# Patient Record
Sex: FEMALE | Race: BLACK OR AFRICAN AMERICAN | NOT HISPANIC OR LATINO | Employment: FULL TIME | URBAN - METROPOLITAN AREA
[De-identification: names, ages, dates, MRNs, and addresses within clinical notes are randomized per-mention and may not be internally consistent; named-entity substitution may affect disease eponyms.]

---

## 2017-01-26 ENCOUNTER — OFFICE VISIT (OUTPATIENT)
Dept: FAMILY MEDICINE | Facility: CLINIC | Age: 57
End: 2017-01-26
Payer: COMMERCIAL

## 2017-01-26 ENCOUNTER — TELEPHONE (OUTPATIENT)
Dept: FAMILY MEDICINE | Facility: CLINIC | Age: 57
End: 2017-01-26

## 2017-01-26 VITALS
HEART RATE: 75 BPM | HEIGHT: 65 IN | WEIGHT: 293 LBS | OXYGEN SATURATION: 97 % | BODY MASS INDEX: 48.82 KG/M2 | DIASTOLIC BLOOD PRESSURE: 84 MMHG | SYSTOLIC BLOOD PRESSURE: 132 MMHG | RESPIRATION RATE: 18 BRPM | TEMPERATURE: 98 F

## 2017-01-26 DIAGNOSIS — J30.89 NON-SEASONAL ALLERGIC RHINITIS DUE TO OTHER ALLERGIC TRIGGER: ICD-10-CM

## 2017-01-26 DIAGNOSIS — I10 ESSENTIAL HYPERTENSION: ICD-10-CM

## 2017-01-26 DIAGNOSIS — M25.551 RIGHT HIP PAIN: Primary | ICD-10-CM

## 2017-01-26 DIAGNOSIS — F45.41 STRESS HEADACHES: ICD-10-CM

## 2017-01-26 DIAGNOSIS — E55.9 VITAMIN D DEFICIENCY: ICD-10-CM

## 2017-01-26 PROBLEM — J30.9 ALLERGIC RHINITIS: Status: ACTIVE | Noted: 2017-01-26

## 2017-01-26 PROCEDURE — 99999 PR PBB SHADOW E&M-EST. PATIENT-LVL IV: CPT | Mod: PBBFAC,,, | Performed by: FAMILY MEDICINE

## 2017-01-26 PROCEDURE — 1159F MED LIST DOCD IN RCRD: CPT | Mod: S$GLB,,, | Performed by: FAMILY MEDICINE

## 2017-01-26 PROCEDURE — 3079F DIAST BP 80-89 MM HG: CPT | Mod: S$GLB,,, | Performed by: FAMILY MEDICINE

## 2017-01-26 PROCEDURE — 3075F SYST BP GE 130 - 139MM HG: CPT | Mod: S$GLB,,, | Performed by: FAMILY MEDICINE

## 2017-01-26 PROCEDURE — 99214 OFFICE O/P EST MOD 30 MIN: CPT | Mod: S$GLB,,, | Performed by: FAMILY MEDICINE

## 2017-01-26 RX ORDER — ERGOCALCIFEROL 1.25 MG/1
50000 CAPSULE ORAL
Qty: 6 CAPSULE | Refills: 0 | Status: SHIPPED | OUTPATIENT
Start: 2017-01-26 | End: 2017-03-09 | Stop reason: SDUPTHER

## 2017-01-26 RX ORDER — MELOXICAM 15 MG/1
15 TABLET ORAL DAILY
Qty: 30 TABLET | Refills: 2 | Status: SHIPPED | OUTPATIENT
Start: 2017-01-26 | End: 2017-11-29 | Stop reason: SDUPTHER

## 2017-01-26 NOTE — PROGRESS NOTES
Chief Complaint   Patient presents with    Headache     couple of weeks    Hip Pain     R hip on going       HPI  Bambi Carrera is a 56 y.o. female with multiple medical diagnoses as listed in the medical history and problem list that presents for evaluation of right pain that has been present for several months and worsening over the past three weeks. She did fall while bringing groceries in the house and she fell forward and landed on her left knee. The pain in her hip is worse with lying down. It can be intense and she does take ibuprofen for this.    She has been having headaches for the past three weeks that occur frontally. Initially they were occurring daily but are now every other day. No vision changes or light sensitivity, no nausea/vomiting. She has no hx of migraine headaches and has been having some stress with a new relationship she is involved in.    PAST MEDICAL HISTORY:  Past Medical History   Diagnosis Date    Borderline diabetic     Depression     Hypertension     Menopause        PAST SURGICAL HISTORY:  Past Surgical History   Procedure Laterality Date    Lasik Bilateral 07/15/2016       SOCIAL HISTORY:  Social History     Social History    Marital status: Single     Spouse name: N/A    Number of children: N/A    Years of education: N/A     Occupational History    Not on file.     Social History Main Topics    Smoking status: Never Smoker    Smokeless tobacco: Never Used    Alcohol use Not on file    Drug use: Not on file    Sexual activity: Not on file     Other Topics Concern    Not on file     Social History Narrative       FAMILY HISTORY:  Family History   Problem Relation Age of Onset    Cancer Mother        ALLERGIES AND MEDICATIONS: updated and reviewed.  Review of patient's allergies indicates:  No Known Allergies  Current Outpatient Prescriptions   Medication Sig Dispense Refill    cetirizine (ZYRTEC) 10 MG tablet TAKE ONE TABLET BY MOUTH ONCE DAILY 30 tablet 5     "ergocalciferol (VITAMIN D2) 50,000 unit Cap Take 1 capsule (50,000 Units total) by mouth every 7 days. 6 capsule 0    fluticasone (FLONASE) 50 mcg/actuation nasal spray 1 spray by Each Nare route 2 (two) times daily. 1 Bottle 5    hydrochlorothiazide (HYDRODIURIL) 25 MG tablet TAKE ONE TABLET BY MOUTH ONCE DAILY 90 tablet 0    meloxicam (MOBIC) 15 MG tablet Take 1 tablet (15 mg total) by mouth once daily. 30 tablet 2     Current Facility-Administered Medications   Medication Dose Route Frequency Provider Last Rate Last Dose    methylPREDNISolone acetate injection 80 mg  80 mg Intra-articular 1 time in Clinic/HOD MD BROOKS Ordonez  Review of Systems   Constitutional: Negative for chills, diaphoresis, fatigue, fever and unexpected weight change.   HENT: Negative for rhinorrhea, sinus pressure, sore throat and tinnitus.    Eyes: Negative for photophobia and visual disturbance.   Respiratory: Negative for cough, shortness of breath and wheezing.    Cardiovascular: Negative for chest pain and palpitations.   Gastrointestinal: Negative for abdominal pain, blood in stool, constipation, diarrhea, nausea and vomiting.   Genitourinary: Negative for dysuria, flank pain, frequency and vaginal discharge.   Musculoskeletal: Positive for arthralgias and myalgias. Negative for joint swelling.   Skin: Negative for rash.   Neurological: Negative for speech difficulty, weakness, light-headedness and headaches.   Psychiatric/Behavioral: Negative for behavioral problems and dysphoric mood.       Physical Exam  Vitals:    01/26/17 0924   BP: 132/84   Pulse: 75   Resp: 18   Temp: 97.6 °F (36.4 °C)    Body mass index is 50.35 kg/(m^2).  Weight: (!) 137.2 kg (302 lb 9.3 oz)   Height: 5' 5" (165.1 cm)     Physical Exam   Constitutional: She is oriented to person, place, and time. She appears well-developed and well-nourished. No distress.   HENT:   Head: Normocephalic and atraumatic.   Eyes: EOM are normal.   Neck: Neck " supple.   Cardiovascular: Normal rate and regular rhythm.  Exam reveals no gallop and no friction rub.    No murmur heard.  Pulmonary/Chest: Effort normal and breath sounds normal. No respiratory distress. She has no wheezes. She has no rales.   Abdominal: Soft. Bowel sounds are normal. She exhibits no distension and no mass. There is no tenderness. There is no rebound and no guarding.   Musculoskeletal:   Right hip pain with palpation over upper buttock muscle, pain with internal rotation of hip while flexed, no pain with external rotation   Lymphadenopathy:     She has no cervical adenopathy.   Neurological: She is alert and oriented to person, place, and time.   Skin: Skin is warm and dry. No rash noted.   Psychiatric: She has a normal mood and affect. Her behavior is normal.   Nursing note and vitals reviewed.      Health Maintenance       Date Due Completion Date    Colonoscopy 4/1/2010 ---    Influenza Vaccine 8/1/2016 3/9/2016 (Declined)    Override on 3/9/2016: Declined    Pap Smear 6/3/2017 6/3/2014 (Done)    Override on 6/3/2014: Done (normal)    Mammogram 6/10/2017 6/10/2015 (Done)    Override on 6/10/2015: Done (normal)    Override on 6/10/2014: Done (normal)    Lipid Panel 3/9/2021 3/9/2016    TETANUS VACCINE 6/8/2026 6/8/2016    Override on 4/1/2005: Done            ASSESSMENT     1. Right hip pain    2. Stress headaches    3. Essential hypertension    4. Vitamin D deficiency    5. Non-seasonal allergic rhinitis due to other allergic trigger        PLAN:     Right hip pain  -PT and ortho consult due to chronic nature of pain  -     Ambulatory referral to Orthopedics  -     Ambulatory consult to Physical Therapy    Stress headaches  -discussed stress management    Essential hypertension  -stable on current regimen    Vitamin D deficiency  -     ergocalciferol (VITAMIN D2) 50,000 unit Cap; Take 1 capsule (50,000 Units total) by mouth every 7 days.  Dispense: 6 capsule; Refill: 0    Non-seasonal allergic  rhinitis due to other allergic trigger  -may take claritin, allegra or use flonase OTC            Natalia Martin MD  01/30/2017 10:07 AM        Return in about 6 weeks (around 3/9/2017) for Follow up.

## 2017-01-26 NOTE — MR AVS SNAPSHOT
[facility]  [address]  [place] [address]  Phone: [phone]  Fax: [phone]                  Bambi Carrera   2017 9:20 AM   Office Visit    Description:  Female : 1960   Provider:  Natalia Martin MD   Department:  [facility]           Reason for Visit     Headache     Hip Pain           Diagnoses this Visit        Comments    Right hip pain    -  Primary     Stress headaches         Essential hypertension         Vitamin D deficiency         Non-seasonal allergic rhinitis due to other allergic trigger                To Do List           Goals (5 Years of Data)     None      Follow-Up and Disposition     Return in about 6 weeks (around 3/9/2017) for Follow up.       These Medications        Disp Refills Start End    ergocalciferol (VITAMIN D2) 50,000 unit Cap 6 capsule 0 2017     Take 1 capsule (50,000 Units total) by mouth every 7 days. - Oral    Pharmacy: [facility] [address] [place] [address]. Ph #: [phone]       meloxicam (MOBIC) 15 MG tablet 30 tablet 2 2017    Take 1 tablet (15 mg total) by mouth once daily. - Oral    Pharmacy: [facility] [address] [place] [address]. Ph #: [phone]         [facility]     [facility] Nurse Care Line -  Assistance  Registered nurses in the [facility] provide clinical advisement, health education, appointment booking, and other advisory services.  Call for this free service at [phone].             Medications           Message regarding Medications     Verify the changes and/or additions to your medication regime listed below are the same as discussed with your clinician today.  If any of these changes or additions are incorrect, please notify your healthcare provider.        START taking these NEW medications        Refills    meloxicam (MOBIC) 15 MG tablet 2    Sig: Take 1 tablet (15 mg total) by mouth  "once daily.    Class: Normal    Route: Oral      CHANGE how you are taking these medications     Start Taking Instead of    ergocalciferol (VITAMIN D2) 50,000 unit Cap VITAMIN D2 50,000 unit capsule    Dosage:  Take 1 capsule (50,000 Units total) by mouth every 7 days. Dosage:  TAKE ONE CAPSULE BY MOUTH ONCE A WEEK    Reason for Change:  Reorder            Verify that the below list of medications is an accurate representation of the medications you are currently taking.  If none reported, the list may be blank. If incorrect, please contact your healthcare provider. Carry this list with you in case of emergency.           Current Medications     cetirizine (ZYRTEC) 10 MG tablet TAKE ONE TABLET BY MOUTH ONCE DAILY    ergocalciferol (VITAMIN D2) 50,000 unit Cap Take 1 capsule (50,000 Units total) by mouth every 7 days.    fluticasone (FLONASE) 50 mcg/actuation nasal spray 1 spray by Each Nare route 2 (two) times daily.    hydrochlorothiazide (HYDRODIURIL) 25 MG tablet TAKE ONE TABLET BY MOUTH ONCE DAILY    meloxicam (MOBIC) 15 MG tablet Take 1 tablet (15 mg total) by mouth once daily.           Clinical Reference Information           Vital Signs - Last Recorded  Most recent update: 1/26/2017  9:25 AM by Isaiah Portillo MA    BP Pulse Temp Resp Ht Wt    132/84 (BP Location: Left arm, Patient Position: Sitting, BP Method: Manual) 75 97.6 °F (36.4 °C) (Oral) 18 5' 5" (1.651 m) (!) 137.2 kg (302 lb 9.3 oz)    SpO2 BMI             97% 50.35 kg/m2         Blood Pressure          Most Recent Value    BP  132/84      Allergies as of 1/26/2017     No Known Allergies      Immunizations Administered on Date of Encounter - 1/26/2017     None      Orders Placed During Today's Visit      Normal Orders This Visit    Ambulatory referral to Orthopedics       "

## 2017-01-27 ENCOUNTER — TELEPHONE (OUTPATIENT)
Dept: FAMILY MEDICINE | Facility: CLINIC | Age: 57
End: 2017-01-27

## 2017-01-27 NOTE — TELEPHONE ENCOUNTER
Patient has an appointment 2/9/17 10:30am with Dr. Cavazos at the main [facility], patient was notified.

## 2017-02-06 ENCOUNTER — CLINICAL SUPPORT (OUTPATIENT)
Dept: REHABILITATION | Facility: HOSPITAL | Age: 57
End: 2017-02-06
Attending: FAMILY MEDICINE
Payer: COMMERCIAL

## 2017-02-06 DIAGNOSIS — M25.551 RIGHT HIP PAIN: ICD-10-CM

## 2017-02-06 DIAGNOSIS — M25.60 JOINT STIFFNESS: ICD-10-CM

## 2017-02-06 DIAGNOSIS — R26.2 DIFFICULTY WALKING: ICD-10-CM

## 2017-02-06 DIAGNOSIS — M62.81 MUSCLE WEAKNESS: ICD-10-CM

## 2017-02-06 PROCEDURE — 97110 THERAPEUTIC EXERCISES: CPT | Mod: PN

## 2017-02-06 PROCEDURE — 97161 PT EVAL LOW COMPLEX 20 MIN: CPT | Mod: PN

## 2017-02-06 PROCEDURE — 97140 MANUAL THERAPY 1/> REGIONS: CPT | Mod: PN

## 2017-02-06 NOTE — PLAN OF CARE
OUTPATIENT PHYSICAL THERAPY   PATIENT EVALUATION  Onset Date: 2 weeks  Primary Diagnosis:   1. Right hip pain     2. Joint stiffness     3. Muscle weakness     4. Difficulty walking       Treatment Diagnosis: see above  Past Medical History   Diagnosis Date    Borderline diabetic     Depression     Hypertension     Menopause      Precautions: none  Prior Therapy: None  Medications: Bambi Carrera has a current medication list which includes the following prescription(s): cetirizine, ergocalciferol, fluticasone, hydrochlorothiazide, and meloxicam, and the following Facility-Administered Medications: methylprednisolone acetate.  Nutrition:  Obese    Prior Level of Function: Independent  Social History: computer work  Place of Residence (Steps/Adaptations): 18 stairs      Subjective     Bambi Carrera is a 56 year old female presenting with c/o right hip and low back pain without right LE radiculopathy.  She reports a traumatic onset status post fall onto her knees 2 weeks ago. She denies a prior history of falls.   She states she had lower back pain prior to the fall for several years. She denies any recent imaging. Her goal is to return to pain free prior level of function including walking more and riding her bike.     Pain:  Location: right hip, right lumbar    Activities Which Increase Pain: lying, prolonged walking, sweeping, standing at sink (5 min),bending, squatting  Activities Which Decrease Pain: hot water.   Pain Scale: 1/10 at best 1/10 now  10/10 at worst      Objective     Observation: pt stands with right side slightly higher than the contralateral left  Gait: Pt presents ambulating with a slight antalgic gait, decreased stance on right LE.   Palpation: moderate tenderness on palpation of right lumbar paraspinals, QL, piriformis, greater trochanter and IT band      Range of Motion/Strength:      Lumbar AROM: Degrees   Flexion 50   Extension 10   Right side bending 20   Left side bending 15    Lumbar quadrant reveals: right lateral flexion, extension    AROM: Bilateral LE: Grossly WFL  MMT:  Right LE: 4/5   Left LE: 4/5  Abdominal Strength: 3+/5    Mobility: L/S p/a grade 2-  Flexibility: hip flexor length:fair      Hamstring Length:fair    Bed Mobility:Independent  Transfers: Independent    Special Tests:   -LLD: right > left 1cm  -Slump: Negative  -SLR: negative  -Hip scour: negative  -Horace's: Negative  -SIJ gapping and compression: Negative    Treatment:   Pt received therapeutic exercises to develop strength, endurance, ROM, flexibility, posture and core stabilization for 20 minutes including:  -LTR x 20  -piriformis stretch 20 sec x 4  -pelvic tilts x 20: np  -TrA recruitment x 5 sec 2 x 10: np  -QL stretch 20 sec x 4  -ball squeeze x 20    Pt received manual therapy to improve mobility for 10 minutes:  -STM with roller stick to piriformis, QL, IT band  -MET right innominate posterior, shotgun      Pt was instructed in and given a home exercise program consisting of the above activities.       Assessment     Pt presents with signs and symptoms consistent with referring diagnosis. Evaluation has determined a decrease in functional status and subjective and objective deficits that can be addressed by physical therapy intervention. Pt demonstrates pain limiting functional activities. Decreased flexibility and strength limiting normal movement patterns. Decreased segmental motion. Decreased postural strength and awareness. Positive special testing. Decreased participation in functional and recreational activities. Subjective and objective measures are addressed by goals in the plan of care.  Patient/family are involved in the development of these goals. Patient/family are educated about current injury and treatment. Pt demonstrates no additional cultural, spiritual or educational need and currently has no barriers to learning.      Pt responded well to treatment today. Pt is a good candidate for  skilled physical therapy intervention and has a good prognosis and is motivated to return to functional and  recreational activities.    Rehab Potential: good     History  Co-morbidities and personal factors that may impact the plan of care Examination  Body Structures and Functions, activity limitations and participation restrictions that may impact the plan of care Clinical Presentation   Decision Making/ Complexity Score   Co-morbidities:   obesity              Personal Factors:   None Body Regions: right hip, right L/S    Body Systems: musculoskeletal          Activity limitations:  Prolonged walking, prolonged standing, bending, lying down      Participation Restrictions: sleeping, household chores, walking for exercise          Stable, uncomplicated Low         Short Term Goals (4 Weeks):     1.Pt to increase strength by a 1/2 grade of muscles test to allow for improvement in functional activities such as performing chores.  2.Pt to improve range of motion by 25% to allow for improved functional mobility to allow for improvement in IADLs.   3.Pt to report compliance with HEP and demonstrate proper exercise technique to PT to show competence with self management of condition.  4.Decrease pain by 25% during functional activities.    Long Term Goals (12 Weeks):     1. Increase ROM to allow improved joint biomechanics during functional activities.   2.Increase trunk and lower extremity strength to within normal limits during functional activities.   3. Independent with home exercise program.   4. Full return to functional activities with manageable complaints.  5. Patient to demonstrate improved posture and body mechanics.  6. Decrease pain by 75% during functional activities.    CMS Impairment/Limitation/Restriction for FOTO Hip Survey  Status Limitation G-Code CMS Severity Modifier  Intake 71% 29% Current Status CJ - At least 20 percent but less than 40 percent  Predicted 77% 23% Goal Status+ CJ - At least 20  percent but less than 40 percent  PT goal 1-20% impaired      Plan     Certification Period: 2/6/17 to 5/6/17    Recommended Treatment Plan: 2-3 times per week for 12 weeks with treatments to consist of:  Neuromuscular and postural re-education,  training, therapeutic exercise, therapeutic activities,balance training, manual therapy, soft tissue mobilization, ROM exercises, Cardiovascular, Postural stabilization, manual traction, spinal mobilization, moist heat, cryotherapy, electrical stimulation, ultrasound, home exercise education and planning.      Therapist: Rell Mcconnell, PT

## 2017-02-06 NOTE — PROGRESS NOTES
TIME RECORD    Date: 02/06/2017    Start Time:  10:00  Stop Time:  11:00      Total Timed Minutes:  60 minutes      OUTPATIENT PHYSICAL THERAPY   PATIENT EVALUATION  Onset Date: 2 weeks  Primary Diagnosis:   1. Right hip pain     2. Joint stiffness     3. Muscle weakness     4. Difficulty walking       Treatment Diagnosis: see above  Past Medical History   Diagnosis Date    Borderline diabetic     Depression     Hypertension     Menopause      Precautions: none  Prior Therapy: None  Medications: Bambi Carrera has a current medication list which includes the following prescription(s): cetirizine, ergocalciferol, fluticasone, hydrochlorothiazide, and meloxicam, and the following Facility-Administered Medications: methylprednisolone acetate.  Nutrition:  Obese    Prior Level of Function: Independent  Social History: computer work  Place of Residence (Steps/Adaptations): 18 stairs      Subjective     Bambi Carrera is a 56 year old female presenting with c/o right hip and low back pain without right LE radiculopathy.  She reports a traumatic onset status post fall onto her knees 2 weeks ago. She denies a prior history of falls.   She states she had lower back pain prior to the fall for several years. She denies any recent imaging. Her goal is to return to pain free prior level of function including walking more and riding her bike.     Pain:  Location: right hip, right lumbar    Activities Which Increase Pain: lying, prolonged walking, sweeping, standing at sink (5 min),bending, squatting  Activities Which Decrease Pain: hot water.   Pain Scale: 1/10 at best 1/10 now  10/10 at worst      Objective     Observation: pt stands with right side slightly higher than the contralateral left  Gait: Pt presents ambulating with a slight antalgic gait, decreased stance on right LE.   Palpation: moderate tenderness on palpation of right lumbar paraspinals, QL, piriformis, greater trochanter and IT band      Range of  Motion/Strength:      Lumbar AROM: Degrees   Flexion 50   Extension 10   Right side bending 20   Left side bending 15   Lumbar quadrant reveals: right lateral flexion, extension    AROM: Bilateral LE: Grossly WFL  MMT:  Right LE: 4/5   Left LE: 4/5  Abdominal Strength: 3+/5    Mobility: L/S p/a grade 2-  Flexibility: hip flexor length:fair      Hamstring Length:fair    Bed Mobility:Independent  Transfers: Independent    Special Tests:   -LLD: right > left 1cm  -Slump: Negative  -SLR: negative  -Hip scour: negative  -Horace's: Negative  -SIJ gapping and compression: Negative    Treatment:   Pt received therapeutic exercises to develop strength, endurance, ROM, flexibility, posture and core stabilization for 20 minutes including:  -LTR x 20  -piriformis stretch 20 sec x 4  -pelvic tilts x 20: np  -TrA recruitment x 5 sec 2 x 10: np  -QL stretch 20 sec x 4  -ball squeeze x 20    Pt received manual therapy to improve mobility for 10 minutes:  -STM with roller stick to piriformis, QL, IT band  -MET right innominate posterior, shotgun      Pt was instructed in and given a home exercise program consisting of the above activities.       Assessment     Pt presents with signs and symptoms consistent with referring diagnosis. Evaluation has determined a decrease in functional status and subjective and objective deficits that can be addressed by physical therapy intervention. Pt demonstrates pain limiting functional activities. Decreased flexibility and strength limiting normal movement patterns. Decreased segmental motion. Decreased postural strength and awareness. Positive special testing. Decreased participation in functional and recreational activities. Subjective and objective measures are addressed by goals in the plan of care.  Patient/family are involved in the development of these goals. Patient/family are educated about current injury and treatment. Pt demonstrates no additional cultural, spiritual or educational need  and currently has no barriers to learning.      Pt responded well to treatment today. Pt is a good candidate for skilled physical therapy intervention and has a good prognosis and is motivated to return to functional and  recreational activities.    Rehab Potential: good     History  Co-morbidities and personal factors that may impact the plan of care Examination  Body Structures and Functions, activity limitations and participation restrictions that may impact the plan of care Clinical Presentation   Decision Making/ Complexity Score   Co-morbidities:   obesity              Personal Factors:   None Body Regions: right hip, right L/S    Body Systems: musculoskeletal          Activity limitations:  Prolonged walking, prolonged standing, bending, lying down      Participation Restrictions: sleeping, household chores, walking for exercise          Stable, uncomplicated Low         Short Term Goals (4 Weeks):     1.Pt to increase strength by a 1/2 grade of muscles test to allow for improvement in functional activities such as performing chores.  2.Pt to improve range of motion by 25% to allow for improved functional mobility to allow for improvement in IADLs.   3.Pt to report compliance with HEP and demonstrate proper exercise technique to PT to show competence with self management of condition.  4.Decrease pain by 25% during functional activities.    Long Term Goals (12 Weeks):     1. Increase ROM to allow improved joint biomechanics during functional activities.   2.Increase trunk and lower extremity strength to within normal limits during functional activities.   3. Independent with home exercise program.   4. Full return to functional activities with manageable complaints.  5. Patient to demonstrate improved posture and body mechanics.  6. Decrease pain by 75% during functional activities.    CMS Impairment/Limitation/Restriction for FOTO Hip Survey  Status Limitation G-Code CMS Severity Modifier  Intake 71% 29%  Current Status CJ - At least 20 percent but less than 40 percent  Predicted 77% 23% Goal Status+ CJ - At least 20 percent but less than 40 percent  PT goal 1-20% impaired      Plan     Certification Period: 2/6/17 to 5/6/17    Recommended Treatment Plan: 2-3 times per week for 12 weeks with treatments to consist of:  Neuromuscular and postural re-education,  training, therapeutic exercise, therapeutic activities,balance training, manual therapy, soft tissue mobilization, ROM exercises, Cardiovascular, Postural stabilization, manual traction, spinal mobilization, moist heat, cryotherapy, electrical stimulation, ultrasound, home exercise education and planning.      Therapist: Rell Mcconnell, PT

## 2017-02-09 ENCOUNTER — HOSPITAL ENCOUNTER (OUTPATIENT)
Dept: RADIOLOGY | Facility: HOSPITAL | Age: 57
Discharge: HOME | End: 2017-02-09
Attending: ORTHOPAEDIC SURGERY
Payer: COMMERCIAL

## 2017-02-09 ENCOUNTER — OFFICE VISIT (OUTPATIENT)
Dept: ORTHOPEDICS | Facility: CLINIC | Age: 57
End: 2017-02-09
Payer: COMMERCIAL

## 2017-02-09 VITALS
HEART RATE: 77 BPM | BODY MASS INDEX: 48.82 KG/M2 | WEIGHT: 293 LBS | DIASTOLIC BLOOD PRESSURE: 87 MMHG | HEIGHT: 65 IN | SYSTOLIC BLOOD PRESSURE: 122 MMHG

## 2017-02-09 DIAGNOSIS — M70.71 BURSITIS OF HIP, RIGHT: ICD-10-CM

## 2017-02-09 DIAGNOSIS — M25.559 ARTHRALGIA OF HIP, UNSPECIFIED LATERALITY: Primary | ICD-10-CM

## 2017-02-09 DIAGNOSIS — M25.559 ARTHRALGIA OF HIP, UNSPECIFIED LATERALITY: ICD-10-CM

## 2017-02-09 PROCEDURE — 99243 OFF/OP CNSLTJ NEW/EST LOW 30: CPT | Mod: S$GLB,,, | Performed by: ORTHOPAEDIC SURGERY

## 2017-02-09 PROCEDURE — 72170 X-RAY EXAM OF PELVIS: CPT | Mod: 26,,, | Performed by: RADIOLOGY

## 2017-02-09 PROCEDURE — 72170 X-RAY EXAM OF PELVIS: CPT | Mod: TC

## 2017-02-09 PROCEDURE — 99999 PR PBB SHADOW E&M-EST. PATIENT-LVL III: CPT | Mod: PBBFAC,,, | Performed by: ORTHOPAEDIC SURGERY

## 2017-02-09 NOTE — LETTER
February 9, 2017        Natalia Martin MD  [address] [facility]  [place] [address]             [facility] Orthopedics  [address]  [place] [address]  Phone: [phone]   Patient: Bambi Carrera   MR Number: [id number]   YOB: 1960   Date of Visit: 2/9/2017       Dear Dr. Martin:    Thank you for referring Bambi Carrera to me for evaluation. Below are the relevant portions of my assessment and plan of care.     She has a imaging which was reviewed with the patient and shows .mild osteoarthritis of the hips.                                                                               IMPRESSION: Trochanteric bursitis  right hip.    The conservative options including NSAIDs, activity modification, physical therapy, and corticosteroid injection were discussed.  She has begun PT and I have encouraged continued IT stretching.  She will continue mobic.  Will consider injection if does not improve.         If you have questions, please do not hesitate to call me. I look forward to following Bambi along with you.    Sincerely,      Oli Cavazos MD           CC  No Recipients

## 2017-02-09 NOTE — LETTER
February 9, 2017      Natalia Martin MD  [address]  [place] [address]           [facility]  [address]  [place] [address]  Phone: [phone]          Patient: Bambi Carrera   MR Number: [id number]   YOB: 1960   Date of Visit: 2/9/2017       Dear Dr. Natalia Martin:    Thank you for referring Bambi Carrera to me for evaluation. Attached you will find relevant portions of my assessment and plan of care.    If you have questions, please do not hesitate to call me. I look forward to following Bambi Carrera along with you.    Sincerely,    Oli Cavazos MD    Enclosure  CC:  No Recipients    If you would like to receive this communication electronically, please contact [email or web address] or [phone] to request more information on [employer] Link access.    For providers and/or their staff who would like to refer a patient to [facility], please contact us through our one-stop-shop provider referral line, [facility] , at [phone].    If you feel you have received this communication in error or would no longer like to receive these types of communications, please e-mail [email or web address]

## 2017-02-09 NOTE — PROGRESS NOTES
CHIEF COMPLAINT: Right  Hip pain.                   Seen in consultation from Dr. Martin                              HISTORY OF PRESENT ILLNESS:  The patient is a 56 y.o. female  who presents  for evaluation of her right hip.     Pain:positive    Description: burning    Duration: 3 months    Night pain: Positive (worst at night and with extended sitting)    Location: .lateral    Tried NSAIDS's (OTC or prescription): .Yes - helping    She  presents for further treatment recommendations.    She denies radicular pain.  She denies distal paresthesias, lower extremity edema, or calf pain concerning for vascular claudication.  She has no history of discreet prior trauma.                                                                                                                       Past Medical History   Diagnosis Date    Borderline diabetic     Depression     Hypertension     Menopause        Past Surgical History   Procedure Laterality Date    Lasik Bilateral 07/15/2016       Review of patient's allergies indicates:  No Known Allergies    SOCIAL HISTORY:  Reviewed per EPIC history for tobacco or alcohol use and she   is an active  56 y.o.  female                                                                             FAMILY MEDICAL HISTORY:  family history includes Cancer in her mother.                                                                                                                              PHYSICAL EXAMINATION:                                                        GENERAL:  A well-developed, well-nourished 56 y.o. female who is alert and       oriented in no acute distress.      Gait: She  walks with a normal gait.                   EXTREMITIES:  Examination of lower extremities reveals there is no visible mass or deformity.    Left hip:  ROM (IR/ER) 20/30    .Negative trochanteric pain.    .Negative straight leg raise.    No warmth.    No erythema    Right hip:  ROM(IR/ER)  20/30    Positive trochanteric pain.    Negative straight leg raise.    No warmth    No erythema    The skin over both lower extremities is normal and unremarkable.  She has a  painless range of motion of the knees and ankles bilaterally.   Sensation is intact in both lower extremities.    There are no motor deficits in the lower extremities bilaterally.   Pedal pulses are palpable distally bilaterally.    She has no calf tenderness to palpation nor edema.                                                                               She has a imaging which was reviewed with the patient and shows .mild osteoarthritis of the hips.                                                                               IMPRESSION: Trochanteric bursitis  right hip.    The conservative options including NSAIDs, activity modification, physical therapy, and corticosteroid injection were discussed.  She has begun PT and I have encouraged continued IT stretching.  She will continue mobic.  Will consider injection if does not improve.

## 2017-02-13 ENCOUNTER — CLINICAL SUPPORT (OUTPATIENT)
Dept: REHABILITATION | Facility: HOSPITAL | Age: 57
End: 2017-02-13
Attending: FAMILY MEDICINE
Payer: COMMERCIAL

## 2017-02-13 DIAGNOSIS — R26.2 DIFFICULTY WALKING: ICD-10-CM

## 2017-02-13 DIAGNOSIS — M25.60 JOINT STIFFNESS: ICD-10-CM

## 2017-02-13 DIAGNOSIS — M25.551 RIGHT HIP PAIN: Primary | ICD-10-CM

## 2017-02-13 DIAGNOSIS — M62.81 MUSCLE WEAKNESS: ICD-10-CM

## 2017-02-13 PROCEDURE — 97110 THERAPEUTIC EXERCISES: CPT | Mod: PN

## 2017-02-13 PROCEDURE — 97140 MANUAL THERAPY 1/> REGIONS: CPT | Mod: PN

## 2017-02-13 NOTE — PROGRESS NOTES
Name: Bambi Carrera  [facility] Number: [id number]  Date of Treatment: 02/13/2017   Diagnosis:   Encounter Diagnoses   Name Primary?    Right hip pain Yes    Joint stiffness     Muscle weakness     Difficulty walking        Time in: 9:30  Time Out: 10:30    Total Treatment Time: 60 minutes (1 on 1 with PT for 45 minutes)      Subjective:    Bambi reports improvement of symptoms.  Patient reports their pain to be 1/10 on a 0-10 scale with 0 being no pain and 10 being the worst pain imaginable.    Objective    Treatment:   Pt received therapeutic exercises to develop strength, endurance, ROM, flexibility, posture and core stabilization for 45 minutes including:    -LTR x 20  -piriformis stretch 20 sec x 4  -pelvic tilts x 20:  -TrA recruitment x 5 sec 2 x 10  -TrA recruitment with hip flexion  -TrA recruitment: with hip extension  -QL stretch 20 sec x 4  -ball squeeze x 20  -left sidelying ITband stretch 20 sec x 4    Pt received manual therapy to improve mobility for 10 minutes:  -STM with roller stick to piriformis, QL, IT band  -MET right innominate posterior, shotgun    Pt was instructed in and given a home exercise program consisting of the above activities.       Assessment     No c/o increased discomfort with prescribed activities. Good response to progression of stabilization therex. Pt demontrates a good understanding of the education provided and a good return demonstration of activities. Pt  Requires skilled supervision to complete and progress home program.    Medical necessity is demonstrated by the following IMPAIRMENTS:  -pain   -decreased range of motion/flexibility   -decreased muscle strength   -impaired function   -decreased ADL ability  -decreased recreational ability     Patient is making good progress towards established goals.      Short Term Goals (4 Weeks):     1.Pt to increase strength by a 1/2 grade of muscles test to allow for improvement in functional activities such as performing  chores.  2.Pt to improve range of motion by 25% to allow for improved functional mobility to allow for improvement in IADLs.   3.Pt to report compliance with HEP and demonstrate proper exercise technique to PT to show competence with self management of condition.  4.Decrease pain by 25% during functional activities.    Long Term Goals (12 Weeks):     1. Increase ROM to allow improved joint biomechanics during functional activities.   2.Increase trunk and lower extremity strength to within normal limits during functional activities.   3. Independent with home exercise program.   4. Full return to functional activities with manageable complaints.  5. Patient to demonstrate improved posture and body mechanics.  6. Decrease pain by 75% during functional activities.    CMS Impairment/Limitation/Restriction for FOTO Hip Survey  Status Limitation G-Code CMS Severity Modifier  Intake 71% 29% Current Status CJ - At least 20 percent but less than 40 percent  Predicted 77% 23% Goal Status+ CJ - At least 20 percent but less than 40 percent  PT goal 1-20% impaired      Plan     Continue with established Plan of Care towards PT goals.     Certification Period: 2/6/17 to 5/6/17    Recommended Treatment Plan: 2-3 times per week for 12 weeks with treatments to consist of:  Neuromuscular and postural re-education,  training, therapeutic exercise, therapeutic activities,balance training, manual therapy, soft tissue mobilization, ROM exercises, Cardiovascular, Postural stabilization, manual traction, spinal mobilization, moist heat, cryotherapy, electrical stimulation, ultrasound, home exercise education and planning.      Therapist: Rell Mcconnell, PT

## 2017-02-21 ENCOUNTER — TELEPHONE (OUTPATIENT)
Dept: FAMILY MEDICINE | Facility: CLINIC | Age: 57
End: 2017-02-21

## 2017-02-21 DIAGNOSIS — I10 ESSENTIAL HYPERTENSION: ICD-10-CM

## 2017-02-21 DIAGNOSIS — Z12.31 ENCOUNTER FOR SCREENING MAMMOGRAM FOR MALIGNANT NEOPLASM OF BREAST: Primary | ICD-10-CM

## 2017-02-21 DIAGNOSIS — J30.9 ALLERGIC RHINITIS: ICD-10-CM

## 2017-02-21 RX ORDER — HYDROCHLOROTHIAZIDE 25 MG/1
TABLET ORAL
Qty: 90 TABLET | Refills: 0 | Status: SHIPPED | OUTPATIENT
Start: 2017-02-21 | End: 2017-05-30 | Stop reason: SDUPTHER

## 2017-02-21 RX ORDER — CETIRIZINE HYDROCHLORIDE 10 MG/1
TABLET ORAL
Qty: 30 TABLET | Refills: 0 | Status: SHIPPED | OUTPATIENT
Start: 2017-02-21 | End: 2017-03-24 | Stop reason: SDUPTHER

## 2017-02-21 NOTE — TELEPHONE ENCOUNTER
----- Message from Niurka Macias sent at 2/20/2017  8:09 AM CST -----  Contact: self  Pt calling to schedule a mammogram. Please call [phone]

## 2017-02-22 ENCOUNTER — HOSPITAL ENCOUNTER (OUTPATIENT)
Dept: RADIOLOGY | Facility: HOSPITAL | Age: 57
Discharge: HOME | End: 2017-02-22
Attending: FAMILY MEDICINE
Payer: COMMERCIAL

## 2017-02-22 DIAGNOSIS — Z12.31 ENCOUNTER FOR SCREENING MAMMOGRAM FOR MALIGNANT NEOPLASM OF BREAST: ICD-10-CM

## 2017-02-22 PROCEDURE — 77067 SCR MAMMO BI INCL CAD: CPT | Mod: TC

## 2017-02-22 PROCEDURE — 77063 BREAST TOMOSYNTHESIS BI: CPT | Mod: 26,,, | Performed by: RADIOLOGY

## 2017-02-22 PROCEDURE — 77067 SCR MAMMO BI INCL CAD: CPT | Mod: 26,,, | Performed by: RADIOLOGY

## 2017-03-09 ENCOUNTER — OFFICE VISIT (OUTPATIENT)
Dept: FAMILY MEDICINE | Facility: CLINIC | Age: 57
End: 2017-03-09
Payer: COMMERCIAL

## 2017-03-09 ENCOUNTER — LAB VISIT (OUTPATIENT)
Dept: LAB | Facility: HOSPITAL | Age: 57
End: 2017-03-09
Attending: FAMILY MEDICINE
Payer: COMMERCIAL

## 2017-03-09 VITALS
TEMPERATURE: 98 F | DIASTOLIC BLOOD PRESSURE: 84 MMHG | BODY MASS INDEX: 48.82 KG/M2 | HEIGHT: 65 IN | HEART RATE: 66 BPM | WEIGHT: 293 LBS | OXYGEN SATURATION: 96 % | SYSTOLIC BLOOD PRESSURE: 122 MMHG

## 2017-03-09 DIAGNOSIS — M25.551 RIGHT HIP PAIN: Primary | ICD-10-CM

## 2017-03-09 DIAGNOSIS — E55.9 VITAMIN D DEFICIENCY: ICD-10-CM

## 2017-03-09 DIAGNOSIS — M79.671 PAIN OF RIGHT HEEL: ICD-10-CM

## 2017-03-09 PROCEDURE — 36415 COLL VENOUS BLD VENIPUNCTURE: CPT | Mod: PO

## 2017-03-09 PROCEDURE — 99214 OFFICE O/P EST MOD 30 MIN: CPT | Mod: S$GLB,,, | Performed by: FAMILY MEDICINE

## 2017-03-09 PROCEDURE — 3080F DIAST BP >= 90 MM HG: CPT | Mod: S$GLB,,, | Performed by: FAMILY MEDICINE

## 2017-03-09 PROCEDURE — 99999 PR PBB SHADOW E&M-EST. PATIENT-LVL III: CPT | Mod: PBBFAC,,, | Performed by: FAMILY MEDICINE

## 2017-03-09 PROCEDURE — 82306 VITAMIN D 25 HYDROXY: CPT

## 2017-03-09 PROCEDURE — 1160F RVW MEDS BY RX/DR IN RCRD: CPT | Mod: S$GLB,,, | Performed by: FAMILY MEDICINE

## 2017-03-09 PROCEDURE — 3074F SYST BP LT 130 MM HG: CPT | Mod: S$GLB,,, | Performed by: FAMILY MEDICINE

## 2017-03-09 RX ORDER — ERGOCALCIFEROL 1.25 MG/1
50000 CAPSULE ORAL
Qty: 6 CAPSULE | Refills: 5 | Status: SHIPPED | OUTPATIENT
Start: 2017-03-09 | End: 2017-12-13 | Stop reason: SDUPTHER

## 2017-03-09 RX ORDER — DICLOFENAC SODIUM 10 MG/G
2 GEL TOPICAL 4 TIMES DAILY
Qty: 100 G | Refills: 1 | Status: SHIPPED | OUTPATIENT
Start: 2017-03-09 | End: 2017-07-10

## 2017-03-09 NOTE — PROGRESS NOTES
Chief Complaint   Patient presents with    Hip Pain     6 Week Follow Up    Headache       HPI  Bambi Carrera is a 56 y.o. female with multiple medical diagnoses as listed in the medical history and problem list that presents for follow-up for right hip pain. She did see orthopedics and was diagnosed with trochanteric bursitis. She opted for PT instead of injection and has been improving with exercises. She has now been having pain in her right heel worse in the Am when she gets out of bed. She has insoles but has not been wearing them.    She would also like to lose weight as her joint pains have been made worse by weight gain.     PAST MEDICAL HISTORY:  Past Medical History:   Diagnosis Date    Borderline diabetic     Depression     Hypertension     Menopause        PAST SURGICAL HISTORY:  Past Surgical History:   Procedure Laterality Date    LASIK Bilateral 07/15/2016       SOCIAL HISTORY:  Social History     Social History    Marital status: Single     Spouse name: N/A    Number of children: N/A    Years of education: N/A     Occupational History    Not on file.     Social History Main Topics    Smoking status: Never Smoker    Smokeless tobacco: Never Used    Alcohol use No    Drug use: No    Sexual activity: Not on file     Other Topics Concern    Not on file     Social History Narrative       FAMILY HISTORY:  Family History   Problem Relation Age of Onset    Cancer Mother        ALLERGIES AND MEDICATIONS: updated and reviewed.  Review of patient's allergies indicates:  No Known Allergies  Current Outpatient Prescriptions   Medication Sig Dispense Refill    cetirizine (ZYRTEC) 10 MG tablet TAKE ONE TABLET BY MOUTH ONCE DAILY 30 tablet 0    ergocalciferol (VITAMIN D2) 50,000 unit Cap Take 1 capsule (50,000 Units total) by mouth every 7 days. 6 capsule 5    fluticasone (FLONASE) 50 mcg/actuation nasal spray 1 spray by Each Nare route 2 (two) times daily. 1 Bottle 5    hydrochlorothiazide  "(HYDRODIURIL) 25 MG tablet TAKE ONE TABLET BY MOUTH ONCE DAILY 90 tablet 0    diclofenac sodium 1 % Gel Apply 2 g topically 4 (four) times daily. 100 g 1     Current Facility-Administered Medications   Medication Dose Route Frequency Provider Last Rate Last Dose    methylPREDNISolone acetate injection 80 mg  80 mg Intra-articular 1 time in Clinic/HOD MD BROOKS Ordonez  Review of Systems   Constitutional: Negative for chills, diaphoresis, fatigue, fever and unexpected weight change.   HENT: Negative for rhinorrhea, sinus pressure, sore throat and tinnitus.    Eyes: Negative for photophobia and visual disturbance.   Respiratory: Negative for cough, shortness of breath and wheezing.    Cardiovascular: Negative for chest pain and palpitations.   Gastrointestinal: Negative for abdominal pain, blood in stool, constipation, diarrhea, nausea and vomiting.   Genitourinary: Negative for dysuria, flank pain, frequency and vaginal discharge.   Musculoskeletal: Positive for arthralgias. Negative for joint swelling.   Skin: Negative for rash.   Neurological: Negative for speech difficulty, weakness, light-headedness and headaches.   Psychiatric/Behavioral: Negative for behavioral problems and dysphoric mood.       Physical Exam  Vitals:    03/09/17 0959   BP: (!) 132/90   BP Location: Left arm   Patient Position: Sitting   BP Method: Manual   Pulse: 66   Temp: 97.8 °F (36.6 °C)   TempSrc: Oral   SpO2: 96%   Weight: (!) 136.2 kg (300 lb 4.3 oz)   Height: 5' 5" (1.651 m)    Body mass index is 49.97 kg/(m^2).  Weight: (!) 136.2 kg (300 lb 4.3 oz)   Height: 5' 5" (165.1 cm)     Physical Exam   Constitutional: She is oriented to person, place, and time. She appears well-developed and well-nourished.   HENT:   Head: Normocephalic and atraumatic.   Eyes: EOM are normal.   Musculoskeletal:   Right heel pain with palpation, callus present also, palpable pedal pulses bilaterally, she is wearing slide shoes without a back "   Neurological: She is alert and oriented to person, place, and time.   Skin: Skin is warm and dry. No rash noted. No erythema.   Psychiatric: She has a normal mood and affect. Her behavior is normal.   Nursing note and vitals reviewed.      Health Maintenance       Date Due Completion Date    Colonoscopy 4/1/2010 ---    Pap Smear 6/3/2017 6/3/2014 (Done)    Override on 6/3/2014: Done (normal)    Mammogram 2/22/2019 2/22/2017    Override on 6/10/2015: Done (normal)    Override on 6/10/2014: Done (normal)    Lipid Panel 3/9/2021 3/9/2016    TETANUS VACCINE 6/8/2026 6/8/2016    Override on 4/1/2005: Done            ASSESSMENT     1. Right hip pain    2. Vitamin D deficiency    3. BMI 45.0-49.9, adult    4. Pain of right heel        PLAN:     Right hip pain  -recommend she continue her exercises as she is improving  Follow up with ortho if it worsens    Vitamin D deficiency  -vitamin d level  -     ergocalciferol (VITAMIN D2) 50,000 unit Cap; Take 1 capsule (50,000 Units total) by mouth every 7 days.  Dispense: 6 capsule; Refill: 5    BMI 45.0-49.9, adult  -     Ambulatory Referral to Bariatric Medicine      Pain of right heel  -recommend insoles, frozen water bottle massage  -diclofenac gel BID along with OTC lidoderm patches          Natalia Martin MD  03/09/2017 10:29 AM        Return in about 3 months (around 6/9/2017) for Follow up.

## 2017-03-09 NOTE — MR AVS SNAPSHOT
[facility]  [address] [facility]  [place] [address]  Phone: [phone]  Fax: [phone]                  Bambi Carrera   3/9/2017 10:00 AM   Office Visit    Description:  Female : 1960   Provider:  Natalia Martin MD   Department:  [facility]           Reason for Visit     Hip Pain     Headache           Diagnoses this Visit        Comments    Right hip pain    -  Primary     Vitamin D deficiency         BMI 45.0-49.9, adult                To Do List           Goals (5 Years of Data)     None      Follow-Up and Disposition     Return in about 3 months (around 2017) for Follow up.       These Medications        Disp Refills Start End    ergocalciferol (VITAMIN D2) 50,000 unit Cap 6 capsule 5 3/9/2017     Take 1 capsule (50,000 Units total) by mouth every 7 days. - Oral    Pharmacy: [facility] [address]  #: [phone]         [facility] On Call     [facility] On Call Nurse Care Line -  Assistance  Registered nurses in the [facility] On Call Center provide clinical advisement, health education, appointment booking, and other advisory services.  Call for this free service at [phone].             Medications           Message regarding Medications     Verify the changes and/or additions to your medication regime listed below are the same as discussed with your clinician today.  If any of these changes or additions are incorrect, please notify your healthcare provider.             Verify that the below list of medications is an accurate representation of the medications you are currently taking.  If none reported, the list may be blank. If incorrect, please contact your healthcare provider. Carry this list with you in case of emergency.           Current Medications     cetirizine (ZYRTEC) 10 MG tablet TAKE ONE TABLET BY MOUTH ONCE DAILY    ergocalciferol (VITAMIN D2) 50,000 unit Cap Take 1 capsule (50,000 Units  "total) by mouth every 7 days.    fluticasone (FLONASE) 50 mcg/actuation nasal spray 1 spray by Each Nare route 2 (two) times daily.    hydrochlorothiazide (HYDRODIURIL) 25 MG tablet TAKE ONE TABLET BY MOUTH ONCE DAILY           Clinical Reference Information           Your Vitals Were     BP Pulse Temp Height Weight SpO2    132/90 (BP Location: Left arm, Patient Position: Sitting, BP Method: Manual) 66 97.8 °F (36.6 °C) (Oral) 5' 5" (1.651 m) 136.2 kg (300 lb 4.3 oz) 96%    BMI                49.97 kg/m2          Blood Pressure          Most Recent Value    BP  (!)  132/90      Allergies as of 3/9/2017     No Known Allergies      Immunizations Administered on Date of Encounter - 3/9/2017     None      Orders Placed During Today's Visit      Normal Orders This Visit    Ambulatory Referral to Bariatric Medicine     Future Labs/Procedures Expected by Expires    Vitamin D  3/9/2017 3/9/2018      [facility] Sign-Up     Activating your [facility] account is as easy as 1-2-3!     1) Visit [email or web address], select Sign Up Now, enter this activation code and your date of birth, then select Next.  Activation code not generated  Current Patient Portal Status: Account disabled      2) Create a username and password to use when you visit [facility] in the future and select a security question in case you lose your password and select Next.    3) Enter your e-mail address and click Sign Up!    Additional Information  If you have questions, please e-mail [email or web address] or call [phone] to talk to our [facility] staff. Remember, [facility] is NOT to be used for urgent needs. For medical emergencies, dial 911.         Language Assistance Services     ATTENTION: Language assistance services are available, free of charge. Please call [phone].      ATENCIÓN: Si john jung, tiene a underwood disposición servicios gratuitos de asistencia lingüística. Llame al [phone].     CHÚ Ý: N?u b?n nói Ti?ng Vi?t, có các d?ch v? h? tr? " marifer clark? mi?n phí dành cho b?n. G?i s? [phone].         [facility] complies with applicable Federal civil rights laws and does not discriminate on the basis of race, color, national origin, age, disability, or sex.

## 2017-03-10 LAB — 25(OH)D3+25(OH)D2 SERPL-MCNC: 32 NG/ML

## 2017-03-13 ENCOUNTER — TELEPHONE (OUTPATIENT)
Dept: FAMILY MEDICINE | Facility: CLINIC | Age: 57
End: 2017-03-13

## 2017-03-16 NOTE — TELEPHONE ENCOUNTER
Patient has an appointment 4/7/17 2:15pm with Dr. [place] at the main [address], patient was notified.

## 2017-03-24 DIAGNOSIS — J30.9 ALLERGIC RHINITIS: ICD-10-CM

## 2017-03-24 RX ORDER — CETIRIZINE HYDROCHLORIDE 10 MG/1
TABLET ORAL
Qty: 30 TABLET | Refills: 0 | Status: SHIPPED | OUTPATIENT
Start: 2017-03-24 | End: 2017-04-05 | Stop reason: SDUPTHER

## 2017-04-05 DIAGNOSIS — J30.9 ALLERGIC RHINITIS: ICD-10-CM

## 2017-04-05 RX ORDER — CETIRIZINE HYDROCHLORIDE 10 MG/1
TABLET ORAL
Qty: 30 TABLET | Refills: 3 | Status: SHIPPED | OUTPATIENT
Start: 2017-04-05 | End: 2017-08-26 | Stop reason: SDUPTHER

## 2017-04-07 ENCOUNTER — INITIAL CONSULT (OUTPATIENT)
Dept: BARIATRICS | Facility: CLINIC | Age: 57
End: 2017-04-07
Payer: COMMERCIAL

## 2017-04-07 VITALS
WEIGHT: 293 LBS | SYSTOLIC BLOOD PRESSURE: 126 MMHG | HEIGHT: 65 IN | HEART RATE: 78 BPM | BODY MASS INDEX: 48.82 KG/M2 | DIASTOLIC BLOOD PRESSURE: 72 MMHG

## 2017-04-07 DIAGNOSIS — I10 ESSENTIAL HYPERTENSION: Primary | ICD-10-CM

## 2017-04-07 DIAGNOSIS — E66.01 OBESITY, MORBID, BMI 50 OR HIGHER: ICD-10-CM

## 2017-04-07 PROCEDURE — 99244 OFF/OP CNSLTJ NEW/EST MOD 40: CPT | Mod: S$GLB,,, | Performed by: INTERNAL MEDICINE

## 2017-04-07 PROCEDURE — 99999 PR PBB SHADOW E&M-EST. PATIENT-LVL III: CPT | Mod: PBBFAC,,, | Performed by: INTERNAL MEDICINE

## 2017-04-07 RX ORDER — DIETHYLPROPION HYDROCHLORIDE 75 MG/1
75 TABLET, EXTENDED RELEASE ORAL DAILY
Qty: 30 TABLET | Refills: 0 | Status: SHIPPED | OUTPATIENT
Start: 2017-04-07 | End: 2017-05-08 | Stop reason: SDUPTHER

## 2017-04-07 NOTE — PATIENT INSTRUCTIONS
Exercising 30 min 4 times a week.     Patient warned of common side effects of diethylpropion including anxiety, insomnia, palpitations and increased blood pressure. It was also explained that it is for short-term usage along with diet and exercise, and that stopping the medication without making lifestyle changes will result in regain of weight. Patient states understanding.    No soda, sweet tea, juices or lemonade    3 meals a day made up of the following:  Unlimited green vegetables, tomatoes, mushrooms, spaghetti squash, cauliflower, meat, poultry, seafood, eggs and hard cheeses.   Milk and plain yogurt  Dressings, seasonings, condiments, etc should have less than 2 g sugars.   beans or nuts can have 1 x a day.   1-2 servings of citrus fruits, berries, pineapple or melon a day (1/2 cup)  Avoid fried foods    No grains, rice, pasta, potatoes or bread    No soda, sweet tea, juices or lemonade    [email or web address] Action for recipes.     Weight loss medications are controlled substances.  They require routine follow up. Prescription or pills that are lost or destroyed will not be replaced.     Return in about 4 weeks (around 5/5/2017).

## 2017-04-07 NOTE — MR AVS SNAPSHOT
[facility]  [address]  [facility] [address]  Phone: [phone]  Fax: [phone]                  Bambi Carrera   2017 2:15 PM   Initial consult    Description:  Female : 1960   Provider:  Niurka Phoenix MD   Department:  [facility]           Reason for Visit     Consult           Diagnoses this Visit        Comments    Essential hypertension    -  Primary     Obesity, morbid, BMI 50 or higher                To Do List           Future Appointments        Provider Department Dept Phone    2017 9:00 AM Niurka Phoenix MD [facility] [phone]      Goals (5 Years of Data)     None      Follow-Up and Disposition     Return in about 4 weeks (around 2017).       These Medications        Disp Refills Start End    diethylpropion 75 mg TbSR 30 tablet 0 2017     Take 75 mg by mouth once daily. - Oral    Pharmacy: [facility] [address] [place] [address].  #: [phone]         [facility]     [facility] -  Assistance  Unless otherwise directed by your provider, please contact [facility], our nurse care line that is available for  assistance.     Registered nurses in the [facility] provide: appointment scheduling, clinical advisement, health education, and other advisory services.  Call: [phone] (toll free)               Medications           Message regarding Medications     Verify the changes and/or additions to your medication regime listed below are the same as discussed with your clinician today.  If any of these changes or additions are incorrect, please notify your healthcare provider.        START taking these NEW medications        Refills    diethylpropion 75 mg TbSR 0    Sig: Take 75 mg by mouth once daily.    Class: Print    Route: Oral           Verify that the below list of medications is an accurate representation  "of the medications you are currently taking.  If none reported, the list may be blank. If incorrect, please contact your healthcare provider. Carry this list with you in case of emergency.           Current Medications     cetirizine (ZYRTEC) 10 MG tablet TAKE ONE TABLET BY MOUTH ONCE DAILY    diclofenac sodium 1 % Gel Apply 2 g topically 4 (four) times daily.    diethylpropion 75 mg TbSR Take 75 mg by mouth once daily.    ergocalciferol (VITAMIN D2) 50,000 unit Cap Take 1 capsule (50,000 Units total) by mouth every 7 days.    fluticasone (FLONASE) 50 mcg/actuation nasal spray 1 spray by Each Nare route 2 (two) times daily.    hydrochlorothiazide (HYDRODIURIL) 25 MG tablet TAKE ONE TABLET BY MOUTH ONCE DAILY           Clinical Reference Information           Your Vitals Were     BP Pulse Height Weight BMI    126/72 78 5' 5" (1.651 m) 137.2 kg (302 lb 7.5 oz) 50.33 kg/m2      Blood Pressure          Most Recent Value    BP  126/72      Allergies as of 4/7/2017     No Known Allergies      Immunizations Administered on Date of Encounter - 4/7/2017     None      [facility] Sign-Up     Activating your [facility] account is as easy as 1-2-3!     1) Visit [email or web address], select Sign Up Now, enter this activation code and your date of birth, then select Next.  Activation code not generated  Current Patient Portal Status: Account disabled      2) Create a username and password to use when you visit [facility] in the future and select a security question in case you lose your password and select Next.    3) Enter your e-mail address and click Sign Up!    Additional Information  If you have questions, please e-mail [email or web address] or call [phone] to talk to our [facility] staff. Remember, [facility] is NOT to be used for urgent needs. For medical emergencies, dial 911.         Instructions    Exercising 30 min 4 times a week.     Patient warned of common side effects of diethylpropion including anxiety, insomnia, " palpitations and increased blood pressure. It was also explained that it is for short-term usage along with diet and exercise, and that stopping the medication without making lifestyle changes will result in regain of weight. Patient states understanding.    No soda, sweet tea, juices or lemonade    3 meals a day made up of the following:  Unlimited green vegetables, tomatoes, mushrooms, spaghetti squash, cauliflower, meat, poultry, seafood, eggs and hard cheeses.   Milk and plain yogurt  Dressings, seasonings, condiments, etc should have less than 2 g sugars.   beans or nuts can have 1 x a day.   1-2 servings of citrus fruits, berries, pineapple or melon a day (1/2 cup)  Avoid fried foods    No grains, rice, pasta, potatoes or bread    No soda, sweet tea, juices or lemonade    [email or web address] for recipes.     Weight loss medications are controlled substances.  They require routine follow up. Prescription or pills that are lost or destroyed will not be replaced.     Return in about 4 weeks (around 5/5/2017).         Language Assistance Services     ATTENTION: Language assistance services are available, free of charge. Please call [phone].      ATENCIÓN: Si aurela erich, tiene a underwood disposición servicios gratuitos de asistencia lingüística. Llame al [phone].     VALERIA Ý: N?u b?n nói Ti?ng Vi?t, có các d?ch v? h? tr? ngôn ng? mi?n phí dành cho b?n. G?i s? [phone].         [facility] complies with applicable Federal civil rights laws and does not discriminate on the basis of race, color, national origin, age, disability, or sex.

## 2017-04-07 NOTE — LETTER
April 11, 2017      Natalia Martin MD  [address]  [place] [address]           [facility]  [address]  [facility] [address]  Phone: [phone]  Fax: [phone]          Patient: Bambi Carrera   MR Number: [id number]   YOB: 1960   Date of Visit: 4/7/2017       Dear Dr. Natalia Martin:    Thank you for referring Bambi Carrera to me for evaluation. Attached you will find relevant portions of my assessment and plan of care.    If you have questions, please do not hesitate to call me. I look forward to following Bambi Carrera along with you.    Sincerely,    Niurka Phoenix MD    Enclosure  CC:  No Recipients    If you would like to receive this communication electronically, please contact [email or web address] or [phone] to request more information on [employer] Link access.    For providers and/or their staff who would like to refer a patient to [facility], please contact us through our one-stop-shop provider referral line, [facility], at [phone].    If you feel you have received this communication in error or would no longer like to receive these types of communications, please e-mail [email or web address]

## 2017-04-07 NOTE — PROGRESS NOTES
"Subjective:       Patient ID: Bambi Carrera is a 57 y.o. female.    Chief Complaint: Consult    HPI Comments: Current attempts at weight loss: New pt to me, referred by Natalia Martin MD  [address] [facility]  [place] [address]  With Patient Active Problem List:     Hypertension     Allergic rhinitis     Right hip pain     Joint stiffness     Muscle weakness     Difficulty walking     Morbid obesity    Just started Walking for the past 2 weeks. Has "slacked up" on eating.     Previous diet attempts: eats WW dinners.     Heaviest weight: 306#    Lightest weight: 250#    Goal weight: 250#    History of medication for loss: 30 years ago  Took medication. Does not recall what.         Typical eating patterns:   Breakfast: coffee with cream and equal. Oatmeal- with fruit from [employer]    Lunch: Leftovers. Salad from [employer], baked fish and vegetables.     Dinner: protein drink, egg and rice and martini. Green peas and spaghetti and meat sauce.     Snacks:  Cookie     Beverages: Water, soda, coffee as above.     Willingness to change: 10/10        BMR: 1959      Review of Systems   Constitutional: Negative for chills and fever.   Respiratory: Positive for shortness of breath. Negative for apnea.    Cardiovascular: Negative for chest pain and leg swelling.   Gastrointestinal: Negative for constipation and diarrhea.        Denies HB   Genitourinary: Negative for difficulty urinating and menstrual problem.   Musculoskeletal: Positive for arthralgias and back pain.   Neurological: Negative for dizziness and light-headedness.   Psychiatric/Behavioral: Negative for dysphoric mood. The patient is not nervous/anxious.        Objective:     /72  Pulse 78  Ht 5' 5" (1.651 m)  Wt (!) 137.2 kg (302 lb 7.5 oz)  BMI 50.33 kg/m2    Physical Exam   Constitutional: She is oriented to person, place, and time. She appears well-developed. No distress.   Morbidly obese     HENT:   Head: Normocephalic and atraumatic.   Mouth/Throat: No " oropharyngeal exudate.   Eyes: EOM are normal. Pupils are equal, round, and reactive to light. No scleral icterus.   Neck: Normal range of motion. Neck supple. No thyromegaly present.   Cardiovascular: Normal rate and normal heart sounds.  Exam reveals no gallop and no friction rub.    No murmur heard.  Pulmonary/Chest: Effort normal and breath sounds normal. No respiratory distress. She has no wheezes.   Abdominal: Soft. Bowel sounds are normal. She exhibits no distension. There is no tenderness.   Musculoskeletal: Normal range of motion. She exhibits no edema.   Lymphadenopathy:     She has no cervical adenopathy.   Neurological: She is alert and oriented to person, place, and time. No cranial nerve deficit.   Skin: Skin is warm and dry. No erythema.   Psychiatric: She has a normal mood and affect. Her behavior is normal. Judgment normal.   Vitals reviewed.      Assessment:       1. Essential hypertension    2. Obesity, morbid, BMI 50 or higher        Plan:         1. Essential hypertension  The current medical regimen is effective;  continue present plan and medications.      2. Obesity, morbid, BMI 50 or higher    - diethylpropion 75 mg TbSR; Take 75 mg by mouth once daily.  Dispense: 30 tablet; Refill: 0      Exercising 30 min 4 times a week.     Patient warned of common side effects of diethylpropion including anxiety, insomnia, palpitations and increased blood pressure. It was also explained that it is for short-term usage along with diet and exercise, and that stopping the medication without making lifestyle changes will result in regain of weight. Patient states understanding.    No soda, sweet tea, juices or lemonade    3 meals a day made up of the following:  Unlimited green vegetables, tomatoes, mushrooms, spaghetti squash, cauliflower, meat, poultry, seafood, eggs and hard cheeses.   Milk and plain yogurt  Dressings, seasonings, condiments, etc should have less than 2 g sugars.   beans or nuts can have  1 x a day.   1-2 servings of citrus fruits, berries, pineapple or melon a day (1/2 cup)  Avoid fried foods    No grains, rice, pasta, potatoes or bread    No soda, sweet tea, juices or lemonade    [email or web address] for recipes.     Weight loss medications are controlled substances.  They require routine follow up. Prescription or pills that are lost or destroyed will not be replaced.     Return in about 4 weeks (around 5/5/2017).

## 2017-05-08 ENCOUNTER — OFFICE VISIT (OUTPATIENT)
Dept: BARIATRICS | Facility: CLINIC | Age: 57
End: 2017-05-08
Payer: COMMERCIAL

## 2017-05-08 VITALS
HEIGHT: 65 IN | BODY MASS INDEX: 47.2 KG/M2 | WEIGHT: 283.31 LBS | HEART RATE: 76 BPM | SYSTOLIC BLOOD PRESSURE: 126 MMHG | DIASTOLIC BLOOD PRESSURE: 84 MMHG

## 2017-05-08 DIAGNOSIS — I10 ESSENTIAL HYPERTENSION: ICD-10-CM

## 2017-05-08 DIAGNOSIS — E66.01 MORBID OBESITY WITH BMI OF 45.0-49.9, ADULT: ICD-10-CM

## 2017-05-08 PROCEDURE — 3074F SYST BP LT 130 MM HG: CPT | Mod: S$GLB,,, | Performed by: INTERNAL MEDICINE

## 2017-05-08 PROCEDURE — 3079F DIAST BP 80-89 MM HG: CPT | Mod: S$GLB,,, | Performed by: INTERNAL MEDICINE

## 2017-05-08 PROCEDURE — 99213 OFFICE O/P EST LOW 20 MIN: CPT | Mod: S$GLB,,, | Performed by: INTERNAL MEDICINE

## 2017-05-08 PROCEDURE — 99999 PR PBB SHADOW E&M-EST. PATIENT-LVL III: CPT | Mod: PBBFAC,,, | Performed by: INTERNAL MEDICINE

## 2017-05-08 PROCEDURE — 1160F RVW MEDS BY RX/DR IN RCRD: CPT | Mod: S$GLB,,, | Performed by: INTERNAL MEDICINE

## 2017-05-08 RX ORDER — DIETHYLPROPION HYDROCHLORIDE 75 MG/1
75 TABLET, EXTENDED RELEASE ORAL DAILY
Qty: 30 TABLET | Refills: 1 | Status: SHIPPED | OUTPATIENT
Start: 2017-05-08 | End: 2017-06-19 | Stop reason: SDUPTHER

## 2017-05-08 NOTE — MR AVS SNAPSHOT
[facility]  [address]  [facility] [address]  Phone: [phone]  Fax: [phone]                  Bambi Carrera   2017 3:00 PM   Office Visit    Description:  Female : 1960   Provider:  Niurka Phoenix MD   Department:  [facility]           Reason for Visit     Follow-up           Diagnoses this Visit        Comments    Morbid obesity with BMI of 45.0-49.9, adult    -  Primary     Essential hypertension                To Do List           Future Appointments        Provider Department Dept Phone    2017 3:00 PM Niurka Phoenix MD [facility] [phone]    2017 9:00 AM Niurka Phoenix MD [facility] [phone]    2017 9:00 AM Niurka Phoenix MD [facility] [phone]      Goals (5 Years of Data)     None       These Medications        Disp Refills Start End    diethylpropion 75 mg TbSR 30 tablet 1 2017     Take 75 mg by mouth once daily. - Oral    Pharmacy: [facility] [address] [place] [address].  #: [phone]         [facility]     [facility] -  Assistance  Unless otherwise directed by your provider, please contact [facility], our nurse care line that is available for  assistance.     Registered nurses in the [facility] provide: appointment scheduling, clinical advisement, health education, and other advisory services.  Call: [phone] (toll free)               Medications           Message regarding Medications     Verify the changes and/or additions to your medication regime listed below are the same as discussed with your clinician today.  If any of these changes or additions are incorrect, please notify your healthcare provider.             Verify that the below list of medications is an accurate representation of the medications you are currently taking.  If none  "reported, the list may be blank. If incorrect, please contact your healthcare provider. Carry this list with you in case of emergency.           Current Medications     cetirizine (ZYRTEC) 10 MG tablet TAKE ONE TABLET BY MOUTH ONCE DAILY    diclofenac sodium 1 % Gel Apply 2 g topically 4 (four) times daily.    diethylpropion 75 mg TbSR Take 75 mg by mouth once daily.    ergocalciferol (VITAMIN D2) 50,000 unit Cap Take 1 capsule (50,000 Units total) by mouth every 7 days.    fluticasone (FLONASE) 50 mcg/actuation nasal spray 1 spray by Each Nare route 2 (two) times daily.    hydrochlorothiazide (HYDRODIURIL) 25 MG tablet TAKE ONE TABLET BY MOUTH ONCE DAILY           Clinical Reference Information           Your Vitals Were     BP Pulse Height Weight BMI    126/84 76 5' 5" (1.651 m) 128.5 kg (283 lb 4.7 oz) 47.14 kg/m2      Blood Pressure          Most Recent Value    BP  126/84      Allergies as of 5/8/2017     No Known Allergies      Immunizations Administered on Date of Encounter - 5/8/2017     None      [facility] Sign-Up     Activating your [facility] account is as easy as 1-2-3!     1) Visit [email or web address], select Sign Up Now, enter this activation code and your date of birth, then select Next.  Activation code not generated  Current Patient Portal Status: Account disabled      2) Create a username and password to use when you visit [facility] in the future and select a security question in case you lose your password and select Next.    3) Enter your e-mail address and click Sign Up!    Additional Information  If you have questions, please e-mail [email or web address] or call [phone] to talk to our [facility] staff. Remember, [facility] is NOT to be used for urgent needs. For medical emergencies, dial 911.         Instructions    Exercising 30 min 5 times a week.     Patient warned of common side effects of diethylpropion including anxiety, insomnia, palpitations and increased blood pressure. It was also " explained that it is for short-term usage along with diet and exercise, and that stopping the medication without making lifestyle changes will result in regain of weight. Patient states understanding.    No soda, sweet tea, juices or lemonade    3 meals a day made up of the following:  Unlimited green vegetables, tomatoes, mushrooms, spaghetti squash, cauliflower, meat, poultry, seafood, eggs and hard cheeses.   Milk and plain yogurt  Dressings, seasonings, condiments, etc should have less than 2 g sugars.   beans or nuts can have 1 x a day.   1-2 servings of citrus fruits, berries, pineapple or melon a day (1/2 cup)  Avoid fried foods    No grains, rice, pasta, potatoes or bread    No soda, sweet tea, juices or lemonade    [email or web address] for recipes.     Weight loss medications are controlled substances.  They require routine follow up. Prescription or pills that are lost or destroyed will not be replaced.     Return in about 6 weeks          Language Assistance Services     ATTENTION: Language assistance services are available, free of charge. Please call [phone].      ATENCIÓN: Si habla español, tiene a underwood disposición servicios gratuitos de asistencia lingüística. Llame al [phone].     VALERIA Ý: N?u b?n nói Ti?ng Vi?t, có các d?ch v? h? tr? ngôn ng? mi?n phí dành cho b?n. G?i s? [phone].         [facility] complies with applicable Federal civil rights laws and does not discriminate on the basis of race, color, national origin, age, disability, or sex.

## 2017-05-08 NOTE — PROGRESS NOTES
"Subjective:       Patient ID: Bambi Carrera is a 57 y.o. female.    Chief Complaint: Follow-up    HPI Comments: Pt here today for follow up. She has lost 19 lbs. BP is good today. Has been in diethylpropion with LCHF diet. She has been following diet plan and walking for exercise. Only SE has been dry mouth.       Review of Systems   Constitutional: Negative for chills and fever.   Respiratory: Positive for shortness of breath. Negative for apnea.    Cardiovascular: Negative for chest pain and leg swelling.   Gastrointestinal: Negative for constipation and diarrhea.        Denies HB   Genitourinary: Negative for difficulty urinating and menstrual problem.   Musculoskeletal: Positive for arthralgias and back pain.   Neurological: Negative for dizziness and light-headedness.   Psychiatric/Behavioral: Negative for dysphoric mood. The patient is not nervous/anxious.        Objective:     /84  Pulse 76  Ht 5' 5" (1.651 m)  Wt 128.5 kg (283 lb 4.7 oz)  BMI 47.14 kg/m2    Physical Exam   Constitutional: She is oriented to person, place, and time. She appears well-developed. No distress.   Morbidly obese     HENT:   Head: Normocephalic and atraumatic.   Eyes: EOM are normal. Pupils are equal, round, and reactive to light. No scleral icterus.   Neck: Normal range of motion. Neck supple.   Cardiovascular: Normal rate and normal heart sounds.  Exam reveals no gallop and no friction rub.    No murmur heard.  Pulmonary/Chest: Effort normal and breath sounds normal. No respiratory distress. She has no wheezes.   Musculoskeletal: Normal range of motion. She exhibits no edema.   Neurological: She is alert and oriented to person, place, and time. No cranial nerve deficit.   Skin: Skin is warm and dry. No erythema.   Psychiatric: She has a normal mood and affect. Her behavior is normal. Judgment normal.   Vitals reviewed.      Assessment:       1. Essential hypertension    2. Morbid obesity with BMI of 45.0-49.9, adult  "       Plan:         1. Essential hypertension  The current medical regimen is effective;  continue present plan and medications.      2. Obesity, morbid, BMI 45-49.9    - diethylpropion 75 mg TbSR; Take 75 mg by mouth once daily.  Dispense: 30 tablet; Refill: 0      Exercising 30 min 5 times a week.     Patient warned of common side effects of diethylpropion including anxiety, insomnia, palpitations and increased blood pressure. It was also explained that it is for short-term usage along with diet and exercise, and that stopping the medication without making lifestyle changes will result in regain of weight. Patient states understanding.    No soda, sweet tea, juices or lemonade    3 meals a day made up of the following:  Unlimited green vegetables, tomatoes, mushrooms, spaghetti squash, cauliflower, meat, poultry, seafood, eggs and hard cheeses.   Milk and plain yogurt  Dressings, seasonings, condiments, etc should have less than 2 g sugars.   beans or nuts can have 1 x a day.   1-2 servings of citrus fruits, berries, pineapple or melon a day (1/2 cup)  Avoid fried foods    No grains, rice, pasta, potatoes or bread    No soda, sweet tea, juices or lemonade    [email or web address] for recipes.     Weight loss medications are controlled substances.  They require routine follow up. Prescription or pills that are lost or destroyed will not be replaced.     Return in about 6 weeks

## 2017-05-08 NOTE — PATIENT INSTRUCTIONS
Exercising 30 min 5 times a week.     Patient warned of common side effects of diethylpropion including anxiety, insomnia, palpitations and increased blood pressure. It was also explained that it is for short-term usage along with diet and exercise, and that stopping the medication without making lifestyle changes will result in regain of weight. Patient states understanding.    No soda, sweet tea, juices or lemonade    3 meals a day made up of the following:  Unlimited green vegetables, tomatoes, mushrooms, spaghetti squash, cauliflower, meat, poultry, seafood, eggs and hard cheeses.   Milk and plain yogurt  Dressings, seasonings, condiments, etc should have less than 2 g sugars.   beans or nuts can have 1 x a day.   1-2 servings of citrus fruits, berries, pineapple or melon a day (1/2 cup)  Avoid fried foods    No grains, rice, pasta, potatoes or bread    No soda, sweet tea, juices or lemonade    [email or web address] for recipes.     Weight loss medications are controlled substances.  They require routine follow up. Prescription or pills that are lost or destroyed will not be replaced.     Return in about 6 weeks

## 2017-05-11 DIAGNOSIS — J30.9 ALLERGIC RHINITIS: ICD-10-CM

## 2017-05-12 RX ORDER — FLUTICASONE PROPIONATE 50 MCG
SPRAY, SUSPENSION (ML) NASAL
Qty: 16 G | Refills: 5 | Status: SHIPPED | OUTPATIENT
Start: 2017-05-12 | End: 2017-05-15 | Stop reason: SDUPTHER

## 2017-05-15 DIAGNOSIS — J30.9 ALLERGIC RHINITIS, UNSPECIFIED ALLERGIC RHINITIS TRIGGER, UNSPECIFIED RHINITIS SEASONALITY: ICD-10-CM

## 2017-05-15 RX ORDER — FLUTICASONE PROPIONATE 50 MCG
SPRAY, SUSPENSION (ML) NASAL
Qty: 16 G | Refills: 5 | Status: SHIPPED | OUTPATIENT
Start: 2017-05-15 | End: 2017-07-10 | Stop reason: SDUPTHER

## 2017-05-30 DIAGNOSIS — I10 ESSENTIAL HYPERTENSION: ICD-10-CM

## 2017-05-30 RX ORDER — HYDROCHLOROTHIAZIDE 25 MG/1
TABLET ORAL
Qty: 90 TABLET | Refills: 0 | Status: SHIPPED | OUTPATIENT
Start: 2017-05-30 | End: 2017-08-26 | Stop reason: SDUPTHER

## 2017-06-16 ENCOUNTER — TELEPHONE (OUTPATIENT)
Dept: BARIATRICS | Facility: CLINIC | Age: 57
End: 2017-06-16

## 2017-06-19 ENCOUNTER — OFFICE VISIT (OUTPATIENT)
Dept: BARIATRICS | Facility: CLINIC | Age: 57
End: 2017-06-19
Payer: COMMERCIAL

## 2017-06-19 VITALS
DIASTOLIC BLOOD PRESSURE: 74 MMHG | SYSTOLIC BLOOD PRESSURE: 122 MMHG | BODY MASS INDEX: 45.73 KG/M2 | HEIGHT: 65 IN | WEIGHT: 274.5 LBS | HEART RATE: 76 BPM

## 2017-06-19 DIAGNOSIS — E66.01 MORBID OBESITY WITH BMI OF 45.0-49.9, ADULT: ICD-10-CM

## 2017-06-19 DIAGNOSIS — I10 ESSENTIAL HYPERTENSION: ICD-10-CM

## 2017-06-19 PROCEDURE — 99213 OFFICE O/P EST LOW 20 MIN: CPT | Mod: S$GLB,,, | Performed by: INTERNAL MEDICINE

## 2017-06-19 PROCEDURE — 99999 PR PBB SHADOW E&M-EST. PATIENT-LVL III: CPT | Mod: PBBFAC,,, | Performed by: INTERNAL MEDICINE

## 2017-06-19 RX ORDER — PHENTERMINE HYDROCHLORIDE 37.5 MG/1
37.5 TABLET ORAL
Qty: 30 TABLET | Refills: 0 | Status: SHIPPED | OUTPATIENT
Start: 2017-07-19 | End: 2017-08-18

## 2017-06-19 RX ORDER — DIETHYLPROPION HYDROCHLORIDE 75 MG/1
75 TABLET, EXTENDED RELEASE ORAL DAILY
Qty: 30 TABLET | Refills: 1 | Status: SHIPPED | OUTPATIENT
Start: 2017-06-19 | End: 2017-09-11

## 2017-06-19 NOTE — PROGRESS NOTES
"Subjective:       Patient ID: Bambimyron Carrera is a 57 y.o. female.    Chief Complaint: Follow-up    Pt here today for follow up. She has lost 9 lbs, net neg 27 lbs in total. BP is good today. Has been in diethylpropion x 2 months with LCHF diet. She has been following diet plan and walking for exercise. Only SE has been dry mouth.         Review of Systems   Constitutional: Negative for chills and fever.   Respiratory: Positive for shortness of breath. Negative for apnea.    Cardiovascular: Negative for chest pain and leg swelling.   Gastrointestinal: Negative for constipation and diarrhea.        Denies HB   Genitourinary: Negative for difficulty urinating and menstrual problem.   Musculoskeletal: Positive for arthralgias and back pain.   Neurological: Negative for dizziness and light-headedness.   Psychiatric/Behavioral: Negative for dysphoric mood. The patient is not nervous/anxious.        Objective:     /74   Pulse 76   Ht 5' 5" (1.651 m)   Wt 124.5 kg (274 lb 7.6 oz)   BMI 45.67 kg/m²     Physical Exam   Constitutional: She is oriented to person, place, and time. She appears well-developed. No distress.   Morbidly obese     HENT:   Head: Normocephalic and atraumatic.   Eyes: EOM are normal. Pupils are equal, round, and reactive to light. No scleral icterus.   Neck: Normal range of motion. Neck supple.   Cardiovascular: Normal rate and normal heart sounds.  Exam reveals no gallop and no friction rub.    No murmur heard.  Pulmonary/Chest: Effort normal and breath sounds normal. No respiratory distress. She has no wheezes.   Musculoskeletal: Normal range of motion. She exhibits no edema.   Neurological: She is alert and oriented to person, place, and time. No cranial nerve deficit.   Skin: Skin is warm and dry. No erythema.   Psychiatric: She has a normal mood and affect. Her behavior is normal. Judgment normal.   Vitals reviewed.      Assessment:       1. Essential hypertension    2. Morbid obesity " with BMI of 45.0-49.9, adult        Plan:         1. Essential hypertension  The current medical regimen is effective;  continue present plan and medications.      2. Obesity, morbid, BMI 45-49.9    - diethylpropion 75 mg TbSR; Take 75 mg by mouth once daily.  Dispense: 30 tablet; Refill: 0      Exercising 30 min 5 times a week.     Patient warned of common side effects of diethylpropion including anxiety, insomnia, palpitations and increased blood pressure. It was also explained that it is for short-term usage along with diet and exercise, and that stopping the medication without making lifestyle changes will result in regain of weight. Patient states understanding.    No soda, sweet tea, juices or lemonade    3 meals a day made up of the following:  Unlimited green vegetables, tomatoes, mushrooms, spaghetti squash, cauliflower, meat, poultry, seafood, eggs and hard cheeses.   Milk and plain yogurt  Dressings, seasonings, condiments, etc should have less than 2 g sugars.   beans or nuts can have 1 x a day.   1-2 servings of citrus fruits, berries, pineapple or melon a day (1/2 cup)  Avoid fried foods    No grains, rice, pasta, potatoes or bread    No soda, sweet tea, juices or lemonade    [email or web address] for recipes.     Weight loss medications are controlled substances.  They require routine follow up. Prescription or pills that are lost or destroyed will not be replaced.     Return in about 6 weeks

## 2017-06-19 NOTE — PATIENT INSTRUCTIONS
Exercising 30 min 5 times a week. Look up indoor work outs on [employer] or [employer].     Patient warned of common side effects of diethylpropion including anxiety, insomnia, palpitations and increased blood pressure. It was also explained that it is for short-term usage along with diet and exercise, and that stopping the medication without making lifestyle changes will result in regain of weight. Patient states understanding.    After one month: Patient warned of common side effects of phentermine including anxiety, insomnia, palpitations and increased blood pressure. It was also explained that it is for short-term usage along with diet and exercise, and that stopping the medication without making lifestyle changes will result in regain of weight. Patient states understanding.    Start phentermine with 1/2 pill a day to see if that will control your appetite.  Go up to a full pill when needed.       No soda, sweet tea, juices or lemonade    3 meals a day made up of the following:  Unlimited green vegetables, tomatoes, mushrooms, spaghetti squash, cauliflower, meat, poultry, seafood, eggs and hard cheeses.   Milk and plain yogurt  Dressings, seasonings, condiments, etc should have less than 2 g sugars.   beans or nuts can have 1 x a day.   1-2 servings of citrus fruits, berries, pineapple or melon a day (1/2 cup)  Avoid fried foods    No grains, rice, pasta, potatoes or bread    No soda, sweet tea, juices or lemonade    [email or web address] for recipes.     Weight loss medications are controlled substances.  They require routine follow up. Prescription or pills that are lost or destroyed will not be replaced.     Return in about 6 weeks       Spring Recipes:    Marian Shake:     Ingredients  ½ cup low fat cottage cheese  ¼ cup vanilla protein powder  1/8 tsp mint extract  2-3 packets of stevia or artificial sweetener of choice  5-10 ice cubes (more or less depending on how thick you would like it)  4 oz of water  2-3  "drops of green food coloring OR a small handful of spinach to make it green    Put all ingredients in  and  until desired consistency.      "Unstuffed" Cabbage Rolls:    Ingredients:  1 1/2 to 2 pounds lean ground beef or turkey  1 large onion, chopped  1 clove garlic, minced  1 small cabbage, chopped  2 cans (14.5 ounces each) diced tomatoes  1 can (8 ounces) tomato sauce  ¼ - ½ cup water depending on desired consistency  1 teaspoon ground black pepper, salt to taste    Spray large skillet with nonstick cookspray. Heat pan on medium heat. Sauté the onions until tender, and then add the ground beef (or turkey) and cook until the meat is browned.    Add the garlic, cook an additional minute before adding the remaining ingredients. Cover, reduce the heat and simmer about 25 minutes (or until the cabbage is  fork tender)        Not so Anahi's Pie:    Ingredients  2 (or more) pounds of lean ground beef, or turkey  2 heads of cauliflower or 3 bags of frozen cauliflower, chopped  1 bag of frozen mixed veggies          (NO Corn, Peas or Potatoes!)  1-2 onions  1 egg  1 teaspoon each of basil, garlic powder, pepper, oregano and a little cayenne  4-5 sprays of I cant believe its not butter spray  4 ounces of fat free cream cheese (optional)  Low fat Cheese to top (optional)    Roast cauliflower in oven on 350* F for about 15-20 minutes, until browned and fork tender. (begin marte meat while cauliflower is cooking). Place cooked cauliflower in . Add 4 ounces of fat free cream cheese, 4-5 sprays of I cant believe its not butter SPRAY, and spices and puree until creamy.     While cauliflower is roasting, brown meat in large skillet and season to taste. Set aside. Sauté diced onion in skillet until somewhat soft. Set aside with meat. Pour mixed veggies in the skillet to heat on low heat.     Mix the meat, onions, mixed veggies, raw egg and any additional seasonings and put in bottom of 9x13 baking " dish. Spread mashed cauliflower mixture over it until smooth.    Bake at 350 for approximately 30 minutes. Add cheese and bake 5 additional minutes (optional). Serve warm (or reheat later).    Crock Pot Balsamic Pork Tenderloin:    Ingredients:  1 tsp dried thyme  1 tsp ground pepper  ½ tsp salt  3 tbsp dried minced onion  2 tsp dried basil  ¼ cup low sodium broth  ½ cup balsamic vinegar  2 cloves garlic, minced  2 lbs Pork Tenderloin    Mix first 5 ingredients together. Rub pork tenderloin with dry seasoning mixture.  In a large sauté pan, heat ¼ cup balsamic vinegar and garlic on medium heat. Add pork to sauté kaplan and sear, marte all sides. Add low sodium broth, 1 tbsp at a time to keep tenderloin from sticking or use nonstick cookspray.     Transfer meat to crock pot. Pour remaining balsamic vinegar into sauté pan and continue  to stir for a few minutes to deglaze the pan. Pour juices over the top of the tenderloin in crockpot. Cook on high for 3 hours or until meat thermometer says 170*. Let rest 5-10 minutes and then slice.       Side Dish: Steamed carrots w/ garlic and jesus    Ingredients:  2 garlic cloves, minced  1 lb baby carrots  5-6 sprays of I cant believe its not butter SPRAY  1 tsp minced peeled fresh jesus  1 tbsp chopped fresh cilantro  ½ tsp grated lime rind  1 tbsp fresh lime juice   ¼ tsp salt    Prepare garlic; let stand 10 minutes. Steam carrots, covered in pot, about 10 minutes or until tender.     In a nonstick skillet over medium heat, spray pan w/ I cant believe its not butter SPRAY. Add garlic and jesus and cook 1 minute, stirring constantly. Remove from  heat; stir in carrots, cilantro and remaining ingredients.         Side Dish: Green Bean Almondine    Ingredients:  1 pound fresh green beans, trimmed  1 tbsp leroy vinegar  1 ½ tsp water  1 tsp Chandu Mustard  ¼ tsp salt  ¼ tsp freshly ground black pepper  1 tbsp sliced almonds, toasted    Cook green beans in boiling water for  4 minutes or until crisp-tender. Drain and plunge beans into ice water. Drain well. Pat beans dry w/ paper towel.     Combine vinegar, water, mustard, salt and pepper in a medium bowl. Add beans to vinegar mixture; toss to coat well. Sprinkle with toasted almonds.      How to Cook the Perfect Hard Boiled Egg:    Steam in water: Fill a large pot with 1 inch of water. Place steamer insert inside, cover, and bring to a boil over high heat. Add eggs to steamer basket, cover, and continue cooking 12 minute. If serving cold, immediately place eggs in a bowl of ice water and allow to cool for at least 15 minutes before peeling under cool running water. Store in the refrigerator for up to 5 days.    OR    Purchase Dash Go Rapid Egg Boiler: available @ [employer], [employer], [employer], [employer] for ~$15-$20      Classic Egg Salad Recipe:    Ingredients:  8 hard cooked eggs, peeled and coarsely chopped  4-6 tbsp of low fat or fat free rodriguez  2 tbsp celery, chopped  2 tsp alejandra mustard  Dash of cayenne pepper (for spice)  Black pepper, salt to taste    In a medium bowl, combine rodriguez, celery, mustard and cayenne pepper with chopped eggs. Season w/ pepper and salt. Serve over Lettuce leaves.     Get Creative! Add chopped turkey martini and tomato and serve on lettuce leaves for an egg-cellent BLT egg salad or mix lean diced ham, low fat cheddar and tomatoes for  egg salad    Tuna Deviled Eggs:    Ingredients:  12 hard boiled eggs  1 can of tuna packed in water  1 rib celery, diced  ¼ cup low fat rodriguez  1 tsp mustard  Garlic powder, black pepper to taste  Dash of paprika (for finish)    Cut eggs in half lengthwise. Remove yolk and mash in bowl. In separate bowl, mix tuna, celery, low fat rodriguez, mustard and seasonings.  Stir in egg yolks until blended. Stuff eggs and sprinkle dash of paprika      Martini Jalapeno Deviled Eggs:    Ingredients:  12 hard boiled eggs, peeled  ½ cup low fat rodriguez  1 ½ tsp rice vinegar  ¾ tsp ground  mustard  ½ packet splenda  2 jalapenos, seeded and chopped, 6 pieces turkey martini, cooked crisp and crumbled  Dash of paprika (for finish)    Cut eggs in half lengthwise. Remove yolk and mash in bowl. Add rodriguez, vinegar, spices and Splenda until well combined. Mix in jalapenos and martini. Stuff eggs and sprinkle w/ dash of paprika      Sugar-Free High Protein Brownie Recipe:    Ingredients:  2 cups of pureed zucchini  4 oz fat free cream cheese  1 large egg  2 scoops chocolate protein powder  1 tbsp pure vanilla extract  1/3 cup unsweetened cocoa powder    ¼ tsp salt  2 tbsp chopped nuts  *8-9 packets of splenda or artificial sweetener of choice    Preheat oven to 350* F.     Line an 8x8 pan w/ parchment paper or spray with nonstick cookspray.     In a medium bowl, blend the fat free cream cheese with egg until creamy. Add chocolate protein powder and cocoa powder until creamy. Add vanilla extract. Stir in pureed zucchini and salt.     The batter will be thick, but not dry. If batter seems dry, add 1-2 tbsp water. Fold in Nuts. Pour batter in prepared pan.     Bake for 30 minutes. Allow to cool completely. Cut into squares and chill to semi-set.     *best if eaten within 2 days but may last 3-4 days in fridge.         Lemon Whip:    Ingredients:  Small box of sugar-free vanilla instant pudding mix  1 small packet of crystal light lemonade mix  2 cups skim milk or fat free fairlife milk  Sugar-free, fat free cool whip     Make sugar-free pudding using 2 cups skim milk according to package. Stir in 1 small packet of crystal light lemonade mix.  Fold in a dollop of sugar-free, fat free cool whip and stir to combine.     Home-made Sugar-free Pudding Pops:    Ingredients:  1 small pkg of sugar-free instant pudding mix (flavor of choice!)  2 cups fat free milk     Beat ingredients with whisk for 2 minutes. Pour into 6 paper or plastic cups or into a popsicle mold. Insert wooden pop stick in center of each cup.     Freeze  for 5 hours or until firm. Peel off paper or pull out of popsicle mold.     Variations: add sugar-free syrups to change up the flavor, such as sugar-free chocolate pudding + sugar free raspberry syrup = chocolate raspberry fudgesicle.

## 2017-06-30 ENCOUNTER — TELEPHONE (OUTPATIENT)
Dept: FAMILY MEDICINE | Facility: CLINIC | Age: 57
End: 2017-06-30

## 2017-06-30 DIAGNOSIS — M79.671 PAIN IN BOTH FEET: Primary | ICD-10-CM

## 2017-06-30 DIAGNOSIS — M79.672 PAIN IN BOTH FEET: Primary | ICD-10-CM

## 2017-06-30 NOTE — TELEPHONE ENCOUNTER
Would she like a referral to podiatrist, they can evaluate her and determine if an injection is appropriate

## 2017-06-30 NOTE — TELEPHONE ENCOUNTER
----- Message from Yina Martinez sent at 6/30/2017  9:00 AM CDT -----  Patient states her feet are in pain and she needs an injection or something. Please call at [phone] thank you!

## 2017-07-05 ENCOUNTER — TELEPHONE (OUTPATIENT)
Dept: FAMILY MEDICINE | Facility: CLINIC | Age: 57
End: 2017-07-05

## 2017-07-10 ENCOUNTER — LAB VISIT (OUTPATIENT)
Dept: LAB | Facility: HOSPITAL | Age: 57
End: 2017-07-10
Attending: FAMILY MEDICINE
Payer: COMMERCIAL

## 2017-07-10 ENCOUNTER — OFFICE VISIT (OUTPATIENT)
Dept: FAMILY MEDICINE | Facility: CLINIC | Age: 57
End: 2017-07-10
Payer: COMMERCIAL

## 2017-07-10 VITALS
HEART RATE: 76 BPM | HEIGHT: 65 IN | OXYGEN SATURATION: 99 % | WEIGHT: 273.81 LBS | SYSTOLIC BLOOD PRESSURE: 130 MMHG | RESPIRATION RATE: 18 BRPM | TEMPERATURE: 98 F | DIASTOLIC BLOOD PRESSURE: 84 MMHG | BODY MASS INDEX: 45.62 KG/M2

## 2017-07-10 DIAGNOSIS — I10 ESSENTIAL HYPERTENSION: ICD-10-CM

## 2017-07-10 DIAGNOSIS — R26.2 DIFFICULTY WALKING: ICD-10-CM

## 2017-07-10 DIAGNOSIS — Z00.00 ROUTINE GENERAL MEDICAL EXAMINATION AT A HEALTH CARE FACILITY: ICD-10-CM

## 2017-07-10 DIAGNOSIS — L30.4 INTERTRIGO: ICD-10-CM

## 2017-07-10 DIAGNOSIS — J30.9 CHRONIC ALLERGIC RHINITIS, UNSPECIFIED SEASONALITY, UNSPECIFIED TRIGGER: ICD-10-CM

## 2017-07-10 DIAGNOSIS — N89.8 VAGINAL DRYNESS: ICD-10-CM

## 2017-07-10 DIAGNOSIS — R73.03 BORDERLINE DIABETIC: ICD-10-CM

## 2017-07-10 DIAGNOSIS — Z00.00 ROUTINE GENERAL MEDICAL EXAMINATION AT A HEALTH CARE FACILITY: Primary | ICD-10-CM

## 2017-07-10 LAB
ALBUMIN SERPL BCP-MCNC: 3.6 G/DL
ALP SERPL-CCNC: 124 U/L
ALT SERPL W/O P-5'-P-CCNC: 20 U/L
ANION GAP SERPL CALC-SCNC: 10 MMOL/L
AST SERPL-CCNC: 17 U/L
BASOPHILS # BLD AUTO: 0.02 K/UL
BASOPHILS NFR BLD: 0.4 %
BILIRUB SERPL-MCNC: 0.3 MG/DL
BUN SERPL-MCNC: 14 MG/DL
CALCIUM SERPL-MCNC: 9.6 MG/DL
CHLORIDE SERPL-SCNC: 103 MMOL/L
CHOLEST/HDLC SERPL: 2.7 {RATIO}
CO2 SERPL-SCNC: 26 MMOL/L
CREAT SERPL-MCNC: 0.9 MG/DL
DIFFERENTIAL METHOD: ABNORMAL
EOSINOPHIL # BLD AUTO: 0.1 K/UL
EOSINOPHIL NFR BLD: 1.1 %
ERYTHROCYTE [DISTWIDTH] IN BLOOD BY AUTOMATED COUNT: 16.4 %
EST. GFR  (AFRICAN AMERICAN): >60 ML/MIN/1.73 M^2
EST. GFR  (NON AFRICAN AMERICAN): >60 ML/MIN/1.73 M^2
GLUCOSE SERPL-MCNC: 88 MG/DL
HCT VFR BLD AUTO: 39.4 %
HDL/CHOLESTEROL RATIO: 37.5 %
HDLC SERPL-MCNC: 168 MG/DL
HDLC SERPL-MCNC: 63 MG/DL
HGB BLD-MCNC: 12.8 G/DL
LDLC SERPL CALC-MCNC: 86 MG/DL
LYMPHOCYTES # BLD AUTO: 2.5 K/UL
LYMPHOCYTES NFR BLD: 45.4 %
MCH RBC QN AUTO: 28.2 PG
MCHC RBC AUTO-ENTMCNC: 32.5 %
MCV RBC AUTO: 87 FL
MONOCYTES # BLD AUTO: 0.5 K/UL
MONOCYTES NFR BLD: 8.3 %
NEUTROPHILS # BLD AUTO: 2.5 K/UL
NEUTROPHILS NFR BLD: 44.6 %
NONHDLC SERPL-MCNC: 105 MG/DL
PLATELET # BLD AUTO: 293 K/UL
PMV BLD AUTO: 10.9 FL
POTASSIUM SERPL-SCNC: 3.8 MMOL/L
PROT SERPL-MCNC: 8.4 G/DL
RBC # BLD AUTO: 4.54 M/UL
SODIUM SERPL-SCNC: 139 MMOL/L
TRIGL SERPL-MCNC: 95 MG/DL
WBC # BLD AUTO: 5.53 K/UL

## 2017-07-10 PROCEDURE — 99396 PREV VISIT EST AGE 40-64: CPT | Mod: S$GLB,,, | Performed by: FAMILY MEDICINE

## 2017-07-10 PROCEDURE — 99999 PR PBB SHADOW E&M-EST. PATIENT-LVL IV: CPT | Mod: PBBFAC,,, | Performed by: FAMILY MEDICINE

## 2017-07-10 PROCEDURE — 85025 COMPLETE CBC W/AUTO DIFF WBC: CPT

## 2017-07-10 PROCEDURE — 87624 HPV HI-RISK TYP POOLED RSLT: CPT

## 2017-07-10 PROCEDURE — 88175 CYTOPATH C/V AUTO FLUID REDO: CPT

## 2017-07-10 PROCEDURE — 80053 COMPREHEN METABOLIC PANEL: CPT

## 2017-07-10 PROCEDURE — 83036 HEMOGLOBIN GLYCOSYLATED A1C: CPT

## 2017-07-10 PROCEDURE — 80061 LIPID PANEL: CPT

## 2017-07-10 PROCEDURE — 36415 COLL VENOUS BLD VENIPUNCTURE: CPT | Mod: PO

## 2017-07-10 RX ORDER — FLUTICASONE PROPIONATE 50 MCG
SPRAY, SUSPENSION (ML) NASAL
Qty: 16 G | Refills: 5 | Status: SHIPPED | OUTPATIENT
Start: 2017-07-10 | End: 2019-10-22 | Stop reason: SDUPTHER

## 2017-07-10 RX ORDER — ESTRADIOL 0.1 MG/G
1 CREAM VAGINAL DAILY
Qty: 30 G | Refills: 11 | Status: SHIPPED | OUTPATIENT
Start: 2017-07-10 | End: 2018-02-10

## 2017-07-10 RX ORDER — FLUCONAZOLE 150 MG/1
150 TABLET ORAL ONCE
Qty: 2 TABLET | Refills: 2 | Status: SHIPPED | OUTPATIENT
Start: 2017-07-10 | End: 2017-07-10

## 2017-07-10 NOTE — PROGRESS NOTES
Chief Complaint   Patient presents with    Gynecologic Exam    Annual Exam       HPI  Bambi Carrera is a 57 y.o. female with multiple medical diagnoses as listed in the medical history and problem list that presents for annual exam and pap smear.    She has been seeing Dr. Phoenix and has been losing weight and has lost 27lbs. She is taking adipex. She has been walking outdoors and her knees are bothering her. She does wear supportive shoes and uses insoles in her shoes.     She has also been having vaginal dryness that is intense, she is not sexually active and is not having bleeding or discharge.    She has also been having a rash under both breasts that is very pruritic.    PAST MEDICAL HISTORY:  Past Medical History:   Diagnosis Date    Borderline diabetic     Depression     Hypertension     Menopause        PAST SURGICAL HISTORY:  Past Surgical History:   Procedure Laterality Date    LASIK Bilateral 07/15/2016       SOCIAL HISTORY:  Social History     Social History    Marital status: Single     Spouse name: N/A    Number of children: N/A    Years of education: N/A     Occupational History    Not on file.     Social History Main Topics    Smoking status: Never Smoker    Smokeless tobacco: Never Used    Alcohol use No    Drug use: No    Sexual activity: Not on file     Other Topics Concern    Not on file     Social History Narrative    No narrative on file       FAMILY HISTORY:  Family History   Problem Relation Age of Onset    Cancer Mother        ALLERGIES AND MEDICATIONS: updated and reviewed.  Review of patient's allergies indicates:  No Known Allergies  Current Outpatient Prescriptions   Medication Sig Dispense Refill    cetirizine (ZYRTEC) 10 MG tablet TAKE ONE TABLET BY MOUTH ONCE DAILY 30 tablet 3    diethylpropion 75 mg TbSR Take 75 mg by mouth once daily. 30 tablet 1    ergocalciferol (VITAMIN D2) 50,000 unit Cap Take 1 capsule (50,000 Units total) by mouth every 7 days. 6  "capsule 5    fluticasone (FLONASE) 50 mcg/actuation nasal spray USE 1 SPRAY IN EACH NOSTRIL 2 TIMES DAILY 16 g 5    hydrochlorothiazide (HYDRODIURIL) 25 MG tablet TAKE ONE TABLET BY MOUTH ONCE DAILY 90 tablet 0    [START ON 7/19/2017] phentermine (ADIPEX-P) 37.5 mg tablet Take 1 tablet (37.5 mg total) by mouth before breakfast. 30 tablet 0    conjugated estrogens (PREMARIN) vaginal cream Place 0.5 g vaginally once daily. 30 g 5    fluconazole (DIFLUCAN) 150 MG Tab Take 1 tablet (150 mg total) by mouth once. May repeat in 72 hours. 2 tablet 2     No current facility-administered medications for this visit.        ROS  Review of Systems   Constitutional: Negative for chills, diaphoresis, fatigue, fever and unexpected weight change.   HENT: Negative for rhinorrhea, sinus pressure, sore throat and tinnitus.    Eyes: Negative for photophobia and visual disturbance.   Respiratory: Negative for cough, shortness of breath and wheezing.    Cardiovascular: Negative for chest pain and palpitations.   Gastrointestinal: Negative for abdominal pain, blood in stool, constipation, diarrhea, nausea and vomiting.   Genitourinary: Negative for dysuria, flank pain, frequency and vaginal discharge.   Musculoskeletal: Positive for arthralgias. Negative for joint swelling.   Skin: Positive for color change. Negative for rash.   Neurological: Negative for speech difficulty, weakness, light-headedness and headaches.   Psychiatric/Behavioral: Negative for behavioral problems and dysphoric mood.       Physical Exam  Vitals:    07/10/17 0907   BP: 130/84   Pulse: 76   Resp: 18   Temp: 97.7 °F (36.5 °C)   TempSrc: Oral   SpO2: 99%   Weight: 124.2 kg (273 lb 13 oz)   Height: 5' 5" (1.651 m)    Body mass index is 45.56 kg/m².  Weight: 124.2 kg (273 lb 13 oz)   Height: 5' 5" (165.1 cm)     Physical Exam   Constitutional: She is oriented to person, place, and time. She appears well-developed and well-nourished. No distress.   HENT:   Head: " Normocephalic and atraumatic.   Right Ear: Tympanic membrane normal.   Left Ear: Tympanic membrane normal.   Nose: Nose normal.   Mouth/Throat: No oropharyngeal exudate.   Eyes: EOM are normal.   Neck: Neck supple. No thyromegaly present.   Cardiovascular: Normal rate and regular rhythm.  Exam reveals no gallop and no friction rub.    No murmur heard.  Pulmonary/Chest: Effort normal and breath sounds normal. No respiratory distress. She has no wheezes. She has no rales.   Abdominal: Soft. Bowel sounds are normal. She exhibits no distension and no mass. There is no tenderness. There is no rebound and no guarding.   Genitourinary:   Genitourinary Comments:   GYN Exam    Medical assistant was present in room and assisted with procedure. Pap smear was performed, specimen collected with cytobrush. No external lesions noted. Normal vaginal mucosa.  Cervix normal appearing cervix without discharge or lesions,no vaginal discharge.    Bimanual exam performed:  no cervical motion tenderness noted, no uterine or ovarian masses palpated.      Lymphadenopathy:     She has no cervical adenopathy.   Neurological: She is alert and oriented to person, place, and time.   Skin: Skin is warm and dry. Rash noted. There is erythema.   Confluent hyperpigmented rash under both breasts   Psychiatric: She has a normal mood and affect. Her behavior is normal.   Nursing note and vitals reviewed.      Health Maintenance       Date Due Completion Date    Pap Smear with HPV Cotest 04/01/1981 ---    Colonoscopy 04/01/2010 ---    Influenza Vaccine 08/01/2017 3/9/2017 (Declined)    Override on 3/9/2017: Declined    Override on 3/9/2016: Declined    Mammogram 02/22/2019 2/22/2017    Override on 6/10/2015: Done (normal)    Override on 6/10/2014: Done (normal)    Lipid Panel 03/09/2021 3/9/2016    TETANUS VACCINE 06/08/2026 6/8/2016    Override on 4/1/2005: Done            ASSESSMENT     1. Routine general medical examination at a health care facility     2. Difficulty walking    3. Essential hypertension    4. Borderline diabetic    5. Vaginal dryness    6. Chronic allergic rhinitis, unspecified seasonality, unspecified trigger    7. Intertrigo        PLAN:     Routine general medical examination at a health care facility  -     Liquid-based pap smear, screening  -     CBC auto differential; Future; Expected date: 07/10/2017  -     Comprehensive metabolic panel; Future; Expected date: 07/10/2017  -     Liquid-based pap smear, screening  -     HPV High Risk Genotypes, PCR    Difficulty walking  -temporary handicap tag given, with weight loss her symptoms should improve    Essential hypertension  -This is a chronic medical condition that is stable under the current regimen. Continue to monitor and we will make medication adjustments as needed.       Borderline diabetic  -     Lipid panel; Future; Expected date: 07/10/2017  -     Hemoglobin A1c; Future; Expected date: 07/10/2017    Vaginal dryness  -she should apply once a week  -     conjugated estrogens (PREMARIN) vaginal cream; Place 0.5 g vaginally once daily.  Dispense: 30 g; Refill: 5    Chronic allergic rhinitis, unspecified seasonality, unspecified trigger  -     fluticasone (FLONASE) 50 mcg/actuation nasal spray; USE 1 SPRAY IN EACH NOSTRIL 2 TIMES DAILY  Dispense: 16 g; Refill: 5    Intertrigo  -she should try to keep the area dry, likely due to sweating from her exercise program and stimulants  -     fluconazole (DIFLUCAN) 150 MG Tab; Take 1 tablet (150 mg total) by mouth once. May repeat in 72 hours.  Dispense: 2 tablet; Refill: 2              Natalia Martin MD  07/10/2017 9:47 AM        Return in about 3 months (around 10/10/2017) for Follow up.

## 2017-07-11 ENCOUNTER — TELEPHONE (OUTPATIENT)
Dept: BARIATRICS | Facility: CLINIC | Age: 57
End: 2017-07-11

## 2017-07-11 LAB
ESTIMATED AVG GLUCOSE: 126 MG/DL
HBA1C MFR BLD HPLC: 6 %

## 2017-07-11 NOTE — TELEPHONE ENCOUNTER
----- Message from Michael Genao sent at 7/11/2017 10:56 AM CDT -----  Pt wants to speak with someone regarding her diagnosis code. Pt said she isn't covered under that one. Please call pt at [phone]

## 2017-07-13 ENCOUNTER — TELEPHONE (OUTPATIENT)
Dept: FAMILY MEDICINE | Facility: CLINIC | Age: 57
End: 2017-07-13

## 2017-07-13 NOTE — TELEPHONE ENCOUNTER
Her hemoglobin A1C has gone down from 6.1 to 6.0. Keep up the good work! At 5.6 she will no longer be borderline diabetic    All other labs look good    Natalia Martin MD

## 2017-07-14 LAB
HPV HR 12 DNA CVX QL NAA+PROBE: NEGATIVE
HPV16 DNA SPEC QL NAA+PROBE: NEGATIVE
HPV18 DNA SPEC QL NAA+PROBE: NEGATIVE

## 2017-07-18 ENCOUNTER — OFFICE VISIT (OUTPATIENT)
Dept: PODIATRY | Facility: CLINIC | Age: 57
End: 2017-07-18
Payer: COMMERCIAL

## 2017-07-18 VITALS
WEIGHT: 272 LBS | DIASTOLIC BLOOD PRESSURE: 80 MMHG | HEIGHT: 65 IN | BODY MASS INDEX: 45.32 KG/M2 | SYSTOLIC BLOOD PRESSURE: 104 MMHG

## 2017-07-18 DIAGNOSIS — M21.42 PES PLANUS OF BOTH FEET: ICD-10-CM

## 2017-07-18 DIAGNOSIS — M79.672 HEEL PAIN, BILATERAL: Primary | ICD-10-CM

## 2017-07-18 DIAGNOSIS — M20.42 HAMMER TOES OF BOTH FEET: ICD-10-CM

## 2017-07-18 DIAGNOSIS — M21.41 PES PLANUS OF BOTH FEET: ICD-10-CM

## 2017-07-18 DIAGNOSIS — M79.671 HEEL PAIN, BILATERAL: Primary | ICD-10-CM

## 2017-07-18 DIAGNOSIS — M20.41 HAMMER TOES OF BOTH FEET: ICD-10-CM

## 2017-07-18 DIAGNOSIS — M20.10 HALLUX ABDUCTO VALGUS, UNSPECIFIED LATERALITY: ICD-10-CM

## 2017-07-18 DIAGNOSIS — M72.2 PLANTAR FASCIITIS: ICD-10-CM

## 2017-07-18 PROCEDURE — 99999 PR PBB SHADOW E&M-EST. PATIENT-LVL III: CPT | Mod: PBBFAC,,, | Performed by: PODIATRIST

## 2017-07-18 PROCEDURE — 99203 OFFICE O/P NEW LOW 30 MIN: CPT | Mod: 25,S$GLB,, | Performed by: PODIATRIST

## 2017-07-18 PROCEDURE — 20550 NJX 1 TENDON SHEATH/LIGAMENT: CPT | Mod: RT,S$GLB,, | Performed by: PODIATRIST

## 2017-07-18 RX ORDER — MELOXICAM 15 MG/1
15 TABLET ORAL DAILY
Qty: 30 TABLET | Refills: 1 | Status: SHIPPED | OUTPATIENT
Start: 2017-07-18 | End: 2017-11-29

## 2017-07-18 RX ORDER — DEXAMETHASONE SODIUM PHOSPHATE 4 MG/ML
2 INJECTION, SOLUTION INTRA-ARTICULAR; INTRALESIONAL; INTRAMUSCULAR; INTRAVENOUS; SOFT TISSUE ONCE
Status: COMPLETED | OUTPATIENT
Start: 2017-07-18 | End: 2017-07-18

## 2017-07-18 RX ORDER — BUPIVACAINE HYDROCHLORIDE 5 MG/ML
1 INJECTION, SOLUTION EPIDURAL; INTRACAUDAL ONCE
Status: COMPLETED | OUTPATIENT
Start: 2017-07-18 | End: 2017-07-18

## 2017-07-18 RX ORDER — LIDOCAINE HYDROCHLORIDE 20 MG/ML
1 INJECTION, SOLUTION EPIDURAL; INFILTRATION; INTRACAUDAL; PERINEURAL ONCE
Status: COMPLETED | OUTPATIENT
Start: 2017-07-18 | End: 2017-07-18

## 2017-07-18 RX ORDER — TRIAMCINOLONE ACETONIDE 40 MG/ML
20 INJECTION, SUSPENSION INTRA-ARTICULAR; INTRAMUSCULAR ONCE
Status: COMPLETED | OUTPATIENT
Start: 2017-07-18 | End: 2017-07-18

## 2017-07-18 RX ADMIN — BUPIVACAINE HYDROCHLORIDE 5 MG: 5 INJECTION, SOLUTION EPIDURAL; INTRACAUDAL at 10:07

## 2017-07-18 RX ADMIN — LIDOCAINE HYDROCHLORIDE 20 MG: 20 INJECTION, SOLUTION EPIDURAL; INFILTRATION; INTRACAUDAL; PERINEURAL at 10:07

## 2017-07-18 RX ADMIN — DEXAMETHASONE SODIUM PHOSPHATE 2 MG: 4 INJECTION, SOLUTION INTRA-ARTICULAR; INTRALESIONAL; INTRAMUSCULAR; INTRAVENOUS; SOFT TISSUE at 10:07

## 2017-07-18 RX ADMIN — TRIAMCINOLONE ACETONIDE 20 MG: 40 INJECTION, SUSPENSION INTRA-ARTICULAR; INTRAMUSCULAR at 10:07

## 2017-07-18 NOTE — PROGRESS NOTES
Subjective:      Patient ID: Bambi Carrera is a 57 y.o. female.    Chief Complaint: Foot Pain (both feet pcp Dr. Martin 7/10/17)    Bambi Carrera is a 57 y.o. female who presents to the clinic complaining of bilateral plantar medial heel pain R>>L, especially with the first step in the morning. The pain is described as Aching and Throbbing. The onset of the pain was gradual and has worsened over the past several months. Bambi Carrera rates the pain as 10/10. she denies a history of trauma. she relates pain began several years ago. Prior treatments include icing, stretching, PT.    Shoe gear: kitten heel  Hours on Feet: 6  Exercise: moderately active    Hemoglobin A1C   Date Value Ref Range Status   07/10/2017 6.0 (H) 4.0 - 5.6 % Final     Comment:     According to ADA guidelines, hemoglobin A1c <7.0% represents  optimal control in non-pregnant diabetic patients. Different  metrics may apply to specific patient populations.   Standards of Medical Care in Diabetes-2016.  For the purpose of screening for the presence of diabetes:  <5.7%     Consistent with the absence of diabetes  5.7-6.4%  Consistent with increasing risk for diabetes   (prediabetes)  >or=6.5%  Consistent with diabetes  Currently, no consensus exists for use of hemoglobin A1c  for diagnosis of diabetes for children.  This Hemoglobin A1c assay has significant interference with fetal   hemoglobin   (HbF). The results are invalid for patients with abnormal amounts of   HbF,   including those with known Hereditary Persistence   of Fetal Hemoglobin. Heterozygous hemoglobin variants (HbAS, HbAC,   HbAD, HbAE, HbA2) do not significantly interfere with this assay;   however, presence of multiple variants in a sample may impact the %   interference.     03/09/2016 6.1 4.5 - 6.2 % Final         Patient Active Problem List   Diagnosis    Hypertension    Allergic rhinitis    Right hip pain    Joint stiffness    Muscle weakness    Difficulty  walking    Borderline diabetic       Current Outpatient Prescriptions on File Prior to Visit   Medication Sig Dispense Refill    cetirizine (ZYRTEC) 10 MG tablet TAKE ONE TABLET BY MOUTH ONCE DAILY 30 tablet 3    diethylpropion 75 mg TbSR Take 75 mg by mouth once daily. 30 tablet 1    ergocalciferol (VITAMIN D2) 50,000 unit Cap Take 1 capsule (50,000 Units total) by mouth every 7 days. 6 capsule 5    estradiol (ESTRACE) 0.01 % (0.1 mg/gram) vaginal cream Place 1 g vaginally once daily. 30 g 11    fluticasone (FLONASE) 50 mcg/actuation nasal spray USE 1 SPRAY IN EACH NOSTRIL 2 TIMES DAILY 16 g 5    hydrochlorothiazide (HYDRODIURIL) 25 MG tablet TAKE ONE TABLET BY MOUTH ONCE DAILY 90 tablet 0    [START ON 7/19/2017] phentermine (ADIPEX-P) 37.5 mg tablet Take 1 tablet (37.5 mg total) by mouth before breakfast. 30 tablet 0     No current facility-administered medications on file prior to visit.        Review of patient's allergies indicates:  No Known Allergies    Past Surgical History:   Procedure Laterality Date    LASIK Bilateral 07/15/2016       Family History   Problem Relation Age of Onset    Cancer Mother        Social History     Social History    Marital status: Single     Spouse name: N/A    Number of children: N/A    Years of education: N/A     Occupational History    Not on file.     Social History Main Topics    Smoking status: Never Smoker    Smokeless tobacco: Never Used    Alcohol use No    Drug use: No    Sexual activity: Not on file     Other Topics Concern    Not on file     Social History Narrative    No narrative on file     Review of Systems   Constitution: Negative for chills, fever and weakness.   Cardiovascular: Negative for claudication and leg swelling.   Respiratory: Negative for cough and shortness of breath.    Skin: Negative for dry skin, itching, nail changes and rash.   Musculoskeletal: Positive for myalgias. Negative for falls, joint pain, joint swelling and muscle  "weakness.   Gastrointestinal: Negative for diarrhea, nausea and vomiting.   Neurological: Negative for numbness, paresthesias and tremors.   Psychiatric/Behavioral: Negative for altered mental status and hallucinations.       Objective:       Vitals:    07/18/17 0929   BP: 104/80   Weight: 123.4 kg (272 lb)   Height: 5' 5" (1.651 m)   PainSc: 10-Worst pain ever   PainLoc: Foot       Physical Exam   Constitutional:  Non-toxic appearance. She does not have a sickly appearance. No distress.   Cardiovascular:   Pulses:       Dorsalis pedis pulses are 2+ on the right side, and 2+ on the left side.        Posterior tibial pulses are 2+ on the right side, and 2+ on the left side.   Pulmonary/Chest: No respiratory distress.   Musculoskeletal:        Right ankle: She exhibits decreased range of motion. She exhibits no swelling. No tenderness. No lateral malleolus, no medial malleolus, no AITFL, no CF ligament and no posterior TFL tenderness found. Achilles tendon exhibits no pain, no defect and normal Klein's test results.        Left ankle: She exhibits decreased range of motion. She exhibits no swelling. No tenderness. No lateral malleolus, no medial malleolus, no AITFL, no CF ligament and no posterior TFL tenderness found. Achilles tendon exhibits no pain, no defect and normal Klein's test results.        Right foot: There is no bony tenderness.        Left foot: There is no bony tenderness.   Biomechanical exam: Pain on palpation bilateral medial calcaneal tubercle at origin of plantar fascia. There is equinus deformity bilateral with decreased dorsiflexion at the ankle joint bilateral. No tenderness with compression of heel. Negative tinels sign. Gait analysis reveals excessive pronation through midstance and propulsion with early heel off. Shoes reveals lateral heel counter wear bilateral     Patient has hammertoes of digits 2-5 bilateral partially reducible without symptom today.    Visible and palpable bunion " without pain at dorsomedial 1st metatarsal head right and left.  Hallux abducted right and left partially reducible, tracks laterally without being track bound.  No ecchymosis, erythema, edema, or cardinal signs infection or signs of trauma same foot.     Neurological: She has normal reflexes. She displays no atrophy and no tremor. No sensory deficit. She exhibits normal muscle tone.   Skin: Skin is warm, dry and intact. No bruising, no burn, no laceration, no lesion and no rash noted. She is not diaphoretic. No cyanosis. No pallor. Nails show no clubbing.   Psychiatric: Her mood appears not anxious. Her affect is not inappropriate. Her speech is not slurred. She is not combative. She is communicative. She is attentive.   Nursing note reviewed.            Assessment:       Encounter Diagnoses   Name Primary?    Heel pain, bilateral Yes    Plantar fasciitis     Pes planus of both feet     Hammer toes of both feet     Hallux abducto valgus, unspecified laterality          Plan:       Bambi was seen today for foot pain.    Diagnoses and all orders for this visit:    Heel pain, bilateral    Plantar fasciitis    Pes planus of both feet    Hammer toes of both feet    Hallux abducto valgus, unspecified laterality    Other orders  -     bupivacaine (PF) 0.5% (5 mg/ml) injection 5 mg; Inject 1 mL (5 mg total) into the skin once.  -     dexamethasone injection 2 mg; 0.5 mLs (2 mg total) by Other route once.  -     triamcinolone acetonide injection 20 mg; 0.5 mLs (20 mg total) by Other route once.  -     lidocaine (PF) 20 mg/ml (2%) injection 20 mg; 1 mL (20 mg total) by Other route once.  -     meloxicam (MOBIC) 15 MG tablet; Take 1 tablet (15 mg total) by mouth once daily.      I counseled the patient on her conditions, their implications and medical management.      Discussed different treatment options for heel pain. I gave written and verbal instructions on stretching exercises. Patient expressed understanding.  Discussed icing the affected area as needed and also wearing appropriate shoe gear and avoiding flats, slippers, sandals, and going barefoot. My recommendation for OTC supports is [employer]. Patient instructed on adequate icing techniques. Patient should ice the affected area at least once per day x 10 minutes for 10 days . I advised the patient that extra icing would also be beneficial to ensure adequate anti inflammatory effect. We also discussed cortisone injections and NSAID therapy.     Mobic prescribed. Patient was instructed on dosing information. Discontinue if adverse effects occur     Patient would like injection today.  With patient's permission,  a cortisone injection was performed at the right foot, medial approach, using 3ccs of mixture of (1cc 1% plain Lidocaine : 1cc 0.5% Marcaine plain:  0.5cc kenalog-40 : 0.5cc dexamethasone).   This was well tolerated.         RTC in 6-9 weeks if no improvement, at this time she will receive cortisone injections and referral to PT. Patient is amenable to plan.

## 2017-07-18 NOTE — PATIENT INSTRUCTIONS
Recommend lotions: eucerin, aquaphor, A&D ointment, gold bond for diabetics, sween    Shoe recommendations: (try [email or web address], [email or web address] , [email or web address], or [email or web address] for discounted prices) you can visit [employer] shoes in [place] as well    Asics (GT 1000 or gel foundations), new balance, saucony (stabil c3),  Cunha (GTS or Beast or transcend), vionic, propet (tennis shoe)    sofft brand, clarks, crocs, aerosoles, naturalizers, SAS, ecco, born, devan manuel, rockports (dress shoes)    Vionic, volitiles, burkenstocks, fitflops, propet (sandals)    Nike comfort thong sandals, crocs, propet (house shoes)    Nail Home remedy:  Vicks Vapor rub to nails    Occasional soaks for 15-20 mins in luke warm water with 1 cup of listerine and 1 cup of apple cider vinegar are ok You may add several drops of oil of oregano or tea tree oil as well      Understanding Plantar Fasciitis    Plantar fasciitis is a condition that causes foot and heel pain. The plantar fascia is a tough band of tissue that runs across the bottom of the foot from the heel to the toes. This tissue pulls on the heel bone. It supports the arch of the foot as it pushes off the ground. If the tissue becomes irritated or red and swollen (inflamed), it is called plantar fasciitis.  How to say it  PLAN-tuhr fa-see-IY-tis   What causes plantar fasciitis?  Plantar fasciitis most often occurs from overusing the plantar fascia. The tissue may become damaged from activities that put repeated stress on the heel and foot. Or it may wear down over time with age and ankle stiffness. You are more likely to have plantar fasciitis if you:  · Do activities that require a lot of running, jumping, or dancing  · Have a job that requires being on your feet for long periods  · Are overweight or obese  · Have certain foot problems, such as a tight Achilles tendon, flat feet, or high arches  · Often wear poorly fitting shoes  Symptoms of plantar fasciitis  The condition most often causes pain  in the heel and the bottom of the foot. The pain may occur when you take your first steps in the morning. It may get better as you walk throughout the day. But as you continue to put weight on the foot, the pain often returns. Pain may also occur after standing or sitting for long periods.  Treating plantar fasciitis  Treatments for plantar fasciitis include:  · Resting the foot. This involves limiting movements that make your foot hurt. You may also need to avoid certain sports and types of work for a time.  · Using cold packs. Put an ice pack on the heel and foot to help reduce pain and swelling.  · Taking pain medicines. Prescription and over-the-counter pain medicines can help relieve pain and swelling.  · Using heel cups or foot inserts (orthotics). These are placed in the shoes to help support the heel or arch and cushion the heel. You may also be told to buy proper-fitting shoes with good arch support and cushioned soles.  · Taping the foot. This supports the arch and limits the movement of the plantar fascia to help relieve symptoms.  · Wearing a night splint. This stretches the plantar fascia and leg muscles while you sleep. This may help relieve pain.  · Doing exercises and physical therapy. These stretch and strengthen the plantar fascia and the muscles in the leg that support the heel and foot.  · Getting shots of medicine into the foot. These may help relieve symptoms for a time.  · Having surgery. This may be needed if other treatments fail to relieve symptoms. During surgery, the surgeon may partially cut the plantar fascia to release tension.  Possible complications of plantar fasciitis  Without proper care and treatment, healing may take longer than normal. Also, symptoms may continue or get worse. Over time, the plantar fascia may be damaged. This can make it hard to walk or even stand without pain.  When to call your healthcare provider  Call your healthcare provider right away if you have any of  these:  · Fever of 100.4°F (38°C) or higher, or as directed  · Symptoms that dont get better with treatment, or get worse  · New symptoms, such as numbness, tingling, or weakness in the foot   © 7388-7727 [employer] [address] [place] [address]. All rights reserved. This information is not intended as a substitute for professional medical care. Always follow your healthcare professional's instructions.        Treating Plantar Fasciitis  First, your healthcare provider tries to determine the cause of your problem in order to suggest ways to relieve pain. If your pain is due to poor foot mechanics, custom-made shoe inserts (orthoses) may help.    Reduce symptoms  · To relieve mild symptoms, try aspirin, ibuprofen, or other medicines as directed. Rubbing ice on the affected area may also help.  · To reduce severe pain and swelling, your healthcare provider may prescribe pills or injections or a walking cast in some instances. Physical therapy, such as ultrasound or a daily stretching program, may also be recommended. Surgery is rarely required.  · To reduce symptoms caused by poor foot mechanics, your foot may be taped. This supports the arch and temporarily controls movement. Night splints may also help by stretching the fascia.  Control movement  If taping helps, your healthcare provider may prescribe orthoses. Built from plaster casts of your feet, these inserts control the way your foot moves. As a result, your symptoms should go away.  Reduce overuse  Every time your foot strikes the ground, the plantar fascia is stretched. You can reduce the strain on the plantar fascia and the possibility of overuse by following these suggestions:  · Lose any excess weight.  · Avoid running on hard or uneven ground.  · Use orthoses at all times in your shoes and house slippers.  If surgery is needed  Your healthcare provider may consider surgery if other types of treatment don't control your pain.  During surgery, the plantar fascia is partially cut to release tension. As you heal, fibrous tissue fills the space between the heel bone and the plantar fascia.   © 8863-8231 [employer]. [address] [place] [address], [place] [address]. All rights reserved. This information is not intended as a substitute for professional medical care. Always follow your healthcare professional's instructions.        Wearing Proper Shoes                    You walk on your feet every day, forcing them to support the weight of your body. Repeated stress on your feet can cause damage over time. The right shoes can help protect your feet. The wrong shoes can cause more foot problems. Read the information below to help you find a shoe that fits your foot needs.     A good shoe fit will cover your foot outline.       A shoe that doesnt cover the outline is a bad fit.      Whats Your Foot Shape?  To get a good fit, you need to know the shape of your foot. Do this simple test: While standing, place your foot on a piece of paper and trace around it. Is your foot straight or curved? Do you have a foot problem, such as a bunion, that causes your foot outline to show a bulge on the side of your big toe?  Finding Your Fit  Bring your foot outline to the [employer] store to help you find the right shoe. Place a shoe you like on top of the outline to see if it matches the shape. The shoe should cover the outline. (If you have a bunion, the shoe may not cover the bulge on the outline. Look for soft leather shoes to stretch over the bunion.) Once youve found a pair of proper shoes, put them on. Walk around. Be sure the shoes dont rub or pinch. If the shoes feel good, youve found your fit!  The Right Shoe for You  A good shoe has features that provide comfort and support. It must also be the right size and shape for your feet. Look for a shoe made of breathable fabric and lining, such as leather or canvas. Be sure that shoes have enough  tread to prevent slipping. Go to a good shoe store for help finding the right shoe.  Good Shoe Features  An ideal shoe has the following:  · Laces for support. If tying laces is a problem for you, try shoes with Velcro fasteners or jj.  · A front of the shoe (toe box) with ½ inch space in front of your longest toes.  · An arch shape that supports your foot.  · No more than 1½ inches of heel.  · A stiff, snug back of the shoe to keep your foot from sliding around.  · A smooth lining with no rough seams.  Shoe Shopping Tips  Below are some dos and donts for when you go to the shoe store.  Do:  · Select the shoes that feel right. Wear them around the house. Then bring them to your foot doctor to check for fit. If they dont fit well, return them.  · Shop late in the day, when your feet will be slightly bigger.  · Each time you buy shoes, have both your feet measured while you are standing. Foot size changes with time.  · Pick shoes to suit their purpose. High heels are okay for an occasional night on the town. But for everyday wear, choose a more sensible shoe.  · Try on shoes while wearing any inserts specially made for your feet (orthoses).  · Try on both the right and left shoes. If your feet are different sizes, pick a pair that fits the larger foot.  Dont:  · Dont buy shoes based on shoe size alone. Always try on shoes, as sizes differ from brand to brand and within brands.  · Dont expect shoes to break in. If they dont fit at the store, dont buy them.  · Dont buy a shoe that doesnt match your foot shape.  What About Socks?  Always wear socks with shoes. Socks help absorb sweat and reduce friction and blistering. When shopping for shoes, choose soft, padded socks with seams that dont irritate your feet.  If You Have Foot Problems  Some foot problems cause deformities. This can make it hard to find a good fit. Look for shoes made of soft leather to stretch over the deformity. If you have bunions,  buy shoes with a wider toe box. To fit hammertoes, look for shoes with a tall toe box. If you have arch problems, you may need inserts. In some cases, youll need to have custom footwear or orthoses made for your feet.  Suggested Footwear  Ask your healthcare provider what kind of footwear you need. He or she may recommend a certain brand or shoe store.  © 9957-1549 [employer]. [address] [place] [address]. All rights reserved. This information is not intended as a substitute for professional medical care. Always follow your healthcare professional's instructions.        Toe Extension (Flexibility)    These instructions are for your right foot. Switch sides for your left foot.  1. Sit in a chair. Rest your right ankle on your left knee.  2. Hold your toes with your right hand. Gently bend the toes backward. Feel a stretch in the undersides of the toes and ball of the foot. Hold for 30 to 60 seconds.  3. Then gently bend the toes in the other direction. Gently press on them until your foot is pointed. Hold for 30 to 60 seconds.  4. Repeat 5 times, or as instructed.  © 6720-1689 [employer]. [address] [place] [address]. All rights reserved. This information is not intended as a substitute for professional medical care. Always follow your healthcare professional's instructions.      Ankle Alphabet (Flexibility)    These instructions are for your right foot. Switch sides for your left foot.  5. Sit on the floor with your legs straight in front of you.  6. Rest your right calf on a rolled-up towel. Use your foot to write the letters of the alphabet in mid-air.  7. Repeat this exercise 3 times a day, or as instructed.  Date Last Reviewed: 3/10/2016  © 8059-0456 [employer]. [address] [place] [address]. All rights reserved. This information is not intended as a substitute for professional medical care. Always follow your healthcare  professional's instructions.        Calf Raise (Strength)    8. Stand up straight with both feet flat on the floor, slightly apart. Hold onto a sturdy chair, railing, counter, or table.  9. Raise both heels so youre standing on the balls of your feet. Dont lock your knees or arch your back. Hold for 5 seconds. Then slowly lower your heels back down to the floor.  10. Repeat 10 times, or as instructed.  11. Do this exercise 3 times a day, or as instructed.     Challenge yourself  As you become stronger, do this exercise on one foot at a time.   Date Last Reviewed: 3/10/2016  © 5423-3323 [employer]. [address] [place] [address]. All rights reserved. This information is not intended as a substitute for professional medical care. Always follow your healthcare professional's instructions.        Bent-Knee Calf Stretch  This exercise is designed to stretch and strengthen your feet and ankles. Before beginning the exercise, read through all the instructions. While exercising, breathe normally and dont bounce. If you feel any pain, stop the exercise. If pain persists, inform your healthcare provider:    · Stand an arms length away from a wall. Place the palms of your hands on the wall. Step forward about 12 inches with your ______ foot.  · Keeping toes pointed forward and both heels on the floor, bend both knees and lean forward. Hold for ______ seconds. Relax.  · Repeat ______ times. Do ______ sets a day.  Date Last Reviewed: 8/16/2015  © 2527-4554 [employer]. [address] [place] [address]. All rights reserved. This information is not intended as a substitute for professional medical care. Always follow your healthcare professional's instructions.        Arch retraining    These exercises are for your right foot. Switch sides for your left foot.  12. Sit in a chair or stand with both feet flat on the floor. Press down with the ball of your right foot, but only on  the left side of the foot, just under the big toe.  13. Then pull the bottom of your big toe back toward your heel. This should pull up the arch of your foot. Dont flex your toes while doing this. It is a subtle movement of the arch.  14. Hold for 5 seconds. Relax.  Date Last Reviewed: 3/10/2016  © 1200-9537 [employer]. [address] [place] [address]. All rights reserved. This information is not intended as a substitute for professional medical care. Always follow your healthcare professional's instructions.        Ankle Dorsiflexion/Plantarflexion (Flexibility)    15. Sit on the floor or in bed with your legs straight in front of you.  16. Point both feet. Then flex both feet.  17. Do this 10 to 30 times in a row.  18. Repeat this exercise 2 times a day, or as instructed.  Date Last Reviewed: 5/1/2016  © 0294-5687 [employer]. [address] [place] [address]. All rights reserved. This information is not intended as a substitute for professional medical care. Always follow your healthcare professional's instructions.

## 2017-07-18 NOTE — LETTER
July 18, 2017      Natalia Martin MD  [address] [facility]  [place] [address]           [facility]  [address]  [place] [address]  Phone: [phone]          Patient: Bambi Carrera   MR Number: [id number]   YOB: 1960   Date of Visit: 7/18/2017       Dear Dr. Natalia Martin:    Thank you for referring Bambi Carrera to me for evaluation. Attached you will find relevant portions of my assessment and plan of care.    If you have questions, please do not hesitate to call me. I look forward to following Bambi Carrera along with you.    Sincerely,    Gertrude Marie, ABRAM    Enclosure  CC:  No Recipients    If you would like to receive this communication electronically, please contact [email or web address] or [phone] to request more information on [employer] Link access.    For providers and/or their staff who would like to refer a patient to [facility], please contact us through our one-stop-shop provider referral line, [facility], at [phone].    If you feel you have received this communication in error or would no longer like to receive these types of communications, please e-mail [email or web address]

## 2017-07-20 NOTE — TELEPHONE ENCOUNTER
Patient states her April visit was covered but her may and June visit wasn't because we didn't list HTN first. She states the insurance company is requesting we change the codes and resubmit it.

## 2017-07-21 ENCOUNTER — TELEPHONE (OUTPATIENT)
Dept: BARIATRICS | Facility: CLINIC | Age: 57
End: 2017-07-21

## 2017-07-21 NOTE — TELEPHONE ENCOUNTER
----- Message from Nina Stewart sent at 7/21/2017  3:17 PM CDT -----  Contact: self  Nikki States that she needs a call returned by a nurse in reference to her medication having her mouth dry  . Please call Nikki @ [phone]  . Thanks :)

## 2017-07-21 NOTE — TELEPHONE ENCOUNTER
Advised pt that this symptom should go away she said dr told her to take 1/2 pill but the bottle says take a whole pill she has been taking a whole pill advised pt to take how Dr instructed her to take and see if Dryness gets better

## 2017-07-29 ENCOUNTER — HOSPITAL ENCOUNTER (EMERGENCY)
Facility: OTHER | Age: 57
Discharge: HOME | End: 2017-07-29
Attending: EMERGENCY MEDICINE
Payer: COMMERCIAL

## 2017-07-29 VITALS
TEMPERATURE: 98 F | HEIGHT: 65 IN | HEART RATE: 72 BPM | RESPIRATION RATE: 18 BRPM | DIASTOLIC BLOOD PRESSURE: 84 MMHG | BODY MASS INDEX: 45.32 KG/M2 | SYSTOLIC BLOOD PRESSURE: 150 MMHG | OXYGEN SATURATION: 100 % | WEIGHT: 272 LBS

## 2017-07-29 DIAGNOSIS — M54.9 ACUTE RIGHT-SIDED BACK PAIN, UNSPECIFIED BACK LOCATION: Primary | ICD-10-CM

## 2017-07-29 DIAGNOSIS — M62.838 MUSCLE SPASM: ICD-10-CM

## 2017-07-29 LAB
BILIRUBIN, POC UA: NEGATIVE
BLOOD, POC UA: ABNORMAL
CLARITY, POC UA: ABNORMAL
COLOR, POC UA: YELLOW
GLUCOSE, POC UA: NEGATIVE
KETONES, POC UA: NEGATIVE
LEUKOCYTE EST, POC UA: NEGATIVE
NITRITE, POC UA: NEGATIVE
PH UR STRIP: 5.5 [PH]
POCT GLUCOSE: 78 MG/DL (ref 70–110)
PROTEIN, POC UA: NEGATIVE
SPECIFIC GRAVITY, POC UA: 1.01
UROBILINOGEN, POC UA: 0.2 E.U./DL

## 2017-07-29 PROCEDURE — 99284 EMERGENCY DEPT VISIT MOD MDM: CPT | Mod: 25

## 2017-07-29 PROCEDURE — 81003 URINALYSIS AUTO W/O SCOPE: CPT

## 2017-07-29 PROCEDURE — 96372 THER/PROPH/DIAG INJ SC/IM: CPT

## 2017-07-29 PROCEDURE — 63600175 PHARM REV CODE 636 W HCPCS: Performed by: EMERGENCY MEDICINE

## 2017-07-29 RX ORDER — ORPHENADRINE CITRATE 30 MG/ML
60 INJECTION INTRAMUSCULAR; INTRAVENOUS
Status: COMPLETED | OUTPATIENT
Start: 2017-07-29 | End: 2017-07-29

## 2017-07-29 RX ORDER — DICLOFENAC SODIUM 10 MG/G
2 GEL TOPICAL 4 TIMES DAILY
Qty: 1 TUBE | Refills: 0 | Status: SHIPPED | OUTPATIENT
Start: 2017-07-29 | End: 2018-02-10

## 2017-07-29 RX ORDER — KETOROLAC TROMETHAMINE 30 MG/ML
30 INJECTION, SOLUTION INTRAMUSCULAR; INTRAVENOUS
Status: COMPLETED | OUTPATIENT
Start: 2017-07-29 | End: 2017-07-29

## 2017-07-29 RX ORDER — IBUPROFEN 600 MG/1
600 TABLET ORAL EVERY 6 HOURS PRN
Qty: 20 TABLET | Refills: 0 | Status: SHIPPED | OUTPATIENT
Start: 2017-07-29 | End: 2017-11-29

## 2017-07-29 RX ORDER — CYCLOBENZAPRINE HCL 10 MG
10 TABLET ORAL 3 TIMES DAILY PRN
Qty: 15 TABLET | Refills: 0 | Status: SHIPPED | OUTPATIENT
Start: 2017-07-29 | End: 2017-08-03

## 2017-07-29 RX ADMIN — ORPHENADRINE CITRATE 60 MG: 30 INJECTION INTRAMUSCULAR; INTRAVENOUS at 05:07

## 2017-07-29 RX ADMIN — KETOROLAC TROMETHAMINE 30 MG: 30 INJECTION, SOLUTION INTRAMUSCULAR; INTRAVENOUS at 05:07

## 2017-07-29 NOTE — ED PROVIDER NOTES
Encounter Date: 7/29/2017       History     Chief Complaint   Patient presents with    Back Pain     non traumatic right lower back pain, denies difficulty with urination, onset today      Bambi Carrera is a 57 y.o. female who presents to the Emergency Department with  low back pain worse on the right side.  Patient does not recall an injury.      The history is provided by the patient.   Back Pain    This is a new problem. The current episode started today. The problem occurs constantly. The problem has been unchanged. The pain is associated with no known injury. The pain is present in the sacro-iliac joint. The quality of the pain is described as stabbing. The pain does not radiate. The pain is at a severity of 9/10. The symptoms are aggravated by bending. Pertinent negatives include no chest pain, no fever, no numbness, no abdominal pain, no abdominal swelling, no bowel incontinence, no perianal numbness, no dysuria, no paresthesias, no paresis, no tingling and no weakness. She has tried nothing for the symptoms. The treatment provided no relief. Risk factors include obesity and a sedentary lifestyle.     Review of patient's allergies indicates:  No Known Allergies  Past Medical History:   Diagnosis Date    Borderline diabetic     Depression     Hypertension     Menopause      Past Surgical History:   Procedure Laterality Date    LASIK Bilateral 07/15/2016     Family History   Problem Relation Age of Onset    Cancer Mother      Social History   Substance Use Topics    Smoking status: Never Smoker    Smokeless tobacco: Never Used    Alcohol use No     Review of Systems   Constitutional: Negative for fever.   HENT: Negative for sore throat.    Respiratory: Negative for shortness of breath.    Cardiovascular: Negative for chest pain.   Gastrointestinal: Negative for abdominal pain, bowel incontinence and nausea.   Genitourinary: Negative for dysuria.   Musculoskeletal: Positive for back pain.   Skin:  Negative for rash.   Neurological: Negative for tingling, weakness, numbness and paresthesias.   Hematological: Does not bruise/bleed easily.   All other systems reviewed and are negative.      Physical Exam     Initial Vitals   BP Pulse Resp Temp SpO2   07/29/17 1706 07/29/17 1704 07/29/17 1704 07/29/17 1704 07/29/17 1704   (!) 150/84 72 18 98.2 °F (36.8 °C) 100 %      MAP       07/29/17 1706       106         Physical Exam    Nursing note and vitals reviewed.  Constitutional: She appears well-developed and well-nourished.   HENT:   Head: Normocephalic and atraumatic.   Right Ear: External ear normal.   Left Ear: External ear normal.   Nose: Nose normal.   Mouth/Throat: Oropharynx is clear and moist.   Eyes: Conjunctivae and EOM are normal. Pupils are equal, round, and reactive to light.   Neck: Normal range of motion. Neck supple.   Cardiovascular: Normal rate, regular rhythm, normal heart sounds and intact distal pulses. Exam reveals no gallop and no friction rub.    No murmur heard.  Pulmonary/Chest: Breath sounds normal. No respiratory distress. She has no wheezes. She has no rhonchi. She has no rales. She exhibits no tenderness.   Abdominal: Soft. Bowel sounds are normal. She exhibits no distension. There is no tenderness. There is no rebound and no guarding.   Musculoskeletal: Normal range of motion. She exhibits tenderness. She exhibits no edema.        Right hip: Normal.        Left hip: Normal.        Cervical back: Normal.        Thoracic back: Normal.        Lumbar back: She exhibits tenderness.        Back:    No midline tenderness   Neurological: She is alert and oriented to person, place, and time. She has normal strength and normal reflexes. She displays normal reflexes. No cranial nerve deficit or sensory deficit.   Skin: Skin is warm and dry. Capillary refill takes less than 2 seconds.   Psychiatric: She has a normal mood and affect.         ED Course   Procedures  Labs Reviewed   POCT URINALYSIS  W/O SCOPE - Abnormal; Notable for the following:        Result Value    Glucose, UA Negative (*)     Bilirubin, UA Negative (*)     Ketones, UA Negative (*)     Blood, UA Trace-lysed (*)     Protein, UA Negative (*)     Nitrite, UA Negative (*)     Leukocytes, UA Negative (*)     All other components within normal limits   POCT GLUCOSE   POCT GLUCOSE                               ED Course       Medical decision making   Chief complaint: Right-sided low back pain  Differential diagnosis: Drain, sprain, and muscle spasm   Treatment in the ED Physical Exam, Toradol and Norflex.    Patient reports feeling better after medication.    Discussed outpatient treatment plan, prescriptions, and need to follow up with PCP in one day.    Fill and take prescriptions  for ibuprofen, Flexeril, and Voltaren gel as directed.  Return to the ED if symptoms worsen or do not resolve.   Answered questions and discussed discharge plan.    Patient feels much better and is ready for discharge.  Follow up with PCP in 1 days.    Clinical Impression:   The primary encounter diagnosis was Acute right-sided back pain, unspecified back location. A diagnosis of Muscle spasm was also pertinent to this visit.                           Cathy Green DO  08/03/17 1241

## 2017-07-29 NOTE — ED NOTES
Pt identifiers checked and correct.    LOC: The patient is awake, alert and aware of environment with an appropriate affect, the patient is oriented x 3 and speaking appropriately.   APPEARANCE: Patient resting comfortably and in no acute distress, patient is clean and well groomed, patient's clothing is properly fastened.   SKIN: The skin is warm and dry, color consistent with ethnicity, patient has normal skin turgor and moist mucus membranes, skin intact, no breakdown or bruising noted.   MUSCULOSKELETAL: Patient moving all extremities spontaneously, no obvious swelling or deformities noted.   RESPIRATORY: Airway is open and patent, respirations are spontaneous, patient has a normal effort and rate, no accessory muscle use noted.   CARDIAC: Patient has a normal rate and regular rhythm, no periphreal edema noted, capillary refill < 3 seconds.   ABDOMEN: Morbid Obesity / Soft and non tender to palpation, no distention noted, active bowel sounds present in all four quadrants.   NEUROLOGIC: PERRL, 3 mm bilaterally, eyes open spontaneously, behavior appropriate to situation, follows commands, facial expression symmetrical, bilateral hand grasp equal and even, purposeful motor response noted, normal sensation in all extremities when touched with a finger.

## 2017-08-03 ENCOUNTER — TELEPHONE (OUTPATIENT)
Dept: FAMILY MEDICINE | Facility: CLINIC | Age: 57
End: 2017-08-03

## 2017-08-03 DIAGNOSIS — N95.0 POST-MENOPAUSAL BLEEDING: Primary | ICD-10-CM

## 2017-08-03 NOTE — TELEPHONE ENCOUNTER
Pt reports right sided cramping & heavy bleeding from her vagina since Sunday morning.  Pt reports not having a cycle in 7 years.  Pt had her tubes tied in 1999.    Please advise.

## 2017-08-03 NOTE — TELEPHONE ENCOUNTER
Please let her know we are going to refer to gynecology for evaluation; someone will call for an appt

## 2017-08-03 NOTE — TELEPHONE ENCOUNTER
----- Message from Konstantin Dorsey sent at 8/3/2017 10:23 AM CDT -----  Contact: Self/[phone]  Patient states that she haven't had a menstrual cycle since 2010 and and woke up this morning and she's experiencing heavy bleeding. Thank you.

## 2017-08-04 ENCOUNTER — TELEPHONE (OUTPATIENT)
Dept: OBSTETRICS AND GYNECOLOGY | Facility: CLINIC | Age: 57
End: 2017-08-04

## 2017-08-04 NOTE — TELEPHONE ENCOUNTER
----- Message from Niurka Macias sent at 8/4/2017  2:04 PM CDT -----  Contact: self  Pt is having abnormal having bleeding and is in pain. Pt states she has not had a menstrual in 7 years pt is 58 y/o. Please call pt to discuss being seen sooner at [phone].  ---------------------------------------------------------------  8/3/17 3469p (CHELSEA)  SPOKE WITH MS RAMON, SHE STATED THE WENT TO E.R. WITH HEAVY BLEEDING ,AND THEY GAVE MEDICATION AND TOLD HER TO SEE A GYN DR. SHE STATED SHE CAN NOT WAIT UNTIL 8/28 TO SEE DR SALVADOR, NEW APPT MADE WITH DR REDDY FOR 8/8/17 @ 5807E . INSTRUCTED PT TO GO TO E.R. THRU THE WEEKEND IF SHE NEEDS TO,

## 2017-08-08 ENCOUNTER — OFFICE VISIT (OUTPATIENT)
Dept: OBSTETRICS AND GYNECOLOGY | Facility: CLINIC | Age: 57
End: 2017-08-08
Payer: COMMERCIAL

## 2017-08-08 VITALS
SYSTOLIC BLOOD PRESSURE: 132 MMHG | WEIGHT: 270.94 LBS | DIASTOLIC BLOOD PRESSURE: 77 MMHG | HEIGHT: 65 IN | BODY MASS INDEX: 45.14 KG/M2

## 2017-08-08 DIAGNOSIS — N93.9 ABNORMAL UTERINE BLEEDING: Primary | ICD-10-CM

## 2017-08-08 DIAGNOSIS — N95.1 MENOPAUSAL STATE: ICD-10-CM

## 2017-08-08 DIAGNOSIS — D25.9 UTERINE LEIOMYOMA, UNSPECIFIED LOCATION: ICD-10-CM

## 2017-08-08 PROCEDURE — 3078F DIAST BP <80 MM HG: CPT | Mod: S$GLB,,, | Performed by: OBSTETRICS & GYNECOLOGY

## 2017-08-08 PROCEDURE — 99205 OFFICE O/P NEW HI 60 MIN: CPT | Mod: S$GLB,,, | Performed by: OBSTETRICS & GYNECOLOGY

## 2017-08-08 PROCEDURE — 3075F SYST BP GE 130 - 139MM HG: CPT | Mod: S$GLB,,, | Performed by: OBSTETRICS & GYNECOLOGY

## 2017-08-08 PROCEDURE — 99999 PR PBB SHADOW E&M-EST. PATIENT-LVL III: CPT | Mod: PBBFAC,,, | Performed by: OBSTETRICS & GYNECOLOGY

## 2017-08-08 PROCEDURE — 3008F BODY MASS INDEX DOCD: CPT | Mod: S$GLB,,, | Performed by: OBSTETRICS & GYNECOLOGY

## 2017-08-08 NOTE — LETTER
August 8, 2017      Catyh Green, DO  [address]  [place] [address]           [facility]  [address] [facility] [address]  [place] [address]  Phone: [phone]          Patient: Bambi Carrera   MR Number: [id number]   YOB: 1960   Date of Visit: 8/8/2017       Dear Dr. Cathy Green:    Thank you for referring Bambi Carrera to me for evaluation. Attached you will find relevant portions of my assessment and plan of care.    If you have questions, please do not hesitate to call me. I look forward to following Bambi Carrera along with you.    Sincerely,    Prince Le MD    Enclosure  CC:  No Recipients    If you would like to receive this communication electronically, please contact [email or web address] or [phone] to request more information on [employer] Link access.    For providers and/or their staff who would like to refer a patient to [facility], please contact us through our one-stop-shop provider referral line, [facility], at [phone].    If you feel you have received this communication in error or would no longer like to receive these types of communications, please e-mail [email or web address]

## 2017-08-08 NOTE — PROGRESS NOTES
Subjective:      Chief Complaint:  ABN   BLEEDING    Menstrual History:    OB History      Para Term  AB Living    3 3 3 0 0 3    SAB TAB Ectopic Multiple Live Births    0 0 0 0            Menarche age: 13     No LMP recorded. Patient is postmenopausal.               Objective:        History of Present Illness AND  Examination detailed DICTATE:    HISTORY OF PRESENT ILLNESS:  The patient is 57 years of age, new patient to me,   new patient to [facility].  The patient is a  3, para 3, who   presented to clinic because of abnormal uterine bleeding about a week and a   half.  Last time she bled when she was 50 years of age, the menopausal bleeding   with pain and cramping.  Pap smear in 2017 negative, mammogram in 2017 was   negative.  Medical, the patient has increased blood pressure and diabetes   mellitus.  Surgery, LASIK.    PHYSICAL EXAMINATION:  VITAL SIGNS:  Blood pressure 132/77 and weight 270.  BREASTS:  No lumps, masses, discharge, skin changes, retraction, nipple changes.    Axillae negative.  PELVIC:  External normal.  Vulva normal.  Bartholin, urethral and [place] glands   are negative.  Vagina minimal bleeding, cervix bleeding.  Uterus is enlarged,   probably about 12 weeks' size, nodular, multinodular.  Ovaries are palpable, of   normal size.  Good pelvic support.  No descensus.  RECTAL:  External is negative.    IMPRESSION:  Abnormal uterine bleeding, most likely uterine fibroid,   adenomyosis.    PLAN:  We will do a pelvic ultrasound.  Reviewing the patient's CBC and   comprehensive metabolic pattern, she is not anemic, slightly elevated hemoglobin   A1c.  We will see the patient back within two weeks and also we would   recommended the patient to take a multivitamin with iron and we will put her on   Megace to stop the bleeding and we will see her back within a week to 10 days   and we will decide on followup and treatment.      KARYNA  dd: 2017 15:36:13 (CDT)  td:  08/09/2017 03:04:20 (CDT)  Doc ID   #[id number]  Job ID #[id number]    CC:         Physical Exam   Constitutional: She is oriented to person, place, and time. She appears well-developed and well-nourished. No distress.   HENT:   Head: Normoc  Eyes: Pupils are equal, round, and reactive to light.   Neck: Neck supple. No tracheal deviation present.   Cardiovascular: Normal rate, regular rhythm and normal heart sounds. No murmur heard.  Pulmonary/Chest: Effort normal and breath sounds normal. No respiratory distress. She has no wheezes. She has no rales.    Abdominal: Bowel sounds are normal. She exhibits no distension and no mass. There is no tenderness. There is no rebound and no guarding.  Musculoskeletal: Normal range of motion.   Lymphadenopathy:        Right: No inguinal adenopathy present.        Left: No inguinal adenopathy present.   Neurological: She is alert and oriented.  Skin: Skin is warm. No rash noted.        Review of Systems  Review of Systems   Normal ROS:   Constitutional: Negative for fever, chills, activity change fatigue and unexpected weight change.   HENT: Negative for nosebleeds, congestion.  Eyes: Negative for visual disturbance.   Respiratory: Negative for shortness of breath and wheezing.    Cardiovascular: Negative for chest pain, palpitations.   Gastrointestinal: Negative for abdominal pain, diarrhea,   Musculoskeletal: Negative for back pain.   Allergic/Immunologic: Negative s.   Neurological: Negative .   Hematological: Negative    Psychiatric/Behavioral: DEPRESSION    Assessment:      Diagnosis: MENOPAUSAL   FIBROIDS   ABN   UTERINE   BLEEDING       Plan:      Return in 2  weeks

## 2017-08-09 ENCOUNTER — HOSPITAL ENCOUNTER (OUTPATIENT)
Dept: RADIOLOGY | Facility: HOSPITAL | Age: 57
Discharge: HOME | End: 2017-08-09
Attending: OBSTETRICS & GYNECOLOGY
Payer: COMMERCIAL

## 2017-08-09 DIAGNOSIS — D25.9 UTERINE LEIOMYOMA, UNSPECIFIED LOCATION: ICD-10-CM

## 2017-08-09 DIAGNOSIS — N93.9 ABNORMAL UTERINE BLEEDING: ICD-10-CM

## 2017-08-09 PROCEDURE — 76830 TRANSVAGINAL US NON-OB: CPT | Mod: 26,,, | Performed by: RADIOLOGY

## 2017-08-09 PROCEDURE — 76856 US EXAM PELVIC COMPLETE: CPT | Mod: 26,,, | Performed by: RADIOLOGY

## 2017-08-09 PROCEDURE — 76856 US EXAM PELVIC COMPLETE: CPT | Mod: TC

## 2017-08-11 ENCOUNTER — TELEPHONE (OUTPATIENT)
Dept: OBSTETRICS AND GYNECOLOGY | Facility: CLINIC | Age: 57
End: 2017-08-11

## 2017-08-11 NOTE — TELEPHONE ENCOUNTER
----- Message from Prince Le MD sent at 8/10/2017  8:06 AM CDT -----  Please make an appointment. I would like to see you in my office.

## 2017-08-11 NOTE — TELEPHONE ENCOUNTER
Notes Recorded by Stephanie Martinez MA on 8/11/2017 at 10:02 AM CDT  Patient aware of appointment and results. No questions

## 2017-08-16 ENCOUNTER — OFFICE VISIT (OUTPATIENT)
Dept: OBSTETRICS AND GYNECOLOGY | Facility: CLINIC | Age: 57
End: 2017-08-16
Payer: COMMERCIAL

## 2017-08-16 VITALS
DIASTOLIC BLOOD PRESSURE: 78 MMHG | HEIGHT: 65 IN | BODY MASS INDEX: 44.92 KG/M2 | SYSTOLIC BLOOD PRESSURE: 125 MMHG | WEIGHT: 269.63 LBS

## 2017-08-16 DIAGNOSIS — N95.0 PMB (POSTMENOPAUSAL BLEEDING): Primary | ICD-10-CM

## 2017-08-16 DIAGNOSIS — D25.9 UTERINE LEIOMYOMA, UNSPECIFIED LOCATION: ICD-10-CM

## 2017-08-16 DIAGNOSIS — N95.1 MENOPAUSAL STATE: ICD-10-CM

## 2017-08-16 PROCEDURE — 99999 PR PBB SHADOW E&M-EST. PATIENT-LVL III: CPT | Mod: PBBFAC,,, | Performed by: OBSTETRICS & GYNECOLOGY

## 2017-08-16 PROCEDURE — 99212 OFFICE O/P EST SF 10 MIN: CPT | Mod: S$GLB,,, | Performed by: OBSTETRICS & GYNECOLOGY

## 2017-08-16 PROCEDURE — 3074F SYST BP LT 130 MM HG: CPT | Mod: S$GLB,,, | Performed by: OBSTETRICS & GYNECOLOGY

## 2017-08-16 PROCEDURE — 3078F DIAST BP <80 MM HG: CPT | Mod: S$GLB,,, | Performed by: OBSTETRICS & GYNECOLOGY

## 2017-08-16 PROCEDURE — 3008F BODY MASS INDEX DOCD: CPT | Mod: S$GLB,,, | Performed by: OBSTETRICS & GYNECOLOGY

## 2017-08-16 NOTE — PROGRESS NOTES
Subjective:      Chief Complaint:    Chief Complaint   Patient presents with    Results       Menstrual History:    OB History      Para Term  AB Living    3 3 3 0 0 3    SAB TAB Ectopic Multiple Live Births    0 0 0 0            Menarche age: 13     No LMP recorded. Patient is postmenopausal.           Objective:        History of Present Illness AND  Examination detailed DICTATE:       The patient is 57 years of age.  Return to the clinic for followup from previous   exam.  She is a  3, para 3, diabetes, and increased blood pressure.  The   patient had postmenopausal bleeding episode.  The patient received the benefit   of pelvic ultrasonography, which indicates one small subserosal fibroid.    Endometrial tissue was 3 mm below what is expected.  At this stage, her CBC is   essentially normal.  She is not anemic.  Her hemoglobin A1c is.  Discussed the   problem with the patient.  The bleeding stopped.  At the last visit, I gave the   patient some Megace; however, the patient never started it.  This is a   postmenopausal bleeding, is some concern the endometrial tissue is thin.  I do   not think the fibroid causing the bleeding.  The plan will be repeat pelvic   ultrasound in three months, expectant management.  If the bleeding recurs or   happens again, the patient will come in immediately, then we may schedule her   for either endometrial biopsy and then we may consider possibility of removal of   the uterus with the fibroids.  However, at the present, she is asymptomatic.    We will follow with expectant management.  We will see her back in three months.      KARYNA  dd: 2017 10:44:11 (CDT)  td: 2017 22:42:08 (CDT)  Doc ID   #[id number]  Job ID #[id number]    CC:           Assessment:      Diagnosis:PMB    FIBROID       Plan:      Return in 3  months

## 2017-08-25 DIAGNOSIS — Z12.11 COLON CANCER SCREENING: ICD-10-CM

## 2017-08-26 DIAGNOSIS — J30.9 ALLERGIC RHINITIS: ICD-10-CM

## 2017-08-26 DIAGNOSIS — I10 ESSENTIAL HYPERTENSION: ICD-10-CM

## 2017-08-28 RX ORDER — HYDROCHLOROTHIAZIDE 25 MG/1
TABLET ORAL
Qty: 90 TABLET | Refills: 0 | Status: SHIPPED | OUTPATIENT
Start: 2017-08-28 | End: 2017-11-22 | Stop reason: SDUPTHER

## 2017-08-28 RX ORDER — CETIRIZINE HYDROCHLORIDE 10 MG/1
TABLET ORAL
Qty: 30 TABLET | Refills: 3 | Status: SHIPPED | OUTPATIENT
Start: 2017-08-28 | End: 2017-12-13 | Stop reason: SDUPTHER

## 2017-09-11 ENCOUNTER — OFFICE VISIT (OUTPATIENT)
Dept: BARIATRICS | Facility: CLINIC | Age: 57
End: 2017-09-11
Payer: COMMERCIAL

## 2017-09-11 VITALS
HEART RATE: 70 BPM | BODY MASS INDEX: 44.92 KG/M2 | SYSTOLIC BLOOD PRESSURE: 129 MMHG | HEIGHT: 65 IN | DIASTOLIC BLOOD PRESSURE: 85 MMHG | WEIGHT: 269.63 LBS

## 2017-09-11 DIAGNOSIS — E66.01 MORBID OBESITY WITH BMI OF 40.0-44.9, ADULT: ICD-10-CM

## 2017-09-11 DIAGNOSIS — I10 ESSENTIAL HYPERTENSION: ICD-10-CM

## 2017-09-11 PROCEDURE — 3008F BODY MASS INDEX DOCD: CPT | Mod: S$GLB,,, | Performed by: INTERNAL MEDICINE

## 2017-09-11 PROCEDURE — 99999 PR PBB SHADOW E&M-EST. PATIENT-LVL III: CPT | Mod: PBBFAC,,, | Performed by: INTERNAL MEDICINE

## 2017-09-11 PROCEDURE — 3074F SYST BP LT 130 MM HG: CPT | Mod: S$GLB,,, | Performed by: INTERNAL MEDICINE

## 2017-09-11 PROCEDURE — 99213 OFFICE O/P EST LOW 20 MIN: CPT | Mod: S$GLB,,, | Performed by: INTERNAL MEDICINE

## 2017-09-11 PROCEDURE — 3079F DIAST BP 80-89 MM HG: CPT | Mod: S$GLB,,, | Performed by: INTERNAL MEDICINE

## 2017-09-11 RX ORDER — TOPIRAMATE 50 MG/1
50 TABLET, FILM COATED ORAL 2 TIMES DAILY
Qty: 60 TABLET | Refills: 2 | Status: SHIPPED | OUTPATIENT
Start: 2017-09-11 | End: 2017-12-04

## 2017-09-11 NOTE — PATIENT INSTRUCTIONS
Patient was informed that topiramate is used for migraine prevention and seizures. Weight loss is a common side effect that is well documented. She understands this. She was informed of the potential side effects such as serious and possibly fatal rash in which case the medication should be discontinued immediately. Paresthesias, forgetfulness, fatigue, kidney stones, GI symptoms, and changes in lab values such as electrolytes, blood counts and kidney function.    Start topiramate  in the evening for 1 week, then morning and evening.     Patient was informed that topiramate is used for migraine prevention and seizures. Weight loss is a common side effect that is well documented. She understands this. She was informed of the potential side effects such as serious and possibly fatal rash in which case the medication should be discontinued immediately. Paresthesias, forgetfulness, fatigue, kidney stones, GI symptoms, and changes in lab values such as electrolytes, blood counts and kidney function.    Start topiramate  in the evening for 1 week, then morning and evening.     Add some type of resistance training 2-3 days a week. These can be body weight exercises, light weight or elastic bands. [employer] and [employer] are great sources for free work out plans and videos.       No soda, sweet tea, juices or lemonade    3 meals a day made up of the following:  Unlimited green vegetables, tomatoes, mushrooms, spaghetti squash, cauliflower, meat, poultry, seafood, eggs and hard cheeses.   Milk and plain yogurt  Dressings, seasonings, condiments, etc should have less than 2 g sugars.   beans or nuts can have 1 x a day.   1-2 servings of citrus fruits, berries, pineapple or melon a day (1/2 cup)  Avoid fried foods    No grains, rice, pasta, potatoes or bread    No soda, sweet tea, juices or lemonade    Www.dietdoctor.com for recipes.       Lower Carb Comfort Food Dupes      Skinny Bell Pepper All Boats  Yields: 18 boats   Servin boats  Calories: 145  Total Fat: 9g  Saturated Fat: 4g  Trans Fat: 0g  Cholesterol: 50mg  Sodium: 293mg  Carbohydrates: 4g  Fiber: 1g  Sugars: 2g  Protein: 13g  SmartPoints: 4     Ingredients   1 pound lean ground turkey   1 teaspoons chili powder   1 teaspoon cumin   1/2 teaspoon black pepper   1/4 teaspoon kosher or sea salt   3/4 cup salsa, no sugar added   1 cup grated cheddar cheese, reduced-fat   3 bell peppers  Directions  Remove seeds, core, and membrane from bell peppers then slice each one into 6 verticle pieces where they dip down. Set sliced bell peppers aside.  Cook ground turkey over medium-high heat, breaking up as it cooks. Cook until the turkey loses it's pink color and is cooked through. Drain off any fat.  Preheat oven to 375 degrees.  Combine cooked turkey with spices and salsa. Evenly distribute mixture into the bell pepper boats, top with cheese. Bake on a parchment lined baking sheet for 10 minutes or until cheese is melted and peppers are hot.  NOTE: If you prefer much softer bell peppers, add a few tablespoons water to the bottom of a large casserole dish, add filled nachos, cover tightly with foil and bake 15 minutes.  Remove from the oven and add additional toppings, If desired.  Optional ingredients: sliced Jalepeno peppers, diced avocado, fat-free Greek yogurt or sour cream, or sliced green onions.    [place] Chicken Spaghetti Squash  Yields: 4 servings  Calories: 457  Total Fat: 23g  Saturated Fat: 8g  Trans Fat: 0g  Cholesterol: 201mg  Sodium: 1146mg  Carbohydrates: 19g  Fiber: 4g  Sugar: 9g  Protein: 44g  SmartPoints: 13    Ingredients   1 large spaghetti squash   1 small onion, diced   2 medium carrots, diced   2 celery stalks, diced   1 pound cooked chicken, shredded   1/2 cup hot sauce   1/4 cup Homemade Ranch dressing   1/2 teaspoon garlic powder   salt and pepper to taste   1 cup low-fat shredded cheddar cheese   2 eggs   1/4  cup green onion, chopped  Directions  Preheat oven to 400 degrees F and spray a baking sheet with cooking spray.  Slice your spaghetti squash in half lengthwise and scoop out the seeds, then spray the cut side of the squash with a little olive oil cooking spray and place cut side down on the baking pan.  Roast spaghetti squash for 30-45 minutes or until it is tender.  While the squash is cooking, sauté the onion, celery, and carrots until softened and mostly cooked through.  In a large bowl, combine the sauteed vegetables, chicken, hot sauce, ranch dressing, garlic powder, salt, pepper, eggs, and cheese.  Once the squash is cooked through, allow to cool slightly then use a fork to scrape the insides into your chicken mixture, making sure not to tear the skins.  Make sure that the spaghetti squash is well incorporated with the other ingredients, then divide the mixture between the spaghetti squash halves.  Bake at 350 degrees for 30-35 minutes, or until hot and bubbly.  Remove from the oven and garnish with the green onions and additional ranch or hot sauce if desired.    Lasagna Zucchini Boats  Servings: 8 zucchini boats  Ingredients   Report this ad    4 medium zucchini (2 1/2 lbs), sliced into halves through the length*   1 cup (8.6 oz) part-skim ricotta cheese   1 large egg   1 1/2 Tbsp chopped fresh parsley , plus more for garnish   1 1/4 cups (5 oz) shredded mozzarella cheese   1/2 cup (2 oz) finely shredded parmesan cheese   8 oz 93% lean ground beef or lean ground turkey   4 tsp olive oil , divided   Salt and freshly ground black pepper   1 3/4 cup roasted garlic marinara sauce (low sugar)   1 Tbsp chopped fresh basil , plus more for garnish  Instructions  1. Preheat oven to 400 degrees. Using a spoon, scoop centers from zucchini while leaving a 1/4-inch rim to create boats. Set aside.  2. In a mixing bowl stir together ricotta cheese, egg and 1 1/2 Tbsp of the parsley. Season lightly with salt and  pepper. Stir in 1/2 cup of the mozzarella cheese and the parmesan cheese. Set aside.  3. Heat 2 tsp of the olive oil in a large non-stick skillet over medium-high heat. Crumble beef into pan, season with salt and pepper and cook, stirring occasionally and breaking up beef when stirring, until browned (there shouldn't be any excess fat but if you happened to use a fattier beef then just drain excess rendered fat). Stir in marinara sauce and 1 Tbsp of the basil, remove from heat.  4. To assemble boats, brush both sides of of zucchini lightly with remaining 2 tsp olive oil and place in two baking pans (I used a 13 by 9 and a 9 by 9). Divide cheese mixture among zucchini spooning about 2 1/2 Tbsp into each, then spread cheese mixture into and even layer. Divide sauce among zucchini adding a few heaping spoonfuls to each. Cover baking dishes with foil and place in oven side by side and bake in preheated oven 30 minutes. Remove from oven, sprinkle tops with remaining 3/4 cup mozzarella, return to oven and bake until cheese has melted and zucchini is tender, about 5 minutes. Sprinkle tops with with fresh basil and parsley and serve warm.  5. *Look for zucchini that is wider and more uniform in width. The skinnier zucchini won't fit much filling.  6. Recipe source: [employer]    Chicken Avocado Lime Soup  Prep Time: 15 minutes  Cook Time: 20 minutes  Ingredients   Report this ad    1 1/2 lbs boneless skinless chicken breasts*   1 Tbsp olive oil   1 cup chopped green onions (including whites, mince the whites)   2 jalapeños , seeded and minced (leave seeds if you want soup spicy, omit if you don't like heat)   2 cloves garlic , minced   4 (14.5 oz) cans low-sodium chicken broth   2 [place] tomatoes , seeded and diced   1/2 tsp ground cumin   Salt and freshly ground black pepper   1/3 cup chopped cilantro   3 Tbsp fresh lime juice   3 medium avocados , peeled, cored and diced   Tortilla chips , aixa rafael  "cheese, sour cream for serving (optional)    Instructions  1. In a large pot heat 1 Tbsp olive oil over medium heat. Once hot, add green onions and jalapenos and saute until tender, about 2 minutes, adding garlic during last 30 seconds of sauteing. Add chicken broth, tomatoes, cumin, season with salt and pepper to taste and add chicken breasts. Bring mixture to a boil over medium-high heat. Then reduce heat to medium, cover with lid and allow to cook, stirring occasionally, until chicken has cooked through 10 - 15 minutes (cook time will vary based on thickness of chicken breasts). Reduce burner to warm heat, remove chicken from pan and let rest on a cutting board 5 minutes, then shred chicken and return to soup. Stir in cilantro and lime juice. Add avocados to soup just before serving (if you don't plan on serving the soup right away, I would recommend adding the avocados to each bowl individually, about 1/2 an avocado per serving). Serve with tortilla chips, cheese and sour cream if desired.  2. *For thicker chicken breasts, cut breasts in half through the length (thickness) of the breasts, they will cook faster and more evenly.  3. Recipe Source: adapted slightly from [facility]        CAULIFLOWER "MAC" AND CHEESE    INGREDIENTS   1 small head cauliflower cut into small florets about 5-6 cups   1/2 small onion, diced   1 teaspoon olive oil   Kosher salt   Freshly ground black pepper   2 tablespoons parsley   1 teaspoon paprika   2 tablespoons butter   2 tablespoons flour   1 1/4 cups milk, (whole milk or coconut is best)   1/2 teaspoon granulated garlic   1 teaspoon mustard (optional)   1/2 teaspoon paprika   2 1/2 cups grated extra sharp cheddar cheese (reserve 1/2 cup)     PREPARATION  1. Preheat oven to 400°F. Place cauliflower florets on a baking sheet, mix with diced onion and oil, sprinkle with salt and pepper. Roast for 20-25 minutes tossing half way through until browned.  2. Warm the milk " in the microwave, so it is just heated through (this helps prevent the cheese sauce from clumping). Heat a medium saucepan over medium med-high heat. Add 2 tablespoons butter and flour then whisk for 2 minutes (until a smooth keira is made), add milk and continue whisking until sauce thickens. Once smooth add salt and pepper and other spices. Then add the cheese reserving 1/2 cup. Mix with spatula and add cauliflower, stir gently. Now add the reserved cheese, mix just enough to evenly distribute.   4. Place mixture into a 8x8 inch greased casserole dish and cover with paprika and parsley. Bake in oven for 20 minutes until toasty and bubbly.

## 2017-09-11 NOTE — PROGRESS NOTES
"Subjective:       Patient ID: Bambi Carrera is a 57 y.o. female.    Chief Complaint: Follow-up    Pt here today for follow up. She has lost 5 lbs, net neg 32 lbs in total. BP is good today. Has been in diethylpropion x 1 months with LCHF diet. Phentermine the month before. She has been following diet plan and walking for exercise. Only SE has been dry mouth with both. She feels it is bothersome to her.         Review of Systems   Constitutional: Negative for chills and fever.   Respiratory: Positive for shortness of breath. Negative for apnea.    Cardiovascular: Negative for chest pain and leg swelling.   Gastrointestinal: Negative for constipation and diarrhea.        Denies HB   Genitourinary: Negative for difficulty urinating and menstrual problem.   Musculoskeletal: Positive for arthralgias and back pain.   Neurological: Negative for dizziness and light-headedness.   Psychiatric/Behavioral: Negative for dysphoric mood. The patient is not nervous/anxious.        Objective:     /85   Pulse 70   Ht 5' 5" (1.651 m)   Wt 122.3 kg (269 lb 10 oz)   BMI 44.87 kg/m²     Physical Exam   Constitutional: She is oriented to person, place, and time. She appears well-developed. No distress.   Morbidly obese     HENT:   Head: Normocephalic and atraumatic.   Eyes: EOM are normal. Pupils are equal, round, and reactive to light. No scleral icterus.   Neck: Normal range of motion. Neck supple.   Cardiovascular: Normal rate and normal heart sounds.  Exam reveals no gallop and no friction rub.    No murmur heard.  Pulmonary/Chest: Effort normal and breath sounds normal. No respiratory distress. She has no wheezes.   Musculoskeletal: Normal range of motion. She exhibits no edema.   Neurological: She is alert and oriented to person, place, and time. No cranial nerve deficit.   Skin: Skin is warm and dry. No erythema.   Psychiatric: She has a normal mood and affect. Her behavior is normal. Judgment normal.   Vitals " reviewed.      Assessment:       1. Essential hypertension    2. Morbid obesity with BMI of 40.0-44.9, adult        Plan:             Bambi was seen today for follow-up.    Diagnoses and all orders for this visit:    Essential hypertension  The current medical regimen is effective;  continue present plan and medications.   Morbid obesity with BMI of 40.0-44.9, adult    Comfort food recipes given  -     topiramate (TOPAMAX) 50 MG tablet; Take 1 tablet (50 mg total) by mouth 2 (two) times daily.          Patient was informed that topiramate is used for migraine prevention and seizures. Weight loss is a common side effect that is well documented. She understands this. She was informed of the potential side effects such as serious and possibly fatal rash in which case the medication should be discontinued immediately. Paresthesias, forgetfulness, fatigue, kidney stones, GI symptoms, and changes in lab values such as electrolytes, blood counts and kidney function.    Start topiramate  in the evening for 1 week, then morning and evening.     Add some type of resistance training 2-3 days a week. These can be body weight exercises, light weight or elastic bands. [employer] are great sources for free work out plans and videos.       No soda, sweet tea, juices or lemonade    3 meals a day made up of the following:  Unlimited green vegetables, tomatoes, mushrooms, spaghetti squash, cauliflower, meat, poultry, seafood, eggs and hard cheeses.   Milk and plain yogurt  Dressings, seasonings, condiments, etc should have less than 2 g sugars.   beans or nuts can have 1 x a day.   1-2 servings of citrus fruits, berries, pineapple or melon a day (1/2 cup)  Avoid fried foods    No grains, rice, pasta, potatoes or bread    No soda, sweet tea, juices or lemonade    [email or web address].dietdoctor.com for recipes.

## 2017-09-22 ENCOUNTER — TELEPHONE (OUTPATIENT)
Dept: FAMILY MEDICINE | Facility: CLINIC | Age: 57
End: 2017-09-22

## 2017-09-22 DIAGNOSIS — L97.519 ULCER OF FOOT, RIGHT, WITH UNSPECIFIED SEVERITY: ICD-10-CM

## 2017-09-22 RX ORDER — MUPIROCIN 20 MG/G
OINTMENT TOPICAL
Qty: 22 G | Refills: 0 | Status: SHIPPED | OUTPATIENT
Start: 2017-09-22 | End: 2017-11-06 | Stop reason: SDUPTHER

## 2017-09-22 NOTE — TELEPHONE ENCOUNTER
----- Message from Jada Harman sent at 9/22/2017 12:40 PM CDT -----  Contact: self  Patient is requesting a refill on mupirocin (BACTROBAN. Patient is at the pharmacy. Patient can be reached at [phone].      Thanks,

## 2017-10-10 ENCOUNTER — OFFICE VISIT (OUTPATIENT)
Dept: FAMILY MEDICINE | Facility: CLINIC | Age: 57
End: 2017-10-10
Payer: COMMERCIAL

## 2017-10-10 VITALS
HEIGHT: 65 IN | BODY MASS INDEX: 43.91 KG/M2 | OXYGEN SATURATION: 99 % | WEIGHT: 263.56 LBS | TEMPERATURE: 98 F | DIASTOLIC BLOOD PRESSURE: 74 MMHG | HEART RATE: 77 BPM | RESPIRATION RATE: 18 BRPM | SYSTOLIC BLOOD PRESSURE: 108 MMHG

## 2017-10-10 DIAGNOSIS — R73.03 BORDERLINE DIABETIC: ICD-10-CM

## 2017-10-10 DIAGNOSIS — Z23 NEED FOR IMMUNIZATION AGAINST INFLUENZA: ICD-10-CM

## 2017-10-10 DIAGNOSIS — L25.9 CONTACT DERMATITIS, UNSPECIFIED CONTACT DERMATITIS TYPE, UNSPECIFIED TRIGGER: ICD-10-CM

## 2017-10-10 DIAGNOSIS — I10 ESSENTIAL HYPERTENSION: Primary | ICD-10-CM

## 2017-10-10 PROCEDURE — 90686 IIV4 VACC NO PRSV 0.5 ML IM: CPT | Mod: S$GLB,,, | Performed by: FAMILY MEDICINE

## 2017-10-10 PROCEDURE — 90471 IMMUNIZATION ADMIN: CPT | Mod: S$GLB,,, | Performed by: FAMILY MEDICINE

## 2017-10-10 PROCEDURE — 99999 PR PBB SHADOW E&M-EST. PATIENT-LVL III: CPT | Mod: PBBFAC,,, | Performed by: FAMILY MEDICINE

## 2017-10-10 PROCEDURE — 99214 OFFICE O/P EST MOD 30 MIN: CPT | Mod: 25,S$GLB,, | Performed by: FAMILY MEDICINE

## 2017-10-10 RX ORDER — TRIAMCINOLONE ACETONIDE 1 MG/G
CREAM TOPICAL 2 TIMES DAILY
Qty: 45 G | Refills: 0 | Status: SHIPPED | OUTPATIENT
Start: 2017-10-10 | End: 2021-02-15

## 2017-10-10 NOTE — PROGRESS NOTES
Chief Complaint   Patient presents with    Hypertension    Borderline Diabetes    Follow-up    Rash     neck       HPI  Bambi Carrera is a 57 y.o. female with multiple medical diagnoses as listed in the medical history and problem list that presents for follow-up for hypertension and borderline diabetes. She has lost 35lbs and is walking daily. She has been having a rash on her neck only on the right side, she has been putting bactroban on it with no relief. She does wear chains and stopped wearing one to see if there was an allergy but is not sure what caused it.     PAST MEDICAL HISTORY:  Past Medical History:   Diagnosis Date    Borderline diabetic     Depression     Hypertension     Menopause        PAST SURGICAL HISTORY:  Past Surgical History:   Procedure Laterality Date    LASIK Bilateral 07/15/2016       SOCIAL HISTORY:  Social History     Social History    Marital status: Single     Spouse name: N/A    Number of children: N/A    Years of education: N/A     Occupational History    Not on file.     Social History Main Topics    Smoking status: Never Smoker    Smokeless tobacco: Never Used    Alcohol use No    Drug use: No    Sexual activity: Not on file     Other Topics Concern    Not on file     Social History Narrative    No narrative on file       FAMILY HISTORY:  Family History   Problem Relation Age of Onset    Cancer Mother        ALLERGIES AND MEDICATIONS: updated and reviewed.  Review of patient's allergies indicates:  No Known Allergies  Current Outpatient Prescriptions   Medication Sig Dispense Refill    cetirizine (ZYRTEC) 10 MG tablet TAKE ONE TABLET BY MOUTH ONCE DAILY 30 tablet 3    diclofenac sodium (VOLTAREN) 1 % Gel Apply 2 g topically 4 (four) times daily. 1 Tube 0    ergocalciferol (VITAMIN D2) 50,000 unit Cap Take 1 capsule (50,000 Units total) by mouth every 7 days. 6 capsule 5    estradiol (ESTRACE) 0.01 % (0.1 mg/gram) vaginal cream Place 1 g vaginally once  "daily. 30 g 11    fluticasone (FLONASE) 50 mcg/actuation nasal spray USE 1 SPRAY IN EACH NOSTRIL 2 TIMES DAILY 16 g 5    hydrochlorothiazide (HYDRODIURIL) 25 MG tablet TAKE ONE TABLET BY MOUTH ONCE DAILY 90 tablet 0    ibuprofen (ADVIL,MOTRIN) 600 MG tablet Take 1 tablet (600 mg total) by mouth every 6 (six) hours as needed. 20 tablet 0    meloxicam (MOBIC) 15 MG tablet Take 1 tablet (15 mg total) by mouth once daily. 30 tablet 1    mupirocin (BACTROBAN) 2 % ointment APPLY  OINTMENT TOPICALLY TWICE DAILY 22 g 0    topiramate (TOPAMAX) 50 MG tablet Take 1 tablet (50 mg total) by mouth 2 (two) times daily. 60 tablet 2    triamcinolone acetonide 0.1% (KENALOG) 0.1 % cream Apply topically 2 (two) times daily. 45 g 0     No current facility-administered medications for this visit.        ROS  Review of Systems   Constitutional: Negative for chills, diaphoresis, fatigue, fever and unexpected weight change.   HENT: Negative for rhinorrhea, sinus pressure, sore throat and tinnitus.    Eyes: Negative for photophobia and visual disturbance.   Respiratory: Negative for cough, shortness of breath and wheezing.    Cardiovascular: Negative for chest pain and palpitations.   Gastrointestinal: Negative for abdominal pain, blood in stool, constipation, diarrhea, nausea and vomiting.   Genitourinary: Negative for dysuria, flank pain, frequency and vaginal discharge.   Musculoskeletal: Negative for arthralgias and joint swelling.   Skin: Positive for rash.   Neurological: Negative for speech difficulty, weakness, light-headedness and headaches.   Psychiatric/Behavioral: Negative for behavioral problems and dysphoric mood.       Physical Exam  Vitals:    10/10/17 0916   BP: 108/74   Pulse: 77   Resp: 18   Temp: 97.5 °F (36.4 °C)   TempSrc: Oral   SpO2: 99%   Weight: 119.6 kg (263 lb 9 oz)   Height: 5' 5" (1.651 m)    Body mass index is 43.86 kg/m².  Weight: 119.6 kg (263 lb 9 oz)   Height: 5' 5" (165.1 cm)     Physical Exam "   Constitutional: She is oriented to person, place, and time. She appears well-developed and well-nourished.   Eyes: EOM are normal.   Neurological: She is alert and oriented to person, place, and time.   Skin: Skin is warm and dry. No rash noted. No erythema.   Psychiatric: She has a normal mood and affect. Her behavior is normal.   Nursing note and vitals reviewed.      Health Maintenance       Date Due Completion Date    Colonoscopy 04/01/2010 ---    Influenza Vaccine 08/01/2017 3/9/2017 (Declined)    Override on 3/9/2017: Declined    Override on 3/9/2016: Declined    Mammogram 02/22/2019 2/22/2017    Override on 6/10/2015: Done (normal)    Override on 6/10/2014: Done (normal)    Pap Smear with HPV Cotest 07/10/2020 7/10/2017    Override on 6/3/2014: Done (normal)    Lipid Panel 07/10/2022 7/10/2017    TETANUS VACCINE 06/08/2026 6/8/2016    Override on 4/1/2005: Done            ASSESSMENT     1. Essential hypertension    2. Borderline diabetic    3. Contact dermatitis, unspecified contact dermatitis type, unspecified trigger    4. Need for immunization against influenza        PLAN:     Problem List Items Addressed This Visit        Cardiac/Vascular    Hypertension - Primary  -stable on current therapy       Endocrine    Borderline diabetic  -continue lifestyle changes      Other Visit Diagnoses     Contact dermatitis, unspecified contact dermatitis type, unspecified trigger      -notify me if not improving    Relevant Medications    triamcinolone acetonide 0.1% (KENALOG) 0.1 % cream    Need for immunization against influenza        Relevant Orders    Influenza - Quadrivalent (3 years & older) (PF)            Natalia Martin MD  10/10/2017 10:02 AM        Return in about 6 months (around 4/10/2018) for Follow up.

## 2017-10-11 ENCOUNTER — OFFICE VISIT (OUTPATIENT)
Dept: PODIATRY | Facility: CLINIC | Age: 57
End: 2017-10-11
Payer: COMMERCIAL

## 2017-10-11 VITALS
WEIGHT: 263 LBS | SYSTOLIC BLOOD PRESSURE: 104 MMHG | HEIGHT: 65 IN | BODY MASS INDEX: 43.82 KG/M2 | DIASTOLIC BLOOD PRESSURE: 72 MMHG

## 2017-10-11 DIAGNOSIS — L03.115 CELLULITIS OF RIGHT FOOT: ICD-10-CM

## 2017-10-11 DIAGNOSIS — S91.311A LACERATION OF PLANTAR ASPECT OF RIGHT FOOT, INITIAL ENCOUNTER: Primary | ICD-10-CM

## 2017-10-11 PROCEDURE — 99214 OFFICE O/P EST MOD 30 MIN: CPT | Mod: S$GLB,,, | Performed by: PODIATRIST

## 2017-10-11 PROCEDURE — 99999 PR PBB SHADOW E&M-EST. PATIENT-LVL III: CPT | Mod: PBBFAC,,, | Performed by: PODIATRIST

## 2017-10-11 RX ORDER — CEPHALEXIN 500 MG/1
500 CAPSULE ORAL EVERY 12 HOURS
Qty: 20 CAPSULE | Refills: 0 | Status: SHIPPED | OUTPATIENT
Start: 2017-10-11 | End: 2017-10-21

## 2017-10-11 NOTE — PROGRESS NOTES
"Subjective:      Patient ID: Bambi Carrera is a 57 y.o. female.    Chief Complaint: Foot Problem (cut on bottom of foot pcp Dr. Martin 10/10/17)    Bambi Carrera is a 57 y.o. female who presents to the clinic for follow up of  bilateral plantar medial heel pain R>>L. Following injection to the right heel on last encounter she has been icing, stretching, and "mostly" wearing supportive shoes and relates she no longer has the same heel pain.  She has a new complaint today. Patient relates that one month ago she noted a cut to the plantar medial right heel.  Treated with bactroban. No drainage. Tender with palpation    Shoe gear: kitten heel  Hours on Feet: 6  Exercise: moderately active    Hemoglobin A1C   Date Value Ref Range Status   07/10/2017 6.0 (H) 4.0 - 5.6 % Final     Comment:     According to ADA guidelines, hemoglobin A1c <7.0% represents  optimal control in non-pregnant diabetic patients. Different  metrics may apply to specific patient populations.   Standards of Medical Care in Diabetes-2016.  For the purpose of screening for the presence of diabetes:  <5.7%     Consistent with the absence of diabetes  5.7-6.4%  Consistent with increasing risk for diabetes   (prediabetes)  >or=6.5%  Consistent with diabetes  Currently, no consensus exists for use of hemoglobin A1c  for diagnosis of diabetes for children.  This Hemoglobin A1c assay has significant interference with fetal   hemoglobin   (HbF). The results are invalid for patients with abnormal amounts of   HbF,   including those with known Hereditary Persistence   of Fetal Hemoglobin. Heterozygous hemoglobin variants (HbAS, HbAC,   HbAD, HbAE, HbA2) do not significantly interfere with this assay;   however, presence of multiple variants in a sample may impact the %   interference.     03/09/2016 6.1 4.5 - 6.2 % Final         Patient Active Problem List   Diagnosis    Hypertension    Allergic rhinitis    Right hip pain    Joint stiffness    " Muscle weakness    Difficulty walking    Borderline diabetic    Abnormal uterine bleeding    Menopausal state    Uterine leiomyoma    PMB (postmenopausal bleeding)       Current Outpatient Prescriptions on File Prior to Visit   Medication Sig Dispense Refill    cetirizine (ZYRTEC) 10 MG tablet TAKE ONE TABLET BY MOUTH ONCE DAILY 30 tablet 3    ergocalciferol (VITAMIN D2) 50,000 unit Cap Take 1 capsule (50,000 Units total) by mouth every 7 days. 6 capsule 5    estradiol (ESTRACE) 0.01 % (0.1 mg/gram) vaginal cream Place 1 g vaginally once daily. 30 g 11    fluticasone (FLONASE) 50 mcg/actuation nasal spray USE 1 SPRAY IN EACH NOSTRIL 2 TIMES DAILY 16 g 5    hydrochlorothiazide (HYDRODIURIL) 25 MG tablet TAKE ONE TABLET BY MOUTH ONCE DAILY 90 tablet 0    ibuprofen (ADVIL,MOTRIN) 600 MG tablet Take 1 tablet (600 mg total) by mouth every 6 (six) hours as needed. 20 tablet 0    meloxicam (MOBIC) 15 MG tablet Take 1 tablet (15 mg total) by mouth once daily. 30 tablet 1    mupirocin (BACTROBAN) 2 % ointment APPLY  OINTMENT TOPICALLY TWICE DAILY 22 g 0    topiramate (TOPAMAX) 50 MG tablet Take 1 tablet (50 mg total) by mouth 2 (two) times daily. 60 tablet 2    triamcinolone acetonide 0.1% (KENALOG) 0.1 % cream Apply topically 2 (two) times daily. 45 g 0    diclofenac sodium (VOLTAREN) 1 % Gel Apply 2 g topically 4 (four) times daily. 1 Tube 0     No current facility-administered medications on file prior to visit.        Review of patient's allergies indicates:  No Known Allergies    Past Surgical History:   Procedure Laterality Date    LASIK Bilateral 07/15/2016       Family History   Problem Relation Age of Onset    Cancer Mother        Social History     Social History    Marital status: Single     Spouse name: N/A    Number of children: N/A    Years of education: N/A     Occupational History    Not on file.     Social History Main Topics    Smoking status: Never Smoker    Smokeless tobacco:  "Never Used    Alcohol use No    Drug use: No    Sexual activity: Not on file     Other Topics Concern    Not on file     Social History Narrative    No narrative on file     Review of Systems   Constitution: Negative for chills, fever and weakness.   Cardiovascular: Negative for claudication and leg swelling.   Respiratory: Negative for cough and shortness of breath.    Skin: Negative for dry skin, itching, nail changes and rash.   Musculoskeletal: Positive for myalgias. Negative for falls, joint pain, joint swelling and muscle weakness.   Gastrointestinal: Negative for diarrhea, nausea and vomiting.   Neurological: Negative for numbness, paresthesias and tremors.   Psychiatric/Behavioral: Negative for altered mental status and hallucinations.       Objective:       Vitals:    10/11/17 1353   BP: 104/72   Weight: 119.3 kg (263 lb)   Height: 5' 5" (1.651 m)   PainSc: 0-No pain       Physical Exam   Constitutional:  Non-toxic appearance. She does not have a sickly appearance. No distress.   Cardiovascular:   Pulses:       Dorsalis pedis pulses are 2+ on the right side, and 2+ on the left side.        Posterior tibial pulses are 2+ on the right side, and 2+ on the left side.   Pulmonary/Chest: No respiratory distress.   Musculoskeletal:        Right ankle: She exhibits decreased range of motion. She exhibits no swelling. No tenderness. No lateral malleolus, no medial malleolus, no AITFL, no CF ligament and no posterior TFL tenderness found. Achilles tendon exhibits no pain, no defect and normal Klein's test results.        Left ankle: She exhibits decreased range of motion. She exhibits no swelling. No tenderness. No lateral malleolus, no medial malleolus, no AITFL, no CF ligament and no posterior TFL tenderness found. Achilles tendon exhibits no pain, no defect and normal Klein's test results.        Right foot: There is no bony tenderness.        Left foot: There is no bony tenderness.   Biomechanical " exam: There is equinus deformity bilateral with decreased dorsiflexion at the ankle joint bilateral. No tenderness with compression of heel. Negative tinels sign. Gait analysis reveals excessive pronation through midstance and propulsion with early heel off. Kitten heeled shoes reveal lateral heel counter wear bilateral     Patient has hammertoes of digits 2-5 bilateral partially reducible without symptom today.    Visible and palpable bunion without pain at dorsomedial 1st metatarsal head right and left.  Hallux abducted right and left partially reducible, tracks laterally without being track bound.  No ecchymosis, erythema, edema, or cardinal signs infection or signs of trauma same foot.     Neurological: She has normal reflexes. She displays no atrophy and no tremor. No sensory deficit. She exhibits normal muscle tone.   Skin: Skin is warm and dry. Laceration (tenderness to plantar medial right heel lesion. limited to skin breakdown) noted. No bruising, no burn, no lesion and no rash noted. She is not diaphoretic. There is erythema. No cyanosis. No pallor. Nails show no clubbing.   Psychiatric: Her mood appears not anxious. Her affect is not inappropriate. Her speech is not slurred. She is not combative. She is communicative. She is attentive.   Nursing note reviewed.    Plantar medial right heel            Assessment:       Encounter Diagnoses   Name Primary?    Laceration of plantar aspect of right foot, initial encounter Yes    Cellulitis of right foot          Plan:       Bambi was seen today for foot problem.    Diagnoses and all orders for this visit:    Laceration of plantar aspect of right foot, initial encounter    Cellulitis of right foot    Other orders  -     cephALEXin (KEFLEX) 500 MG capsule; Take 1 capsule (500 mg total) by mouth every 12 (twelve) hours.      I counseled the patient on her conditions, their implications and medical management.    Greater than 50% of this visit spent on  counseling and coordination of care.    ~15 minutes spent discussing wound healing cycle, healing, infection control. Adequate vitamin supplementation, protein intake, and hydration - discussed with patient    The wound is cleansed of foreign material as much as possible and the base inspected for bone or abscess. None noted    Dressings: medi-honey  Offloading: mepilex [facility] wound care instructions provided    Assured patient that with antibiotic treatment, the size of the red area will gradually shrink in size until the skin returns to normal. This will take 7-10 days. Patient instructed to limit ambulation possible during the first few days of the treatment. Keep your leg elevated while sitting. This will reduce swelling. Take all of the antibiotic medicine exactly as directed until it is gone. Be careful not to miss any doses, especially during the first seven days.    Significant difference in plantar fasciitis pain reaction on physical exam today than on previous encounter.    Encouraged patient to stretch aggressively. Continue to ice as needed. Mobic PRN pain.  Discussed wearing appropriate shoe gear and avoiding flats and ill fitted dress shoes, slippers, sandals, and going barefoot.    Follow-up:Patient is to return to the clinic in 2-3 weeks for follow-up but should call [facility] immediately if any signs of infection, such as fever, chills, sweats, increased redness or pain.    Short-term goals include maintaining good offloading and minimizing bioburden, promoting granulation and epithelialization to healing.  Long-term goals include keeping the wound healed by good offloading and medical management under the direction of internist.

## 2017-10-11 NOTE — PATIENT INSTRUCTIONS
Wound care Instructions:   · Once a day beginning in 24 hours::   ¨ Clean the foot separate from your body with warm running water and antibacterial soap such as dial.  ¨ Clean any remaining crust away with soap and water using a cotton-tipped applicator.  ¨ DRY COMPLETELY  ¨ Apply thin layer of medi honey to wound.  ¨ Cover with a breathable bandage until there is no more drainage or open flesh.  · Change the dressing daily, or whenever it becomes wet or dirty.  · If you were prescribed antibiotics, take them as directed until they are all gone.  · Wear comfortable shoes with a lot of toe room  · You may use acetaminophen or ibuprofen to control pain, unless another medicine was prescribed. If you have chronic liver or kidney disease or ever had a stomach ulcer or GI bleeding, talk with your doctor before using these medicines.      When to seek medical advice  Call your health care provider right away if any of the following occur:  · Increasing redness, pain or swelling of the toe  · Red streaks in the skin leading away from the wound  · Continued pus or fluid drainage for more than 24 hours  · Fever of 100.4º F (38º C) or higher, or as directed by your health care provider    Elevate affected foot as much as possible    Stay hydrated.      Nutrition and MyPlate: Protein Foods  This group includes foods that are high in protein. Protein helps the body build new cells and keeps tissues healthy. Most Americans get enough protein without even trying. It can be harder for vegetarians, but plenty of non-meat foods are rich in protein, too. Its best to get protein from a variety of sources.    Nutrient-Rich Choices  Theres a lot more to this food group than just meat and beans. It also includes nuts, seeds, and eggs. There are all sorts of nutrient-rich choices:  · Chicken and turkey with the skin removed  · Fish and shellfish  · Lean beef, pork, or lamb (without visible fat)  · Soy products, such as tofu,  soybeans (edamame), tempeh, or soymilk  · Black beans, kidney beans, mcgovern beans, chickpeas (garbanzo beans), and lentils (Note: beans and peas count as both a protein and a vegetable)  · Peanuts, almonds, walnuts, sesame seeds, and sunflower  seeds, as well as foods made from these (such as peanut butter or tahini)  · Eggs and foods made with eggs (such as quiche or frittata)  What Makes Meat and Beans Less Healthy?  · Fatty meat is not healthy. Before you cook meat, trim off all the fat you can see. Chicken and turkey skin is also high in fat, and should be removed before cooking.  · Breading and frying make food less healthy. This includes dishes like fried chicken, fried fish, and refried beans.  · Sausage and lunch meats tend to be high in fat and salt. Buy low-fat, low-sodium versions.  One Small Change  Make a meal that includes a non-meat source of protein (such as tofu, lentils, or any other food listed above). Have a better idea? Write it here:  _____________________________________________________________  © 8102-3468 [employer]. [address] [place] [address], [place] [address]. All rights reserved. This information is not intended as a substitute for professional medical care. Always follow your healthcare professional's instructions.

## 2017-11-06 DIAGNOSIS — L97.519 ULCER OF FOOT, RIGHT, WITH UNSPECIFIED SEVERITY: ICD-10-CM

## 2017-11-06 RX ORDER — MUPIROCIN 20 MG/G
OINTMENT TOPICAL
Qty: 22 G | Refills: 0 | Status: SHIPPED | OUTPATIENT
Start: 2017-11-06 | End: 2018-04-11

## 2017-11-22 DIAGNOSIS — I10 ESSENTIAL HYPERTENSION: ICD-10-CM

## 2017-11-22 RX ORDER — HYDROCHLOROTHIAZIDE 25 MG/1
TABLET ORAL
Qty: 90 TABLET | Refills: 0 | Status: SHIPPED | OUTPATIENT
Start: 2017-11-22 | End: 2018-03-09 | Stop reason: SDUPTHER

## 2017-11-29 RX ORDER — MELOXICAM 15 MG/1
TABLET ORAL
Qty: 30 TABLET | Refills: 5 | Status: SHIPPED | OUTPATIENT
Start: 2017-11-29 | End: 2018-02-10 | Stop reason: SDUPTHER

## 2017-12-04 ENCOUNTER — OFFICE VISIT (OUTPATIENT)
Dept: BARIATRICS | Facility: CLINIC | Age: 57
End: 2017-12-04
Payer: COMMERCIAL

## 2017-12-04 VITALS
HEIGHT: 65 IN | SYSTOLIC BLOOD PRESSURE: 134 MMHG | HEART RATE: 60 BPM | BODY MASS INDEX: 44.88 KG/M2 | DIASTOLIC BLOOD PRESSURE: 90 MMHG | WEIGHT: 269.38 LBS

## 2017-12-04 DIAGNOSIS — R73.03 PREDIABETES: ICD-10-CM

## 2017-12-04 DIAGNOSIS — E66.01 MORBID OBESITY WITH BMI OF 40.0-44.9, ADULT: ICD-10-CM

## 2017-12-04 PROCEDURE — 99213 OFFICE O/P EST LOW 20 MIN: CPT | Mod: S$GLB,,, | Performed by: INTERNAL MEDICINE

## 2017-12-04 PROCEDURE — 99999 PR PBB SHADOW E&M-EST. PATIENT-LVL III: CPT | Mod: PBBFAC,,, | Performed by: INTERNAL MEDICINE

## 2017-12-04 RX ORDER — TOPIRAMATE 50 MG/1
TABLET, FILM COATED ORAL
COMMUNITY
Start: 2017-11-20 | End: 2017-12-04

## 2017-12-04 NOTE — PROGRESS NOTES
"Subjective:       Patient ID: Bambi Carrera is a 57 y.o. female.    Chief Complaint: Follow-up    Pt here today for follow up. She has lost 0 lbs, net neg 32 lbs in total. BP is good today. Has been in diethylpropion x 2 months with LCHF diet, last filled 10/11/17. She denies taking it. Says she just picked up the Rx.  although I had stopped this 2/2 to she found the dry mouth to be bothersome. Had switched her to topiramte. She has been following diet plan and walking for exercise. She said that she says that she was dizzy with the topiramate. Took it for less than a month before she stopped it.   She does have prediabetes.   Lab Results       Component                Value               Date                       HGBA1C                   6.0 (H)             07/10/2017                 HGBA1C                   6.1                 03/09/2016            Lab Results       Component                Value               Date                       LDLCALC                  86.0                07/10/2017                 CREATININE               0.9                 07/10/2017                 Review of Systems   Constitutional: Negative for chills and fever.   Respiratory: Positive for shortness of breath. Negative for apnea.    Cardiovascular: Negative for chest pain and leg swelling.   Gastrointestinal: Negative for constipation and diarrhea.        Denies HB   Genitourinary: Negative for difficulty urinating and menstrual problem.   Musculoskeletal: Positive for arthralgias and back pain.   Neurological: Negative for dizziness and light-headedness.   Psychiatric/Behavioral: Negative for dysphoric mood. The patient is not nervous/anxious.        Objective:     BP (!) 134/90   Pulse 60   Ht 5' 5" (1.651 m)   Wt 122.2 kg (269 lb 6.4 oz)   BMI 44.83 kg/m²     Physical Exam   Constitutional: She is oriented to person, place, and time. She appears well-developed. No distress.   Morbidly obese     HENT:   Head: " Normocephalic and atraumatic.   Eyes: EOM are normal. Pupils are equal, round, and reactive to light. No scleral icterus.   Neck: Normal range of motion. Neck supple.   Cardiovascular: Normal rate and normal heart sounds.  Exam reveals no gallop and no friction rub.    No murmur heard.  Pulmonary/Chest: Effort normal and breath sounds normal. No respiratory distress. She has no wheezes.   Musculoskeletal: Normal range of motion. She exhibits no edema.   Neurological: She is alert and oriented to person, place, and time. No cranial nerve deficit.   Skin: Skin is warm and dry. No erythema.   Psychiatric: She has a normal mood and affect. Her behavior is normal. Judgment normal.   Vitals reviewed.      Assessment:       1. Prediabetes    2. Morbid obesity with BMI of 40.0-44.9, adult        Plan:             1. Prediabetes  Discussed decreasing the risks of diabetes with changes in diet, routine exercise and losing weight.  Could also consider metformin.  Discussed with her that this is used for diabetes prevention and may help with her weight, but she states that if she takes the medication, it will make her feel like she has diabetes. Assured pt that this is not necessarily a correlation.     2. Morbid obesity with BMI of 40.0-44.9, adult  Explained to pt that this is really the last option outside of saxenda which is cost prohibitive, and I am not sure if the issue of the same drug being used for diabetes would be an issue with it.   - naltrexone-bupropion 8-90 mg TbSR; Take 2 tablets by mouth 2 (two) times daily.  Dispense: 120 tablet; Refill: 2        Contrave is long term weight loss medication. Side effects may include insomnia, nausea, headache, constipation, depression or change in thinking.  These side effects will improve with stopping the medication.        Add some type of resistance training 2-3 days a week. These can be body weight exercises, light weight or elastic bands. Youtube and pinterest are great  sources for free work out plans and videos.       No soda, sweet tea, juices or lemonade    3 meals a day made up of the following:  Unlimited green vegetables, tomatoes, mushrooms, spaghetti squash, cauliflower, meat, poultry, seafood, eggs and hard cheeses.   Milk and plain yogurt  Dressings, seasonings, condiments, etc should have less than 2 g sugars.   beans or nuts can have 1 x a day.   1-2 servings of citrus fruits, berries, pineapple or melon a day (1/2 cup)  Avoid fried foods    No grains, rice, pasta, potatoes or bread    No soda, sweet tea, juices or lemonade    [email or web address] Button for recipes.

## 2017-12-04 NOTE — PATIENT INSTRUCTIONS
Contrave is long term weight loss medication. Side effects may include insomnia, nausea, headache, constipation, depression or change in thinking.  These side effects will improve with stopping the medication.        Add some type of resistance training 2-3 days a week. These can be body weight exercises, light weight or elastic bands. [employer] and [employer] are great sources for free work out plans and videos.       3 meals a day made up of the following:  Unlimited green vegetables, tomatoes, mushrooms, spaghetti squash, cauliflower, meat, poultry, seafood, eggs and hard cheeses.   Milk and plain yogurt  Dressings, seasonings, condiments, etc should have less than 2 g sugars.   beans or nuts can have 1 x a day.   1-2 servings of citrus fruits, berries, pineapple or melon a day (1/2 cup)  Avoid fried foods    No grains, rice, pasta, potatoes, bread, corn, peas, oatmeal, grits, tortillas, crackers, chips    No soda, sweet tea, juices or lemonade    Www.dietdoctor.[employer] for recipes. Moderate carb intake     Helpful tips to survive the holidays:  - Dont save yourself for the meal: Make sure you continue to eat high protein small meals every 3-4 hours to ensure to do not become over-hungry. Avoid temptation by not showing up to a holiday party or gathering hungry.   - Plan ahead. Bring a dish to a party if you know there may not be an appropriate option.   - Choose sugar-free drinks: Stick to water or other sugar-free beverages and make sure you are getting 6-8 cups of fluid each day (but not with meals!). Avoid alcohol, carbonated beverages, and high-fat/high-sugar beverages like hot chocolate and eggnog. Try sugar-free hot cocoa made with skim milk or water, or sugar-free spiced tea to add some holiday flair to your beverage (see sugar-free mulled cider recipe below)  - Take your time: Eat mindfully. Dont graze on food throughout the day. Sit down to enjoy your small meals. Chew slowly and thoroughly. Cut your food  into small pieces before eating.  - Listen to your body: Stop eating as soon as you feel full. Do not feel pressured to try certain (or all) foods or to eat all of the food on your plate. Listen to your hunger cues.   - REMEMBER: Make your holidays about spending time with family and friends instead of focusing gatherings around food.  - Keep up your exercise routine: Make sure you continue to get regular exercise throughout the holiday season. Encourage friends and family to be active by taking a walk together after a meal, to look at holiday lights, or to window-shop.    Good Holiday Meal Options:  - Roasted Turkey, NO skin. Use low sodium broth instead of gravy.   - Stuffed Bell Peppers made WITHOUT bread crumbs or Rice. Try using parmesan cheese instead  - Gumbo, NO rice. Try picking out mostly the meat/seafood and vegetables with little broth.   - Green Bean Casserole made with 98% fat free cream of mushroom soup and crushed almonds/pecans instead of fried onions  - Side salad w/ low fat dressing. Try a different kind of salad maybe use Kale or spinach.   - Roasted non-starchy vegetables like brussel sprouts, broccoli, green beans, zucchini, butternut squash, cauliflower  - Cauliflower Mash (steam or roast cauliflower, puree w/ low fat cheese, dash of fat free milk and 2-3 sprays of I cant believe its not butter spray. Add garlic powder and black pepper to season). Use Low sodium broth instead of gravy.   - Try Loaded Cauliflower Mash (Make cauliflower like above cauliflower mash. Top with diced turkey martini, ¼ cup low fat cheddar cheese and bake @ 350* F for 5-10 minutes, until cheese is melted. Top with minced chives, black pepper and garlic to taste).   - Homemade cranberry sauce using Splenda or another alternative sweetener. Boil fresh cranberries and add splenda to taste. Boil until cranberries break open and then simmer until it reaches the consistency you want (less time for more watery sauce and  simmer for longer to create a thicker sauce).   - Deviled eggs: make using low fat rodriguez, mustard, DILL relish (not sweet relish).   - Vegetable tray w/ Greek yogurt Ranch Dip. Mix 1 packet of [place] ranch dip mix w/ 16 oz low fat plain greek yogurt.     Good Holiday Dessert Options:  - High protein Pumpkin Cheesecake (see recipe below)  - Pumpkin Whip (see recipe below)  - Quest Apple Pie or Cinnamon Roll flavored protein bar (warm in microwave for 10-15 seconds)  - Eggnog Protein shake (see recipe below)  - Fresh fruit w/ low fat cheese  - Sugar-free Jello Parfaits. Layer Red and Green sugar-free jello in cups and top w/ 2 tbsp Sugar-free cool-whip    Pumpkin Cheesecake    8 ounces fat free cream cheese, softened   2 scoops of vanilla protein powder (<4 g sugar per serving)   ¼ tsp Fine salt   2 eggs, at room temperature   1/3 cup fat free sour cream  1/3 cup fat free half and half  1 15 -ounce can pure pumpkin puree   1 tablespoon pumpkin pie spice, plus more for dusting   Unsalted nuts, crushed  *Add splenda to taste    Directions     1. Preheat the oven to 300 degrees F. Line 18 muffin cups with paper liners. Sprinkle 1 tsp crushed unsalted nuts at the bottom of each of muffin cup liner.     2. In a large bowl, beat the cream cheese, vanilla protein powder and 1/4 teaspoon fine salt on medium-high speed until smooth and creamy, 2 to 3 minutes. Scrape down the sides, reduce speed to low and beat in the eggs, 1 at a time, until combined. Beat in 1/3 cup fat free sour cream and fat free half and half. Stir in the pumpkin puree and pumpkin pie spice until smooth. Divide evenly among cookie-lined paper cups, filling almost all the way to the top.     3. Bake until the filling is just set, 40 to 45 minutes. A sharp knife inserted into the center will come out moist, but clean. Cool completely in tins on a wire rack. Refrigerate until cold, 4 hours, or overnight. Top with a dusting of pumpkin pie  spice.    Recipe altered from the following recipe: http://[email or web address]TravelTipz.ru/recipes/food-network-marj/mini-pumpkin-cheesecakes-recipe.print.html?oc=radha    Pumpkin Whip    Box of sugar-free vanilla pudding  Can of pumpkin puree  Pumpkin Pie spice (sprinkle to taste)  ½ cup of sugar-free Cool Whip    Directions:  Make sugar-free pudding according to package directions using fat free or 1% milk. Stir in pumpkin and cool whip. Add pumpkin pie spice to taste.     Egg Nog Protein shake    8 oz skim or 1% milk  1 scoop vanilla protein powder  1 tbsp sugar-free vanilla pudding mix  ½ tsp butter flavor extract  ½ tsp rum extract  ½ tsp cinnamon     Shake together or blend with ice and serve.     Sugar-Free Mulled Cider    3 oz diet cran-apple juice  6 oz water  1 packet sugar-free apple cider mix  ½ tsp apple pie spice  ½ tsp butter flavor extract  1 tbsp Sugar-free Syrup    Mix together. Warm if needed and serve w/ orange wedge and cinnamon stick.       Eating well to be healthy and lose weight does not have to be hard. It also does not have to be time consuming or expensive. There a lots of ways you can work in healthy choices into your day. Many of these are easy, quick and even family friendly!    Homemade hazelnut au lait  Brew your favorite brand of hazelnut flavored coffee ([employer] makes a good one). Microwave 1/2 cup of milk that fits your eating plan (whole, skim or sugar-free almond milk can all work). Add half to 1 oz sugar free hazelnut syrup.     Quick and easy breakfast  1-2 boiled eggs or mini-frittatas with a tangerine. The boiled eggs and mini-frittatas can both be made ahead and last for up to 4 days in the refrigerator. Bonus if you portion them out in ready to go containers or zipper bags.     Breakfast Egg Muffins with Hsu and Spinach  Makes 12 muffins  Ingredients    6 eggs  ¼ cup milk  ¼ teaspoon salt  2 cups grated cheddar cheese  3/4 cup spinach, cooked and drained (about 8 oz fresh  spinach)  6 martini slices, cooked, drained of fat, and chopped  1/2 cup grated Parmesan cheese (optional)    Instructions      Preheat oven to 350 degrees. Use a regular 12-cup muffin pan. Spray the muffin pan with non-stick cooking spray.  In a large bowl, beat eggs until smooth. Add milk, salt, Cheddar cheese and mix. Stir spinach, cooked martini into the egg mixture. Ladle the egg mixture into greased muffin cups ¾ full.  Top each muffin cup with grated Parmesan cheese.  Bake for 25 minutes. Remove from the oven, let the muffins cool for 30 minutes before removing them from the pan.      Be a brown bagger! When you make dinner, plan for an extra helping. When you serve your plate for dinner, serve an additional helping into a container that you can take with you the next day. If you don't have a refrigerator available during the day, an insulated lunch bag and ice packs will help you safely store you lunch.     Cold Brewed Iced tea. Fill a pitcher with 64 oz filtered water. Add either 4 regular tea bags of your choice or a large iced tea bag. Refrigerate over night then remove the tea bags. The tea will not be bitter and is super flavorful. Get creative! Try combinations like green tea and hibiscus tea or black tea with lemon zinger. Add orange or lemon slices for even more flavor.     Snack wisely. Protein filled snacks will fill you up, allowing you to get by with fewer calories. String cheese, pork skins (chicharrones), turkey pepperoni, or celery with cream cheese will all fit the bill.       Ditch the take out. Turkey tacos (with or without a low carb tortilla), burgers (without the bun), or fun stir fries are all quick and easy. The whole family will be happy, and you can save calories and money.      Orange Chicken Stir ford with asparagus   Makes 6 servings  Ingredients:    1.5 lbs boneless skinless chicken breast/tenders, diced into 1-inch pieces  1 Tbsp extra virgin olive or avocado oil, divided  2 lb  asparagus, end portions trimmed and remainder diced into 1 1/2-inch pieces  1 small yellow onion, sliced into thin strips  8 oz button mushrooms, sliced  1 Tbsp peeled and finely grated fresh jesus  4 cloves garlic, minced  1/2 cup low-sodium chicken broth  Juice of 2 fresh oranges  2 Tbsp low sodium soy sauce  2 Tbsp cornstarch  Sea salt and freshly ground black pepper    Directions:    In a 12-inch non-stick wok, heat 1/2 oil over moderately high heat. Once oil is hot, add diced chicken and season lightly with salt and pepper. Sauté until cooked through, tossing occasionally, about 5-6 minutes.  Place chicken on a large plate and set aside. Return wok, reduce to medium-high heat, add remaining oil.  Once oil is hot, add asparagus, yellow onion and mushrooms, and sauté until tender-crisp, about 4 - 5 minutes, adding in garlic and jesus during the last 1 minute of sautéing.  Meanwhile, in a mixing bowl whisk together chicken broth, orange juice, soy sauce and cornstarch until well blended.  Pour chicken broth mixture into skillet with veggies, season with salt and pepper to taste, and bring mixture to a light boil, stirring constantly. Allow mixture to gently boil, stirring constantly, until thickened, about 1 minute.  Toss chicken into mixture and serve immediately over cauliflower rice or Shirataki noodles.      Skinny Chicken Tortilla Soup  Makes 7 servings    2 teaspoons olive oil  1 cup onion, chopped (about 1 small)  2 cups celery, sliced (about 4 medium stalks)  4 garlic cloves, minced  4 medium tomatoes, chopped  2 cups water  4 cups low-sodium organic chicken broth  3 cups chopped and/or shredded rotisserie chicken, skinless  2 cups sliced carrots (about 3 medium)  1 teaspoon dried oregano leaves  2 teaspoons chili powder  1 teaspoon garlic powder  2 teaspoons cumin  ½ teaspoon cayenne pepper (add less or omit, if you don't want a spicy soup)  ½ teaspoon sea salt + more to taste  ½ teaspoon pepper + more  to taste    Directions:   Put all ingredients into a large crock pot. Cook on low for 5-6 hours.     Optional garnish with chopped avocado, chopped fresh cilantro, crumbled Cotija cheese, sour cream, Greek yogurt, your favorite hot sauce.           Vegan Avocado Banana Chocolate Pudding  Makes 4 servings  Ingredients    1 1/2 ripe avocados  2 ripe bananas  6 tbsp raw cacao powder or unsweetened cocoa powder  2-3 tbsp maple syrup (or calorie free sweetener)  1/4 cup almond milk  Instructions    Blend everything together in a  until the consistency is smooth and velvety. Taste and see if more sweetener is needed and stir to make sure everything is evenly mixed. Blend a second time if needed.  Top with banana slices, raw cacao nibs, almond butter, or any other toppings and enjoy!

## 2017-12-11 ENCOUNTER — LAB VISIT (OUTPATIENT)
Dept: LAB | Facility: HOSPITAL | Age: 57
End: 2017-12-11
Attending: FAMILY MEDICINE
Payer: COMMERCIAL

## 2017-12-11 DIAGNOSIS — Z12.11 COLON CANCER SCREENING: ICD-10-CM

## 2017-12-11 PROCEDURE — 82274 ASSAY TEST FOR BLOOD FECAL: CPT

## 2017-12-13 DIAGNOSIS — J30.9 ALLERGIC RHINITIS: ICD-10-CM

## 2017-12-13 DIAGNOSIS — E55.9 VITAMIN D DEFICIENCY: ICD-10-CM

## 2017-12-13 RX ORDER — CETIRIZINE HYDROCHLORIDE 10 MG/1
TABLET ORAL
Qty: 30 TABLET | Refills: 3 | Status: SHIPPED | OUTPATIENT
Start: 2017-12-13 | End: 2018-04-28 | Stop reason: SDUPTHER

## 2017-12-13 RX ORDER — ERGOCALCIFEROL 1.25 MG/1
CAPSULE ORAL
Qty: 4 CAPSULE | Refills: 8 | Status: SHIPPED | OUTPATIENT
Start: 2017-12-13 | End: 2018-08-21 | Stop reason: SDUPTHER

## 2017-12-15 LAB — HEMOCCULT STL QL IA: NEGATIVE

## 2017-12-21 ENCOUNTER — TELEPHONE (OUTPATIENT)
Dept: BARIATRICS | Facility: CLINIC | Age: 57
End: 2017-12-21

## 2017-12-22 RX ORDER — TOPIRAMATE 50 MG/1
50 TABLET, FILM COATED ORAL 2 TIMES DAILY
Qty: 180 TABLET | Refills: 1 | Status: SHIPPED | OUTPATIENT
Start: 2017-12-22 | End: 2018-07-09

## 2018-02-10 ENCOUNTER — OFFICE VISIT (OUTPATIENT)
Dept: URGENT CARE | Facility: CLINIC | Age: 58
End: 2018-02-10
Payer: COMMERCIAL

## 2018-02-10 VITALS
SYSTOLIC BLOOD PRESSURE: 139 MMHG | HEIGHT: 65 IN | DIASTOLIC BLOOD PRESSURE: 72 MMHG | RESPIRATION RATE: 12 BRPM | OXYGEN SATURATION: 100 % | WEIGHT: 270 LBS | HEART RATE: 73 BPM | BODY MASS INDEX: 44.98 KG/M2 | TEMPERATURE: 98 F

## 2018-02-10 DIAGNOSIS — M72.2 PLANTAR FASCIITIS: Primary | ICD-10-CM

## 2018-02-10 PROCEDURE — 3008F BODY MASS INDEX DOCD: CPT | Mod: S$GLB,,, | Performed by: FAMILY MEDICINE

## 2018-02-10 PROCEDURE — 99214 OFFICE O/P EST MOD 30 MIN: CPT | Mod: 25,S$GLB,, | Performed by: FAMILY MEDICINE

## 2018-02-10 PROCEDURE — 96372 THER/PROPH/DIAG INJ SC/IM: CPT | Mod: S$GLB,,, | Performed by: FAMILY MEDICINE

## 2018-02-10 RX ORDER — IBUPROFEN 800 MG/1
800 TABLET ORAL 2 TIMES DAILY
Qty: 14 TABLET | Refills: 0 | Status: SHIPPED | OUTPATIENT
Start: 2018-02-10 | End: 2018-02-17

## 2018-02-10 RX ORDER — BETAMETHASONE SODIUM PHOSPHATE AND BETAMETHASONE ACETATE 3; 3 MG/ML; MG/ML
12 INJECTION, SUSPENSION INTRA-ARTICULAR; INTRALESIONAL; INTRAMUSCULAR; SOFT TISSUE
Status: COMPLETED | OUTPATIENT
Start: 2018-02-10 | End: 2018-02-10

## 2018-02-10 RX ADMIN — BETAMETHASONE SODIUM PHOSPHATE AND BETAMETHASONE ACETATE 12 MG: 3; 3 INJECTION, SUSPENSION INTRA-ARTICULAR; INTRALESIONAL; INTRAMUSCULAR; SOFT TISSUE at 09:02

## 2018-02-10 NOTE — PROGRESS NOTES
"Subjective:       Patient ID: Bambi Carrera is a 57 y.o. female.    Vitals:  height is 5' 5" (1.651 m) and weight is 122.5 kg (270 lb). Her tympanic temperature is 97.6 °F (36.4 °C). Her blood pressure is 139/72 and her pulse is 73. Her respiration is 12 and oxygen saturation is 100%.     Chief Complaint: Foot Pain    Foot Pain   This is a new problem. The current episode started yesterday. The problem occurs intermittently. The problem has been unchanged. Pertinent negatives include no abdominal pain, chest pain, chills, fever, headaches, nausea, rash, sore throat or vomiting. The symptoms are aggravated by walking and standing. She has tried NSAIDs for the symptoms. The treatment provided mild relief.     Review of Systems   Constitution: Negative for chills and fever.   HENT: Negative for sore throat.    Eyes: Negative for blurred vision.   Cardiovascular: Negative for chest pain.   Respiratory: Negative for shortness of breath.    Skin: Negative for rash.   Musculoskeletal: Positive for joint pain. Negative for back pain.   Gastrointestinal: Negative for abdominal pain, diarrhea, nausea and vomiting.   Neurological: Negative for headaches.   Psychiatric/Behavioral: The patient is not nervous/anxious.    All other systems reviewed and are negative.      Objective:      Physical Exam   Constitutional: She is oriented to person, place, and time. She appears well-developed and well-nourished. She is cooperative.  Non-toxic appearance. She does not appear ill. No distress.   HENT:   Head: Normocephalic and atraumatic.   Right Ear: Hearing, tympanic membrane, external ear and ear canal normal.   Left Ear: Hearing, tympanic membrane, external ear and ear canal normal.   Nose: Nose normal. No mucosal edema, rhinorrhea or nasal deformity. No epistaxis. Right sinus exhibits no maxillary sinus tenderness and no frontal sinus tenderness. Left sinus exhibits no maxillary sinus tenderness and no frontal sinus tenderness. "   Mouth/Throat: Uvula is midline, oropharynx is clear and moist and mucous membranes are normal. No trismus in the jaw. Normal dentition. No uvula swelling. No posterior oropharyngeal erythema.   Eyes: Conjunctivae and lids are normal. Right eye exhibits no discharge. Left eye exhibits no discharge. No scleral icterus.   Sclera clear bilat   Neck: Trachea normal, normal range of motion, full passive range of motion without pain and phonation normal. Neck supple.   Cardiovascular: Normal rate, regular rhythm, normal heart sounds, intact distal pulses and normal pulses.    Pulmonary/Chest: Effort normal and breath sounds normal. No respiratory distress.   Abdominal: Soft. Normal appearance and bowel sounds are normal. She exhibits no distension, no pulsatile midline mass and no mass. There is no tenderness.   Musculoskeletal: Normal range of motion. She exhibits no edema or deformity.        Feet:    Neurological: She is alert and oriented to person, place, and time. She exhibits normal muscle tone. Coordination normal.   Skin: Skin is warm, dry and intact. She is not diaphoretic. No pallor.   Psychiatric: She has a normal mood and affect. Her speech is normal and behavior is normal. Judgment and thought content normal. Cognition and memory are normal.   Nursing note and vitals reviewed.      Assessment:       1. Plantar fasciitis        Plan:         Plantar fasciitis    Other orders  -     betamethasone acetate-betamethasone sodium phosphate injection 12 mg; Inject 2 mLs (12 mg total) into the muscle one time.  -     ibuprofen (ADVIL,MOTRIN) 800 MG tablet; Take 1 tablet (800 mg total) by mouth 2 (two) times daily.  Dispense: 14 tablet; Refill: 0

## 2018-02-10 NOTE — PATIENT INSTRUCTIONS
Plantar Fasciitis  Plantar fasciitis is a painful swelling of the plantar fascia. The plantar fascia is a thick, fibrous layer of tissue. It covers the bones on the bottom of your foot. And it supports the foot bones in an arched position.  This can happen gradually or suddenly. It usually affects one foot at a time. Heel pain can be sharp, like a knife sticking into the bottom of your foot. You may feel pain after exercising, long-distance jogging, stair climbing, long periods of standing, or after standing up.  Risk factors include: non-active lifestyle, arthritis, diabetes, obesity or recent weight gain, flat foot, high arch. Wearing high heels, loose shoes, or shoes with poor arch support for long periods of time adds to the risk. This problem is commonly found in runners and dancers. It also found in people who stand on hard surfaces for long periods of time.  Foot pain from this condition is usually worse in the morning. But it improves with walking. By the end of the day there may be a dull aching. Treatment requires short-term rest and controlling swelling. It may take up to 9 months before all symptoms go away. Rarely, a steroid injection into the foot, or surgery, may be needed.  Home care  · If you are overweight, lose weight to help healing.  · Choose supportive shoes with good arch support and shock absorbency. Replace athletic shoes when they become worn out. Dont walk or run barefoot.  · Premade or custom-fitted shoe inserts may be helpful. Inserts made of silicone seem to be the most effective. Custom-made inserts can be provided by a podiatrist or foot specialist, physical therapist, or orthopedist.  · Premade or custom-made night splints keep the heel stretched out while you sleep. They may prevent morning pain.  · Avoid activities that stress the feet: jogging, prolonged standing or walking, contact sports, etc.  · First thing in the morning and before sports, stretch the bottom of your feet.  Gently flex your ankle so the toes move toward your knee.  · Icing may help control heel pain. Apply an ice pack to the heel for 10-20 minutes as a preventive. Or ice your heel after a severe flare-up of symptoms. You may repeat this every 1-2 hours as needed.  · You may use over-the-counter pain medicine to control pain, unless another medicine was prescribed. Anti-inflammatory pain medicines, such as ibuprofen or naproxen, may work better than acetaminophen. If you have chronic liver or kidney disease or ever had a stomach ulcer or GI bleeding, talk with your healthcare provider before using these medicines.  Follow-up care  Follow up with your healthcare provider, physical therapist, or podiatrist or foot specialist as advised.  Call for an appointment if pain worsens or there is no relief after a few weeks of home treatment. Shoe inserts, a night splint, or a special boot may be required.  If X-rays were taken, you will be told of any new findings that may affect your care.  When to seek medical advice  Call your healthcare provider right away if any of these occur:  · Foot swelling  · Redness with increasing pain  Date Last Reviewed: 11/21/2015  © 3182-2240 [employer]. [address] [place] [address], [place] [address]. All rights reserved. This information is not intended as a substitute for professional medical care. Always follow your healthcare professional's instructions.

## 2018-02-15 ENCOUNTER — TELEPHONE (OUTPATIENT)
Dept: FAMILY MEDICINE | Facility: CLINIC | Age: 58
End: 2018-02-15

## 2018-02-15 DIAGNOSIS — Z12.31 ENCOUNTER FOR SCREENING MAMMOGRAM FOR BREAST CANCER: Primary | ICD-10-CM

## 2018-02-15 NOTE — TELEPHONE ENCOUNTER
----- Message from Miky Liriano sent at 2/15/2018 12:24 PM CST -----  Contact: [phone]/PT  Pt calling TO get order for Mammo      Please advise.

## 2018-02-21 ENCOUNTER — OFFICE VISIT (OUTPATIENT)
Dept: PODIATRY | Facility: CLINIC | Age: 58
End: 2018-02-21
Payer: COMMERCIAL

## 2018-02-21 VITALS
BODY MASS INDEX: 44.98 KG/M2 | DIASTOLIC BLOOD PRESSURE: 86 MMHG | WEIGHT: 270 LBS | HEIGHT: 65 IN | SYSTOLIC BLOOD PRESSURE: 118 MMHG

## 2018-02-21 DIAGNOSIS — M20.41 HAMMER TOES OF BOTH FEET: ICD-10-CM

## 2018-02-21 DIAGNOSIS — M20.42 HAMMER TOES OF BOTH FEET: ICD-10-CM

## 2018-02-21 DIAGNOSIS — M20.12 HALLUX ABDUCTO VALGUS, LEFT: ICD-10-CM

## 2018-02-21 DIAGNOSIS — M21.41 PES PLANUS OF BOTH FEET: ICD-10-CM

## 2018-02-21 DIAGNOSIS — M21.42 PES PLANUS OF BOTH FEET: ICD-10-CM

## 2018-02-21 DIAGNOSIS — M20.11 HALLUX ABDUCTO VALGUS, RIGHT: ICD-10-CM

## 2018-02-21 DIAGNOSIS — M79.671 PAIN OF RIGHT HEEL: Primary | ICD-10-CM

## 2018-02-21 DIAGNOSIS — M72.2 PLANTAR FASCIITIS: ICD-10-CM

## 2018-02-21 PROCEDURE — 99213 OFFICE O/P EST LOW 20 MIN: CPT | Mod: S$GLB,,, | Performed by: PODIATRIST

## 2018-02-21 PROCEDURE — 3008F BODY MASS INDEX DOCD: CPT | Mod: S$GLB,,, | Performed by: PODIATRIST

## 2018-02-21 PROCEDURE — 99999 PR PBB SHADOW E&M-EST. PATIENT-LVL III: CPT | Mod: PBBFAC,,, | Performed by: PODIATRIST

## 2018-02-21 NOTE — PROGRESS NOTES
Subjective:      Patient ID: Bambi Carrera is a 57 y.o. female.    Chief Complaint: Foot Pain (right the whole foot pcp dr. Martin 2/10/18)    Bambi Carrera is a 57 y.o. female who presents to the clinic complaining of  Right plantar medial heel pain, especially with the first step in the morning. The pain is described as Aching, Throbbing and Sharp. The onset of the pain was gradual and has worsened over the past several months. Bambi Carrera rates the pain as 4/10. she denies a history of trauma. she relates pain began beginning of February after increasing her walking. Prior treatments include presented to the ED and getting a steroid injection and beginning PO NSAID which was effective.    Shoe gear: kitten heel  Hours on Feet: 6  Exercise: moderately active    Hemoglobin A1C   Date Value Ref Range Status   07/10/2017 6.0 (H) 4.0 - 5.6 % Final     Comment:     According to ADA guidelines, hemoglobin A1c <7.0% represents  optimal control in non-pregnant diabetic patients. Different  metrics may apply to specific patient populations.   Standards of Medical Care in Diabetes-2016.  For the purpose of screening for the presence of diabetes:  <5.7%     Consistent with the absence of diabetes  5.7-6.4%  Consistent with increasing risk for diabetes   (prediabetes)  >or=6.5%  Consistent with diabetes  Currently, no consensus exists for use of hemoglobin A1c  for diagnosis of diabetes for children.  This Hemoglobin A1c assay has significant interference with fetal   hemoglobin   (HbF). The results are invalid for patients with abnormal amounts of   HbF,   including those with known Hereditary Persistence   of Fetal Hemoglobin. Heterozygous hemoglobin variants (HbAS, HbAC,   HbAD, HbAE, HbA2) do not significantly interfere with this assay;   however, presence of multiple variants in a sample may impact the %   interference.     03/09/2016 6.1 4.5 - 6.2 % Final         Patient Active Problem List   Diagnosis     Hypertension    Allergic rhinitis    Right hip pain    Joint stiffness    Muscle weakness    Difficulty walking    Prediabetes    Abnormal uterine bleeding    Menopausal state    Uterine leiomyoma    PMB (postmenopausal bleeding)       Current Outpatient Prescriptions on File Prior to Visit   Medication Sig Dispense Refill    cetirizine (ZYRTEC) 10 MG tablet TAKE ONE TABLET BY MOUTH ONCE DAILY 30 tablet 3    fluticasone (FLONASE) 50 mcg/actuation nasal spray USE 1 SPRAY IN EACH NOSTRIL 2 TIMES DAILY 16 g 5    hydroCHLOROthiazide (HYDRODIURIL) 25 MG tablet TAKE ONE TABLET BY MOUTH ONCE DAILY 90 tablet 0    mupirocin (BACTROBAN) 2 % ointment APPLY  OINTMENT TOPICALLY TWICE DAILY 22 g 0    VITAMIN D2 50,000 unit capsule TAKE ONE CAPSULE BY MOUTH ONCE A WEEK 4 capsule 8    diclofenac sodium (VOLTAREN) 1 % Gel Apply 2 g topically 4 (four) times daily. 1 Tube 0    topiramate (TOPAMAX) 50 MG tablet Take 1 tablet (50 mg total) by mouth 2 (two) times daily. 180 tablet 1    triamcinolone acetonide 0.1% (KENALOG) 0.1 % cream Apply topically 2 (two) times daily. 45 g 0     No current facility-administered medications on file prior to visit.        Review of patient's allergies indicates:  No Known Allergies    Past Surgical History:   Procedure Laterality Date    LASIK Bilateral 07/15/2016       Family History   Problem Relation Age of Onset    Cancer Mother        Social History     Social History    Marital status: Single     Spouse name: N/A    Number of children: N/A    Years of education: N/A     Occupational History     [employer]     Social History Main Topics    Smoking status: Never Smoker    Smokeless tobacco: Never Used    Alcohol use No    Drug use: No    Sexual activity: Not on file     Other Topics Concern    Not on file     Social History Narrative    No narrative on file     Review of Systems   Constitution: Negative for chills, fever and weakness.   Cardiovascular: Negative  "for claudication and leg swelling.   Respiratory: Negative for cough and shortness of breath.    Skin: Negative for dry skin, itching, nail changes and rash.   Musculoskeletal: Positive for myalgias. Negative for falls, joint pain, joint swelling and muscle weakness.   Gastrointestinal: Negative for diarrhea, nausea and vomiting.   Neurological: Negative for numbness, paresthesias and tremors.   Psychiatric/Behavioral: Negative for altered mental status and hallucinations.       Objective:       Vitals:    02/21/18 0744   BP: 118/86   Weight: 122.5 kg (270 lb)   Height: 5' 5" (1.651 m)   PainSc: 0-No pain       Physical Exam   Constitutional:  Non-toxic appearance. She does not have a sickly appearance. No distress.   Cardiovascular:   Pulses:       Dorsalis pedis pulses are 2+ on the right side, and 2+ on the left side.        Posterior tibial pulses are 2+ on the right side, and 2+ on the left side.   Pulmonary/Chest: No respiratory distress.   Musculoskeletal:        Right ankle: She exhibits decreased range of motion. She exhibits no swelling. No tenderness. No lateral malleolus, no medial malleolus, no AITFL, no CF ligament and no posterior TFL tenderness found. Achilles tendon exhibits no pain, no defect and normal Klein's test results.        Left ankle: She exhibits decreased range of motion. She exhibits no swelling. No tenderness. No lateral malleolus, no medial malleolus, no AITFL, no CF ligament and no posterior TFL tenderness found. Achilles tendon exhibits no pain, no defect and normal Klein's test results.        Right foot: There is tenderness. There is no bony tenderness.        Left foot: There is no bony tenderness.   Biomechanical exam: Pain on palpation right medial calcaneal tubercle at origin of plantar fascia.There is equinus deformity bilateral with decreased dorsiflexion at the ankle joint bilateral. No tenderness with compression of heel. Negative tinels sign. Gait analysis reveals " excessive pronation through midstance and propulsion with early heel off. Kitten heeled shoes reveal lateral heel counter wear bilateral     Patient has hammertoes of digits 2-5 bilateral partially reducible without symptom today.    Visible and palpable bunion without pain at dorsomedial 1st metatarsal head right and left.  Hallux abducted right and left partially reducible, tracks laterally without being track bound.  No ecchymosis, erythema, edema, or cardinal signs infection or signs of trauma same foot.     Neurological: She has normal reflexes. She displays no atrophy and no tremor. No sensory deficit. She exhibits normal muscle tone.   Skin: Skin is warm and dry. No bruising, no burn, no laceration, no lesion and no rash noted. She is not diaphoretic. No cyanosis or erythema. No pallor. Nails show no clubbing.   Psychiatric: Her mood appears not anxious. Her affect is not inappropriate. Her speech is not slurred. She is not combative. She is communicative. She is attentive.   Nursing note reviewed.      Assessment:       Encounter Diagnoses   Name Primary?    Pain of right heel Yes    Plantar fasciitis     Pes planus of both feet     Hammer toes of both feet     Hallux abducto valgus, right     Hallux abducto valgus, left          Plan:       Bambi was seen today for foot pain.    Diagnoses and all orders for this visit:    Pain of right heel    Plantar fasciitis    Pes planus of both feet    Hammer toes of both feet    Hallux abducto valgus, right    Hallux abducto valgus, left      I counseled the patient on her conditions, their implications and medical management.    Discussed different treatment options for heel pain. I gave written and verbal instructions on stretching exercises. Patient expressed understanding. Discussed icing the affected area as needed and also wearing appropriate shoe gear and avoiding flats, slippers, sandals, and going barefoot. My recommendation for OTC supports is  [facility]. Patient instructed on adequate icing techniques. Patient should ice the affected area at least once per day x 10 minutes for 10 days . I advised the patient that extra icing would also be beneficial to ensure adequate anti inflammatory effect. We also discussed cortisone injections and NSAID therapy.  Continue NSAID rx    RTC in  6-9 weeks if no improvement, at this time she will receive cortisone injections and referral to PT. Patient is amenable to plan.

## 2018-02-21 NOTE — PATIENT INSTRUCTIONS
Recommend lotions: eucerin, eucerin for diabetics, aquaphor, A&D ointment, gold bond for diabetics, sween, Pickerington's Bees all purpose baby ointment,  urea 40 with aloe (found on amazon.com)    Shoe recommendations: (try 6pm.com, [email or web address].Patient Conversation Media , [email or web address].Patient Conversation Media, or shoes.Patient [employer]versation Media for discounted prices) you can visit DSW shoes in [place]  or [employer] [facility] in the [facility] (there are also several shoe brand outlets in the [facility])    Haydee Waller H3; Asics (GT 2000 or gel foundations), new balance stability type shoes, saucony (stabil c3),  Cunha (GTS or Beast or transcend), vionic, propet (tennis shoe)    sofft brand, clarks, crocs, aerosoles, naturalizers, SAS, ecco, born, devan manuel, rockports (dress shoes)    Vionic, burkenstocks, fitflops, propet (sandals)  Nike comfort thong sandals, crocs, propet (house shoes)    Nail Home remedy:  Vicks Vapor rub to nails for easier managability        Understanding Plantar Fasciitis    Plantar fasciitis is a condition that causes foot and heel pain. The plantar fascia is a tough band of tissue that runs across the bottom of the foot from the heel to the toes. This tissue pulls on the heel bone. It supports the arch of the foot as it pushes off the ground. If the tissue becomes irritated or red and swollen (inflamed), it is called plantar fasciitis.  How to say it  PLAN-tuhr fa-see-IY-[id number]   What causes plantar fasciitis?  Plantar fasciitis most often occurs from overusing the plantar fascia. The tissue may become damaged from activities that put repeated stress on the heel and foot. Or it may wear down over time with age and ankle stiffness. You are more likely to have plantar fasciitis if you:  · Do activities that require a lot of running, jumping, or dancing  · Have a job that requires being on your feet for long periods  · Are overweight or obese  · Have certain foot problems, such as a tight Achilles tendon, flat feet, or high arches  · Often wear poorly fitting shoes  Symptoms  of plantar fasciitis  The condition most often causes pain in the heel and the bottom of the foot. The pain may occur when you take your first steps in the morning. It may get better as you walk throughout the day. But as you continue to put weight on the foot, the pain often returns. Pain may also occur after standing or sitting for long periods.  Treating plantar fasciitis  Treatments for plantar fasciitis include:  · Resting the foot. This involves limiting movements that make your foot hurt. You may also need to avoid certain sports and types of work for a time.  · Using cold packs. Put an ice pack on the heel and foot to help reduce pain and swelling.  · Taking pain medicines. Prescription and over-the-counter pain medicines can help relieve pain and swelling.  · Using heel cups or foot inserts (orthotics). These are placed in the shoes to help support the heel or arch and cushion the heel. You may also be told to buy proper-fitting shoes with good arch support and cushioned soles.  · Taping the foot. This supports the arch and limits the movement of the plantar fascia to help relieve symptoms.  · Wearing a night splint. This stretches the plantar fascia and leg muscles while you sleep. This may help relieve pain.  · Doing exercises and physical therapy. These stretch and strengthen the plantar fascia and the muscles in the leg that support the heel and foot.  · Getting shots of medicine into the foot. These may help relieve symptoms for a time.  · Having surgery. This may be needed if other treatments fail to relieve symptoms. During surgery, the surgeon may partially cut the plantar fascia to release tension.  Possible complications of plantar fasciitis  Without proper care and treatment, healing may take longer than normal. Also, symptoms may continue or get worse. Over time, the plantar fascia may be damaged. This can make it hard to walk or even stand without pain.  When to call your healthcare  provider  Call your healthcare provider right away if you have any of these:  · Fever of 100.4°F (38°C) or higher, or as directed  · Symptoms that dont get better with treatment, or get worse  · New symptoms, such as numbness, tingling, or weakness in the foot   © 5453-0461 [employer]. [address] [place] [address]. All rights reserved. This information is not intended as a substitute for professional medical care. Always follow your healthcare professional's instructions.        Treating Plantar Fasciitis  First, your healthcare provider tries to determine the cause of your problem in order to suggest ways to relieve pain. If your pain is due to poor foot mechanics, custom-made shoe inserts (orthoses) may help.    Reduce symptoms  · To relieve mild symptoms, try aspirin, ibuprofen, or other medicines as directed. Rubbing ice on the affected area may also help.  · To reduce severe pain and swelling, your healthcare provider may prescribe pills or injections or a walking cast in some instances. Physical therapy, such as ultrasound or a daily stretching program, may also be recommended. Surgery is rarely required.  · To reduce symptoms caused by poor foot mechanics, your foot may be taped. This supports the arch and temporarily controls movement. Night splints may also help by stretching the fascia.  Control movement  If taping helps, your healthcare provider may prescribe orthoses. Built from plaster casts of your feet, these inserts control the way your foot moves. As a result, your symptoms should go away.  Reduce overuse  Every time your foot strikes the ground, the plantar fascia is stretched. You can reduce the strain on the plantar fascia and the possibility of overuse by following these suggestions:  · Lose any excess weight.  · Avoid running on hard or uneven ground.  · Use orthoses at all times in your shoes and house slippers.  If surgery is needed  Your healthcare provider may  consider surgery if other types of treatment don't control your pain. During surgery, the plantar fascia is partially cut to release tension. As you heal, fibrous tissue fills the space between the heel bone and the plantar fascia.   © 2804-1608 [employer]. [address] [place] [address], [place] [address]. All rights reserved. This information is not intended as a substitute for professional medical care. Always follow your healthcare professional's instructions.        Wearing Proper Shoes                    You walk on your feet every day, forcing them to support the weight of your body. Repeated stress on your feet can cause damage over time. The right shoes can help protect your feet. The wrong shoes can cause more foot problems. Read the information below to help you find a shoe that fits your foot needs.     A good shoe fit will cover your foot outline.       A shoe that doesnt cover the outline is a bad fit.      Whats Your Foot Shape?  To get a good fit, you need to know the shape of your foot. Do this simple test: While standing, place your foot on a piece of paper and trace around it. Is your foot straight or curved? Do you have a foot problem, such as a bunion, that causes your foot outline to show a bulge on the side of your big toe?  Finding Your Fit  Bring your foot outline to the [employer] store to help you find the right shoe. Place a shoe you like on top of the outline to see if it matches the shape. The shoe should cover the outline. (If you have a bunion, the shoe may not cover the bulge on the outline. Look for soft leather shoes to stretch over the bunion.) Once youve found a pair of proper shoes, put them on. Walk around. Be sure the shoes dont rub or pinch. If the shoes feel good, youve found your fit!  The Right Shoe for You  A good shoe has features that provide comfort and support. It must also be the right size and shape for your feet. Look for a shoe made of breathable fabric and  lining, such as leather or canvas. Be sure that shoes have enough tread to prevent slipping. Go to a good shoe store for help finding the right shoe.  Good Shoe Features  An ideal shoe has the following:  · Laces for support. If tying laces is a problem for you, try shoes with Velcro fasteners or jj.  · A front of the shoe (toe box) with ½ inch space in front of your longest toes.  · An arch shape that supports your foot.  · No more than 1½ inches of heel.  · A stiff, snug back of the shoe to keep your foot from sliding around.  · A smooth lining with no rough seams.  Shoe Shopping Tips  Below are some dos and donts for when you go to the shoe store.  Do:  · Select the shoes that feel right. Wear them around the house. Then bring them to your foot doctor to check for fit. If they dont fit well, return them.  · Shop late in the day, when your feet will be slightly bigger.  · Each time you buy shoes, have both your feet measured while you are standing. Foot size changes with time.  · Pick shoes to suit their purpose. High heels are okay for an occasional night on the town. But for everyday wear, choose a more sensible shoe.  · Try on shoes while wearing any inserts specially made for your feet (orthoses).  · Try on both the right and left shoes. If your feet are different sizes, pick a pair that fits the larger foot.  Dont:  · Dont buy shoes based on shoe size alone. Always try on shoes, as sizes differ from brand to brand and within brands.  · Dont expect shoes to break in. If they dont fit at the store, dont buy them.  · Dont buy a shoe that doesnt match your foot shape.  What About Socks?  Always wear socks with shoes. Socks help absorb sweat and reduce friction and blistering. When shopping for shoes, choose soft, padded socks with seams that dont irritate your feet.  If You Have Foot Problems  Some foot problems cause deformities. This can make it hard to find a good fit. Look for shoes made of  soft leather to stretch over the deformity. If you have bunions, buy shoes with a wider toe box. To fit hammertoes, look for shoes with a tall toe box. If you have arch problems, you may need inserts. In some cases, youll need to have custom footwear or orthoses made for your feet.  Suggested Footwear  Ask your healthcare provider what kind of footwear you need. He or she may recommend a certain brand or shoe store.  © 4461-6471 [employer]. [address] [place] [address]. All rights reserved. This information is not intended as a substitute for professional medical care. Always follow your healthcare professional's instructions.        Toe Extension (Flexibility)    These instructions are for your right foot. Switch sides for your left foot.  1. Sit in a chair. Rest your right ankle on your left knee.  2. Hold your toes with your right hand. Gently bend the toes backward. Feel a stretch in the undersides of the toes and ball of the foot. Hold for 30 to 60 seconds.  3. Then gently bend the toes in the other direction. Gently press on them until your foot is pointed. Hold for 30 to 60 seconds.  4. Repeat 5 times, or as instructed.  © 7306-3926 [employer]. [address] [place] [address], [place] [address]. All rights reserved. This information is not intended as a substitute for professional medical care. Always follow your healthcare professional's instructions.

## 2018-02-23 ENCOUNTER — HOSPITAL ENCOUNTER (OUTPATIENT)
Dept: RADIOLOGY | Facility: HOSPITAL | Age: 58
Discharge: HOME | End: 2018-02-23
Attending: FAMILY MEDICINE
Payer: COMMERCIAL

## 2018-02-23 DIAGNOSIS — Z12.31 ENCOUNTER FOR SCREENING MAMMOGRAM FOR BREAST CANCER: ICD-10-CM

## 2018-02-23 PROCEDURE — 77063 BREAST TOMOSYNTHESIS BI: CPT | Mod: 26,,, | Performed by: RADIOLOGY

## 2018-02-23 PROCEDURE — 77067 SCR MAMMO BI INCL CAD: CPT | Mod: 26,,, | Performed by: RADIOLOGY

## 2018-02-23 PROCEDURE — 77067 SCR MAMMO BI INCL CAD: CPT | Mod: TC,PO

## 2018-03-09 DIAGNOSIS — I10 ESSENTIAL HYPERTENSION: ICD-10-CM

## 2018-03-09 RX ORDER — HYDROCHLOROTHIAZIDE 25 MG/1
TABLET ORAL
Qty: 90 TABLET | Refills: 0 | Status: SHIPPED | OUTPATIENT
Start: 2018-03-09 | End: 2018-06-18 | Stop reason: SDUPTHER

## 2018-04-11 DIAGNOSIS — M79.671 PAIN OF RIGHT HEEL: ICD-10-CM

## 2018-04-11 DIAGNOSIS — L97.519 ULCER OF FOOT, RIGHT, WITH UNSPECIFIED SEVERITY: ICD-10-CM

## 2018-04-11 RX ORDER — DICLOFENAC SODIUM 10 MG/G
GEL TOPICAL
Qty: 100 G | Refills: 5 | Status: SHIPPED | OUTPATIENT
Start: 2018-04-11 | End: 2018-07-09

## 2018-04-11 RX ORDER — MUPIROCIN 20 MG/G
OINTMENT TOPICAL
Qty: 22 G | Refills: 0 | Status: SHIPPED | OUTPATIENT
Start: 2018-04-11 | End: 2018-12-27

## 2018-04-13 ENCOUNTER — TELEPHONE (OUTPATIENT)
Dept: FAMILY MEDICINE | Facility: CLINIC | Age: 58
End: 2018-04-13

## 2018-04-13 NOTE — TELEPHONE ENCOUNTER
Those don't perform the same function. If she is willing, I can send a script to a compound pharmacy and see if her insurance will cover that. They will mail it to her home address and contact her.

## 2018-04-16 ENCOUNTER — TELEPHONE (OUTPATIENT)
Dept: FAMILY MEDICINE | Facility: CLINIC | Age: 58
End: 2018-04-16

## 2018-04-16 NOTE — TELEPHONE ENCOUNTER
----- Message from Aranza Fragoso sent at 4/16/2018  2:21 PM CDT -----  Contact: Janey Tamayo  Called to f/u on [place] for diclofenac sodium 1 % Gel. Pharmacy can be reached @ [phone].

## 2018-04-16 NOTE — TELEPHONE ENCOUNTER
Spoke with patient who states did not call to clinic for any reason and has all needed medication.

## 2018-04-28 DIAGNOSIS — J30.9 ALLERGIC RHINITIS: ICD-10-CM

## 2018-04-30 ENCOUNTER — OFFICE VISIT (OUTPATIENT)
Dept: FAMILY MEDICINE | Facility: CLINIC | Age: 58
End: 2018-04-30
Payer: COMMERCIAL

## 2018-04-30 VITALS
OXYGEN SATURATION: 99 % | HEIGHT: 65 IN | RESPIRATION RATE: 16 BRPM | BODY MASS INDEX: 46.32 KG/M2 | DIASTOLIC BLOOD PRESSURE: 80 MMHG | WEIGHT: 278 LBS | SYSTOLIC BLOOD PRESSURE: 136 MMHG | TEMPERATURE: 98 F | HEART RATE: 66 BPM

## 2018-04-30 DIAGNOSIS — I83.90 VARICOSE VEIN OF LEG: ICD-10-CM

## 2018-04-30 DIAGNOSIS — M70.61 TROCHANTERIC BURSITIS OF RIGHT HIP: Primary | ICD-10-CM

## 2018-04-30 PROCEDURE — 3075F SYST BP GE 130 - 139MM HG: CPT | Mod: CPTII,S$GLB,, | Performed by: FAMILY MEDICINE

## 2018-04-30 PROCEDURE — 99999 PR PBB SHADOW E&M-EST. PATIENT-LVL IV: CPT | Mod: PBBFAC,,, | Performed by: FAMILY MEDICINE

## 2018-04-30 PROCEDURE — 3079F DIAST BP 80-89 MM HG: CPT | Mod: CPTII,S$GLB,, | Performed by: FAMILY MEDICINE

## 2018-04-30 PROCEDURE — 99214 OFFICE O/P EST MOD 30 MIN: CPT | Mod: S$GLB,,, | Performed by: FAMILY MEDICINE

## 2018-04-30 RX ORDER — CETIRIZINE HYDROCHLORIDE 10 MG/1
TABLET ORAL
Qty: 30 TABLET | Refills: 5 | Status: SHIPPED | OUTPATIENT
Start: 2018-04-30 | End: 2018-10-29 | Stop reason: SDUPTHER

## 2018-04-30 NOTE — PROGRESS NOTES
Chief Complaint   Patient presents with    Sciatica    vein check       HPI  Bambi Carrera is a 58 y.o. female with multiple medical diagnoses as listed in the medical history and problem list that presents for follow-up of right sided hip pain. This has been going on for 7 mos. She had seen ortho last year and was referred to PT but could not afford the copay so chose to exercise at home. Pain eased up but then returned. She is very tender in her right hip and it radiates to her right knee.     She would also like to have a vein removed in her left leg. It is not painful.    PAST MEDICAL HISTORY:  Past Medical History:   Diagnosis Date    Borderline diabetic     Depression     Hypertension     Menopause        PAST SURGICAL HISTORY:  Past Surgical History:   Procedure Laterality Date    LASIK Bilateral 07/15/2016       SOCIAL HISTORY:  Social History     Social History    Marital status: Single     Spouse name: N/A    Number of children: N/A    Years of education: N/A     Occupational History     [employer]     Social History Main Topics    Smoking status: Never Smoker    Smokeless tobacco: Never Used    Alcohol use No    Drug use: No    Sexual activity: Not on file     Other Topics Concern    Not on file     Social History Narrative    No narrative on file       FAMILY HISTORY:  Family History   Problem Relation Age of Onset    Cancer Mother        ALLERGIES AND MEDICATIONS: updated and reviewed.  Review of patient's allergies indicates:  No Known Allergies  Current Outpatient Prescriptions   Medication Sig Dispense Refill    cetirizine (ZYRTEC) 10 MG tablet TAKE ONE TABLET BY MOUTH ONCE DAILY 30 tablet 5    diclofenac sodium 1 % Gel APPLY 2 GRAMS TOPICALLY 4 TIMES DAILY 100 g 5    fluticasone (FLONASE) 50 mcg/actuation nasal spray USE 1 SPRAY IN EACH NOSTRIL 2 TIMES DAILY 16 g 5    hydroCHLOROthiazide (HYDRODIURIL) 25 MG tablet TAKE ONE TABLET BY MOUTH ONCE DAILY 90 tablet 0     "topiramate (TOPAMAX) 50 MG tablet Take 1 tablet (50 mg total) by mouth 2 (two) times daily. 180 tablet 1    VITAMIN D2 50,000 unit capsule TAKE ONE CAPSULE BY MOUTH ONCE A WEEK 4 capsule 8    mupirocin (BACTROBAN) 2 % ointment APPLY  OINTMENT TOPICALLY TWICE DAILY 22 g 0    triamcinolone acetonide 0.1% (KENALOG) 0.1 % cream Apply topically 2 (two) times daily. 45 g 0     No current facility-administered medications for this visit.        ROS  Review of Systems   Constitutional: Negative for chills, diaphoresis, fatigue, fever and unexpected weight change.   HENT: Negative for rhinorrhea, sinus pressure, sore throat and tinnitus.    Eyes: Negative for photophobia and visual disturbance.   Respiratory: Negative for cough, shortness of breath and wheezing.    Cardiovascular: Negative for chest pain and palpitations.   Gastrointestinal: Negative for abdominal pain, blood in stool, constipation, diarrhea, nausea and vomiting.   Genitourinary: Negative for dysuria, flank pain, frequency and vaginal discharge.   Musculoskeletal: Positive for arthralgias. Negative for joint swelling.   Skin: Negative for rash.   Neurological: Negative for speech difficulty, weakness, light-headedness and headaches.   Psychiatric/Behavioral: Negative for behavioral problems and dysphoric mood.       Physical Exam  Vitals:    04/30/18 1049   BP: 136/80   Pulse: 66   Resp: 16   Temp: 97.7 °F (36.5 °C)   TempSrc: Oral   SpO2: 99%   Weight: 126.1 kg (278 lb)   Height: 5' 5" (1.651 m)    Body mass index is 46.26 kg/m².  Weight: 126.1 kg (278 lb)   Height: 5' 5" (165.1 cm)     Physical Exam   Constitutional: She is oriented to person, place, and time. She appears well-developed and well-nourished.   HENT:   Head: Normocephalic and atraumatic.   Eyes: EOM are normal.   Musculoskeletal:   Right tenderness over greater trochanter, no pain with internal/external rotation   Neurological: She is alert and oriented to person, place, and time.   Skin: " Skin is warm and dry. No rash noted. No erythema.   Psychiatric: She has a normal mood and affect. Her behavior is normal.   Nursing note and vitals reviewed.      Health Maintenance       Date Due Completion Date    Fecal Occult Blood Test (FOBT)/[facility] 12/11/2018 12/11/2017    Override on 12/11/2017: Done    Mammogram 02/23/2020 2/23/2018    Override on 6/10/2015: Done (normal)    Override on 6/10/2014: Done (normal)    Pap Smear with HPV Cotest 07/10/2020 7/10/2017    Override on 6/3/2014: Done (normal)    Lipid Panel 07/10/2022 7/10/2017    TETANUS VACCINE 06/08/2026 6/8/2016    Override on 4/1/2005: Done            ASSESSMENT     1. Trochanteric bursitis of right hip    2. Varicose vein of leg        PLAN:     Problem List Items Addressed This Visit        Orthopedic    Right hip pain - Primary  -reconsult ortho as she would benefit from an injection  -she has mobic at home for pain, recommend PT at least once a week if she is able to complete      Other Visit Diagnoses     Varicose vein of leg      -discussed vein removal is likely to be considered cosmetic    Relevant Orders    Ambulatory consult to Vascular Medicine            Natalia Martin MD  04/30/2018 11:38 AM        Follow-up in about 6 weeks (around 6/11/2018) for Follow up.

## 2018-05-09 ENCOUNTER — TELEPHONE (OUTPATIENT)
Dept: FAMILY MEDICINE | Facility: CLINIC | Age: 58
End: 2018-05-09

## 2018-05-10 DIAGNOSIS — I83.93 VARICOSE VEINS OF BOTH LOWER EXTREMITIES: Primary | ICD-10-CM

## 2018-05-21 ENCOUNTER — HOSPITAL ENCOUNTER (OUTPATIENT)
Dept: RADIOLOGY | Facility: HOSPITAL | Age: 58
Discharge: HOME | End: 2018-05-21
Attending: SURGERY
Payer: COMMERCIAL

## 2018-05-21 DIAGNOSIS — I83.93 VARICOSE VEINS OF BOTH LOWER EXTREMITIES: ICD-10-CM

## 2018-05-21 PROCEDURE — 93970 EXTREMITY STUDY: CPT | Mod: TC

## 2018-05-21 PROCEDURE — 93970 EXTREMITY STUDY: CPT | Mod: 26,,, | Performed by: RADIOLOGY

## 2018-05-28 ENCOUNTER — INITIAL CONSULT (OUTPATIENT)
Dept: VASCULAR SURGERY | Facility: CLINIC | Age: 58
End: 2018-05-28
Payer: COMMERCIAL

## 2018-05-28 VITALS
SYSTOLIC BLOOD PRESSURE: 120 MMHG | HEART RATE: 64 BPM | BODY MASS INDEX: 46.06 KG/M2 | WEIGHT: 276.44 LBS | HEIGHT: 65 IN | DIASTOLIC BLOOD PRESSURE: 80 MMHG

## 2018-05-28 DIAGNOSIS — I83.899 SYMPTOMATIC SPIDER VARICOSE VEIN: Primary | ICD-10-CM

## 2018-05-28 PROCEDURE — 99244 OFF/OP CNSLTJ NEW/EST MOD 40: CPT | Mod: S$GLB,,, | Performed by: SURGERY

## 2018-05-28 PROCEDURE — 99999 PR PBB SHADOW E&M-EST. PATIENT-LVL III: CPT | Mod: PBBFAC,,, | Performed by: SURGERY

## 2018-05-28 RX ORDER — MELOXICAM 15 MG/1
TABLET ORAL
Qty: 30 TABLET | Refills: 5 | Status: SHIPPED | OUTPATIENT
Start: 2018-05-28 | End: 2018-07-26

## 2018-05-28 NOTE — PROGRESS NOTES
Tom Gorman MD VI                       [facility]                         05/28/2018    HPI:  Bambi Carrera is a 58 y.o. female with   Patient Active Problem List   Diagnosis    Hypertension    Allergic rhinitis    Right hip pain    Joint stiffness    Muscle weakness    Difficulty walking    Prediabetes    Abnormal uterine bleeding    Menopausal state    Uterine leiomyoma    PMB (postmenopausal bleeding)    being managed by PCP and specialists who is here today for evaluation of spider vein.  Patient states location is L lateral calf occurring for 2 years.  Associated signs and symptoms include tenderness.  Quality is aching and severity is 8/10.  Symptoms began 2 years ago.  Alleviating factors include compression and lotion.  Worsening factors include none.  Denies leg edema.  Denies leg wounds or previous vein procedures.    no MI  no Stroke  Tobacco use: no    Past Medical History:   Diagnosis Date    Borderline diabetic     Depression     Hypertension     Menopause      Past Surgical History:   Procedure Laterality Date    LASIK Bilateral 07/15/2016     Family History   Problem Relation Age of Onset    Cancer Mother      Social History     Social History    Marital status: Single     Spouse name: N/A    Number of children: N/A    Years of education: N/A     Occupational History     [employer]     Social History Main Topics    Smoking status: Never Smoker    Smokeless tobacco: Never Used    Alcohol use No    Drug use: No    Sexual activity: Not on file     Other Topics Concern    Not on file     Social History Narrative    No narrative on file       Current Outpatient Prescriptions:     cetirizine (ZYRTEC) 10 MG tablet, TAKE ONE TABLET BY MOUTH ONCE DAILY, Disp: 30 tablet, Rfl: 5    diclofenac sodium 1 % Gel, APPLY 2 GRAMS TOPICALLY 4 TIMES DAILY, Disp: 100 g, Rfl: 5    fluticasone (FLONASE) 50 mcg/actuation nasal spray, USE 1 SPRAY IN  EACH NOSTRIL 2 TIMES DAILY, Disp: 16 g, Rfl: 5    hydroCHLOROthiazide (HYDRODIURIL) 25 MG tablet, TAKE ONE TABLET BY MOUTH ONCE DAILY, Disp: 90 tablet, Rfl: 0    meloxicam (MOBIC) 15 MG tablet, TAKE ONE TABLET BY MOUTH ONCE DAILY, Disp: 30 tablet, Rfl: 5    mupirocin (BACTROBAN) 2 % ointment, APPLY  OINTMENT TOPICALLY TWICE DAILY, Disp: 22 g, Rfl: 0    VITAMIN D2 50,000 unit capsule, TAKE ONE CAPSULE BY MOUTH ONCE A WEEK, Disp: 4 capsule, Rfl: 8    topiramate (TOPAMAX) 50 MG tablet, Take 1 tablet (50 mg total) by mouth 2 (two) times daily., Disp: 180 tablet, Rfl: 1    triamcinolone acetonide 0.1% (KENALOG) 0.1 % cream, Apply topically 2 (two) times daily., Disp: 45 g, Rfl: 0    REVIEW OF SYSTEMS:  General: No fevers or chills; ENT: No sore throat; Allergy and Immunology: no persistent infections; Hematological and Lymphatic: No history of bleeding or easy bruising; Endocrine: negative; Respiratory: no cough, shortness of breath, or wheezing; Cardiovascular: no chest pain or dyspnea on exertion; Gastrointestinal: no abdominal pain/back, change in bowel habits, or bloody stools; Genito-Urinary: no dysuria, trouble voiding, or hematuria; Musculoskeletal: negative; Neurological: no TIA or stroke symptoms; Psychiatric: no nervousness, anxiety or depression.    PHYSICAL EXAM:   Right Arm BP - Sittin/80 (18 1223)  Left Arm BP - Sittin/88 (18 1223)  Pulse: 64         General appearance:  Alert, well-appearing, and in no distress.  Oriented to person, place, and time                    Neurological: Normal speech, no focal findings noted; CN II - XII grossly intact. RLE with sensation to light touch, LLE with sensation to light touch.            Musculoskeletal: Digits/nail without cyanosis/clubbing.  Strength 5/5.                    Neck: Supple, no significant adenopathy                  Chest:  Clear to auscultation, no wheezes, rales or rhonchi, symmetric air entry. No use of accessory  muscles               Cardiac: Normal rate and regular rhythm, S1 and S2 normal            Abdomen: Soft, nontender, nondistended, no masses or organomegaly, no hernia     No rebound tenderness noted; bowel sounds normal     No groin adenopathy      Extremities:  2+ PT bilaterally, 1+ DP bilat     no RLE edema, no LLE edema    Skin: RLE with spider veins to lateral knee region; LLE with spider and varicose vein to lateral calf, no infection    LAB RESULTS:  No results found for: CBC  No results found for: LABPROT, INR  Lab Results   Component Value Date     07/10/2017    K 3.8 07/10/2017     07/10/2017    CO2 26 07/10/2017    GLU 88 07/10/2017    BUN 14 07/10/2017    CREATININE 0.9 07/10/2017    CALCIUM 9.6 07/10/2017    ANIONGAP 10 07/10/2017    EGFRNONAA >60.0 07/10/2017     Lab Results   Component Value Date    WBC 5.53 07/10/2017    RBC 4.54 07/10/2017    HGB 12.8 07/10/2017    HCT 39.4 07/10/2017    MCV 87 07/10/2017    MCH 28.2 07/10/2017    MCHC 32.5 07/10/2017    RDW 16.4 (H) 07/10/2017     07/10/2017    MPV 10.9 07/10/2017    GRAN 2.5 07/10/2017    GRAN 44.6 07/10/2017    LYMPH 2.5 07/10/2017    LYMPH 45.4 07/10/2017    MONO 0.5 07/10/2017    MONO 8.3 07/10/2017    EOS 0.1 07/10/2017    BASO 0.02 07/10/2017    EOSINOPHIL 1.1 07/10/2017    BASOPHIL 0.4 07/10/2017    DIFFMETHOD Automated 07/10/2017     .  Lab Results   Component Value Date    HGBA1C 6.0 (H) 07/10/2017       IMAGING:  All pertinent imaging has been reviewed and interpreted independently.    No DVT or venous reflux on US.    IMP/PLAN:  58 y.o. female with   Patient Active Problem List   Diagnosis    Hypertension    Allergic rhinitis    Right hip pain    Joint stiffness    Muscle weakness    Difficulty walking    Prediabetes    Abnormal uterine bleeding    Menopausal state    Uterine leiomyoma    PMB (postmenopausal bleeding)    being managed by PCP and specialists who is here today for evaluation of L leg  symptomatic spider vein.    -recommend compression with Rx stockings, elevation, dietary changes associated with water and sodium intake discussed at length with patient  -pt would benefit from sclerotherapy as she remains symptomatic despite medical mgmt    I spent 30 minutes evaluating this patient and greater than 50% of the time was spent counseling, coordinator care and discussing the plan of care.  All questions were answered and patient stated understanding with agreement with the above treatment plan.    Tom Gorman MD [facility]  Vascular and Endovascular Surgery

## 2018-05-28 NOTE — PATIENT INSTRUCTIONS
Sclerotherapy    The most common way to treat spider and small varicose veins is sclerotherapy. This is a simple office procedure. After treatment, you can return to your daily activities right away. For best results, some veins may need to be treated more than once.  What sclerotherapy does  Sclerotherapy causes spider and small varicose veins to disappear over time. This is done by injecting the veins with a chemical that causes it to scar and collapse. Blood is then rerouted to flow only through the healthy veins. Over time, the treated veins go away. Sclerotherapy will not prevent new spider and varicose veins from forming   What this treatment involves  Your healthcare provider injects your affected veins using a fine needle. In most cases, this isnt painful. You may feel a slight burning or stinging. Many veins can be treated in a single session. But some veins may need to be injected more than once. After sclerotherapy, the injection sites are covered with tape and gauze. You may also have to wear bandages or elastic stockings for up to a week.  Recovering at home  At first, your legs will most likely be bruised. For a brief time, they may even look worse than they did before treatment. But dont worry. You can expect to see good results in 6 to 8 weeks. For the best outcome, here are some helpful hints:  · Wear elastic stockings or bandages as directed.  · Elevate your legs as instructed to help reduce swelling.  · Walk each day to increase blood flow.  · Follow your healthcare providers advice about other kinds of exercise.  When to call your healthcare provider  Call your healthcare provider right away if you notice any of the following:  · Pain in your legs or feet  · Bleeding at injection sites  · Extreme swelling or bruising  · Fever  · Chest pain  · Skin breakdown at the site of the injection   Date Last Reviewed: 5/1/2016  © 3222-1695 [employer]. [address] [place] [address], [place]  [address]. All rights reserved. This information is not intended as a substitute for professional medical care. Always follow your healthcare professional's instructions.        Understanding Spider and Varicose Veins  Do you often hide your legs because of the way they look? You may have noticed tiny red or blue bursts (spider veins). Or maybe you have veins that bulge or look twisted (varicose veins). If so, there are treatments that can help  What are the symptoms?  Spider veins or varicose veins may never be a problem. But sometimes they can cause legs to ache or swell. Your legs may also feel heavy and tired, or like theyre burning. These symptoms may be more severe at the end of the day. Prolonged sitting or standing can also make your symptoms worse.  Who gets spider and varicose veins?  Anyone can get spider or varicose veins. But vein problems tend to be hereditary (run in families). Other factors that can affect veins include:  · Pregnancy, hormones, and birth control pills  · A job where you stand or sit a lot  · Extra weight or lack of exercise  · Age     Spider veins look like tiny webs on the ankles, legs, and upper thighs.      Ropy, dark blue, red, or flesh-colored varicose veins are most common on the thighs, calves, and feet.     What can be done?  Spider and varicose veins can affect the way you feel about yourself. Talk to your healthcare provider about your concerns. There are treatments that can ease symptoms and make your legs look better.  Your treatment choices  Treatment may include self-care, sclerotherapy (injecting veins with a chemical), surgery, or newer nonsurgical minimally invasive therapies. Spider veins and some varicose veins can be treated with sclerotherapy. Large varicose veins can often be treated with newer minimally invasive procedures and, in rare cases, surgery may be needed.   Date Last Reviewed: 5/1/2016  © 5698-3711 [employer]. [address] [place] [address],  [place] [address]. All rights reserved. This information is not intended as a substitute for professional medical care. Always follow your healthcare professional's instructions.        Low-Salt Choices  Eating salt (sodium) can make your body retain too much water. Excess water makes your heart work harder. Canned, packaged, and frozen foods are easy to prepare, but they are often high in sodium. Here are some ideas for low-salt foods you can easily prepare yourself.    For breakfast  · Fruit or 100% fruit juice  · Whole-wheat bread or an English muffin. Compare sodium content on labels.  · Low-fat milk or yogurt  · Unsalted eggs  · Shredded wheat  · Corn tortillas  · Unsalted steamed rice  · Regular (not instant) hot cereal, made without salt  Stay away from:  · Sausage, martini, and ham  · Flour tortillas  · Packaged muffins, pancakes, and biscuits  · Instant hot cereals  · Cottage cheese  For lunch and dinner  · Fresh fish, chicken, turkey, or meat--baked, broiled, or roasted without salt  · Dry beans, cooked without salt  · Tofu, stir-fried without salt  · Unsalted fresh fruit and vegetables, or frozen or canned fruit and vegetables with no added salt  Stay away from:  · Lunch or deli meat that is cured or smoked  · Cheese  · Tomato juice and catsup  · Canned vegetables, soups, and fish not labeled as no-salt-added or reduced sodium  · Packaged gravies and sauces  · Olives, pickles, and relish  · Bottled salad dressings  For snacks and desserts  · Yogurt  · Unsalted, air popped popcorn  · Unsalted nuts or seeds  Stay away from:  · Pies and cakes  · Packaged dessert mixes  · Pizza  · Canned and packaged puddings  · Pretzels, chips, crackers, and nuts--unless the label says unsalted  Date Last Reviewed: 6/17/2015  © 1581-0914 [employer]. [address] [place] [address], [place] [address]. All rights reserved. This information is not intended as a substitute for professional medical care. Always follow your  healthcare professional's instructions.        Tips for Using Less Salt    Most people with heart problems need to eat less salt (sodium). Reducing the amount of salt you eat may help control your blood pressure. The higher your blood pressure, the greater your risk for heart disease, stroke, blindness, and kidney problems.  At the store  · Make low-salt choices by reading labels carefully. Look for the total amount of sodium per serving.  · Use more fresh food. Buy more fruits and vegetables. Select lean meats, fish, and poultry.  · Use fewer frozen, canned, and packaged foods which often contain a lot of sodium.  · Use plain frozen vegetables without sauces or toppings. These products are often low- or no-sodium.  · Opt for reduced-sodium or no-salt-added versions of canned vegetables and soups.  In the kitchen  · Don't add salt to food when you're cooking. Season with flavorings such as onion, garlic, pepper, salt-free herbal blends, and lemon or lime juice.  · Use a cookbook containing low-salt recipes. It can give you ideas for tasty meals that are healthy for your heart.  · Sprinkle salt-free herbal blends on vegetables and meat.  · Drain and rinse canned foods, such as canned beans and vegetables, before cooking or eating.  Eating out  · Tell the  you're on a low-salt diet. Ask questions about the menu.  · Order fish, chicken, and meat broiled, baked, poached, or grilled without salt, butter, or breading.  · Use lemon, pepper, and salt-free herb mixes to add flavor.  · Choose plain steamed rice, boiled noodles, and baked or boiled potatoes. Top potatoes with chives and a little sour cream.     Beware! Salt goes by many other names. Limit foods with these words listed as ingredients: salt, sodium, soy sauce, baking soda, baking powder, MSG, monosodium, Na (the chemical symbol for sodium). Some antacids are also high in salt.   Date Last Reviewed: 6/19/2015  © 8036-4660 [employer]. [address]  [place] [address]. All rights reserved. This information is not intended as a substitute for professional medical care. Always follow your healthcare professional's instructions.

## 2018-06-18 DIAGNOSIS — I10 ESSENTIAL HYPERTENSION: ICD-10-CM

## 2018-06-18 RX ORDER — HYDROCHLOROTHIAZIDE 25 MG/1
TABLET ORAL
Qty: 90 TABLET | Refills: 1 | Status: SHIPPED | OUTPATIENT
Start: 2018-06-18 | End: 2018-12-17 | Stop reason: SDUPTHER

## 2018-06-22 NOTE — TELEPHONE ENCOUNTER
----- Message from Chrissie Yap sent at 6/22/2018  9:48 AM CDT -----  Contact: Self/ [phone]  Pt requesting RX for sciatic nerve pain and also requests a call back with status. Thank you.    [facility] [place] - [address] [place] [address].  [address] [place], [place] [address]Daniela BAIRD [address]  Phone: [phone] Fax: [phone]

## 2018-06-22 NOTE — TELEPHONE ENCOUNTER
Patient states that she does not have a specific medication in mind but she is taking Aleve and it is not helping.  She states that she will come in, if necessary.

## 2018-06-29 ENCOUNTER — TELEPHONE (OUTPATIENT)
Dept: PODIATRY | Facility: CLINIC | Age: 58
End: 2018-06-29

## 2018-06-29 NOTE — TELEPHONE ENCOUNTER
----- Message from Aranza Fragoso sent at 6/29/2018  1:24 PM CDT -----  Contact: Self  Pt states she's currently at shoe store & needs to know correct shoe to purchase. Pt states the options are Revel , Ghost or Glycerin. Pt can be reached @ [phone].

## 2018-07-09 ENCOUNTER — OFFICE VISIT (OUTPATIENT)
Dept: PODIATRY | Facility: CLINIC | Age: 58
End: 2018-07-09
Payer: COMMERCIAL

## 2018-07-09 VITALS
WEIGHT: 276.44 LBS | SYSTOLIC BLOOD PRESSURE: 139 MMHG | DIASTOLIC BLOOD PRESSURE: 88 MMHG | HEIGHT: 65 IN | BODY MASS INDEX: 46.06 KG/M2

## 2018-07-09 DIAGNOSIS — M20.11 HALLUX ABDUCTO VALGUS, RIGHT: ICD-10-CM

## 2018-07-09 DIAGNOSIS — M79.671 FOOT PAIN, RIGHT: Primary | ICD-10-CM

## 2018-07-09 DIAGNOSIS — M21.41 PES PLANUS OF BOTH FEET: ICD-10-CM

## 2018-07-09 DIAGNOSIS — M20.12 HALLUX ABDUCTO VALGUS, LEFT: ICD-10-CM

## 2018-07-09 DIAGNOSIS — M21.42 PES PLANUS OF BOTH FEET: ICD-10-CM

## 2018-07-09 DIAGNOSIS — M20.41 HAMMER TOES OF BOTH FEET: ICD-10-CM

## 2018-07-09 DIAGNOSIS — M20.42 HAMMER TOES OF BOTH FEET: ICD-10-CM

## 2018-07-09 PROCEDURE — 3075F SYST BP GE 130 - 139MM HG: CPT | Mod: CPTII,S$GLB,, | Performed by: PODIATRIST

## 2018-07-09 PROCEDURE — 3079F DIAST BP 80-89 MM HG: CPT | Mod: CPTII,S$GLB,, | Performed by: PODIATRIST

## 2018-07-09 PROCEDURE — 99213 OFFICE O/P EST LOW 20 MIN: CPT | Mod: S$GLB,,, | Performed by: PODIATRIST

## 2018-07-09 PROCEDURE — 99999 PR PBB SHADOW E&M-EST. PATIENT-LVL III: CPT | Mod: PBBFAC,,, | Performed by: PODIATRIST

## 2018-07-09 PROCEDURE — 3008F BODY MASS INDEX DOCD: CPT | Mod: CPTII,S$GLB,, | Performed by: PODIATRIST

## 2018-07-09 NOTE — PROGRESS NOTES
Subjective:      Patient ID: Bambi Carrera is a 58 y.o. female.    Chief Complaint: Foot Pain (burning sensation x 2 weeks )    Bambi Carrera is a 58 y.o. female who presents to the clinic complaining of  Right plantar medial arch pain, especially with the first step in the morning. The pain is described as Aching. The onset of the pain was gradual and has worsened over the past several weeks. Bambi Carrera rates the pain as 8/10 at its worst. she denies a history of trauma. she relates pain began with new shoes and increasing her walking. Prior treatments include icing and PO NSAID which was effective.    Shoe gear: corral with flexible sole  Hours on Feet: 6  Exercise: active    Hemoglobin A1C   Date Value Ref Range Status   07/10/2017 6.0 (H) 4.0 - 5.6 % Final     Comment:     According to ADA guidelines, hemoglobin A1c <7.0% represents  optimal control in non-pregnant diabetic patients. Different  metrics may apply to specific patient populations.   Standards of Medical Care in Diabetes-2016.  For the purpose of screening for the presence of diabetes:  <5.7%     Consistent with the absence of diabetes  5.7-6.4%  Consistent with increasing risk for diabetes   (prediabetes)  >or=6.5%  Consistent with diabetes  Currently, no consensus exists for use of hemoglobin A1c  for diagnosis of diabetes for children.  This Hemoglobin A1c assay has significant interference with fetal   hemoglobin   (HbF). The results are invalid for patients with abnormal amounts of   HbF,   including those with known Hereditary Persistence   of Fetal Hemoglobin. Heterozygous hemoglobin variants (HbAS, HbAC,   HbAD, HbAE, HbA2) do not significantly interfere with this assay;   however, presence of multiple variants in a sample may impact the %   interference.     03/09/2016 6.1 4.5 - 6.2 % Final         Patient Active Problem List   Diagnosis    Hypertension    Allergic rhinitis    Right hip pain    Joint stiffness    Muscle  weakness    Difficulty walking    Prediabetes    Abnormal uterine bleeding    Menopausal state    Uterine leiomyoma    PMB (postmenopausal bleeding)       Current Outpatient Prescriptions on File Prior to Visit   Medication Sig Dispense Refill    cetirizine (ZYRTEC) 10 MG tablet TAKE ONE TABLET BY MOUTH ONCE DAILY 30 tablet 5    fluticasone (FLONASE) 50 mcg/actuation nasal spray USE 1 SPRAY IN EACH NOSTRIL 2 TIMES DAILY 16 g 5    hydroCHLOROthiazide (HYDRODIURIL) 25 MG tablet TAKE 1 TABLET BY MOUTH ONCE DAILY 90 tablet 1    meloxicam (MOBIC) 15 MG tablet TAKE ONE TABLET BY MOUTH ONCE DAILY 30 tablet 5    mupirocin (BACTROBAN) 2 % ointment APPLY  OINTMENT TOPICALLY TWICE DAILY 22 g 0    triamcinolone acetonide 0.1% (KENALOG) 0.1 % cream Apply topically 2 (two) times daily. 45 g 0    VITAMIN D2 50,000 unit capsule TAKE ONE CAPSULE BY MOUTH ONCE A WEEK 4 capsule 8    [DISCONTINUED] diclofenac sodium 1 % Gel APPLY 2 GRAMS TOPICALLY 4 TIMES DAILY 100 g 5    [DISCONTINUED] topiramate (TOPAMAX) 50 MG tablet Take 1 tablet (50 mg total) by mouth 2 (two) times daily. 180 tablet 1     No current facility-administered medications on file prior to visit.        Review of patient's allergies indicates:  No Known Allergies    Past Surgical History:   Procedure Laterality Date    LASIK Bilateral 07/15/2016       Family History   Problem Relation Age of Onset    Cancer Mother        Social History     Social History    Marital status: Single     Spouse name: N/A    Number of children: N/A    Years of education: N/A     Occupational History     [employer]     Social History Main Topics    Smoking status: Never Smoker    Smokeless tobacco: Never Used    Alcohol use No    Drug use: No    Sexual activity: Not on file     Other Topics Concern    Not on file     Social History Narrative    No narrative on file     Review of Systems   Constitution: Negative for chills, fever and weakness.  "  Cardiovascular: Negative for claudication and leg swelling.   Respiratory: Negative for cough and shortness of breath.    Skin: Negative for dry skin, itching, nail changes and rash.   Musculoskeletal: Positive for myalgias. Negative for falls, joint pain, joint swelling and muscle weakness.   Gastrointestinal: Negative for diarrhea, nausea and vomiting.   Neurological: Negative for numbness, paresthesias and tremors.   Psychiatric/Behavioral: Negative for altered mental status and hallucinations.       Objective:       Vitals:    07/09/18 0905   BP: 139/88   Weight: 125.4 kg (276 lb 7.3 oz)   Height: 5' 5" (1.651 m)   PainSc:   8   PainLoc: Foot       Physical Exam   Constitutional:  Non-toxic appearance. She does not have a sickly appearance. No distress.   Cardiovascular:   Pulses:       Dorsalis pedis pulses are 2+ on the right side, and 2+ on the left side.        Posterior tibial pulses are 2+ on the right side, and 2+ on the left side.   Pulmonary/Chest: No respiratory distress.   Musculoskeletal:        Right ankle: She exhibits decreased range of motion. She exhibits no swelling. No tenderness. No lateral malleolus, no medial malleolus, no AITFL, no CF ligament and no posterior TFL tenderness found. Achilles tendon exhibits no pain, no defect and normal Klein's test results.        Left ankle: She exhibits decreased range of motion. She exhibits no swelling. No tenderness. No lateral malleolus, no medial malleolus, no AITFL, no CF ligament and no posterior TFL tenderness found. Achilles tendon exhibits no pain, no defect and normal Klein's test results.        Right foot: There is tenderness. There is no bony tenderness.        Left foot: There is no bony tenderness.   Biomechanical exam: Pain on palpation right medial arch. There is equinus deformity bilateral with decreased dorsiflexion at the ankle joint bilateral. No tenderness with compression of heel. Negative tinels sign. Gait analysis " reveals excessive pronation through midstance and propulsion with early heel off.     Shoes with flexible mid sole and inadequate arch support    Patient has hammertoes of digits 2-5 bilateral partially reducible without symptom today.    Visible and palpable bunion without pain at dorsomedial 1st metatarsal head right and left.  Hallux abducted right and left partially reducible, tracks laterally without being track bound.  No ecchymosis, erythema, edema, or cardinal signs infection or signs of trauma same foot.     Neurological: She has normal reflexes. She displays no atrophy and no tremor. No sensory deficit. She exhibits normal muscle tone.   Skin: Skin is warm and dry. No bruising, no burn, no laceration, no lesion and no rash noted. She is not diaphoretic. No cyanosis or erythema. No pallor. Nails show no clubbing.   Psychiatric: Her mood appears not anxious. Her affect is not inappropriate. Her speech is not slurred. She is not combative. She is communicative. She is attentive.   Nursing note reviewed.      Assessment:       Encounter Diagnoses   Name Primary?    Foot pain, right Yes    Pes planus of both feet     Hammer toes of both feet     Hallux abducto valgus, right     Hallux abducto valgus, left          Plan:       Bambi was seen today for foot pain.    Diagnoses and all orders for this visit:    Foot pain, right    Pes planus of both feet    Hammer toes of both feet    Hallux abducto valgus, right    Hallux abducto valgus, left      I counseled the patient on her conditions, their implications and medical management.    Discussed different treatment options for arch pain. I gave written and verbal instructions on stretching exercises. Patient expressed understanding. Discussed icing the affected area as needed and also wearing appropriate shoe gear and avoiding flats, slippers, sandals, and going barefoot. My recommendation for OTC supports is [employer]otic[facility]. Patient instructed on  adequate icing techniques. Patient should ice the affected area at least once per day x 10 minutes for 10 days . I advised the patient that extra icing would also be beneficial to ensure adequate anti inflammatory effect. We also discussed cortisone injections and NSAID therapy.  Continue NSAID rx    Stretching handout dispensed to patient. Instructions on adequate stretching reviewed in clinic     RTC PRN if no improvement, at this time she will receive cortisone injections and referral to PT. Patient is amenable to plan.

## 2018-07-09 NOTE — PATIENT INSTRUCTIONS
Recommend lotions: eucerin, eucerin for diabetics, aquaphor, A&D ointment, gold bond for diabetics, sween, Bronson's Bees all purpose baby ointment,  urea 40 with aloe (found on amazon.com)    Shoe recommendations: (try 6pm.com, zappos.com , nordstromrack.[employer], or shoes[employer] for discounted prices) you can visit [employer] in [place]  or [employer] in the [facility] (there are also several shoe brand outlets in the [facility])    Asics (GT 2000 or gel foundations), new balance stability type shoes (900 series), saucony (stabil c3),  Cunha (GTS or Beast or transcend), vionic, propet (tennis shoe)    sofft brand, clarks, crocs, aerosoles, naturalizers, SAS, ecco, born, devan manuel, rockports (dress shoes)    Vionic, burkenstocks, fitflops, propet (sandals)  Nike comfort thong sandals, crocs, propet (house shoes)    Nail Home remedy:  Vicks Vapor rub to nails for easier managability    Occasional soaks for 15-20 mins in luke warm water with 1 cup of listerine and 1 cup of apple cider vinegar are ok You may add several drops of oil of oregano or tea tree oil as well      Wearing Proper Shoes                    You walk on your feet every day, forcing them to support the weight of your body. Repeated stress on your feet can cause damage over time. The right shoes can help protect your feet. The wrong shoes can cause more foot problems. Read the information below to help you find a shoe that fits your foot needs.     A good shoe fit will cover your foot outline.       A shoe that doesnt cover the outline is a bad fit.      Whats Your Foot Shape?  To get a good fit, you need to know the shape of your foot. Do this simple test: While standing, place your foot on a piece of paper and trace around it. Is your foot straight or curved? Do you have a foot problem, such as a bunion, that causes your foot outline to show a bulge on the side of your big toe?  Finding Your Fit  Bring your foot outline to the [employer] store to help you find  the right shoe. Place a shoe you like on top of the outline to see if it matches the shape. The shoe should cover the outline. (If you have a bunion, the shoe may not cover the bulge on the outline. Look for soft leather shoes to stretch over the bunion.) Once youve found a pair of proper shoes, put them on. Walk around. Be sure the shoes dont rub or pinch. If the shoes feel good, youve found your fit!  The Right Shoe for You  A good shoe has features that provide comfort and support. It must also be the right size and shape for your feet. Look for a shoe made of breathable fabric and lining, such as leather or canvas. Be sure that shoes have enough tread to prevent slipping. Go to a good shoe store for help finding the right shoe.  Good Shoe Features  An ideal shoe has the following:  · Laces for support. If tying laces is a problem for you, try shoes with Velcro fasteners or jj.  · A front of the shoe (toe box) with ½ inch space in front of your longest toes.  · An arch shape that supports your foot.  · No more than 1½ inches of heel.  · A stiff, snug back of the shoe to keep your foot from sliding around.  · A smooth lining with no rough seams.  Shoe Shopping Tips  Below are some dos and donts for when you go to the shoe store.  Do:  · Select the shoes that feel right. Wear them around the house. Then bring them to your foot doctor to check for fit. If they dont fit well, return them.  · Shop late in the day, when your feet will be slightly bigger.  · Each time you buy shoes, have both your feet measured while you are standing. Foot size changes with time.  · Pick shoes to suit their purpose. High heels are okay for an occasional night on the town. But for everyday wear, choose a more sensible shoe.  · Try on shoes while wearing any inserts specially made for your feet (orthoses).  · Try on both the right and left shoes. If your feet are different sizes, pick a pair that fits the larger  foot.  Dont:  · Dont buy shoes based on shoe size alone. Always try on shoes, as sizes differ from brand to brand and within brands.  · Dont expect shoes to break in. If they dont fit at the store, dont buy them.  · Dont buy a shoe that doesnt match your foot shape.  What About Socks?  Always wear socks with shoes. Socks help absorb sweat and reduce friction and blistering. When shopping for shoes, choose soft, padded socks with seams that dont irritate your feet.  If You Have Foot Problems  Some foot problems cause deformities. This can make it hard to find a good fit. Look for shoes made of soft leather to stretch over the deformity. If you have bunions, buy shoes with a wider toe box. To fit hammertoes, look for shoes with a tall toe box. If you have arch problems, you may need inserts. In some cases, youll need to have custom footwear or orthoses made for your feet.  Suggested Footwear  Ask your healthcare provider what kind of footwear you need. He or she may recommend a certain brand or shoe store.  © 0042-9571 [employer]. [address] [place] [address]. All rights reserved. This information is not intended as a substitute for professional medical care. Always follow your healthcare professional's instructions.        Toe Extension (Flexibility)    These instructions are for your right foot. Switch sides for your left foot.  1. Sit in a chair. Rest your right ankle on your left knee.  2. Hold your toes with your right hand. Gently bend the toes backward. Feel a stretch in the undersides of the toes and ball of the foot. Hold for 30 to 60 seconds.  3. Then gently bend the toes in the other direction. Gently press on them until your foot is pointed. Hold for 30 to 60 seconds.  4. Repeat 5 times, or as instructed.  © 0244-7722 [employer]. [address] [place] [address]. All rights reserved. This information is not intended as a substitute for  professional medical care. Always follow your healthcare professional's instructions.

## 2018-07-16 ENCOUNTER — TELEPHONE (OUTPATIENT)
Dept: FAMILY MEDICINE | Facility: CLINIC | Age: 58
End: 2018-07-16

## 2018-07-16 ENCOUNTER — OFFICE VISIT (OUTPATIENT)
Dept: VASCULAR SURGERY | Facility: CLINIC | Age: 58
End: 2018-07-16
Payer: COMMERCIAL

## 2018-07-16 VITALS
SYSTOLIC BLOOD PRESSURE: 138 MMHG | WEIGHT: 277.88 LBS | HEIGHT: 65 IN | DIASTOLIC BLOOD PRESSURE: 86 MMHG | BODY MASS INDEX: 46.3 KG/M2 | HEART RATE: 64 BPM

## 2018-07-16 DIAGNOSIS — I78.1 SPIDER VEIN, SYMPTOMATIC: Primary | ICD-10-CM

## 2018-07-16 PROCEDURE — 3075F SYST BP GE 130 - 139MM HG: CPT | Mod: CPTII,S$GLB,, | Performed by: SURGERY

## 2018-07-16 PROCEDURE — 3008F BODY MASS INDEX DOCD: CPT | Mod: CPTII,S$GLB,, | Performed by: SURGERY

## 2018-07-16 PROCEDURE — 99214 OFFICE O/P EST MOD 30 MIN: CPT | Mod: S$GLB,,, | Performed by: SURGERY

## 2018-07-16 PROCEDURE — 3079F DIAST BP 80-89 MM HG: CPT | Mod: CPTII,S$GLB,, | Performed by: SURGERY

## 2018-07-16 PROCEDURE — 99999 PR PBB SHADOW E&M-EST. PATIENT-LVL III: CPT | Mod: PBBFAC,,, | Performed by: SURGERY

## 2018-07-16 NOTE — TELEPHONE ENCOUNTER
----- Message from Konstantin Dorsey sent at 7/16/2018 10:11 AM CDT -----  Contact: Self/[phone]  Patient called to request paperwork for handicap license. Thank you.

## 2018-07-16 NOTE — TELEPHONE ENCOUNTER
Patient informed that it will take at least 24-48 for paperwork to be completed. Once paperwork is completed, someone from our office will call to inform the patient. Patient verbalized understanding.

## 2018-07-16 NOTE — PATIENT INSTRUCTIONS
Low-Salt Choices  Eating salt (sodium) can make your body retain too much water. Excess water makes your heart work harder. Canned, packaged, and frozen foods are easy to prepare, but they are often high in sodium. Here are some ideas for low-salt foods you can easily prepare yourself.    For breakfast  · Fruit or 100% fruit juice  · Whole-wheat bread or an English muffin. Compare sodium content on labels.  · Low-fat milk or yogurt  · Unsalted eggs  · Shredded wheat  · Corn tortillas  · Unsalted steamed rice  · Regular (not instant) hot cereal, made without salt  Stay away from:  · Sausage, martini, and ham  · Flour tortillas  · Packaged muffins, pancakes, and biscuits  · Instant hot cereals  · Cottage cheese  For lunch and dinner  · Fresh fish, chicken, turkey, or meat--baked, broiled, or roasted without salt  · Dry beans, cooked without salt  · Tofu, stir-fried without salt  · Unsalted fresh fruit and vegetables, or frozen or canned fruit and vegetables with no added salt  Stay away from:  · Lunch or deli meat that is cured or smoked  · Cheese  · Tomato juice and catsup  · Canned vegetables, soups, and fish not labeled as no-salt-added or reduced sodium  · Packaged gravies and sauces  · Olives, pickles, and relish  · Bottled salad dressings  For snacks and desserts  · Yogurt  · Unsalted, air popped popcorn  · Unsalted nuts or seeds  Stay away from:  · Pies and cakes  · Packaged dessert mixes  · Pizza  · Canned and packaged puddings  · Pretzels, chips, crackers, and nuts--unless the label says unsalted  Date Last Reviewed: 6/17/2015  © 4993-9824 [employer]. [address] [place] [address], [place] [address]. All rights reserved. This information is not intended as a substitute for professional medical care. Always follow your healthcare professional's instructions.        Tips for Using Less Salt    Most people with heart problems need to eat less salt (sodium). Reducing the amount of salt you eat may help  control your blood pressure. The higher your blood pressure, the greater your risk for heart disease, stroke, blindness, and kidney problems.  At the store  · Make low-salt choices by reading labels carefully. Look for the total amount of sodium per serving.  · Use more fresh food. Buy more fruits and vegetables. Select lean meats, fish, and poultry.  · Use fewer frozen, canned, and packaged foods which often contain a lot of sodium.  · Use plain frozen vegetables without sauces or toppings. These products are often low- or no-sodium.  · Opt for reduced-sodium or no-salt-added versions of canned vegetables and soups.  In the kitchen  · Don't add salt to food when you're cooking. Season with flavorings such as onion, garlic, pepper, salt-free herbal blends, and lemon or lime juice.  · Use a cookbook containing low-salt recipes. It can give you ideas for tasty meals that are healthy for your heart.  · Sprinkle salt-free herbal blends on vegetables and meat.  · Drain and rinse canned foods, such as canned beans and vegetables, before cooking or eating.  Eating out  · Tell the  you're on a low-salt diet. Ask questions about the menu.  · Order fish, chicken, and meat broiled, baked, poached, or grilled without salt, butter, or breading.  · Use lemon, pepper, and salt-free herb mixes to add flavor.  · Choose plain steamed rice, boiled noodles, and baked or boiled potatoes. Top potatoes with chives and a little sour cream.     Beware! Salt goes by many other names. Limit foods with these words listed as ingredients: salt, sodium, soy sauce, baking soda, baking powder, MSG, monosodium, Na (the chemical symbol for sodium). Some antacids are also high in salt.   Date Last Reviewed: 6/19/2015  © 8557-3861 [employer]. [address] [place] [address], [place] [address]. All rights reserved. This information is not intended as a substitute for professional medical care. Always follow your healthcare professional's  instructions.

## 2018-07-16 NOTE — LETTER
July 16, 2018        Natalia Martin MD  [address] [facility]  [place] [address]             [facility]  [address]  [facility] [address]  Phone: [phone]  Fax: [phone]   Patient: Bambi Carrera   MR Number: [id number]   YOB: 1960   Date of Visit: 7/16/2018       Dear Dr. Martin:    Thank you for referring Bambi Carrera to me for evaluation. Below are the relevant portions of my assessment and plan of care.            If you have questions, please do not hesitate to call me. I look forward to following Bambi along with you.    Sincerely,      Tom Gorman MD           CC  No Recipients

## 2018-07-16 NOTE — TELEPHONE ENCOUNTER
----- Message from Edyta Richter sent at 7/16/2018  1:24 PM CDT -----  Contact: Self   Patient is calling to check the status of the Handicap Plate paperwork she says she turned in. She would like to come pick it up as soon as possible. Please call at [phone].

## 2018-07-16 NOTE — TELEPHONE ENCOUNTER
Has this been completed in the past? Because if not she needs a visit to discuss why she needs this

## 2018-07-16 NOTE — PROGRESS NOTES
Tom Gorman MD VI                       [facility]                         07/16/2018    HPI:  Bambi Carrera is a 58 y.o. female with   Patient Active Problem List   Diagnosis    Hypertension    Allergic rhinitis    Right hip pain    Joint stiffness    Muscle weakness    Difficulty walking    Prediabetes    Abnormal uterine bleeding    Menopausal state    Uterine leiomyoma    PMB (postmenopausal bleeding)    being managed by PCP and specialists who is here today for evaluation of spider vein.  Patient states location is L lateral calf occurring for 2 years.  Associated signs and symptoms include tenderness.  Quality is aching and severity is 8/10.  Symptoms began 2 years ago.  Alleviating factors include compression and lotion.  Worsening factors include none.  Denies leg edema.  Denies leg wounds or previous vein procedures.    no MI  no Stroke  Tobacco use: no    Interval history: cont to experience discomfort of spider veins.  Wearing compression stockings.    Past Medical History:   Diagnosis Date    Borderline diabetic     Depression     Hypertension     Menopause      Past Surgical History:   Procedure Laterality Date    LASIK Bilateral 07/15/2016     Family History   Problem Relation Age of Onset    Cancer Mother      Social History     Social History    Marital status: Single     Spouse name: N/A    Number of children: N/A    Years of education: N/A     Occupational History     [employer]     Social History Main Topics    Smoking status: Never Smoker    Smokeless tobacco: Never Used    Alcohol use No    Drug use: No    Sexual activity: Not on file     Other Topics Concern    Not on file     Social History Narrative    No narrative on file       Current Outpatient Prescriptions:     cetirizine (ZYRTEC) 10 MG tablet, TAKE ONE TABLET BY MOUTH ONCE DAILY, Disp: 30 tablet, Rfl: 5    fluticasone (FLONASE) 50 mcg/actuation nasal spray, USE 1  SPRAY IN EACH NOSTRIL 2 TIMES DAILY, Disp: 16 g, Rfl: 5    hydroCHLOROthiazide (HYDRODIURIL) 25 MG tablet, TAKE 1 TABLET BY MOUTH ONCE DAILY, Disp: 90 tablet, Rfl: 1    meloxicam (MOBIC) 15 MG tablet, TAKE ONE TABLET BY MOUTH ONCE DAILY, Disp: 30 tablet, Rfl: 5    mupirocin (BACTROBAN) 2 % ointment, APPLY  OINTMENT TOPICALLY TWICE DAILY, Disp: 22 g, Rfl: 0    VITAMIN D2 50,000 unit capsule, TAKE ONE CAPSULE BY MOUTH ONCE A WEEK, Disp: 4 capsule, Rfl: 8    triamcinolone acetonide 0.1% (KENALOG) 0.1 % cream, Apply topically 2 (two) times daily., Disp: 45 g, Rfl: 0    REVIEW OF SYSTEMS:  General: No fevers or chills; ENT: No sore throat; Allergy and Immunology: no persistent infections; Hematological and Lymphatic: No history of bleeding or easy bruising; Endocrine: negative; Respiratory: no cough, shortness of breath, or wheezing; Cardiovascular: no chest pain or dyspnea on exertion; Gastrointestinal: no abdominal pain/back, change in bowel habits, or bloody stools; Genito-Urinary: no dysuria, trouble voiding, or hematuria; Musculoskeletal: negative; Neurological: no TIA or stroke symptoms; Psychiatric: no nervousness, anxiety or depression.    PHYSICAL EXAM:      Pulse: 64         General appearance:  Alert, well-appearing, and in no distress.  Oriented to person, place, and time                    Neurological: Normal speech, no focal findings noted; CN II - XII grossly intact. RLE with sensation to light touch, LLE with sensation to light touch.            Musculoskeletal: Digits/nail without cyanosis/clubbing.  Strength 5/5.                    Neck: Supple, no significant adenopathy                  Chest:  Clear to auscultation, no wheezes, rales or rhonchi, symmetric air entry. No use of accessory muscles               Cardiac: Normal rate and regular rhythm, S1 and S2 normal            Abdomen: Soft, nontender, nondistended, no masses or organomegaly, no hernia     No rebound tenderness noted; bowel sounds  normal     No groin adenopathy      Extremities:  2+ PT bilaterally, 1+ DP bilat     no RLE edema, no LLE edema    Skin: RLE with spider veins to lateral knee region; LLE with spider and varicose vein to lateral calf, no infection    LAB RESULTS:  No results found for: CBC  No results found for: LABPROT, INR  Lab Results   Component Value Date     07/10/2017    K 3.8 07/10/2017     07/10/2017    CO2 26 07/10/2017    GLU 88 07/10/2017    BUN 14 07/10/2017    CREATININE 0.9 07/10/2017    CALCIUM 9.6 07/10/2017    ANIONGAP 10 07/10/2017    EGFRNONAA >60.0 07/10/2017     Lab Results   Component Value Date    WBC 5.53 07/10/2017    RBC 4.54 07/10/2017    HGB 12.8 07/10/2017    HCT 39.4 07/10/2017    MCV 87 07/10/2017    MCH 28.2 07/10/2017    MCHC 32.5 07/10/2017    RDW 16.4 (H) 07/10/2017     07/10/2017    MPV 10.9 07/10/2017    GRAN 2.5 07/10/2017    GRAN 44.6 07/10/2017    LYMPH 2.5 07/10/2017    LYMPH 45.4 07/10/2017    MONO 0.5 07/10/2017    MONO 8.3 07/10/2017    EOS 0.1 07/10/2017    BASO 0.02 07/10/2017    EOSINOPHIL 1.1 07/10/2017    BASOPHIL 0.4 07/10/2017    DIFFMETHOD Automated 07/10/2017     .  Lab Results   Component Value Date    HGBA1C 6.0 (H) 07/10/2017       IMAGING:  All pertinent imaging has been reviewed and interpreted independently.    No DVT or venous reflux on US.    IMP/PLAN:  58 y.o. female with   Patient Active Problem List   Diagnosis    Hypertension    Allergic rhinitis    Right hip pain    Joint stiffness    Muscle weakness    Difficulty walking    Prediabetes    Abnormal uterine bleeding    Menopausal state    Uterine leiomyoma    PMB (postmenopausal bleeding)    being managed by PCP and specialists who is here today for evaluation of L leg symptomatic spider vein.    -recommend continuing compression with Rx stockings, elevation, dietary changes associated with water and sodium intake discussed at length with patient  -pt would benefit from sclerotherapy as  she remains symptomatic despite medical mgmt - will begin scheduling process    I spent 30 minutes evaluating this patient and greater than 50% of the time was spent counseling, coordinator care and discussing the plan of care.  All questions were answered and patient stated understanding with agreement with the above treatment plan.    Tom Gorman MD [facility]  Vascular and Endovascular Surgery

## 2018-07-16 NOTE — TELEPHONE ENCOUNTER
Patient informed that paperwork takes at least 24-48 to be completed. Once completed some one from our office will call to inform the patient.     Patient states she needs paperwork completed for a Handicap License plate.    Please advise.

## 2018-07-25 ENCOUNTER — TELEPHONE (OUTPATIENT)
Dept: FAMILY MEDICINE | Facility: CLINIC | Age: 58
End: 2018-07-25

## 2018-07-25 NOTE — TELEPHONE ENCOUNTER
----- Message from Chrissie Yap sent at 7/25/2018 10:43 AM CDT -----  Contact: Self/ [phone]  Pt calling to inquire if her paperwork for the handicap plate is ready. Please call to advise. Thank you.

## 2018-07-26 ENCOUNTER — OFFICE VISIT (OUTPATIENT)
Dept: FAMILY MEDICINE | Facility: CLINIC | Age: 58
End: 2018-07-26
Payer: COMMERCIAL

## 2018-07-26 VITALS
BODY MASS INDEX: 46.99 KG/M2 | DIASTOLIC BLOOD PRESSURE: 84 MMHG | HEART RATE: 80 BPM | OXYGEN SATURATION: 97 % | SYSTOLIC BLOOD PRESSURE: 124 MMHG | HEIGHT: 65 IN | WEIGHT: 282.06 LBS | TEMPERATURE: 98 F

## 2018-07-26 DIAGNOSIS — M62.830 MUSCLE SPASM OF BACK: Primary | ICD-10-CM

## 2018-07-26 DIAGNOSIS — E66.01 MORBID OBESITY: ICD-10-CM

## 2018-07-26 PROCEDURE — 99214 OFFICE O/P EST MOD 30 MIN: CPT | Mod: 25,S$GLB,, | Performed by: FAMILY MEDICINE

## 2018-07-26 PROCEDURE — 3074F SYST BP LT 130 MM HG: CPT | Mod: CPTII,S$GLB,, | Performed by: FAMILY MEDICINE

## 2018-07-26 PROCEDURE — 96372 THER/PROPH/DIAG INJ SC/IM: CPT | Mod: S$GLB,,, | Performed by: FAMILY MEDICINE

## 2018-07-26 PROCEDURE — 99999 PR PBB SHADOW E&M-EST. PATIENT-LVL III: CPT | Mod: PBBFAC,,, | Performed by: FAMILY MEDICINE

## 2018-07-26 PROCEDURE — 3079F DIAST BP 80-89 MM HG: CPT | Mod: CPTII,S$GLB,, | Performed by: FAMILY MEDICINE

## 2018-07-26 PROCEDURE — 3008F BODY MASS INDEX DOCD: CPT | Mod: CPTII,S$GLB,, | Performed by: FAMILY MEDICINE

## 2018-07-26 RX ORDER — KETOROLAC TROMETHAMINE 30 MG/ML
30 INJECTION, SOLUTION INTRAMUSCULAR; INTRAVENOUS ONCE
Status: COMPLETED | OUTPATIENT
Start: 2018-07-26 | End: 2018-07-26

## 2018-07-26 RX ORDER — TIZANIDINE 4 MG/1
4 TABLET ORAL EVERY 8 HOURS PRN
Qty: 30 TABLET | Refills: 0 | Status: SHIPPED | OUTPATIENT
Start: 2018-07-26 | End: 2018-08-05

## 2018-07-26 RX ORDER — NAPROXEN 500 MG/1
500 TABLET ORAL 2 TIMES DAILY PRN
Qty: 60 TABLET | Refills: 0 | Status: SHIPPED | OUTPATIENT
Start: 2018-07-26 | End: 2018-10-29 | Stop reason: SDUPTHER

## 2018-07-26 RX ADMIN — KETOROLAC TROMETHAMINE 30 MG: 30 INJECTION, SOLUTION INTRAMUSCULAR; INTRAVENOUS at 04:07

## 2018-07-26 NOTE — PROGRESS NOTES
[facility]  Progress Note    SUBJECTIVE:     Chief Complaint   Patient presents with    Back Pain       HPI   Bambi Carrera  is a 58 y.o. female here for c/o lower back pain. This has been going on for the past couple months, but lately worst over the past week. She rates pain as 10/10, non-radiating. Certain positions make it worst. No falls/trauma. Took some otc aleve with some relief. Patient has no other new complaints/problems at this time.      Review of patient's allergies indicates:  No Known Allergies    Past Medical History:   Diagnosis Date    Borderline diabetic     Depression     Hypertension     Menopause      Past Surgical History:   Procedure Laterality Date    LASIK Bilateral 07/15/2016     Family History   Problem Relation Age of Onset    Cancer Mother      Social History   Substance Use Topics    Smoking status: Never Smoker    Smokeless tobacco: Never Used    Alcohol use No        Review of Systems   Constitutional: Negative for chills, fever and malaise/fatigue.   Respiratory: Negative.    Cardiovascular: Negative.    Gastrointestinal: Negative for abdominal pain, constipation and diarrhea.   Musculoskeletal: Positive for back pain. Negative for falls, myalgias and neck pain.   Neurological: Negative for weakness and headaches.     OBJECTIVE:     Vitals:    07/26/18 1539   BP: 124/84   Pulse: 80   Temp: 97.6 °F (36.4 °C)     Body mass index is 46.94 kg/m².    Physical Exam   Constitutional: She is oriented to person, place, and time. She appears distressed (mild).   HENT:   Head: Normocephalic and atraumatic.   Neck: Normal range of motion. Neck supple.   Musculoskeletal: She exhibits tenderness (to palpation of paraspinal muscles). She exhibits no edema.   Neurological: She is alert and oriented to person, place, and time. No cranial nerve deficit.   Skin: Skin is warm. She is not diaphoretic.       Old records were reviewed. Labs and/or images were independently  reviewed.    ASSESSMENT     1. Muscle spasm of back    2. Morbid obesity        PLAN:     Muscle spasm of back  -     ketorolac injection 30 mg; Inject 1 mL (30 mg total) into the muscle once.  -     tiZANidine (ZANAFLEX) 4 MG tablet; Take 1 tablet (4 mg total) by mouth every 8 (eight) hours as needed.  Dispense: 30 tablet; Refill: 0  -     naproxen (NAPROSYN) 500 MG tablet; Take 1 tablet (500 mg total) by mouth 2 (two) times daily as needed.  Dispense: 60 tablet; Refill: 0  -     Patient counseled on good posture and stretching exercises. Continue to place ice packs on affected areas 3-4 times daily. Take medications as prescribed. Instructed patient to call MD if symptoms persist or worsen.    Morbid obesity   -     Counseled patient about healthy diet, exercise habits, and to increase physical activity.    RTC DILMA Whitmore MD  07/26/2018 4:40 PM

## 2018-07-27 ENCOUNTER — TELEPHONE (OUTPATIENT)
Dept: FAMILY MEDICINE | Facility: CLINIC | Age: 58
End: 2018-07-27

## 2018-07-27 NOTE — TELEPHONE ENCOUNTER
----- Message from Niurka Macias sent at 7/26/2018 10:06 AM CDT -----  Contact: self  Pt calling to get orders for either an MRI or CT. Please call [phone].

## 2018-08-21 DIAGNOSIS — E55.9 VITAMIN D DEFICIENCY: ICD-10-CM

## 2018-08-22 RX ORDER — ERGOCALCIFEROL 1.25 MG/1
CAPSULE ORAL
Qty: 4 CAPSULE | Refills: 8 | Status: SHIPPED | OUTPATIENT
Start: 2018-08-22 | End: 2019-05-03 | Stop reason: SDUPTHER

## 2018-10-18 ENCOUNTER — OFFICE VISIT (OUTPATIENT)
Dept: VASCULAR SURGERY | Facility: CLINIC | Age: 58
End: 2018-10-18
Payer: COMMERCIAL

## 2018-10-18 VITALS
DIASTOLIC BLOOD PRESSURE: 78 MMHG | WEIGHT: 281.06 LBS | HEIGHT: 65 IN | BODY MASS INDEX: 46.83 KG/M2 | SYSTOLIC BLOOD PRESSURE: 120 MMHG

## 2018-10-18 DIAGNOSIS — I83.812 VARICOSE VEINS OF LEFT LOWER EXTREMITY WITH PAIN: Primary | ICD-10-CM

## 2018-10-18 PROCEDURE — 99999 PR PBB SHADOW E&M-EST. PATIENT-LVL III: CPT | Mod: PBBFAC,,, | Performed by: SURGERY

## 2018-10-18 PROCEDURE — 99214 OFFICE O/P EST MOD 30 MIN: CPT | Mod: S$GLB,,, | Performed by: SURGERY

## 2018-10-18 PROCEDURE — 3074F SYST BP LT 130 MM HG: CPT | Mod: CPTII,S$GLB,, | Performed by: SURGERY

## 2018-10-18 PROCEDURE — 3008F BODY MASS INDEX DOCD: CPT | Mod: CPTII,S$GLB,, | Performed by: SURGERY

## 2018-10-18 PROCEDURE — 3078F DIAST BP <80 MM HG: CPT | Mod: CPTII,S$GLB,, | Performed by: SURGERY

## 2018-10-18 NOTE — LETTER
October 18, 2018        Natalia Martin MD  [address] [facility]  [place] [address]             [facility]  [address] [facility] [address]  [facility] [address]  Phone: [phone]  Fax: [phone]   Patient: Bambi Carrera   MR Number: [id number]   YOB: 1960   Date of Visit: 10/18/2018       Dear Dr. Martin:    Thank you for referring Bambi Carrera to me for evaluation. Below are the relevant portions of my assessment and plan of care.            If you have questions, please do not hesitate to call me. I look forward to following Bambi along with you.    Sincerely,      Tom Groman MD           CC  No Recipients

## 2018-10-18 NOTE — PATIENT INSTRUCTIONS
Self-Care for Spider and Varicose Veins  Your healthcare provider may suggest that you try self-care. Exercising and maintaining a healthy weight may keep problem veins from getting worse. Wearing elastic stockings and elevating your legs can help improve blood flow. Taking breaks when you sit or stand helps, too.     The top of the elastic stocking should be below the bend in your knee for a proper fit.   Exercising  Exercising is good for your veins because it improves blood flow. Walking, cycling, or swimming are great exercises for vein health. But be sure to check with your healthcare provider before starting any exercise program. Also, keep these hints in mind:  · When exercising, start out slowly and try to build up to 30 minutes on most days.  · Elevate your legs above heart level after exercise to keep blood from pooling in veins.  Maintaining a healthy weight  Being overweight puts extra pressure on your veins. To maintain a healthy weight, try these tips:  · Choose lean meats, fish, and skinless chicken.  · Use low-fat dairy products.  · Eat foods high in fiber, such as whole grains, fruits, and vegetables.  · Cut down on sugar, salt, and saturated and trans fats.  · Exercise regularly.  Wearing elastic stockings  Elastic stockings gently squeeze veins so blood flows upward. If you need elastic stockings, your healthcare provider can prescribe them for you. Follow your healthcare providers advice about how and when to wear them. Elastic stockings come in several different levels of pressure. Ask your healthcare provider which level of pressure would benefit you the most.   Elevating your legs  Raising your legs above heart level will help relieve swelling and keep blood from pooling in veins. Try to elevate your legs for 15 to 20 minutes at the end of the day, and whenever youre relaxing. To make sure your legs are raised above heart level, prop them up on cushions or large pillows.  When sitting and  standing  To keep blood moving when you have to sit or stand for long periods, try these tips:  · At work, take walking breaks instead of coffee breaks. Walk during your lunch hour. Or try flexing your feet up and down 10 times each hour.  · When standing, raise yourself up and down on your toes, or rock back and forth on your heels.  Date Last Reviewed: 5/1/2016 © 2000-2017 [employer]. [address] [place] [address], [place] [address]. All rights reserved. This information is not intended as a substitute for professional medical care. Always follow your healthcare professional's instructions.        Understanding Spider and Varicose Veins  Do you often hide your legs because of the way they look? You may have noticed tiny red or blue bursts (spider veins). Or maybe you have veins that bulge or look twisted (varicose veins). If so, there are treatments that can help  What are the symptoms?  Spider veins or varicose veins may never be a problem. But sometimes they can cause legs to ache or swell. Your legs may also feel heavy and tired, or like theyre burning. These symptoms may be more severe at the end of the day. Prolonged sitting or standing can also make your symptoms worse.  Who gets spider and varicose veins?  Anyone can get spider or varicose veins. But vein problems tend to be hereditary (run in families). Other factors that can affect veins include:  · Pregnancy, hormones, and birth control pills  · A job where you stand or sit a lot  · Extra weight or lack of exercise  · Age     Spider veins look like tiny webs on the ankles, legs, and upper thighs.      Ropy, dark blue, red, or flesh-colored varicose veins are most common on the thighs, calves, and feet.     What can be done?  Spider and varicose veins can affect the way you feel about yourself. Talk to your healthcare provider about your concerns. There are treatments that can ease symptoms and make your legs look better.  Your treatment  choices  Treatment may include self-care, sclerotherapy (injecting veins with a chemical), surgery, or newer nonsurgical minimally invasive therapies. Spider veins and some varicose veins can be treated with sclerotherapy. Large varicose veins can often be treated with newer minimally invasive procedures and, in rare cases, surgery may be needed.   Date Last Reviewed: 5/1/2016 © 2000-2017 [email or web address]. [address] [place] [address], [place] [address]. All rights reserved. This information is not intended as a substitute for professional medical care. Always follow your healthcare professional's instructions.        Varicose Veins     Varicose veins are swollen, enlarged veins most often found in the legs. They are usually blue or purple in color and may bulge, twist, and stand out under the skin.  Normally, veins return blood from the body to the heart. The leg veins have one-way valves that prevent blood from flowing backward in the vein. When the valves are weak or damaged, blood backs up in the veins. This may cause some of the veins to swell and bulge and become varicose veins.  Symptoms  Varicose veins may or may not cause symptoms. If symptoms do occur, they can include:  · Legs that feel tired, achy, heavy, or itchy  · Leg muscle cramps  · Skin changes, such as discoloration, dryness, redness, or rash (in more severe cases, you may also have sores on the skin called venous leg ulcers)  Risk Factors  There are a number of factors that increase the risk for varicose veins. These can include:  · Being a woman  · Being older  · Sitting or standing for long periods  · Being overweight  · Being pregnant  · Having a family history of varicose veins  Treatment begins with simple self-help measures (see below). If these dont help, there are many procedures that can be done to shrink or remove varicose veins. Your healthcare provider can tell you more about these options, if needed.  Home care  · Support or  compression stockings will likely be prescribed. If so, be sure to wear them as directed. They may help improve blood flow.  · Exercising helps strengthen your leg muscles and improve blood flow. To get the most benefit, choose exercises such as walking, swimming, or cycling. Also try to exercise for at least 30 minutes on most days.  · Raising (elevating) your legs lets gravity help blood flow back to the heart. Sit or lie with your feet above heart level a few times throughout the day, or as directed.  · Avoid long periods of sitting or standing. Change positions often. Also, move your ankles, toes and knees often. This may also help improve blood flow.  · If you are overweight, talk with your healthcare provider about setting up a weight-loss plan. Maintaining a healthy weight can help reduce the strain on your veins. It may also improve symptoms, such as swelling and aching.  · If you have dryness and itching, ask your provider about special lotions that can be applied to the skin to help improve symptoms.  Follow-up care  Follow up with your healthcare provider, or as directed. If imaging tests were done, youll be told the results and if there are any new findings that affect your care.  When to seek medical advice  Call your healthcare provider right away if any of these occur:  · Sudden, severe leg swelling, pain, or redness  · Symptoms worsen, or they dont improve with self-care  · Bleeding from any affected veins  · Ulcers form on the legs, ankles, or feet  · Fever of 100.4°F (38°C) or higher, or as advised by your provider  Date Last Reviewed: 9/21/2015 © 2000-2017 [employer]. [address] [place] [address], [place] [address]. All rights reserved. This information is not intended as a substitute for professional medical care. Always follow your healthcare professional's instructions.

## 2018-10-18 NOTE — PROGRESS NOTES
Tom Gorman MD VI                       [facility]                         10/18/2018    HPI:  Bambi Carrera is a 58 y.o. female with   Patient Active Problem List   Diagnosis    Hypertension    Allergic rhinitis    Right hip pain    Joint stiffness    Muscle weakness    Difficulty walking    Prediabetes    Abnormal uterine bleeding    Menopausal state    Uterine leiomyoma    PMB (postmenopausal bleeding)    Morbid obesity    being managed by PCP and specialists who is here today for evaluation of spider vein.  Patient states location is L lateral calf occurring for 2 years.  Associated signs and symptoms include tenderness.  Quality is aching and severity is 8/10.  Symptoms began 2 years ago.  Alleviating factors include compression and lotion.  Worsening factors include none.  Denies leg edema.  Denies leg wounds or previous vein procedures.    no MI  no Stroke  Tobacco use: no    7/16/18: cont to experience discomfort of spider veins.  Wearing compression stockings.    Interval history: Legs feel better, decrease in size of LLE spider veins.  Not compliant with stockings.      Past Medical History:   Diagnosis Date    Borderline diabetic     Depression     Hypertension     Menopause      Past Surgical History:   Procedure Laterality Date    LASIK Bilateral 07/15/2016     Family History   Problem Relation Age of Onset    Cancer Mother      Social History     Socioeconomic History    Marital status: Single     Spouse name: Not on file    Number of children: Not on file    Years of education: Not on file    Highest education level: Not on file   Social Needs    Financial resource strain: Not on file    Food insecurity - worry: Not on file    Food insecurity - inability: Not on file    Transportation needs - medical: Not on file    Transportation needs - non-medical: Not on file   Occupational History     Employer: [employer]   Tobacco Use     Smoking status: Never Smoker    Smokeless tobacco: Never Used   Substance and Sexual Activity    Alcohol use: No     Alcohol/week: 0.0 oz    Drug use: No    Sexual activity: Not on file   Other Topics Concern    Not on file   Social History Narrative    Not on file       Current Outpatient Medications:     cetirizine (ZYRTEC) 10 MG tablet, TAKE ONE TABLET BY MOUTH ONCE DAILY, Disp: 30 tablet, Rfl: 5    fluticasone (FLONASE) 50 mcg/actuation nasal spray, USE 1 SPRAY IN EACH NOSTRIL 2 TIMES DAILY, Disp: 16 g, Rfl: 5    hydroCHLOROthiazide (HYDRODIURIL) 25 MG tablet, TAKE 1 TABLET BY MOUTH ONCE DAILY, Disp: 90 tablet, Rfl: 1    mupirocin (BACTROBAN) 2 % ointment, APPLY  OINTMENT TOPICALLY TWICE DAILY, Disp: 22 g, Rfl: 0    naproxen (NAPROSYN) 500 MG tablet, Take 1 tablet (500 mg total) by mouth 2 (two) times daily as needed., Disp: 60 tablet, Rfl: 0    VITAMIN D2 50,000 unit capsule, TAKE ONE CAPSULE BY MOUTH ONCE A WEEK, Disp: 4 capsule, Rfl: 8    triamcinolone acetonide 0.1% (KENALOG) 0.1 % cream, Apply topically 2 (two) times daily., Disp: 45 g, Rfl: 0    REVIEW OF SYSTEMS:  General: No fevers or chills; ENT: No sore throat; Allergy and Immunology: no persistent infections; Hematological and Lymphatic: No history of bleeding or easy bruising; Endocrine: negative; Respiratory: no cough, shortness of breath, or wheezing; Cardiovascular: no chest pain or dyspnea on exertion; Gastrointestinal: no abdominal pain/back, change in bowel habits, or bloody stools; Genito-Urinary: no dysuria, trouble voiding, or hematuria; Musculoskeletal: negative; Neurological: no TIA or stroke symptoms; Psychiatric: no nervousness, anxiety or depression.    PHYSICAL EXAM:   Right Arm BP - Sittin/78 (10/18/18 1309)  Left Arm BP - Sittin/74 (10/18/18 1309)            General appearance:  Alert, well-appearing, and in no distress.  Oriented to person, place, and time                    Neurological: Normal speech, no  focal findings noted; CN II - XII grossly intact. RLE with sensation to light touch, LLE with sensation to light touch.            Musculoskeletal: Digits/nail without cyanosis/clubbing.  Strength 5/5.                    Neck: Supple, no significant adenopathy                  Chest:  Clear to auscultation, no wheezes, rales or rhonchi, symmetric air entry. No use of accessory muscles               Cardiac: Normal rate and regular rhythm, S1 and S2 normal            Abdomen: Soft, nontender, nondistended, no masses or organomegaly, no hernia     No rebound tenderness noted; bowel sounds normal     No groin adenopathy      Extremities:  2+ PT bilaterally, 1+ DP bilat     no RLE edema, no LLE edema    Skin: RLE with spider veins to lateral knee region; LLE with spider and varicose vein to lateral calf, no infection    LAB RESULTS:  No results found for: CBC  No results found for: LABPROT, INR  Lab Results   Component Value Date     07/10/2017    K 3.8 07/10/2017     07/10/2017    CO2 26 07/10/2017    GLU 88 07/10/2017    BUN 14 07/10/2017    CREATININE 0.9 07/10/2017    CALCIUM 9.6 07/10/2017    ANIONGAP 10 07/10/2017    EGFRNONAA >60.0 07/10/2017     Lab Results   Component Value Date    WBC 5.53 07/10/2017    RBC 4.54 07/10/2017    HGB 12.8 07/10/2017    HCT 39.4 07/10/2017    MCV 87 07/10/2017    MCH 28.2 07/10/2017    MCHC 32.5 07/10/2017    RDW 16.4 (H) 07/10/2017     07/10/2017    MPV 10.9 07/10/2017    GRAN 2.5 07/10/2017    GRAN 44.6 07/10/2017    LYMPH 2.5 07/10/2017    LYMPH 45.4 07/10/2017    MONO 0.5 07/10/2017    MONO 8.3 07/10/2017    EOS 0.1 07/10/2017    BASO 0.02 07/10/2017    EOSINOPHIL 1.1 07/10/2017    BASOPHIL 0.4 07/10/2017    DIFFMETHOD Automated 07/10/2017     .  Lab Results   Component Value Date    HGBA1C 6.0 (H) 07/10/2017       IMAGING:  All pertinent imaging has been reviewed and interpreted independently.    No DVT or venous reflux on US.    IMP/PLAN:  58 y.o. female  with   Patient Active Problem List   Diagnosis    Hypertension    Allergic rhinitis    Right hip pain    Joint stiffness    Muscle weakness    Difficulty walking    Prediabetes    Abnormal uterine bleeding    Menopausal state    Uterine leiomyoma    PMB (postmenopausal bleeding)    Morbid obesity    being managed by PCP and specialists who is here today for evaluation of L leg symptomatic spider vein.    -recommend compliance with compression using Rx stockings, elevation, dietary changes associated with water and sodium intake discussed at length with patient  -RTC 3 mo for further evaluation    I spent 20 minutes evaluating this patient and greater than 50% of the time was spent counseling, coordinator care and discussing the plan of care.  All questions were answered and patient stated understanding with agreement with the above treatment plan.    Tom Gorman MD [facility]  Vascular and Endovascular Surgery

## 2018-10-29 DIAGNOSIS — M62.830 MUSCLE SPASM OF BACK: ICD-10-CM

## 2018-10-29 DIAGNOSIS — J30.9 ALLERGIC RHINITIS: ICD-10-CM

## 2018-10-29 RX ORDER — NAPROXEN 500 MG/1
TABLET ORAL
Qty: 60 TABLET | Refills: 0 | Status: SHIPPED | OUTPATIENT
Start: 2018-10-29 | End: 2018-10-31

## 2018-10-29 RX ORDER — CETIRIZINE HYDROCHLORIDE 10 MG/1
TABLET ORAL
Qty: 30 TABLET | Refills: 5 | Status: SHIPPED | OUTPATIENT
Start: 2018-10-29 | End: 2019-05-03 | Stop reason: SDUPTHER

## 2018-10-31 ENCOUNTER — OFFICE VISIT (OUTPATIENT)
Dept: FAMILY MEDICINE | Facility: CLINIC | Age: 58
End: 2018-10-31
Payer: COMMERCIAL

## 2018-10-31 VITALS
OXYGEN SATURATION: 99 % | SYSTOLIC BLOOD PRESSURE: 125 MMHG | DIASTOLIC BLOOD PRESSURE: 78 MMHG | WEIGHT: 278 LBS | HEART RATE: 63 BPM | BODY MASS INDEX: 46.32 KG/M2 | HEIGHT: 65 IN | TEMPERATURE: 98 F

## 2018-10-31 DIAGNOSIS — Z23 NEED FOR INFLUENZA VACCINATION: ICD-10-CM

## 2018-10-31 DIAGNOSIS — M77.11 LATERAL EPICONDYLITIS OF RIGHT ELBOW: Primary | ICD-10-CM

## 2018-10-31 PROCEDURE — 3078F DIAST BP <80 MM HG: CPT | Mod: CPTII,S$GLB,, | Performed by: FAMILY MEDICINE

## 2018-10-31 PROCEDURE — 3074F SYST BP LT 130 MM HG: CPT | Mod: CPTII,S$GLB,, | Performed by: FAMILY MEDICINE

## 2018-10-31 PROCEDURE — 3008F BODY MASS INDEX DOCD: CPT | Mod: CPTII,S$GLB,, | Performed by: FAMILY MEDICINE

## 2018-10-31 PROCEDURE — 90471 IMMUNIZATION ADMIN: CPT | Mod: S$GLB,,, | Performed by: FAMILY MEDICINE

## 2018-10-31 PROCEDURE — 90686 IIV4 VACC NO PRSV 0.5 ML IM: CPT | Mod: S$GLB,,, | Performed by: FAMILY MEDICINE

## 2018-10-31 PROCEDURE — 99999 PR PBB SHADOW E&M-EST. PATIENT-LVL III: CPT | Mod: PBBFAC,,, | Performed by: FAMILY MEDICINE

## 2018-10-31 PROCEDURE — 99214 OFFICE O/P EST MOD 30 MIN: CPT | Mod: 25,S$GLB,, | Performed by: FAMILY MEDICINE

## 2018-10-31 PROCEDURE — 96372 THER/PROPH/DIAG INJ SC/IM: CPT | Mod: S$GLB,,, | Performed by: FAMILY MEDICINE

## 2018-10-31 RX ORDER — NAPROXEN 500 MG/1
500 TABLET ORAL 2 TIMES DAILY PRN
Qty: 30 TABLET | Refills: 0 | Status: SHIPPED | OUTPATIENT
Start: 2018-10-31 | End: 2018-12-27

## 2018-10-31 RX ORDER — KETOROLAC TROMETHAMINE 30 MG/ML
30 INJECTION, SOLUTION INTRAMUSCULAR; INTRAVENOUS ONCE
Status: COMPLETED | OUTPATIENT
Start: 2018-10-31 | End: 2018-10-31

## 2018-10-31 RX ADMIN — KETOROLAC TROMETHAMINE 30 MG: 30 INJECTION, SOLUTION INTRAMUSCULAR; INTRAVENOUS at 10:10

## 2018-10-31 NOTE — PROGRESS NOTES
[facility]  Progress Note    SUBJECTIVE:     Chief Complaint   Patient presents with    Elbow Injury       HPI   Bambi Carrera  is a 58 y.o. female here for c/o right elbow pain for the past 3 weeks. She was doing some exercises in the aerobics class prior to it happening. Rates pain as moderate-severe. No falls/trauma. Hasn't tried anything for it. Touching, and certain positions make it worst.     Review of patient's allergies indicates:  No Known Allergies    Past Medical History:   Diagnosis Date    Borderline diabetic     Depression     Hypertension     Menopause      Past Surgical History:   Procedure Laterality Date    LASIK Bilateral 07/15/2016     Family History   Problem Relation Age of Onset    Cancer Mother      Social History     Tobacco Use    Smoking status: Never Smoker    Smokeless tobacco: Never Used   Substance Use Topics    Alcohol use: No     Alcohol/week: 0.0 oz    Drug use: No        Review of Systems   Constitutional: Negative for chills, fever and malaise/fatigue.   HENT: Negative.    Respiratory: Negative.  Negative for cough and shortness of breath.    Cardiovascular: Negative.  Negative for chest pain.   Gastrointestinal: Negative.  Negative for abdominal pain, nausea and vomiting.   Genitourinary: Negative.    Musculoskeletal: Positive for joint pain (R elbow pain).   Neurological: Negative for weakness and headaches.   All other systems reviewed and are negative.    OBJECTIVE:     Vitals:    10/31/18 0946   BP: 125/78   Pulse: 63   Temp: 97.9 °F (36.6 °C)     Body mass index is 46.26 kg/m².    Physical Exam   Constitutional: She is oriented to person, place, and time and well-developed, well-nourished, and in no distress. No distress.   HENT:   Head: Normocephalic and atraumatic.   Eyes: Conjunctivae and EOM are normal.   Pulmonary/Chest: Effort normal.   Musculoskeletal: She exhibits tenderness (to palpation of right lateral epicondyle. no obvious fractures,  dislocations. good ROM of elbow).   Neurological: She is alert and oriented to person, place, and time.   Skin: She is not diaphoretic.       Old records were reviewed. Labs and/or images were independently reviewed.    ASSESSMENT     1. Lateral epicondylitis of right elbow    2. Need for influenza vaccination        PLAN:     Lateral epicondylitis of right elbow  -     ketorolac injection 30 mg  -     Start naproxen (NAPROSYN) 500 MG tablet; Take 1 tablet (500 mg total) by mouth 2 (two) times daily as needed.  Dispense: 30 tablet; Refill: 0  -     Patient counseled on good posture and stretching exercises. Continue to place ice packs on affected areas 3-4 times daily. Take medications as prescribed. Instructed patient to call MD if symptoms persist or worsen.    Need for influenza vaccination  -     Influenza - Quadrivalent (3 years & older) (PF)      RTC PRN    Wong Whitmore MD  10/31/2018 10:15 AM

## 2018-11-21 ENCOUNTER — TELEPHONE (OUTPATIENT)
Dept: FAMILY MEDICINE | Facility: CLINIC | Age: 58
End: 2018-11-21

## 2018-11-21 NOTE — TELEPHONE ENCOUNTER
Patient called and said that she is still having pain to her right arm. On pain scale-level is a 9. She said that she has been taking the Naprosyn and that is not helping with the pain and she is also doing heat and cold pack to the arm. She said that she took one of her daughter's Ultarm 50 mg and the pain went away but return the next day.

## 2018-11-21 NOTE — TELEPHONE ENCOUNTER
Patient was notified to continue with Ice and Naprosyn and schedule office visit if not better for possible joint injection.

## 2018-11-21 NOTE — TELEPHONE ENCOUNTER
----- Message from Ashwini Martinez sent at 11/20/2018  1:10 PM CST -----  Contact: Self   Patient says her right arm is still in pain and was told to let the doctor know. Please call at [phone]

## 2018-11-27 ENCOUNTER — OFFICE VISIT (OUTPATIENT)
Dept: FAMILY MEDICINE | Facility: CLINIC | Age: 58
End: 2018-11-27
Payer: COMMERCIAL

## 2018-11-27 VITALS
SYSTOLIC BLOOD PRESSURE: 130 MMHG | TEMPERATURE: 98 F | BODY MASS INDEX: 47.75 KG/M2 | OXYGEN SATURATION: 98 % | DIASTOLIC BLOOD PRESSURE: 86 MMHG | WEIGHT: 286.63 LBS | HEART RATE: 89 BPM | HEIGHT: 65 IN

## 2018-11-27 DIAGNOSIS — M77.11 RIGHT LATERAL EPICONDYLITIS: Primary | ICD-10-CM

## 2018-11-27 DIAGNOSIS — M75.21 BICEPS TENDONITIS ON RIGHT: ICD-10-CM

## 2018-11-27 PROCEDURE — 96372 THER/PROPH/DIAG INJ SC/IM: CPT | Mod: S$GLB,,, | Performed by: FAMILY MEDICINE

## 2018-11-27 PROCEDURE — 3075F SYST BP GE 130 - 139MM HG: CPT | Mod: CPTII,S$GLB,, | Performed by: FAMILY MEDICINE

## 2018-11-27 PROCEDURE — 3079F DIAST BP 80-89 MM HG: CPT | Mod: CPTII,S$GLB,, | Performed by: FAMILY MEDICINE

## 2018-11-27 PROCEDURE — 99999 PR PBB SHADOW E&M-EST. PATIENT-LVL IV: CPT | Mod: PBBFAC,,, | Performed by: FAMILY MEDICINE

## 2018-11-27 PROCEDURE — 99214 OFFICE O/P EST MOD 30 MIN: CPT | Mod: 25,S$GLB,, | Performed by: FAMILY MEDICINE

## 2018-11-27 PROCEDURE — 3008F BODY MASS INDEX DOCD: CPT | Mod: CPTII,S$GLB,, | Performed by: FAMILY MEDICINE

## 2018-11-27 RX ORDER — METHYLPREDNISOLONE 4 MG/1
TABLET ORAL
Qty: 1 PACKAGE | Refills: 0 | Status: SHIPPED | OUTPATIENT
Start: 2018-11-27 | End: 2018-12-18

## 2018-11-27 RX ORDER — KETOROLAC TROMETHAMINE 30 MG/ML
30 INJECTION, SOLUTION INTRAMUSCULAR; INTRAVENOUS ONCE
Status: COMPLETED | OUTPATIENT
Start: 2018-11-27 | End: 2018-11-27

## 2018-11-27 RX ADMIN — KETOROLAC TROMETHAMINE 30 MG: 30 INJECTION, SOLUTION INTRAMUSCULAR; INTRAVENOUS at 08:11

## 2018-11-27 NOTE — PROGRESS NOTES
[facility]  Progress Note    SUBJECTIVE:     Chief Complaint   Patient presents with    Elbow Pain       HPI   Bambi Carrera  is a 58 y.o. female here for c/o right elbow pain and right shoulder pain.  This has been going on for months, but recently flared up past week. Rates pain as mild-moderate. No falls/trauma. Tried otc meds without relief. Patient has no other new complaints/problems at this time.      Review of patient's allergies indicates:  No Known Allergies    Past Medical History:   Diagnosis Date    Borderline diabetic     Depression     Hypertension     Menopause      Past Surgical History:   Procedure Laterality Date    LASIK Bilateral 07/15/2016     Family History   Problem Relation Age of Onset    Cancer Mother      Social History     Tobacco Use    Smoking status: Never Smoker    Smokeless tobacco: Never Used   Substance Use Topics    Alcohol use: No     Alcohol/week: 0.0 oz    Drug use: No        Review of Systems   Constitutional: Negative for chills, fever and malaise/fatigue.   HENT: Negative.    Respiratory: Negative.  Negative for cough and shortness of breath.    Cardiovascular: Negative.  Negative for chest pain.   Gastrointestinal: Negative.  Negative for abdominal pain, nausea and vomiting.   Genitourinary: Negative.    Musculoskeletal: Positive for joint pain (R elbow, R shoulder). Negative for back pain, falls, myalgias and neck pain.   Neurological: Negative for weakness and headaches.   All other systems reviewed and are negative.    OBJECTIVE:     Vitals:    11/27/18 0745   BP: 130/86   Pulse: 89   Temp: 97.9 °F (36.6 °C)     Body mass index is 47.69 kg/m².    Physical Exam   Constitutional: She is oriented to person, place, and time. She appears distressed (mild).   HENT:   Head: Normocephalic and atraumatic.   Nose: Nose normal.   Eyes: Conjunctivae and EOM are normal.   Cardiovascular: Normal rate, regular rhythm and normal heart sounds. Exam reveals no  gallop and no friction rub.   No murmur heard.  Pulmonary/Chest: Effort normal and breath sounds normal. No respiratory distress. She has no wheezes. She has no rales. She exhibits no tenderness.   Abdominal: Soft. Bowel sounds are normal. She exhibits no distension. There is no tenderness. There is no rebound.   Musculoskeletal: She exhibits tenderness ( R lateral epicondyle tenderness, and R biceps tendon is tender. no obvious fractures, dislocations. decent ROM, decreased due to pain).   Neurological: She is alert and oriented to person, place, and time.   Skin: Skin is warm. She is not diaphoretic.       Old records were reviewed. Labs and/or images were independently reviewed.    ASSESSMENT     1. Right lateral epicondylitis    2. Biceps tendonitis on right        PLAN:     Right lateral epicondylitis/Biceps tendonitis on right  -     ketorolac injection 30 mg  -     methylPREDNISolone (MEDROL DOSEPACK) 4 mg tablet; use as directed  Dispense: 1 Package; Refill: 0  -     Ambulatory Referral to Physical/Occupational Therapy  -     Patient counseled on good posture and stretching exercises. Continue to place ice packs on affected areas 3-4 times daily. Take medications as prescribed. Instructed patient to call MD if symptoms persist or worsen.      RTC PRZAIN Whitmore MD  11/27/2018 8:21 AM

## 2018-11-28 ENCOUNTER — CLINICAL SUPPORT (OUTPATIENT)
Dept: REHABILITATION | Facility: HOSPITAL | Age: 58
End: 2018-11-28
Attending: FAMILY MEDICINE
Payer: COMMERCIAL

## 2018-11-28 DIAGNOSIS — R68.89 IMPAIRED FUNCTION OF UPPER EXTREMITY: ICD-10-CM

## 2018-11-28 DIAGNOSIS — M75.21 BICEPS TENDONITIS, RIGHT: ICD-10-CM

## 2018-11-28 DIAGNOSIS — M77.11 LATERAL EPICONDYLITIS OF RIGHT ELBOW: Primary | ICD-10-CM

## 2018-11-28 DIAGNOSIS — R29.898 DECREASED GRIP STRENGTH OF RIGHT HAND: ICD-10-CM

## 2018-11-28 PROCEDURE — 97161 PT EVAL LOW COMPLEX 20 MIN: CPT | Mod: PN | Performed by: PHYSICAL THERAPIST

## 2018-11-28 NOTE — PROGRESS NOTES
Physical Therapy Initial Evaluation     Name: Bambi Carrera  Clinic Number: [id number]    Diagnosis:   Encounter Diagnoses   Name Primary?    Lateral epicondylitis of right elbow Yes    Biceps tendonitis, right     Decreased  strength of right hand     Impaired function of upper extremity      Physician: Wong Whitmore MD  Treatment Orders: PT Eval and Treat  Past Medical History:   Diagnosis Date    Borderline diabetic     Depression     Hypertension     Menopause      Current Outpatient Medications   Medication Sig    cetirizine (ZYRTEC) 10 MG tablet TAKE 1 TABLET BY MOUTH ONCE DAILY    fluticasone (FLONASE) 50 mcg/actuation nasal spray USE 1 SPRAY IN EACH NOSTRIL 2 TIMES DAILY    hydroCHLOROthiazide (HYDRODIURIL) 25 MG tablet TAKE 1 TABLET BY MOUTH ONCE DAILY    methylPREDNISolone (MEDROL DOSEPACK) 4 mg tablet use as directed    mupirocin (BACTROBAN) 2 % ointment APPLY  OINTMENT TOPICALLY TWICE DAILY    naproxen (NAPROSYN) 500 MG tablet Take 1 tablet (500 mg total) by mouth 2 (two) times daily as needed.    triamcinolone acetonide 0.1% (KENALOG) 0.1 % cream Apply topically 2 (two) times daily.    VITAMIN D2 50,000 unit capsule TAKE ONE CAPSULE BY MOUTH ONCE A WEEK     No current facility-administered medications for this visit.      Review of patient's allergies indicates:  No Known Allergies      Start Time:  905  Stop Time:  1000  Total Timed Minutes:  55        Subjective       Patient states:  Pain right forearm that is constant. Saw MD yesterday. Provided with injection which she says has not made any difference.   Onset: three months ago, sudden onset of pain while at [employer] exercising. She says she was using the rope to do oscillations when she began to encounter pain in the right forearm. Presently unchanged.   Radicular symptoms:  No   Aggravating factors: picking up objects. States there are times when she can not pick  up a cup, working at her computer.   Easing factors:  Ice over the forearm and not doing anything.   Sleep - disturbed and wakes up every two hours   Pain Scale: Patient rates pain on a scale of 0-10 to be  9 currently   10 at worst ;   5 at best .    Prior Therapy: underwent therapy for her low back.     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   Sensation: intact  Range of Motion/Strength:     Elbow  Right  Pain/Dysfunction with Movement    AROM PROM MMT    flexion WNL WNL 4/5 NP w/MMT or ROM   extension WNL WNL 4/5 NP w/MMT or ROM   pronation 90 90 4/5 NP w/MMT or ROM   supination 90 90 4/5 NP w/MMT or ROM     Wrist  Right  Pain/Dysfunction with Movement    AROM PROM MMT    Flexion 75 90 4/5 NP w/MMT or ROM   Extension 75 80 4/5 NP w/MMT or ROM   Radial deviation WNL WNL 4/5 NP w/MMT or ROM   Ulnar deviation WNL  WNL 4/5 NP w/MMT or ROM     Testing:   Cozens  - neg    Saini Test - neg    Strength - R = 5#, L = 30#      Treatment: IASTM over the dorsal aspect of the right forearm and distal biceps for 5 min using feathering, up and down regulation strokes. TB tape applied for inhibition over the biceps and wrist extensors. I strips also applied for pain over the biceps and RU joint    PT Evaluation Completed? Yes  Discussed Plan of Care with patient:  Yes    Assessment       Initial Assessment (Pertinent finding, problem list and factors affecting outcome): The patient is referred with pain over the right epicondyle. Clinical findings demonstrate normal ROM at the elbow and R/U joint without pain, good strength without pain to resisted extension of the wrist extensors, biceps, pronators or supinators. Clinical testing for lateral epicondylitis is Negative. She is only painful to palpation over the wrist extensor muscles, Radio-ulnar joint and the biceps.   Pt presents with signs and symptoms consistent with referring diagnosis. Evaluation has determined a decrease in functional status and subjective and objective deficits that can be addressed by physical therapy intervention. Pt demonstrates pain limiting functional activities. Decreased  strength limiting normal movement patterns. . NEGATIVE special testing. . Subjective and objective measures are addressed by goals in the plan of care. Patient/family are involved in the development of these goals. Patient/family are educated about current injury and treatment.   Bambi Carrera should benefit from therapeutic intervention to treat the symptoms and achieve established goals.Realistic expectations and hopeful outcomes were discussed. The patient demonstrated and verbalized an understanding of the program and goals as well as expectations. Patient has no barriers to learning. Patient verbalizes understanding of her program and goals  No cultural, spiritual, or educational needs identified.            History  Co-morbidities and personal factors that may impact the plan of care Examination  Body Structures and Functions, activity limitations and participation restrictions that may impact the plan of care Clinical Presentation   Decision Making/ Complexity Score   Co-morbidities:  depression.              Personal Factors:   None identified  Pain tolerance Body Regions: right forearm    Body Systems:  musculoskeletal          Activity limitations: picking up objects. States there are times when she can not  a cup, working at her computer.       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   Prior functional status: no limitation            Stable and uncomplicated        Pain  9 currently   10 at worst ;   5 at best .     Complexity: Low               Short Term Goals (6 Weeks):      1.Pt to increase  strength right hand by  5 to 10 #  to allow for improvement in functional activities such as performing chores.  2.Palpationover the wrist extensors and RU joint w/o pain   3.Pt to report compliance with HEP and demonstrate proper exercise technique to PT to show competence with self management of condition.  4.Decrease pain by 25% during functional activities requiring lifting and carrying.     Long Term Goals (12 Weeks):      1. Increase ROM to allow improved joint biomechanics during functional activities.   2.Increase  strength to within 5# of the left hand during functional testing  3. Independent with home exercise program.   4. Full return to functional activities with manageable complaints.  5  Eliminate pain during functional activities.      CMS Impairment/Limitation/Restriction for FOTO Elbow Survey  Status Limitation G-Code CMS Severity Modifier  Intake 39% 61% Current Status CL - At least 60 percent but less than 80 percent  Predicted 59% 41% Goal Status+ CK - At least 40 percent but less than 60 percent  D/C Status CL **only report if this is a one time visit  +Based on FOTO predicted change score  * Mean,    Plan       Certification Period: 11/28/2018 to 3/1/2019  Recommended Treatment Plan: 2 times per week for 12 weeks: Manual Therapy, Patient Education and Therapeutic Exercise  Other Recommendations: modalities PRN   Pt may be seen  by PTA as part of the rehabilitation team.         Therapist: Sebas Sanches, PT

## 2018-12-12 ENCOUNTER — CLINICAL SUPPORT (OUTPATIENT)
Dept: REHABILITATION | Facility: HOSPITAL | Age: 58
End: 2018-12-12
Attending: FAMILY MEDICINE
Payer: COMMERCIAL

## 2018-12-12 DIAGNOSIS — M62.81 MUSCLE WEAKNESS: ICD-10-CM

## 2018-12-12 DIAGNOSIS — M25.60 JOINT STIFFNESS: ICD-10-CM

## 2018-12-12 DIAGNOSIS — R26.2 DIFFICULTY WALKING: ICD-10-CM

## 2018-12-12 DIAGNOSIS — M25.551 RIGHT HIP PAIN: ICD-10-CM

## 2018-12-12 PROCEDURE — 97110 THERAPEUTIC EXERCISES: CPT | Mod: PN

## 2018-12-12 PROCEDURE — 97140 MANUAL THERAPY 1/> REGIONS: CPT | Mod: PN

## 2018-12-12 NOTE — PROGRESS NOTES
"  Physical Therapy Daily Treatment Note     Name: Bambi Carrera  Clinic Number: [id number]    Therapy Diagnosis:   Encounter Diagnoses   Name Primary?    Right hip pain     Joint stiffness     Muscle weakness     Difficulty walking      Physician: Wong Whitmore MD    Visit Date: 12/12/2018    Physician Orders: PT Eval and Treat  Medical Diagnosis: Right lateral epicondylitis, Biceps tendonitis on Right  Evaluation Date: 11/28/2018  Authorization Period Expiration: 12/31/2018  Plan of Care Certification Period: 11/28/2018 to 3/1/2019  Visit #/Visits authorized: 2 / 20     Time In: 0900  Time Out: 0955  Total Billable Time: 44 minutes    Precautions: Borderline diabetic, HTN    Subjective     Pt reports: "I felt like I had a new arm when I left her last visit." Patient reports having   She was not compliant with home exercise program. No HEP was given on initial evaluation.  Response to previous treatment: good, improvement in pain  Functional change: able to clean her closet and house    Pain: 4/10  Location: right elbow      Objective     Nikki received therapeutic exercises to develop strength, endurance, ROM, flexibility, posture and core stabilization for 25 minutes including:    Active wrist extension/flexion: 20x  Wrist extensor stretch: 5 x 20" hold  Pronation/supination with no resistance: 20x    Nikki received the following manual therapy techniques:   10 minutes x IASTM over the dorsal aspect of the Right forearm and distal biceps; Rolling pin STM to wrist extensors  5 minutes x Preparation and application of kinesiotaping to her Right forearm for inhibition and pain reduction.  5 minutes x ice massage to Right lateral epicondyle      Patient was screened for use of kinesiotape and was cleared for use.   1. Has the patient ever had a reaction to the adhesive in bandaids? Yes/No  2. Has the patient ever uses athletic/kinesiotape in the past? Yes/No  3. Is the patient hemodynamically impaired (PE, DVT, " CHF, Kidney failure)? Yes/No  4. Can the PT/PTA apply the tape to your skin? Yes/No     Patient was instructed on duration to wear the tape, proper drying techniques for the tape, and to remove the tape if her had any issues with it. Patient verbalized understanding of these instructions.    Nikki received cold pack for 10 minutes to Right elbow.    Home Exercises Provided and Patient Education Provided     Education provided:   - use of anti-inflammatories and ice for pain reduction    Written Home Exercises Provided: yes.  Exercises were reviewed and Nikki was able to demonstrate them prior to the end of the session.  Nikki demonstrated good  understanding of the education provided.     See EMR under Patient Instructions for exercises provided 12/12/2018.    Assessment     Patient tolerated treatment session fair today. Session focused on pain reduction this session. Patient with good response to manual care reporting increased tenderness along Right lateral epicondyle and dorsal aspect of forearm. Patient reported some discomfort with wrist extensor stretch requiring cueing to perform in a pain-free range. Patient was encouraged to stretch and use ice and anti-inflammatories for pain reduction, pt verbal understanding.    Nikki is progressing well towards her goals.   Pt prognosis is Good.     Pt will continue to benefit from skilled outpatient physical therapy to address the deficits listed in the problem list box on initial evaluation, provide pt/family education and to maximize pt's level of independence in the home and community environment.     Pt's spiritual, cultural and educational needs considered and pt agreeable to plan of care and goals.    Goals:   Short Term Goals (6 Weeks):   1.Pt to increase strength by a 1/2 grade of muscles test to allow for improvement in functional activities such as performing chores.  2.Pt to improve range of motion by 25% to allow for improved functional mobility to allow for  improvement in IA DLS.   3.Pt to report compliance with HEP and demonstrate proper exercise technique to PT to show competence with self management of condition.  4.Decrease pain by 25% during functional activities.    Plan     Certification Period: 11/28/2018 to 3/1/2019  Recommended Treatment Plan: 2 times per week for 12 weeks: Manual Therapy, Patient Education and Therapeutic Exercise  Other Recommendations: modalities PRN. Pt may be seen by PTA as part of the rehabilitation team.     Nevaeh Nicholas, MARIO   12/12/2018

## 2018-12-13 NOTE — PROGRESS NOTES
"  Physical Therapy Daily Treatment Note     Name: Bambi Carrera  Clinic Number: [id number]    Therapy Diagnosis:   Encounter Diagnoses   Name Primary?    Right hip pain Yes    Muscle weakness     Joint stiffness     Difficulty walking      Physician: Wong Whitmore MD    Visit Date: 12/14/2018    Physician Orders: PT Eval and Treat  Medical Diagnosis: Right lateral epicondylitis, Biceps tendonitis on Right  Evaluation Date: 11/28/2018  Authorization Period Expiration: 12/31/2018  Plan of Care Certification Period: 11/28/2018 to 3/1/2019  Visit #/Visits authorized: 3 / 20   PTA Visit 1/6     Time In: 1000  Time Out: 1050  Total Billable Time: 50 minutes    Precautions: Borderline diabetic, HTN    Subjective     Pt reports: Elbow feels better after last session. Some increased pain after last treatment session but this has resolved.     She was not compliant with home exercise program. No HEP was given on initial evaluation.  Response to previous treatment: good, improvement in pain  Functional change: able to clean her closet and house    Pain: 4/10  Location: right elbow      Objective     Nikki received therapeutic exercises to develop strength, endurance, ROM, flexibility, posture and core stabilization for 35 minutes including:    Active wrist extension/flexion: 20x  Wrist extensor stretch: 5 x 20" hold  Pronation/supination with no resistance: 20x    Nikki received the following manual therapy techniques:   15 minutes x IASTM over the dorsal aspect of the Right forearm and distal biceps; Rolling pin STM to wrist extensors  5 minutes x Preparation and application of kinesiotaping to her Right forearm for inhibition and pain reduction.  5 minutes x ice massage to Right lateral epicondyle      Patient was screened for use of kinesiotape and was cleared for use.   1. Has the patient ever had a reaction to the adhesive in bandaids? Yes/No  2. Has the patient ever uses athletic/kinesiotape in the past? Yes/No  3. " Is the patient hemodynamically impaired (PE, DVT, CHF, Kidney failure)? Yes/No  4. Can the PT/PTA apply the tape to your skin? Yes/No     Patient was instructed on duration to wear the tape, proper drying techniques for the tape, and to remove the tape if her had any issues with it. Patient verbalized understanding of these instructions.    Nikki received cold pack for 10 minutes to Right elbow.    Home Exercises Provided and Patient Education Provided     Education provided:   - use of anti-inflammatories and ice for pain reduction    Written Home Exercises Provided: yes.  Exercises were reviewed and Nikki was able to demonstrate them prior to the end of the session.  Nikki demonstrated good  understanding of the education provided.     See EMR under Patient Instructions for exercises provided 12/12/2018.    Assessment     Patient tolerated today's session well. Staff progressed strengthening activities within patient tolerance. Slight increased in pain but this resolved with application of ice massage. Patient instructed in home completion of ice massage x 5 mins.     Nikki is progressing well towards her goals.   Pt prognosis is Good.     Pt will continue to benefit from skilled outpatient physical therapy to address the deficits listed in the problem list box on initial evaluation, provide pt/family education and to maximize pt's level of independence in the home and community environment.     Pt's spiritual, cultural and educational needs considered and pt agreeable to plan of care and goals.    Goals:   Short Term Goals (6 Weeks):   1.Pt to increase strength by a 1/2 grade of muscles test to allow for improvement in functional activities such as performing chores.  2.Pt to improve range of motion by 25% to allow for improved functional mobility to allow for improvement in IA DLS.   3.Pt to report compliance with HEP and demonstrate proper exercise technique to PT to show competence with self management of  condition.  4.Decrease pain by 25% during functional activities.    Plan     Certification Period: 11/28/2018 to 3/1/2019  Recommended Treatment Plan: 2 times per week for 12 weeks: Manual Therapy, Patient Education and Therapeutic Exercise  Other Recommendations: modalities PRN. Pt may be seen by PTA as part of the rehabilitation team.     Anson Hunt, PTA   12/13/2018

## 2018-12-14 ENCOUNTER — CLINICAL SUPPORT (OUTPATIENT)
Dept: REHABILITATION | Facility: HOSPITAL | Age: 58
End: 2018-12-14
Attending: FAMILY MEDICINE
Payer: COMMERCIAL

## 2018-12-14 DIAGNOSIS — M25.60 JOINT STIFFNESS: ICD-10-CM

## 2018-12-14 DIAGNOSIS — M25.551 RIGHT HIP PAIN: Primary | ICD-10-CM

## 2018-12-14 DIAGNOSIS — R26.2 DIFFICULTY WALKING: ICD-10-CM

## 2018-12-14 DIAGNOSIS — M62.81 MUSCLE WEAKNESS: ICD-10-CM

## 2018-12-14 PROCEDURE — 97110 THERAPEUTIC EXERCISES: CPT | Mod: PN

## 2018-12-14 PROCEDURE — 97140 MANUAL THERAPY 1/> REGIONS: CPT | Mod: PN

## 2018-12-14 NOTE — PLAN OF CARE
Physical Therapy Initial Evaluation     Name: Bambi Carrera  Clinic Number: [id number]    Diagnosis:   Encounter Diagnoses   Name Primary?    Lateral epicondylitis of right elbow Yes    Biceps tendonitis, right     Decreased  strength of right hand     Impaired function of upper extremity      Physician: Wong Whitmore MD  Treatment Orders: PT Eval and Treat  Past Medical History:   Diagnosis Date    Borderline diabetic     Depression     Hypertension     Menopause      Current Outpatient Medications   Medication Sig    cetirizine (ZYRTEC) 10 MG tablet TAKE 1 TABLET BY MOUTH ONCE DAILY    fluticasone (FLONASE) 50 mcg/actuation nasal spray USE 1 SPRAY IN EACH NOSTRIL 2 TIMES DAILY    hydroCHLOROthiazide (HYDRODIURIL) 25 MG tablet TAKE 1 TABLET BY MOUTH ONCE DAILY    methylPREDNISolone (MEDROL DOSEPACK) 4 mg tablet use as directed    mupirocin (BACTROBAN) 2 % ointment APPLY  OINTMENT TOPICALLY TWICE DAILY    naproxen (NAPROSYN) 500 MG tablet Take 1 tablet (500 mg total) by mouth 2 (two) times daily as needed.    triamcinolone acetonide 0.1% (KENALOG) 0.1 % cream Apply topically 2 (two) times daily.    VITAMIN D2 50,000 unit capsule TAKE ONE CAPSULE BY MOUTH ONCE A WEEK     No current facility-administered medications for this visit.      Review of patient's allergies indicates:  No Known Allergies      Start Time:  905  Stop Time:  1000  Total Timed Minutes:  55        Subjective       Patient states:  Pain right forearm that is constant. Saw MD yesterday. Provided with injection which she says has not made any difference.   Onset: three months ago, sudden onset of pain while at [employer] exercising. She says she was using the rope to do oscillations when she began to encounter pain in the right forearm. Presently unchanged.   Radicular symptoms:  No   Aggravating factors: picking up objects. States there are times when she can not   a cup, working at her computer.   Easing factors:  Ice over the forearm and not doing anything.   Sleep - disturbed and wakes up every two hours   Pain Scale: Patient rates pain on a scale of 0-10 to be  9 currently   10 at worst ;   5 at best .    Prior Therapy: underwent therapy for her low back.     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   Sensation: intact  Range of Motion/Strength:     Elbow  Right  Pain/Dysfunction with Movement    AROM PROM MMT    flexion WNL WNL 4/5 NP w/MMT or ROM   extension WNL WNL 4/5 NP w/MMT or ROM   pronation 90 90 4/5 NP w/MMT or ROM   supination 90 90 4/5 NP w/MMT or ROM     Wrist  Right  Pain/Dysfunction with Movement    AROM PROM MMT    Flexion 75 90 4/5 NP w/MMT or ROM   Extension 75 80 4/5 NP w/MMT or ROM   Radial deviation WNL WNL 4/5 NP w/MMT or ROM   Ulnar deviation WNL  WNL 4/5 NP w/MMT or ROM     Testing:   Cozens  - neg    Saini Test - neg    Strength - R = 5#, L = 30#      Treatment: IASTM over the dorsal aspect of the right forearm and distal biceps for 5 min using feathering, up and down regulation strokes. TB tape applied for inhibition over the biceps and wrist extensors. I strips also applied for pain over the biceps and RU joint    PT Evaluation Completed? Yes  Discussed Plan of Care with patient:  Yes    Assessment       Initial Assessment (Pertinent finding, problem list and factors affecting outcome): The patient is referred with pain over the right epicondyle. Clinical findings demonstrate normal ROM at the elbow and R/U joint without pain, good strength without pain to resisted extension of the wrist extensors, biceps, pronators or supinators. Clinical testing for lateral epicondylitis is Negative. She is only painful to palpation over the wrist extensor muscles, Radio-ulnar joint and the biceps.   Pt presents with signs and symptoms consistent with referring diagnosis. Evaluation has determined a decrease in functional status and subjective and objective deficits that can be addressed by physical therapy intervention. Pt demonstrates pain limiting functional activities. Decreased  strength limiting normal movement patterns. . NEGATIVE special testing. . Subjective and objective measures are addressed by goals in the plan of care. Patient/family are involved in the development of these goals. Patient/family are educated about current injury and treatment.   Bambi Carrera should benefit from therapeutic intervention to treat the symptoms and achieve established goals.Realistic expectations and hopeful outcomes were discussed. The patient demonstrated and verbalized an understanding of the program and goals as well as expectations. Patient has no barriers to learning. Patient verbalizes understanding of her program and goals  No cultural, spiritual, or educational needs identified.            History  Co-morbidities and personal factors that may impact the plan of care Examination  Body Structures and Functions, activity limitations and participation restrictions that may impact the plan of care Clinical Presentation   Decision Making/ Complexity Score   Co-morbidities:  depression.              Personal Factors:   None identified  Pain tolerance Body Regions: right forearm    Body Systems:  musculoskeletal          Activity limitations: picking up objects. States there are times when she can not  a cup, working at her computer.       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   Prior functional status: no limitation            Stable and uncomplicated        Pain  9 currently   10 at worst ;   5 at best .     Complexity: Low               Short Term Goals (6 Weeks):      1.Pt to increase  strength right hand by  5 to 10 #  to allow for improvement in functional activities such as performing chores.  2.Palpationover the wrist extensors and RU joint w/o pain   3.Pt to report compliance with HEP and demonstrate proper exercise technique to PT to show competence with self management of condition.  4.Decrease pain by 25% during functional activities requiring lifting and carrying.     Long Term Goals (12 Weeks):      1. Increase ROM to allow improved joint biomechanics during functional activities.   2.Increase  strength to within 5# of the left hand during functional testing  3. Independent with home exercise program.   4. Full return to functional activities with manageable complaints.  5  Eliminate pain during functional activities.      CMS Impairment/Limitation/Restriction for FOTO Elbow Survey  Status Limitation G-Code CMS Severity Modifier  Intake 39% 61% Current Status CL - At least 60 percent but less than 80 percent  Predicted 59% 41% Goal Status+ CK - At least 40 percent but less than 60 percent  D/C Status CL **only report if this is a one time visit  +Based on FOTO predicted change score  * Mean,    Plan       Certification Period: 11/28/2018 to 3/1/2019  Recommended Treatment Plan: 2 times per week for 12 weeks: Manual Therapy, Patient Education and Therapeutic Exercise  Other Recommendations: modalities PRN   Pt may be seen  by PTA as part of the rehabilitation team.         Therapist: Sebas Sanches, PT

## 2018-12-17 DIAGNOSIS — I10 ESSENTIAL HYPERTENSION: ICD-10-CM

## 2018-12-17 NOTE — PROGRESS NOTES
"  Physical Therapy Daily Treatment Note     Name: Bambi Carrera  Clinic Number: [id number]    Therapy Diagnosis:   Encounter Diagnoses   Name Primary?    Right hip pain Yes    Muscle weakness     Joint stiffness     Lateral epicondylitis of right elbow     Biceps tendonitis, right     Difficulty walking     Decreased  strength of right hand     Impaired function of upper extremity      Physician: Wong Whitmore MD    Visit Date: 12/18/2018    Physician Orders: PT Eval and Treat  Medical Diagnosis: Right lateral epicondylitis, Biceps tendonitis on Right  Evaluation Date: 11/28/2018  Authorization Period Expiration: 12/31/2018  Plan of Care Certification Period: 11/28/2018 to 3/1/2019  Visit #/Visits authorized: 4 / 20   PTA Visit 3/6     Time In: 0900  Time Out: 0950  Total Billable Time: 50 minutes    Precautions: Borderline diabetic, HTN    Subjective     Pt reports: Feels that pain is improving. Did ice massage over the weekend and feels this has been helping.     She was not compliant with home exercise program. No HEP was given on initial evaluation.  Response to previous treatment: good, improvement in pain  Functional change: able to clean her closet and house    Pain: 4/10  Location: right elbow      Objective     Nikki received therapeutic exercises to develop strength, endurance, ROM, flexibility, posture and core stabilization for 30 minutes including:    +UBE x 4 mins forward  Active wrist extension/flexion: 20x  Wrist extensor stretch: 5 x 20" hold  Pronation/supination with no resistance: 20x  +Bicep flexion x 25 reps   +Green putty x 4 mins  +Theraband roller (red) pronation/supination x 20       Nikki received the following manual therapy techniques:   20 minutes x IASTM over the dorsal aspect of the Right forearm and distal biceps; Rolling pin STM to wrist extensors. Biofreeze with this activity.   5 minutes x Preparation and application of kinesiotaping to her Right forearm for " inhibition and pain reduction.  5 minutes x ice massage to Right lateral epicondyle      Patient was screened for use of kinesiotape and was cleared for use.   1. Has the patient ever had a reaction to the adhesive in bandaids? Yes/No  2. Has the patient ever uses athletic/kinesiotape in the past? Yes/No  3. Is the patient hemodynamically impaired (PE, DVT, CHF, Kidney failure)? Yes/No  4. Can the PT/PTA apply the tape to your skin? Yes/No     Patient was instructed on duration to wear the tape, proper drying techniques for the tape, and to remove the tape if her had any issues with it. Patient verbalized understanding of these instructions.    Nikki received cold pack for 10 minutes to Right elbow.    Home Exercises Provided and Patient Education Provided     Education provided:   - use of anti-inflammatories and ice for pain reduction    Written Home Exercises Provided: yes.  Exercises were reviewed and Nikki was able to demonstrate them prior to the end of the session.  Nikki demonstrated good  understanding of the education provided.     See EMR under Patient Instructions for exercises provided 12/12/2018.    Assessment     Patient tolerated today's session well. Slight increase in pain with progression of exercises. Pain resolved with ice massage and application of taping to forearm. Patient encouraged to continue with stretches outside of therapy sessions.     Nikki is progressing well towards her goals.   Pt prognosis is Good.     Pt will continue to benefit from skilled outpatient physical therapy to address the deficits listed in the problem list box on initial evaluation, provide pt/family education and to maximize pt's level of independence in the home and community environment.     Pt's spiritual, cultural and educational needs considered and pt agreeable to plan of care and goals.    Goals:   Short Term Goals (6 Weeks):   1.Pt to increase strength by a 1/2 grade of muscles test to allow for improvement in  functional activities such as performing chores.  2.Pt to improve range of motion by 25% to allow for improved functional mobility to allow for improvement in IA DLS.   3.Pt to report compliance with HEP and demonstrate proper exercise technique to PT to show competence with self management of condition.  4.Decrease pain by 25% during functional activities.    Plan     Certification Period: 11/28/2018 to 3/1/2019  Recommended Treatment Plan: 2 times per week for 12 weeks: Manual Therapy, Patient Education and Therapeutic Exercise  Other Recommendations: modalities PRN. Pt may be seen by PTA as part of the rehabilitation team.     Anson Hunt, MARIO   12/18/2018

## 2018-12-18 ENCOUNTER — CLINICAL SUPPORT (OUTPATIENT)
Dept: REHABILITATION | Facility: HOSPITAL | Age: 58
End: 2018-12-18
Attending: FAMILY MEDICINE
Payer: COMMERCIAL

## 2018-12-18 DIAGNOSIS — M75.21 BICEPS TENDONITIS, RIGHT: ICD-10-CM

## 2018-12-18 DIAGNOSIS — M62.81 MUSCLE WEAKNESS: ICD-10-CM

## 2018-12-18 DIAGNOSIS — M25.60 JOINT STIFFNESS: ICD-10-CM

## 2018-12-18 DIAGNOSIS — M77.11 LATERAL EPICONDYLITIS OF RIGHT ELBOW: ICD-10-CM

## 2018-12-18 DIAGNOSIS — R26.2 DIFFICULTY WALKING: ICD-10-CM

## 2018-12-18 DIAGNOSIS — M25.551 RIGHT HIP PAIN: Primary | ICD-10-CM

## 2018-12-18 DIAGNOSIS — R68.89 IMPAIRED FUNCTION OF UPPER EXTREMITY: ICD-10-CM

## 2018-12-18 DIAGNOSIS — R29.898 DECREASED GRIP STRENGTH OF RIGHT HAND: ICD-10-CM

## 2018-12-18 PROCEDURE — 97110 THERAPEUTIC EXERCISES: CPT | Mod: PN

## 2018-12-18 PROCEDURE — 97140 MANUAL THERAPY 1/> REGIONS: CPT | Mod: PN

## 2018-12-18 RX ORDER — HYDROCHLOROTHIAZIDE 25 MG/1
TABLET ORAL
Qty: 90 TABLET | Refills: 1 | Status: SHIPPED | OUTPATIENT
Start: 2018-12-18 | End: 2019-05-03 | Stop reason: SDUPTHER

## 2018-12-19 DIAGNOSIS — Z12.11 COLON CANCER SCREENING: ICD-10-CM

## 2018-12-19 NOTE — PROGRESS NOTES
"  Physical Therapy Daily Treatment Note     Name: Bambi Carrera  Clinic Number: [id number]    Therapy Diagnosis:   Encounter Diagnoses   Name Primary?    Muscle weakness Yes    Joint stiffness     Lateral epicondylitis of right elbow     Biceps tendonitis, right     Decreased  strength of right hand     Impaired function of upper extremity      Physician: Wong Whitmore MD    Visit Date: 12/20/2018    Physician Orders: PT Eval and Treat  Medical Diagnosis: Right lateral epicondylitis, Biceps tendonitis on Right  Evaluation Date: 11/28/2018  Authorization Period Expiration: 12/31/2018  Plan of Care Certification Period: 11/28/2018 to 3/1/2019  Visit #/Visits authorized: 5 / 20   PTA Visit 4/6     Time In: 0655  Time Out: 0750  Total Billable Time: 55 minutes    Precautions: Borderline diabetic, HTN    Subjective     Pt reports: Things are feeling better. Not as much pain today.     She was not compliant with home exercise program. No HEP was given on initial evaluation.  Response to previous treatment: good, improvement in pain  Functional change: able to clean her closet and house    Pain: 2/10  Location: right elbow      Objective     Nikki received therapeutic exercises to develop strength, endurance, ROM, flexibility, posture and core stabilization for 40 minutes including:    UBE x 5 mins forward  Active wrist extension/flexion: 25x  Wrist extensor stretch: 5 x 20" hold  +Wrist inversion/eversion x 25   Pronation/supination with no resistance: 20x  Bicep flexion x 25 reps   Green putty x 4 mins  Theraband roller (red) pronation/supination x 20   +Bicep flexion RTB x 25     Nikki received the following manual therapy techniques:   15 minutes x IASTM over the dorsal aspect of the Right forearm and distal biceps; Rolling pin STM to wrist extensors. Biofreeze with this activity.   5 minutes x Preparation and application of kinesiotaping to her Right forearm for inhibition and pain reduction.  5 minutes x " ice massage to Right lateral epicondyle      Patient was screened for use of kinesiotape and was cleared for use.   1. Has the patient ever had a reaction to the adhesive in bandaids? Yes/No  2. Has the patient ever uses athletic/kinesiotape in the past? Yes/No  3. Is the patient hemodynamically impaired (PE, DVT, CHF, Kidney failure)? Yes/No  4. Can the PT/PTA apply the tape to your skin? Yes/No     Patient was instructed on duration to wear the tape, proper drying techniques for the tape, and to remove the tape if her had any issues with it. Patient verbalized understanding of these instructions.    Nikki received cold pack for 10 minutes to Right elbow.    Home Exercises Provided and Patient Education Provided     Education provided:   - use of anti-inflammatories and ice for pain reduction    Written Home Exercises Provided: yes.  Exercises were reviewed and Nikki was able to demonstrate them prior to the end of the session.  Nikki demonstrated good  understanding of the education provided.     See EMR under Patient Instructions for exercises provided 12/12/2018.    Assessment     Patient tolerated today's treatment session well. No increase in pain with any of today's activities. Patient encouraged to continue with exercises and ice massage outside of therapy sessions.  Exercises progressed during today's session with skilled cues for proper technique required.     Nikki is progressing well towards her goals.   Pt prognosis is Good.     Pt will continue to benefit from skilled outpatient physical therapy to address the deficits listed in the problem list box on initial evaluation, provide pt/family education and to maximize pt's level of independence in the home and community environment.     Pt's spiritual, cultural and educational needs considered and pt agreeable to plan of care and goals.    Goals:   Short Term Goals (6 Weeks):   1.Pt to increase strength by a 1/2 grade of muscles test to allow for improvement  in functional activities such as performing chores.  2.Pt to improve range of motion by 25% to allow for improved functional mobility to allow for improvement in IA DLS.   3.Pt to report compliance with HEP and demonstrate proper exercise technique to PT to show competence with self management of condition.  4.Decrease pain by 25% during functional activities.    Plan     Certification Period: 11/28/2018 to 3/1/2019  Recommended Treatment Plan: 2 times per week for 12 weeks: Manual Therapy, Patient Education and Therapeutic Exercise  Other Recommendations: modalities PRN. Pt may be seen by PTA as part of the rehabilitation team.     Anson Hunt, MARIO   12/20/2018

## 2018-12-20 ENCOUNTER — CLINICAL SUPPORT (OUTPATIENT)
Dept: REHABILITATION | Facility: HOSPITAL | Age: 58
End: 2018-12-20
Attending: FAMILY MEDICINE
Payer: COMMERCIAL

## 2018-12-20 DIAGNOSIS — M75.21 BICEPS TENDONITIS, RIGHT: ICD-10-CM

## 2018-12-20 DIAGNOSIS — R68.89 IMPAIRED FUNCTION OF UPPER EXTREMITY: ICD-10-CM

## 2018-12-20 DIAGNOSIS — M62.81 MUSCLE WEAKNESS: Primary | ICD-10-CM

## 2018-12-20 DIAGNOSIS — M25.60 JOINT STIFFNESS: ICD-10-CM

## 2018-12-20 DIAGNOSIS — R29.898 DECREASED GRIP STRENGTH OF RIGHT HAND: ICD-10-CM

## 2018-12-20 DIAGNOSIS — M77.11 LATERAL EPICONDYLITIS OF RIGHT ELBOW: ICD-10-CM

## 2018-12-20 PROCEDURE — 97110 THERAPEUTIC EXERCISES: CPT | Mod: PN

## 2018-12-20 PROCEDURE — 97140 MANUAL THERAPY 1/> REGIONS: CPT | Mod: PN

## 2018-12-26 ENCOUNTER — TELEPHONE (OUTPATIENT)
Dept: OBSTETRICS AND GYNECOLOGY | Facility: CLINIC | Age: 58
End: 2018-12-26

## 2018-12-26 NOTE — TELEPHONE ENCOUNTER
Spoke with pt. PT informed Dr. Le is out of the office. Pt advised to contact her PCP for rectal bleeding.

## 2018-12-26 NOTE — TELEPHONE ENCOUNTER
----- Message from Chrissie Lara sent at 12/26/2018 10:47 AM CST -----  Contact: pt            Name of Who is Calling: pt       What is the request in detail: pt has possible hemorrhoids. Patient is bleeding. Needs appt today. Nothing available. Call pt to accommodate.       Can the clinic reply by [facility]: no      What Number to Call Back if not in [facility]: [phone]

## 2018-12-27 ENCOUNTER — OFFICE VISIT (OUTPATIENT)
Dept: FAMILY MEDICINE | Facility: CLINIC | Age: 58
End: 2018-12-27
Payer: COMMERCIAL

## 2018-12-27 VITALS
DIASTOLIC BLOOD PRESSURE: 82 MMHG | HEIGHT: 65 IN | OXYGEN SATURATION: 98 % | TEMPERATURE: 98 F | SYSTOLIC BLOOD PRESSURE: 110 MMHG | BODY MASS INDEX: 47.65 KG/M2 | WEIGHT: 286 LBS | HEART RATE: 86 BPM

## 2018-12-27 DIAGNOSIS — K62.5 RECTAL BLEEDING: Primary | ICD-10-CM

## 2018-12-27 DIAGNOSIS — Z78.0 POST-MENOPAUSAL: ICD-10-CM

## 2018-12-27 DIAGNOSIS — N95.1 VAGINAL DRYNESS, MENOPAUSAL: ICD-10-CM

## 2018-12-27 PROCEDURE — 99214 OFFICE O/P EST MOD 30 MIN: CPT | Mod: S$GLB,,, | Performed by: NURSE PRACTITIONER

## 2018-12-27 PROCEDURE — 3008F BODY MASS INDEX DOCD: CPT | Mod: CPTII,S$GLB,, | Performed by: NURSE PRACTITIONER

## 2018-12-27 PROCEDURE — 3079F DIAST BP 80-89 MM HG: CPT | Mod: CPTII,S$GLB,, | Performed by: NURSE PRACTITIONER

## 2018-12-27 PROCEDURE — 3074F SYST BP LT 130 MM HG: CPT | Mod: CPTII,S$GLB,, | Performed by: NURSE PRACTITIONER

## 2018-12-27 PROCEDURE — 99999 PR PBB SHADOW E&M-EST. PATIENT-LVL IV: CPT | Mod: PBBFAC,,, | Performed by: NURSE PRACTITIONER

## 2018-12-27 RX ORDER — ESTRADIOL 0.1 MG/G
3 CREAM VAGINAL DAILY
Qty: 90 G | Refills: 11 | Status: SHIPPED | OUTPATIENT
Start: 2018-12-27 | End: 2021-02-15

## 2018-12-28 NOTE — PROGRESS NOTES
History of Present Illness   Bambi Carrera is a 58 y.o. woman with medical history as listed below who presents today for evaluation of rectal bleeding x2 episodes. She reports noticing bright red blood on the toilet tissue and some bright red blood in the toilet with a bowel movement. She has had no constipation or diarrhea, abdominal pain or cramping, black or tarry stool, fevers, nausea, or vomiting. She denies rectal pain or itching. She denies known hemorrhoids. She reports no change in her diet. She has had no decrease in appetite or unexplained weight loss. She has had no further episode of BRBPR today. She would also like to resume her estradiol cream for vaginal dryness, post-menopausal. She has no additional complaints and is otherwise healthy on today's visit.       Past Medical History:   Diagnosis Date    Borderline diabetic     Depression     Hypertension     Menopause        Past Surgical History:   Procedure Laterality Date    LASIK Bilateral 07/15/2016       Social History     Socioeconomic History    Marital status: Single     Spouse name: None    Number of children: None    Years of education: None    Highest education level: None   Social Needs    Financial resource strain: None    Food insecurity - worry: None    Food insecurity - inability: None    Transportation needs - medical: None    Transportation needs - non-medical: None   Occupational History     Employer: [employer]   Tobacco Use    Smoking status: Never Smoker    Smokeless tobacco: Never Used   Substance and Sexual Activity    Alcohol use: No     Alcohol/week: 0.0 oz    Drug use: No    Sexual activity: Not Currently   Other Topics Concern    None   Social History Narrative    None       Family History   Problem Relation Age of Onset    Cancer Mother        Review of Systems  Review of Systems   Constitutional: Negative for fever.   Gastrointestinal: Positive for blood in stool. Negative for abdominal  "pain, constipation, diarrhea, melena, nausea and vomiting.   Genitourinary: Negative for flank pain and hematuria.        Positive for vaginal dryness.      A complete review of systems was otherwise negative.    Physical Exam  /82 (BP Location: Right arm, Patient Position: Sitting, BP Method: X-Large (Manual))   Pulse 86   Temp 98.1 °F (36.7 °C) (Oral)   Ht 5' 5" (1.651 m)   Wt 129.7 kg (286 lb)   SpO2 98%   BMI 47.59 kg/m²   General appearance: alert, appears stated age, cooperative and no distress  Back: symmetric, no curvature. ROM normal. No CVA tenderness.  Lungs: clear to auscultation bilaterally  Heart: regular rate and rhythm, S1, S2 normal, no murmur, click, rub or gallop  Abdomen: soft, non-tender; bowel sounds normal; no masses,  no organomegaly and no rebound tenderness, guarding, or rigidity  Pelvic: external genitalia normal and vagina normal without discharge  Neurologic: Grossly normal  Rectal: no evidence for external hemorrhoid, no bleeding noted, normal digital rectal exam-normal tone.    Assessment/Plan  Rectal bleeding  No red flags or evidence for acute abdomen on exam today.  FitKit was given to patient on 12/27/2018 7:23 PM   We will check fit kit, I discussed that we should likely proceed with colonoscopy pending result, and she is agreeable to this.  ER precautions discussed.  -     Fecal Immunochemical Test (iFOBT); Future; Expected date: 12/27/2018    Post-menopausal  The current medical regimen is effective;  continue present plan and medications.  Refill today.  Follow-up with OBGYN as scheduled in one week.  -     estradiol (ESTRACE) 0.01 % (0.1 mg/gram) vaginal cream; Place 3 g vaginally once daily.  Dispense: 90 g; Refill: 11    Vaginal dryness, menopausal  The current medical regimen is effective;  continue present plan and medications.  As above.  -     estradiol (ESTRACE) 0.01 % (0.1 mg/gram) vaginal cream; Place 3 g vaginally once daily.  Dispense: 90 g; Refill: " 11    Patient has verbalized understanding and is in agreement with plan of care.    Follow-up if symptoms worsen or fail to improve.

## 2018-12-29 ENCOUNTER — LAB VISIT (OUTPATIENT)
Dept: LAB | Facility: HOSPITAL | Age: 58
End: 2018-12-29
Attending: FAMILY MEDICINE
Payer: COMMERCIAL

## 2018-12-29 DIAGNOSIS — Z12.11 COLON CANCER SCREENING: ICD-10-CM

## 2018-12-29 PROCEDURE — 82274 ASSAY TEST FOR BLOOD FECAL: CPT

## 2018-12-31 LAB — HEMOCCULT STL QL IA: NEGATIVE

## 2019-01-08 ENCOUNTER — TELEPHONE (OUTPATIENT)
Dept: FAMILY MEDICINE | Facility: CLINIC | Age: 59
End: 2019-01-08

## 2019-01-08 ENCOUNTER — OFFICE VISIT (OUTPATIENT)
Dept: OBSTETRICS AND GYNECOLOGY | Facility: CLINIC | Age: 59
End: 2019-01-08
Payer: COMMERCIAL

## 2019-01-08 VITALS
WEIGHT: 289.88 LBS | SYSTOLIC BLOOD PRESSURE: 126 MMHG | HEIGHT: 65 IN | DIASTOLIC BLOOD PRESSURE: 84 MMHG | BODY MASS INDEX: 48.3 KG/M2

## 2019-01-08 DIAGNOSIS — K92.1 BLOOD IN STOOL: Primary | ICD-10-CM

## 2019-01-08 DIAGNOSIS — D25.9 UTERINE LEIOMYOMA, UNSPECIFIED LOCATION: ICD-10-CM

## 2019-01-08 DIAGNOSIS — Z00.00 ANNUAL PHYSICAL EXAM: Primary | ICD-10-CM

## 2019-01-08 DIAGNOSIS — Z79.890 HORMONE REPLACEMENT THERAPY (HRT): ICD-10-CM

## 2019-01-08 DIAGNOSIS — Z01.419 ENCOUNTER FOR GYNECOLOGICAL EXAMINATION WITHOUT ABNORMAL FINDING: ICD-10-CM

## 2019-01-08 DIAGNOSIS — N95.1 MENOPAUSAL STATE: ICD-10-CM

## 2019-01-08 PROCEDURE — 99999 PR PBB SHADOW E&M-EST. PATIENT-LVL III: ICD-10-PCS | Mod: PBBFAC,,, | Performed by: OBSTETRICS & GYNECOLOGY

## 2019-01-08 PROCEDURE — 3008F BODY MASS INDEX DOCD: CPT | Mod: CPTII,S$GLB,, | Performed by: OBSTETRICS & GYNECOLOGY

## 2019-01-08 PROCEDURE — 99999 PR PBB SHADOW E&M-EST. PATIENT-LVL III: CPT | Mod: PBBFAC,,, | Performed by: OBSTETRICS & GYNECOLOGY

## 2019-01-08 PROCEDURE — 3008F PR BODY MASS INDEX (BMI) DOCUMENTED: ICD-10-PCS | Mod: CPTII,S$GLB,, | Performed by: OBSTETRICS & GYNECOLOGY

## 2019-01-08 PROCEDURE — 99396 PR PREVENTIVE VISIT,EST,40-64: ICD-10-PCS | Mod: S$GLB,,, | Performed by: OBSTETRICS & GYNECOLOGY

## 2019-01-08 PROCEDURE — 99396 PREV VISIT EST AGE 40-64: CPT | Mod: S$GLB,,, | Performed by: OBSTETRICS & GYNECOLOGY

## 2019-01-08 NOTE — PATIENT INSTRUCTIONS

## 2019-01-08 NOTE — TELEPHONE ENCOUNTER
----- Message from Neida Dinh sent at 1/8/2019 11:55 AM CST -----  Contact: self  [phone]  Pt is requesting a referral to a GI doctor, . Pls call pt [phone]. Thanks.......Cecy

## 2019-01-08 NOTE — PROGRESS NOTES
Subjective:      Chief Complaint:    Chief Complaint   Patient presents with    Gynecologic Exam       Menstrual History:    OB History      Para Term  AB Living    3 3 3 0 0 3    SAB TAB Ectopic Multiple Live Births    0 0 0 0            Menarche age: 13     No LMP recorded. Patient is postmenopausal.                Objective:      PRESENT ILLNESS AND PHYSICAL EXAM NOTE:  The patient is 58, here for annual   exam.  Pap smear in 2017, negative.  Mammogram this year, negative.  She is a    3, para 3.  On estradiol vaginal cream.    PAST MEDICAL HISTORY:  Diabetes, hypertension, abnormal uterine bleeding, small   uterine fibroid.    Surgery, LASIK.  The patient is complaining of dryness and lack of sexual   desire.    PHYSICAL EXAMINATION:  VITAL SIGNS:  Blood pressure 126/84, weight 289.  BREASTS:  No lumps, mass, discharge, skin changes, retraction, nipple changes.    Axillae negative.  PELVIC:  External normal.  Vulva normal.  Bartholin, urethral and [place] glands   are negative.  Vagina is clear, good rugal pattern.  Cervix present, clear.    Uterus is slightly enlarged.  Adnexa is negative.  Good pelvic support.  No   descensus.  RECTAL:  External is negative.  The patient is complaining of some rectal   bleeding on tissue paper seen by primary care given some sulfa medication.  The   patient had colonoscopy in the recent past.  Recommended to the patient to see   her GI specialist and evaluate the rectum since I do not see any external   hemorrhoids.    IMPRESSION:  Menopausal, small uterine fibroid with vaginal dryness.    PLAN:  We will switch the patient over to oral estrogen hormone replacement   therapy.  We will see her back in next three months if she still has the same   problem.  We may proceed to the injectable Depo-Estradiol testosterone.    Continue with multivitamin, calcium, vitamin D, and we will do a mammogram and   they we will put the patient on stated Prempro and we will  see her back in three   months.      JIV/HN  dd: 01/08/2019 11:22:12 (CST)  td: 01/09/2019 00:25:18 (CST)  Doc ID   #[id number]  Job ID #[id number]    CC:       History of Present Illness AND  Examination detailed DICTATE:        Physical Exam   Constitutional: She is oriented to person, place, and time. She appears well-developed and well-nourished. No distress.   HENT:   Head: Normocephalic.   Mouth/Throat: Oropharynx is clear.  Eyes: Pupils are equal, round, and reactive to light.   Neck: Neck supple. No tracheal deviation present.   Cardiovascular: Normal rate, regular rhythm and normal heart sounds. No murmur heard.  Pulmonary/Chest: Effort normal and breath sounds normal. No respiratory distress. She has no wheezes. She has no rales. She exhibits no tenderness.   Abdominal: Bowel sounds are normal. She exhibits no distension and no mass. There is no tenderness. There is no rebound and no guarding. Hernia confirmed negative in the right inguinal area and confirmed negative in the left inguinal area.   Musculoskeletal: Normal range of motion.   Lymphadenopathy:        Right: No inguinal adenopathy present.        Left: No inguinal adenopathy present.   Neurological: She is alert and oriented.  Skin: Skin is warm. No rash noted.        Review of Systems  Review of Systems   Normal ROS:   Constitutional: Negative for fever, chills, activity change fatigue and unexpected weight change.   HENT: Negative for nosebleeds, congestion.  Eyes: Negative for visual disturbance.   Respiratory: Negative for shortness of breath and wheezing.    Cardiovascular: Negative for chest pain, palpitations.   Gastrointestinal: Negative for abdominal pain, diarrhea, constipation, blood in stool and abdominal distention.   Musculoskeletal: Negative for back pain.   Allergic/Immunologic: Negative for environmental allergies and food allergies.   Neurological: Negative for seizures, syncope, weakness, numbness and headaches.   Hematological:  Negative for adenopathy. Does not bruise/bleed easily.   Psychiatric/Behavioral: Negative for hallucinations, behavioral problems, confusion.    Assessment:      Diagnosis: MENOPAUSAL   GYN   EXAM   FIBROID       Plan:      Return in 3  months

## 2019-01-08 NOTE — TELEPHONE ENCOUNTER
Spoke with patient and she informed me she has been passing a large amount of bright red blood with her bowel movement. She said that this has been going on for the pass 3 days. She informed me that her stool is form. No constipation. She denies any hemorrhoids. She said that she does has some stomach cramping before bowel movemnets . Patient is requesting referral to GI.

## 2019-01-31 ENCOUNTER — TELEPHONE (OUTPATIENT)
Dept: OBSTETRICS AND GYNECOLOGY | Facility: CLINIC | Age: 59
End: 2019-01-31

## 2019-01-31 NOTE — TELEPHONE ENCOUNTER
Spoke with pt. Pt advised Per Dr. Le continue taking pills. Bleeding is expected when started prempro. Pt advised to schedule a f/u appt with Dr. Le if bleeding continues over a week.

## 2019-01-31 NOTE — TELEPHONE ENCOUNTER
----- Message from Konstantin Dorsey sent at 1/31/2019  9:30 AM CST -----  Contact: Self/[phone]  Patient would like to speak to the staff regarding the medication:  estrogen, conjugated,-medroxyprogesterone 0.625-2.5mg (PREMPRO) 0.625-2.5 mg per tablet. She stated that she has a menstrual cycle because of this medication. Thank you.

## 2019-02-25 ENCOUNTER — HOSPITAL ENCOUNTER (OUTPATIENT)
Dept: RADIOLOGY | Facility: HOSPITAL | Age: 59
Discharge: HOME | End: 2019-02-25
Attending: OBSTETRICS & GYNECOLOGY
Payer: COMMERCIAL

## 2019-02-25 VITALS — HEIGHT: 65 IN | BODY MASS INDEX: 48.15 KG/M2 | WEIGHT: 289 LBS

## 2019-02-25 DIAGNOSIS — Z12.39 BREAST CANCER SCREENING: ICD-10-CM

## 2019-02-25 PROCEDURE — 77067 SCR MAMMO BI INCL CAD: CPT | Mod: TC

## 2019-02-25 PROCEDURE — 77067 SCR MAMMO BI INCL CAD: CPT | Mod: 26,,, | Performed by: RADIOLOGY

## 2019-02-25 PROCEDURE — 77067 MAMMO DIGITAL SCREENING BILAT WITH TOMOSYNTHESIS_CAD: ICD-10-PCS | Mod: 26,,, | Performed by: RADIOLOGY

## 2019-02-25 PROCEDURE — 77063 MAMMO DIGITAL SCREENING BILAT WITH TOMOSYNTHESIS_CAD: ICD-10-PCS | Mod: 26,,, | Performed by: RADIOLOGY

## 2019-02-25 PROCEDURE — 77063 BREAST TOMOSYNTHESIS BI: CPT | Mod: 26,,, | Performed by: RADIOLOGY

## 2019-05-01 DIAGNOSIS — J30.9 ALLERGIC RHINITIS: ICD-10-CM

## 2019-05-01 RX ORDER — CETIRIZINE HYDROCHLORIDE 10 MG/1
TABLET ORAL
Qty: 30 TABLET | Refills: 5 | OUTPATIENT
Start: 2019-05-01

## 2019-05-03 ENCOUNTER — OFFICE VISIT (OUTPATIENT)
Dept: FAMILY MEDICINE | Facility: CLINIC | Age: 59
End: 2019-05-03
Payer: COMMERCIAL

## 2019-05-03 VITALS
TEMPERATURE: 97 F | HEIGHT: 65 IN | WEIGHT: 289 LBS | SYSTOLIC BLOOD PRESSURE: 116 MMHG | HEART RATE: 84 BPM | DIASTOLIC BLOOD PRESSURE: 80 MMHG | BODY MASS INDEX: 48.15 KG/M2 | OXYGEN SATURATION: 95 %

## 2019-05-03 DIAGNOSIS — M79.89 SWELLING OF LOWER LEG: ICD-10-CM

## 2019-05-03 DIAGNOSIS — E66.01 MORBID OBESITY: ICD-10-CM

## 2019-05-03 DIAGNOSIS — J30.9 ALLERGIC RHINITIS: ICD-10-CM

## 2019-05-03 DIAGNOSIS — R73.03 PREDIABETES: ICD-10-CM

## 2019-05-03 DIAGNOSIS — E55.9 VITAMIN D DEFICIENCY: ICD-10-CM

## 2019-05-03 DIAGNOSIS — I10 ESSENTIAL HYPERTENSION: Chronic | ICD-10-CM

## 2019-05-03 DIAGNOSIS — Z00.00 ROUTINE PHYSICAL EXAMINATION: Primary | ICD-10-CM

## 2019-05-03 PROBLEM — N95.0 PMB (POSTMENOPAUSAL BLEEDING): Status: RESOLVED | Noted: 2017-08-16 | Resolved: 2019-05-03

## 2019-05-03 PROBLEM — N93.9 ABNORMAL UTERINE BLEEDING: Status: RESOLVED | Noted: 2017-08-08 | Resolved: 2019-05-03

## 2019-05-03 PROBLEM — Z01.419 ENCOUNTER FOR GYNECOLOGICAL EXAMINATION WITHOUT ABNORMAL FINDING: Status: RESOLVED | Noted: 2019-01-08 | Resolved: 2019-05-03

## 2019-05-03 PROCEDURE — 99396 PREV VISIT EST AGE 40-64: CPT | Mod: S$GLB,,, | Performed by: FAMILY MEDICINE

## 2019-05-03 PROCEDURE — 99999 PR PBB SHADOW E&M-EST. PATIENT-LVL III: ICD-10-PCS | Mod: PBBFAC,,, | Performed by: FAMILY MEDICINE

## 2019-05-03 PROCEDURE — 99999 PR PBB SHADOW E&M-EST. PATIENT-LVL III: CPT | Mod: PBBFAC,,, | Performed by: FAMILY MEDICINE

## 2019-05-03 PROCEDURE — 3074F PR MOST RECENT SYSTOLIC BLOOD PRESSURE < 130 MM HG: ICD-10-PCS | Mod: CPTII,S$GLB,, | Performed by: FAMILY MEDICINE

## 2019-05-03 PROCEDURE — 3079F DIAST BP 80-89 MM HG: CPT | Mod: CPTII,S$GLB,, | Performed by: FAMILY MEDICINE

## 2019-05-03 PROCEDURE — 3079F PR MOST RECENT DIASTOLIC BLOOD PRESSURE 80-89 MM HG: ICD-10-PCS | Mod: CPTII,S$GLB,, | Performed by: FAMILY MEDICINE

## 2019-05-03 PROCEDURE — 3074F SYST BP LT 130 MM HG: CPT | Mod: CPTII,S$GLB,, | Performed by: FAMILY MEDICINE

## 2019-05-03 PROCEDURE — 99396 PR PREVENTIVE VISIT,EST,40-64: ICD-10-PCS | Mod: S$GLB,,, | Performed by: FAMILY MEDICINE

## 2019-05-03 RX ORDER — ERGOCALCIFEROL 1.25 MG/1
50000 CAPSULE ORAL
Qty: 12 CAPSULE | Refills: 3 | Status: SHIPPED | OUTPATIENT
Start: 2019-05-03 | End: 2020-04-14

## 2019-05-03 RX ORDER — CETIRIZINE HYDROCHLORIDE 10 MG/1
10 TABLET ORAL DAILY
Qty: 90 TABLET | Refills: 3 | Status: SHIPPED | OUTPATIENT
Start: 2019-05-03 | End: 2019-10-22 | Stop reason: SDUPTHER

## 2019-05-03 RX ORDER — HYDROCHLOROTHIAZIDE 25 MG/1
25 TABLET ORAL DAILY
Qty: 90 TABLET | Refills: 3 | Status: SHIPPED | OUTPATIENT
Start: 2019-05-03 | End: 2020-06-03

## 2019-05-03 NOTE — PROGRESS NOTES
[facility]  Progress Note    SUBJECTIVE:     Chief Complaint   Patient presents with    Annual Exam       HPI   Bambi Carrera  is a 59 y.o. female here for routine physical exam. Patient has no other new complaints/problems at this time.      Review of patient's allergies indicates:  No Known Allergies    Past Medical History:   Diagnosis Date    Borderline diabetic     Depression     Hypertension     Menopause      Past Surgical History:   Procedure Laterality Date    LASIK Bilateral 07/15/2016     Family History   Problem Relation Age of Onset    Cancer Mother      Social History     Tobacco Use    Smoking status: Never Smoker    Smokeless tobacco: Never Used   Substance Use Topics    Alcohol use: No     Alcohol/week: 0.0 oz    Drug use: No        Review of Systems   Constitutional: Negative for chills, diaphoresis and fever.   HENT: Negative for congestion, ear pain and sore throat.    Eyes: Negative for photophobia and discharge.   Respiratory: Negative for cough, shortness of breath and wheezing.    Cardiovascular: Negative for chest pain and palpitations.   Gastrointestinal: Negative for abdominal pain, constipation, diarrhea, nausea and vomiting.   Genitourinary: Negative for dysuria and hematuria.   Musculoskeletal: Negative for back pain and myalgias.   Skin: Negative for itching and rash.   Neurological: Negative for dizziness, sensory change, focal weakness, weakness and headaches.   All other systems reviewed and are negative.    OBJECTIVE:     Vitals:    05/03/19 0834   BP: 116/80   Pulse: 84   Temp: 97.4 °F (36.3 °C)     Body mass index is 48.1 kg/m².    Physical Exam   Constitutional: She is oriented to person, place, and time and well-developed, well-nourished, and in no distress. No distress.   HENT:   Head: Normocephalic and atraumatic.   Right Ear: Tympanic membrane is not perforated, not erythematous and not bulging. No hemotympanum.   Left Ear: Tympanic membrane is not  perforated, not erythematous and not bulging. No hemotympanum.   Mouth/Throat: Oropharynx is clear and moist. No oropharyngeal exudate.   Eyes: Pupils are equal, round, and reactive to light. Conjunctivae and EOM are normal.   Neck: No thyromegaly present.   Cardiovascular: Normal rate, regular rhythm and normal heart sounds. Exam reveals no gallop and no friction rub.   No murmur heard.  Pulmonary/Chest: Effort normal and breath sounds normal. No respiratory distress. She has no wheezes. She has no rales.   Abdominal: Soft. Bowel sounds are normal. She exhibits no distension. There is no tenderness. There is no rebound and no guarding.   Musculoskeletal: Normal range of motion. She exhibits edema (2+ pitting edema b/l lower ext). She exhibits no tenderness.   Lymphadenopathy:     She has no cervical adenopathy.   Neurological: She is alert and oriented to person, place, and time.   Skin: Skin is warm. No rash noted. She is not diaphoretic. No erythema.       Old records were reviewed. Labs and/or images were independently reviewed.    ASSESSMENT     1. Routine physical examination    2. Essential hypertension    3. Prediabetes    4. Morbid obesity    5. Allergic rhinitis    6. Vitamin D deficiency    7. Swelling of lower leg        PLAN:     Routine physical examination  -     CBC auto differential; Future  -     Comprehensive metabolic panel; Future  -     Hemoglobin A1c; Future  -     Lipid panel; Future  -     TSH; Future  -     T4, free; Future  -     We briefly discussed diet, exercise, and routine preventive exams. All questions and comments addressed.    Essential hypertension  -     hydroCHLOROthiazide (HYDRODIURIL) 25 MG tablet; Take 1 tablet (25 mg total) by mouth once daily.  Dispense: 90 tablet; Refill: 3  -     Stable. Continue current regimen.    Prediabetes   -     Instructed patient to take daily glucose AM logs and to write them down to bring with on next visit. Advised patient to decrease intake  of carbohydrates/simple sugars.         Morbid obesity   -     Counseled patient about healthy diet, exercise habits, and to increase physical activity.    Allergic rhinitis  -     cetirizine (ZYRTEC) 10 MG tablet; Take 1 tablet (10 mg total) by mouth once daily.  Dispense: 90 tablet; Refill: 3    Vitamin D deficiency  -     ergocalciferol (VITAMIN D2) 50,000 unit Cap; Take 1 capsule (50,000 Units total) by mouth every 7 days.  Dispense: 12 capsule; Refill: 3    Swelling of lower leg   -      She will get her own pair of compression stockings.     RTC PRZAIN Whitmore MD  05/03/2019 9:00 AM

## 2019-05-04 ENCOUNTER — LAB VISIT (OUTPATIENT)
Dept: LAB | Facility: HOSPITAL | Age: 59
End: 2019-05-04
Attending: FAMILY MEDICINE
Payer: COMMERCIAL

## 2019-05-04 DIAGNOSIS — Z00.00 ROUTINE PHYSICAL EXAMINATION: ICD-10-CM

## 2019-05-04 LAB
ALBUMIN SERPL BCP-MCNC: 3.5 G/DL (ref 3.5–5.2)
ALP SERPL-CCNC: 116 U/L (ref 55–135)
ALT SERPL W/O P-5'-P-CCNC: 14 U/L (ref 10–44)
ANION GAP SERPL CALC-SCNC: 7 MMOL/L (ref 8–16)
AST SERPL-CCNC: 14 U/L (ref 10–40)
BASOPHILS # BLD AUTO: 0.04 K/UL (ref 0–0.2)
BASOPHILS NFR BLD: 0.6 % (ref 0–1.9)
BILIRUB SERPL-MCNC: 0.3 MG/DL (ref 0.1–1)
BUN SERPL-MCNC: 22 MG/DL (ref 6–20)
CALCIUM SERPL-MCNC: 9.9 MG/DL (ref 8.7–10.5)
CHLORIDE SERPL-SCNC: 104 MMOL/L (ref 95–110)
CHOLEST SERPL-MCNC: 180 MG/DL (ref 120–199)
CHOLEST/HDLC SERPL: 2.8 {RATIO} (ref 2–5)
CO2 SERPL-SCNC: 28 MMOL/L (ref 23–29)
CREAT SERPL-MCNC: 0.9 MG/DL (ref 0.5–1.4)
DIFFERENTIAL METHOD: ABNORMAL
EOSINOPHIL # BLD AUTO: 0.1 K/UL (ref 0–0.5)
EOSINOPHIL NFR BLD: 1.6 % (ref 0–8)
ERYTHROCYTE [DISTWIDTH] IN BLOOD BY AUTOMATED COUNT: 15.2 % (ref 11.5–14.5)
EST. GFR  (AFRICAN AMERICAN): >60 ML/MIN/1.73 M^2
EST. GFR  (NON AFRICAN AMERICAN): >60 ML/MIN/1.73 M^2
ESTIMATED AVG GLUCOSE: 126 MG/DL (ref 68–131)
GLUCOSE SERPL-MCNC: 108 MG/DL (ref 70–110)
HBA1C MFR BLD HPLC: 6 % (ref 4–5.6)
HCT VFR BLD AUTO: 38.9 % (ref 37–48.5)
HDLC SERPL-MCNC: 64 MG/DL (ref 40–75)
HDLC SERPL: 35.6 % (ref 20–50)
HGB BLD-MCNC: 12.3 G/DL (ref 12–16)
IMM GRANULOCYTES # BLD AUTO: 0.01 K/UL (ref 0–0.04)
IMM GRANULOCYTES NFR BLD AUTO: 0.1 % (ref 0–0.5)
LDLC SERPL CALC-MCNC: 100.4 MG/DL (ref 63–159)
LYMPHOCYTES # BLD AUTO: 3.7 K/UL (ref 1–4.8)
LYMPHOCYTES NFR BLD: 51.5 % (ref 18–48)
MCH RBC QN AUTO: 28 PG (ref 27–31)
MCHC RBC AUTO-ENTMCNC: 31.6 G/DL (ref 32–36)
MCV RBC AUTO: 89 FL (ref 82–98)
MONOCYTES # BLD AUTO: 0.7 K/UL (ref 0.3–1)
MONOCYTES NFR BLD: 9.3 % (ref 4–15)
NEUTROPHILS # BLD AUTO: 2.6 K/UL (ref 1.8–7.7)
NEUTROPHILS NFR BLD: 36.9 % (ref 38–73)
NONHDLC SERPL-MCNC: 116 MG/DL
NRBC BLD-RTO: 0 /100 WBC
PLATELET # BLD AUTO: 260 K/UL (ref 150–350)
PMV BLD AUTO: 10.8 FL (ref 9.2–12.9)
POTASSIUM SERPL-SCNC: 4 MMOL/L (ref 3.5–5.1)
PROT SERPL-MCNC: 7.8 G/DL (ref 6–8.4)
RBC # BLD AUTO: 4.39 M/UL (ref 4–5.4)
SODIUM SERPL-SCNC: 139 MMOL/L (ref 136–145)
T4 FREE SERPL-MCNC: 0.87 NG/DL (ref 0.71–1.51)
TRIGL SERPL-MCNC: 78 MG/DL (ref 30–150)
TSH SERPL DL<=0.005 MIU/L-ACNC: 2.07 UIU/ML (ref 0.4–4)
WBC # BLD AUTO: 7.09 K/UL (ref 3.9–12.7)

## 2019-05-04 PROCEDURE — 80053 COMPREHEN METABOLIC PANEL: CPT

## 2019-05-04 PROCEDURE — 80061 LIPID PANEL: CPT

## 2019-05-04 PROCEDURE — 85025 COMPLETE CBC W/AUTO DIFF WBC: CPT

## 2019-05-04 PROCEDURE — 83036 HEMOGLOBIN GLYCOSYLATED A1C: CPT

## 2019-05-04 PROCEDURE — 84443 ASSAY THYROID STIM HORMONE: CPT

## 2019-05-04 PROCEDURE — 84439 ASSAY OF FREE THYROXINE: CPT

## 2019-05-04 PROCEDURE — 36415 COLL VENOUS BLD VENIPUNCTURE: CPT | Mod: PO

## 2019-07-01 ENCOUNTER — OFFICE VISIT (OUTPATIENT)
Dept: OPTOMETRY | Facility: CLINIC | Age: 59
End: 2019-07-01
Payer: COMMERCIAL

## 2019-07-01 DIAGNOSIS — R73.03 PREDIABETES: ICD-10-CM

## 2019-07-01 DIAGNOSIS — H25.13 NUCLEAR SCLEROSIS OF BOTH EYES: ICD-10-CM

## 2019-07-01 DIAGNOSIS — H04.123 DRY EYE SYNDROME, BILATERAL: Primary | ICD-10-CM

## 2019-07-01 PROCEDURE — 92004 PR EYE EXAM, NEW PATIENT,COMPREHESV: ICD-10-PCS | Mod: S$GLB,,, | Performed by: OPTOMETRIST

## 2019-07-01 PROCEDURE — 92004 COMPRE OPH EXAM NEW PT 1/>: CPT | Mod: S$GLB,,, | Performed by: OPTOMETRIST

## 2019-07-01 PROCEDURE — 99999 PR PBB SHADOW E&M-EST. PATIENT-LVL II: ICD-10-PCS | Mod: PBBFAC,,, | Performed by: OPTOMETRIST

## 2019-07-01 PROCEDURE — 99999 PR PBB SHADOW E&M-EST. PATIENT-LVL II: CPT | Mod: PBBFAC,,, | Performed by: OPTOMETRIST

## 2019-07-01 NOTE — PROGRESS NOTES
Subjective:       Patient ID: Bambijose g Carrera is a 59 y.o. female      Chief Complaint   Patient presents with    Concerns About Ocular Health    Diabetic Eye Exam     History of Present Illness  Dls: 1 yr     58 y/o female presents today for diabetic eye exam.   Pt c/o film over os >od off/on. Pt wears bifocal glasses.     LBS ?     No tearing  No itching  No burning  No pain  No ha's  No floaters  No flashes    Eye meds  Refresh daily OU    Hemoglobin A1C       Date                     Value               Ref Range           Status              05/04/2019               6.0 (H)             4.0 - 5.6 %         Final               07/10/2017               6.0 (H)             4.0 - 5.6 %         Final               03/09/2016               6.1                 4.5 - 6.2 %         Final              Assessment/Plan:     1. Dry eye syndrome, bilateral  Recommend artificial tears (Systane/Refresh/Blink/Thera Tears). 1 drop 4x per day and PRN. Discussed different drop options - AT/PFAT/gel/ointment. Chronicity of disease and treatment discussed.    2. Prediabetes  No diabetic retinopathy. Discussed with pt the effects of diabetes on vision, importance of good blood sugar control, compliance with meds, and follow up care with PCP. Return in 1 year for dilated eye exam, sooner PRN.    3. Nuclear sclerosis of both eyes  Educated pt on presence of cataracts and effects on vision. No surgery at this time. Recheck in one year.      Follow up in about 1 year (around 7/1/2020).

## 2019-10-22 ENCOUNTER — OFFICE VISIT (OUTPATIENT)
Dept: FAMILY MEDICINE | Facility: CLINIC | Age: 59
End: 2019-10-22
Payer: COMMERCIAL

## 2019-10-22 VITALS
HEIGHT: 65 IN | DIASTOLIC BLOOD PRESSURE: 66 MMHG | WEIGHT: 289 LBS | TEMPERATURE: 98 F | OXYGEN SATURATION: 97 % | SYSTOLIC BLOOD PRESSURE: 138 MMHG | HEART RATE: 72 BPM | BODY MASS INDEX: 48.15 KG/M2

## 2019-10-22 DIAGNOSIS — M79.672 LEFT FOOT PAIN: Primary | ICD-10-CM

## 2019-10-22 DIAGNOSIS — J30.9 CHRONIC ALLERGIC RHINITIS: ICD-10-CM

## 2019-10-22 DIAGNOSIS — N64.4 BREAST PAIN, RIGHT: ICD-10-CM

## 2019-10-22 DIAGNOSIS — M62.830 MUSCLE SPASM OF BACK: ICD-10-CM

## 2019-10-22 DIAGNOSIS — Z23 NEED FOR IMMUNIZATION AGAINST INFLUENZA: ICD-10-CM

## 2019-10-22 DIAGNOSIS — J30.9 ALLERGIC RHINITIS: ICD-10-CM

## 2019-10-22 PROCEDURE — 90686 FLU VACCINE (QUAD) GREATER THAN OR EQUAL TO 3YO PRESERVATIVE FREE IM: ICD-10-PCS | Mod: S$GLB,,, | Performed by: FAMILY MEDICINE

## 2019-10-22 PROCEDURE — 96372 THER/PROPH/DIAG INJ SC/IM: CPT | Mod: 59,S$GLB,, | Performed by: FAMILY MEDICINE

## 2019-10-22 PROCEDURE — 90471 FLU VACCINE (QUAD) GREATER THAN OR EQUAL TO 3YO PRESERVATIVE FREE IM: ICD-10-PCS | Mod: S$GLB,,, | Performed by: FAMILY MEDICINE

## 2019-10-22 PROCEDURE — 99999 PR PBB SHADOW E&M-EST. PATIENT-LVL III: CPT | Mod: PBBFAC,,, | Performed by: FAMILY MEDICINE

## 2019-10-22 PROCEDURE — 3075F PR MOST RECENT SYSTOLIC BLOOD PRESS GE 130-139MM HG: ICD-10-PCS | Mod: CPTII,S$GLB,, | Performed by: FAMILY MEDICINE

## 2019-10-22 PROCEDURE — 90686 IIV4 VACC NO PRSV 0.5 ML IM: CPT | Mod: S$GLB,,, | Performed by: FAMILY MEDICINE

## 2019-10-22 PROCEDURE — 96372 PR INJECTION,THERAP/PROPH/DIAG2ST, IM OR SUBCUT: ICD-10-PCS | Mod: 59,S$GLB,, | Performed by: FAMILY MEDICINE

## 2019-10-22 PROCEDURE — 3008F BODY MASS INDEX DOCD: CPT | Mod: CPTII,S$GLB,, | Performed by: FAMILY MEDICINE

## 2019-10-22 PROCEDURE — 99999 PR PBB SHADOW E&M-EST. PATIENT-LVL III: ICD-10-PCS | Mod: PBBFAC,,, | Performed by: FAMILY MEDICINE

## 2019-10-22 PROCEDURE — 3078F PR MOST RECENT DIASTOLIC BLOOD PRESSURE < 80 MM HG: ICD-10-PCS | Mod: CPTII,S$GLB,, | Performed by: FAMILY MEDICINE

## 2019-10-22 PROCEDURE — 3078F DIAST BP <80 MM HG: CPT | Mod: CPTII,S$GLB,, | Performed by: FAMILY MEDICINE

## 2019-10-22 PROCEDURE — 90471 IMMUNIZATION ADMIN: CPT | Mod: S$GLB,,, | Performed by: FAMILY MEDICINE

## 2019-10-22 PROCEDURE — 99214 PR OFFICE/OUTPT VISIT, EST, LEVL IV, 30-39 MIN: ICD-10-PCS | Mod: 25,S$GLB,, | Performed by: FAMILY MEDICINE

## 2019-10-22 PROCEDURE — 99214 OFFICE O/P EST MOD 30 MIN: CPT | Mod: 25,S$GLB,, | Performed by: FAMILY MEDICINE

## 2019-10-22 PROCEDURE — 3008F PR BODY MASS INDEX (BMI) DOCUMENTED: ICD-10-PCS | Mod: CPTII,S$GLB,, | Performed by: FAMILY MEDICINE

## 2019-10-22 PROCEDURE — 3075F SYST BP GE 130 - 139MM HG: CPT | Mod: CPTII,S$GLB,, | Performed by: FAMILY MEDICINE

## 2019-10-22 RX ORDER — KETOROLAC TROMETHAMINE 30 MG/ML
30 INJECTION, SOLUTION INTRAMUSCULAR; INTRAVENOUS ONCE
Status: COMPLETED | OUTPATIENT
Start: 2019-10-22 | End: 2019-10-22

## 2019-10-22 RX ORDER — NAPROXEN 500 MG/1
500 TABLET ORAL 2 TIMES DAILY PRN
Qty: 30 TABLET | Refills: 0 | Status: SHIPPED | OUTPATIENT
Start: 2019-10-22 | End: 2020-01-16 | Stop reason: SDUPTHER

## 2019-10-22 RX ORDER — FLUTICASONE PROPIONATE 50 MCG
SPRAY, SUSPENSION (ML) NASAL
Qty: 16 G | Refills: 5 | Status: SHIPPED | OUTPATIENT
Start: 2019-10-22 | End: 2020-01-16 | Stop reason: SDUPTHER

## 2019-10-22 RX ORDER — CETIRIZINE HYDROCHLORIDE 10 MG/1
10 TABLET ORAL DAILY
Qty: 90 TABLET | Refills: 3 | Status: SHIPPED | OUTPATIENT
Start: 2019-10-22 | End: 2020-07-15

## 2019-10-22 RX ORDER — TIZANIDINE 4 MG/1
4 TABLET ORAL EVERY 8 HOURS PRN
Qty: 30 TABLET | Refills: 0 | Status: SHIPPED | OUTPATIENT
Start: 2019-10-22 | End: 2019-11-01

## 2019-10-22 RX ADMIN — KETOROLAC TROMETHAMINE 30 MG: 30 INJECTION, SOLUTION INTRAMUSCULAR; INTRAVENOUS at 04:10

## 2019-10-22 NOTE — PROGRESS NOTES
Patient tolerated flu and toradol injections well.  Instructed to wait 15 minutes after injection for monitoring. Verbalized understanding. VIS given

## 2019-10-22 NOTE — PROGRESS NOTES
[facility]  Progress Note    SUBJECTIVE:     Chief Complaint   Patient presents with    Breast Pain     Rigth Breast    Foot Injury     Left foot pain        HPI   Bambi Carrera  is a 59 y.o. female here for multiple complaints.  1. Lower back pain. Started about a week ago. Rates pain as moderate. Certain positions make it worst. Tried otc meds without relief.  2. R breast pain. It was initially b/l but now just R. Started about 2 weeks ago. No nipple discharge, falls, ,trauma.   3. L foot pain. Onset was sudden. Non-radiating, non-traumatic. Admits wears heels.     Review of patient's allergies indicates:  No Known Allergies    Past Medical History:   Diagnosis Date    Borderline diabetic     Depression     Eye injury     fb os     Hypertension     Menopause     Nuclear sclerosis of both eyes 7/1/2019     Past Surgical History:   Procedure Laterality Date    LASIK Bilateral 07/15/2016     Family History   Problem Relation Age of Onset    Cancer Mother     No Known Problems Father     No Known Problems Sister     No Known Problems Brother     No Known Problems Maternal Aunt     No Known Problems Maternal Uncle     No Known Problems Paternal Aunt     No Known Problems Paternal Uncle     No Known Problems Maternal Grandmother     No Known Problems Maternal Grandfather     No Known Problems Paternal Grandmother     No Known Problems Paternal Grandfather     Amblyopia Neg Hx     Blindness Neg Hx     Cataracts Neg Hx     Diabetes Neg Hx     Glaucoma Neg Hx     Hypertension Neg Hx     Macular degeneration Neg Hx     Retinal detachment Neg Hx     Strabismus Neg Hx     Stroke Neg Hx     Thyroid disease Neg Hx      Social History     Tobacco Use    Smoking status: Never Smoker    Smokeless tobacco: Never Used   Substance Use Topics    Alcohol use: No     Alcohol/week: 0.0 standard drinks    Drug use: No        Review of Systems   Constitutional: Negative for chills, fever  and malaise/fatigue.   HENT: Negative.    Respiratory: Negative.  Negative for cough and shortness of breath.    Cardiovascular: Negative.  Negative for chest pain.   Gastrointestinal: Negative.  Negative for abdominal pain, nausea and vomiting.   Genitourinary: Negative.    Musculoskeletal: Positive for back pain and joint pain (L foot pain). Negative for falls.        + R breast pain   Neurological: Negative for weakness and headaches.   All other systems reviewed and are negative.    OBJECTIVE:     Vitals:    10/22/19 1531   BP: 138/66   Pulse: 72   Temp: 98 °F (36.7 °C)     Body mass index is 48.09 kg/m².    Physical Exam   Constitutional: She is oriented to person, place, and time. She appears distressed (mild).   HENT:   Head: Normocephalic and atraumatic.   Nose: Nose normal.   Eyes: Conjunctivae and EOM are normal.   Neck: Normal range of motion. Neck supple.   Cardiovascular: Normal rate, regular rhythm and normal heart sounds. Exam reveals no gallop and no friction rub.   No murmur heard.  Pulmonary/Chest: Effort normal and breath sounds normal. No respiratory distress. She has no wheezes. She has no rales. She exhibits no tenderness.   Abdominal: Soft. Bowel sounds are normal. She exhibits no distension. There is no tenderness. There is no rebound.   Musculoskeletal: She exhibits tenderness (to palpation of paraspinal muscles). She exhibits no edema.   + mild tenderness to L dorsal surface of foot, around 4-5th metatarsal. Normal foot exam otherwise.     -  Deferred breast exam   Neurological: She is alert and oriented to person, place, and time. No cranial nerve deficit.   Skin: Skin is warm. She is not diaphoretic.       Old records were reviewed. Labs and/or images were independently reviewed.    ASSESSMENT     1. Left foot pain    2. Muscle spasm of back    3. Breast pain, right    4. Need for immunization against influenza    5. Chronic allergic rhinitis    6. Allergic rhinitis        PLAN:     Left  foot pain/Muscle spasm of back  -     naproxen (NAPROSYN) 500 MG tablet; Take 1 tablet (500 mg total) by mouth 2 (two) times daily as needed.  Dispense: 30 tablet; Refill: 0  -     tiZANidine (ZANAFLEX) 4 MG tablet; Take 1 tablet (4 mg total) by mouth every 8 (eight) hours as needed.  Dispense: 30 tablet; Refill: 0  -     ketorolac injection 30 mg  -     Patient counseled on good posture and stretching exercises. Continue to place ice packs on affected areas 3-4 times daily. Take medications as prescribed. Instructed patient to call MD if symptoms persist or worsen.    Breast pain, right   -      Normal mammo. Recommend ICE.     Need for immunization against influenza  -     Influenza - Quadrivalent (PF)    Chronic allergic rhinitis  -     fluticasone propionate (FLONASE) 50 mcg/actuation nasal spray; USE 1 SPRAY IN EACH NOSTRIL 2 TIMES DAILY  Dispense: 16 g; Refill: 5    Allergic rhinitis  -     cetirizine (ZYRTEC) 10 MG tablet; Take 1 tablet (10 mg total) by mouth once daily.  Dispense: 90 tablet; Refill: 3      RTC DILMA Whitmore MD  10/22/2019 4:05 PM

## 2019-10-25 ENCOUNTER — TELEPHONE (OUTPATIENT)
Dept: FAMILY MEDICINE | Facility: CLINIC | Age: 59
End: 2019-10-25

## 2019-10-25 NOTE — TELEPHONE ENCOUNTER
Spoke with the pt and she will call back on Monday, if her sore throat don't get any better.  Patient verbalized understandings.

## 2019-10-25 NOTE — TELEPHONE ENCOUNTER
----- Message from Alhaji Martinez sent at 10/25/2019 12:40 PM CDT -----  Contact: Self  Type: Patient Call Back    Who called:Self    What is the request in detail:She states she has an infection and sore throat. She would like to be prescribed something.    Can the clinic reply by [facility]?No    Would the patient rather a call back or a response via [facility]? Callback    Best call back number:[phone]    Additional Information:n/a

## 2020-01-16 ENCOUNTER — HOSPITAL ENCOUNTER (OUTPATIENT)
Dept: RADIOLOGY | Facility: HOSPITAL | Age: 60
Discharge: HOME | End: 2020-01-16
Attending: FAMILY MEDICINE
Payer: COMMERCIAL

## 2020-01-16 ENCOUNTER — OFFICE VISIT (OUTPATIENT)
Dept: FAMILY MEDICINE | Facility: CLINIC | Age: 60
End: 2020-01-16
Payer: COMMERCIAL

## 2020-01-16 VITALS
DIASTOLIC BLOOD PRESSURE: 72 MMHG | HEART RATE: 73 BPM | OXYGEN SATURATION: 98 % | SYSTOLIC BLOOD PRESSURE: 136 MMHG | BODY MASS INDEX: 48.09 KG/M2 | HEIGHT: 65 IN | TEMPERATURE: 98 F

## 2020-01-16 DIAGNOSIS — J30.9 CHRONIC ALLERGIC RHINITIS: ICD-10-CM

## 2020-01-16 DIAGNOSIS — M25.562 ACUTE PAIN OF LEFT KNEE: ICD-10-CM

## 2020-01-16 DIAGNOSIS — Z12.11 ENCOUNTER FOR FIT (FECAL IMMUNOCHEMICAL TEST) SCREENING: ICD-10-CM

## 2020-01-16 DIAGNOSIS — M62.830 MUSCLE SPASM OF BACK: ICD-10-CM

## 2020-01-16 DIAGNOSIS — M94.0 COSTOCHONDRITIS, ACUTE: Primary | ICD-10-CM

## 2020-01-16 DIAGNOSIS — E66.01 MORBID OBESITY: Chronic | ICD-10-CM

## 2020-01-16 DIAGNOSIS — I10 ESSENTIAL HYPERTENSION: Chronic | ICD-10-CM

## 2020-01-16 PROCEDURE — 99214 OFFICE O/P EST MOD 30 MIN: CPT | Mod: 25,S$GLB,, | Performed by: FAMILY MEDICINE

## 2020-01-16 PROCEDURE — 73560 X-RAY EXAM OF KNEE 1 OR 2: CPT | Mod: 26,LT,, | Performed by: RADIOLOGY

## 2020-01-16 PROCEDURE — 3075F SYST BP GE 130 - 139MM HG: CPT | Mod: CPTII,S$GLB,, | Performed by: FAMILY MEDICINE

## 2020-01-16 PROCEDURE — 99999 PR PBB SHADOW E&M-EST. PATIENT-LVL III: CPT | Mod: PBBFAC,,, | Performed by: FAMILY MEDICINE

## 2020-01-16 PROCEDURE — 73560 XR KNEE AP STANDING WITH LEFT LATERAL: ICD-10-PCS | Mod: 26,LT,, | Performed by: RADIOLOGY

## 2020-01-16 PROCEDURE — 3008F PR BODY MASS INDEX (BMI) DOCUMENTED: ICD-10-PCS | Mod: CPTII,S$GLB,, | Performed by: FAMILY MEDICINE

## 2020-01-16 PROCEDURE — 96372 PR INJECTION,THERAP/PROPH/DIAG2ST, IM OR SUBCUT: ICD-10-PCS | Mod: S$GLB,,, | Performed by: FAMILY MEDICINE

## 2020-01-16 PROCEDURE — 73560 X-RAY EXAM OF KNEE 1 OR 2: CPT | Mod: TC,FY,PO,LT

## 2020-01-16 PROCEDURE — 3008F BODY MASS INDEX DOCD: CPT | Mod: CPTII,S$GLB,, | Performed by: FAMILY MEDICINE

## 2020-01-16 PROCEDURE — 3075F PR MOST RECENT SYSTOLIC BLOOD PRESS GE 130-139MM HG: ICD-10-PCS | Mod: CPTII,S$GLB,, | Performed by: FAMILY MEDICINE

## 2020-01-16 PROCEDURE — 96372 THER/PROPH/DIAG INJ SC/IM: CPT | Mod: S$GLB,,, | Performed by: FAMILY MEDICINE

## 2020-01-16 PROCEDURE — 3078F PR MOST RECENT DIASTOLIC BLOOD PRESSURE < 80 MM HG: ICD-10-PCS | Mod: CPTII,S$GLB,, | Performed by: FAMILY MEDICINE

## 2020-01-16 PROCEDURE — 99999 PR PBB SHADOW E&M-EST. PATIENT-LVL III: ICD-10-PCS | Mod: PBBFAC,,, | Performed by: FAMILY MEDICINE

## 2020-01-16 PROCEDURE — 99214 PR OFFICE/OUTPT VISIT, EST, LEVL IV, 30-39 MIN: ICD-10-PCS | Mod: 25,S$GLB,, | Performed by: FAMILY MEDICINE

## 2020-01-16 PROCEDURE — 3078F DIAST BP <80 MM HG: CPT | Mod: CPTII,S$GLB,, | Performed by: FAMILY MEDICINE

## 2020-01-16 RX ORDER — FLUTICASONE PROPIONATE 50 MCG
SPRAY, SUSPENSION (ML) NASAL
Qty: 16 G | Refills: 5 | Status: SHIPPED | OUTPATIENT
Start: 2020-01-16 | End: 2022-01-07

## 2020-01-16 RX ORDER — KETOROLAC TROMETHAMINE 30 MG/ML
30 INJECTION, SOLUTION INTRAMUSCULAR; INTRAVENOUS ONCE
Status: COMPLETED | OUTPATIENT
Start: 2020-01-16 | End: 2020-01-16

## 2020-01-16 RX ORDER — NAPROXEN 500 MG/1
500 TABLET ORAL 2 TIMES DAILY PRN
Qty: 30 TABLET | Refills: 0 | Status: SHIPPED | OUTPATIENT
Start: 2020-01-16 | End: 2020-05-07

## 2020-01-16 RX ADMIN — KETOROLAC TROMETHAMINE 30 MG: 30 INJECTION, SOLUTION INTRAMUSCULAR; INTRAVENOUS at 03:01

## 2020-01-16 NOTE — PROGRESS NOTES
[facility]  Progress Note    SUBJECTIVE:     Chief Complaint   Patient presents with    Breast Pain     right    Leg Pain     left knee on down     Blurred Vision       LUDIVINA Carrera  is a 59 y.o. female here for 2 complaints.  1. Breast/chest wall pain. Started over a week ago. Onset was sudden. Rates pain as mild-moderate. No falls/trauma. Touching her chest wall makes it worst. Hasn't tried anything for it.   2. L knee pain. Hurt most at night when getting ready for bed. Rates pain as moderate. Pain does not radiate.     Review of patient's allergies indicates:  No Known Allergies    Past Medical History:   Diagnosis Date    Borderline diabetic     Depression     Eye injury     fb os     Hypertension     Menopause     Nuclear sclerosis of both eyes 7/1/2019     Past Surgical History:   Procedure Laterality Date    LASIK Bilateral 07/15/2016     Family History   Problem Relation Age of Onset    Cancer Mother     No Known Problems Father     No Known Problems Sister     No Known Problems Brother     No Known Problems Maternal Aunt     No Known Problems Maternal Uncle     No Known Problems Paternal Aunt     No Known Problems Paternal Uncle     No Known Problems Maternal Grandmother     No Known Problems Maternal Grandfather     No Known Problems Paternal Grandmother     No Known Problems Paternal Grandfather     Amblyopia Neg Hx     Blindness Neg Hx     Cataracts Neg Hx     Diabetes Neg Hx     Glaucoma Neg Hx     Hypertension Neg Hx     Macular degeneration Neg Hx     Retinal detachment Neg Hx     Strabismus Neg Hx     Stroke Neg Hx     Thyroid disease Neg Hx      Social History     Tobacco Use    Smoking status: Never Smoker    Smokeless tobacco: Never Used   Substance Use Topics    Alcohol use: No     Alcohol/week: 0.0 standard drinks    Drug use: No        Review of Systems   Constitutional: Negative for chills, fever and malaise/fatigue.   HENT: Negative.     Respiratory: Negative.  Negative for cough and shortness of breath.    Cardiovascular: Positive for chest pain (chest wall).   Gastrointestinal: Negative.  Negative for abdominal pain, nausea and vomiting.   Genitourinary: Negative.    Neurological: Negative for weakness and headaches.   All other systems reviewed and are negative.    OBJECTIVE:     Vitals:    01/16/20 1447   BP: 136/72   Pulse: 73   Temp: 97.9 °F (36.6 °C)     Body mass index is 48.09 kg/m².    Physical Exam   Constitutional: She is oriented to person, place, and time. She appears distressed (mild).   HENT:   Head: Normocephalic and atraumatic.   Nose: Nose normal.   Eyes: Conjunctivae and EOM are normal.   Cardiovascular: Normal rate, regular rhythm and normal heart sounds. Exam reveals no gallop and no friction rub.   No murmur heard.  Pulmonary/Chest: Effort normal and breath sounds normal. No respiratory distress. She has no wheezes. She has no rales. She exhibits tenderness (parasternal tenderness).   Abdominal: Soft. Bowel sounds are normal. She exhibits no distension. There is no tenderness. There is no rebound.   Musculoskeletal: She exhibits tenderness (to palpation of L medial tibial-femoral compartment, decreased joint space. ACL/PCL intact, negative McMurrays sign).   Neurological: She is alert and oriented to person, place, and time.   Skin: Skin is warm. No rash noted. She is not diaphoretic.       Old records were reviewed. Labs and/or images were independently reviewed.    ASSESSMENT     1. Costochondritis, acute    2. Muscle spasm of back    3. Chronic allergic rhinitis    4. Acute pain of left knee    5. Encounter for FIT (fecal immunochemical test) screening    6. Essential hypertension    7. Morbid obesity        PLAN:     Costochondritis, acute  -     ketorolac injection 30 mg  -     Patient counseled on good posture and stretching exercises. Continue to place ice packs on affected areas 3-4 times daily. Take medications as  prescribed. Instructed patient to call MD if symptoms persist or worsen.    Muscle spasm of back  -     naproxen (NAPROSYN) 500 MG tablet; Take 1 tablet (500 mg total) by mouth 2 (two) times daily as needed.  Dispense: 30 tablet; Refill: 0    Chronic allergic rhinitis  -     fluticasone propionate (FLONASE) 50 mcg/actuation nasal spray; USE 1 SPRAY IN EACH NOSTRIL 2 TIMES DAILY  Dispense: 16 g; Refill: 5    Acute pain of left knee  -     X-ray AP Standing Knees with Left Lateral; Future; Expected date: 01/16/2020  -      Consider injection depending on how bad DJD is.     Encounter for FIT (fecal immunochemical test) screening  -     Fecal Immunochemical Test (iFOBT); Future; Expected date: 01/16/2020    Essential hypertension   -     Stable. Continue current regimen.    Morbid obesity   -     Counseled patient about healthy diet, exercise habits, and to increase physical activity.      RTC PRZAIN Whitmore MD  01/16/2020 3:49 PM

## 2020-01-19 ENCOUNTER — LAB VISIT (OUTPATIENT)
Dept: LAB | Facility: HOSPITAL | Age: 60
End: 2020-01-19
Attending: FAMILY MEDICINE
Payer: COMMERCIAL

## 2020-01-19 DIAGNOSIS — Z12.11 ENCOUNTER FOR FIT (FECAL IMMUNOCHEMICAL TEST) SCREENING: ICD-10-CM

## 2020-01-19 PROCEDURE — 82274 ASSAY TEST FOR BLOOD FECAL: CPT

## 2020-01-30 ENCOUNTER — PATIENT OUTREACH (OUTPATIENT)
Dept: ADMINISTRATIVE | Facility: OTHER | Age: 60
End: 2020-01-30

## 2020-01-30 LAB — HEMOCCULT STL QL IA: NEGATIVE

## 2020-01-31 ENCOUNTER — OFFICE VISIT (OUTPATIENT)
Dept: OBSTETRICS AND GYNECOLOGY | Facility: CLINIC | Age: 60
End: 2020-01-31
Payer: COMMERCIAL

## 2020-01-31 VITALS — WEIGHT: 293 LBS | SYSTOLIC BLOOD PRESSURE: 127 MMHG | BODY MASS INDEX: 49.16 KG/M2 | DIASTOLIC BLOOD PRESSURE: 68 MMHG

## 2020-01-31 DIAGNOSIS — Z01.419 WELL WOMAN EXAM WITH ROUTINE GYNECOLOGICAL EXAM: Primary | ICD-10-CM

## 2020-01-31 PROCEDURE — 3074F SYST BP LT 130 MM HG: CPT | Mod: CPTII,S$GLB,, | Performed by: OBSTETRICS & GYNECOLOGY

## 2020-01-31 PROCEDURE — 3078F DIAST BP <80 MM HG: CPT | Mod: CPTII,S$GLB,, | Performed by: OBSTETRICS & GYNECOLOGY

## 2020-01-31 PROCEDURE — 3074F PR MOST RECENT SYSTOLIC BLOOD PRESSURE < 130 MM HG: ICD-10-PCS | Mod: CPTII,S$GLB,, | Performed by: OBSTETRICS & GYNECOLOGY

## 2020-01-31 PROCEDURE — 3078F PR MOST RECENT DIASTOLIC BLOOD PRESSURE < 80 MM HG: ICD-10-PCS | Mod: CPTII,S$GLB,, | Performed by: OBSTETRICS & GYNECOLOGY

## 2020-01-31 PROCEDURE — 99396 PR PREVENTIVE VISIT,EST,40-64: ICD-10-PCS | Mod: S$GLB,,, | Performed by: OBSTETRICS & GYNECOLOGY

## 2020-01-31 PROCEDURE — 99999 PR PBB SHADOW E&M-EST. PATIENT-LVL III: ICD-10-PCS | Mod: PBBFAC,,, | Performed by: OBSTETRICS & GYNECOLOGY

## 2020-01-31 PROCEDURE — 99396 PREV VISIT EST AGE 40-64: CPT | Mod: S$GLB,,, | Performed by: OBSTETRICS & GYNECOLOGY

## 2020-01-31 PROCEDURE — 99999 PR PBB SHADOW E&M-EST. PATIENT-LVL III: CPT | Mod: PBBFAC,,, | Performed by: OBSTETRICS & GYNECOLOGY

## 2020-01-31 NOTE — PROGRESS NOTES
History & Physical  Gynecology      SUBJECTIVE:     Chief Complaint: Well Woman       History of Present Illness:  Annual Exam-Premenopausal  Ms. Carrera is a 58 y/o female who presents for annual exam. The patient has no complaints today. The patient is sexually active. GYN screening history: last pap: approximate date  and was normal and last mammogram: approximate date  and was normal. The patient wears seatbelts: yes. The patient participates in regular exercise: no. Has the patient ever been transfused or tattooed?: no. The patient reports that there is not domestic violence in her life.      Review of patient's allergies indicates:  No Known Allergies    Past Medical History:   Diagnosis Date    Borderline diabetic     Depression     Eye injury     fb os     Hypertension     Menopause     Nuclear sclerosis of both eyes 2019     Past Surgical History:   Procedure Laterality Date    LASIK Bilateral 07/15/2016     OB History        3    Para   3    Term   3       0    AB   0    Living   3       SAB   0    TAB   0    Ectopic   0    Multiple   0    Live Births                   Family History   Problem Relation Age of Onset    Cancer Mother     No Known Problems Father     No Known Problems Sister     No Known Problems Brother     No Known Problems Maternal Aunt     No Known Problems Maternal Uncle     No Known Problems Paternal Aunt     No Known Problems Paternal Uncle     No Known Problems Maternal Grandmother     No Known Problems Maternal Grandfather     No Known Problems Paternal Grandmother     No Known Problems Paternal Grandfather     Amblyopia Neg Hx     Blindness Neg Hx     Cataracts Neg Hx     Diabetes Neg Hx     Glaucoma Neg Hx     Hypertension Neg Hx     Macular degeneration Neg Hx     Retinal detachment Neg Hx     Strabismus Neg Hx     Stroke Neg Hx     Thyroid disease Neg Hx      Social History     Tobacco Use    Smoking status: Never Smoker     Smokeless tobacco: Never Used   Substance Use Topics    Alcohol use: No     Alcohol/week: 0.0 standard drinks    Drug use: No       Current Outpatient Medications   Medication Sig    cetirizine (ZYRTEC) 10 MG tablet Take 1 tablet (10 mg total) by mouth once daily.    ergocalciferol (VITAMIN D2) 50,000 unit Cap Take 1 capsule (50,000 Units total) by mouth every 7 days.    fluticasone propionate (FLONASE) 50 mcg/actuation nasal spray USE 1 SPRAY IN EACH NOSTRIL 2 TIMES DAILY    hydroCHLOROthiazide (HYDRODIURIL) 25 MG tablet Take 1 tablet (25 mg total) by mouth once daily.    naproxen (NAPROSYN) 500 MG tablet Take 1 tablet (500 mg total) by mouth 2 (two) times daily as needed.    estradiol (ESTRACE) 0.01 % (0.1 mg/gram) vaginal cream Place 3 g vaginally once daily. (Patient not taking: Reported on 10/22/2019)    estrogen, conjugated,-medroxyprogesterone 0.625-2.5mg (PREMPRO) 0.625-2.5 mg per tablet Take 1 tablet by mouth once daily. (Patient not taking: Reported on 1/16/2020)    triamcinolone acetonide 0.1% (KENALOG) 0.1 % cream Apply topically 2 (two) times daily. (Patient not taking: Reported on 1/16/2020)     No current facility-administered medications for this visit.        Review of Systems:  Review of Systems   Constitutional: Negative for chills and fever.   Respiratory: Negative for cough and wheezing.    Cardiovascular: Negative for chest pain and palpitations.   Gastrointestinal: Negative for abdominal pain, nausea and vomiting.   Genitourinary: Negative for dysuria, frequency, hematuria, pelvic pain, vaginal bleeding, vaginal discharge and vaginal pain.        OBJECTIVE:     Physical Exam:  Physical Exam   Constitutional: She is oriented to person, place, and time. She appears well-developed and well-nourished.   Cardiovascular: Normal rate.   Pulmonary/Chest: Effort normal. No respiratory distress. No breast swelling, tenderness, discharge or bleeding. Breasts are symmetrical.   Abdominal: Soft.  She exhibits no distension. There is no tenderness.   Genitourinary: No breast swelling, tenderness, discharge or bleeding. No vaginal discharge found.   Genitourinary Comments: Uterus difficult to palpate    Neurological: She is alert and oriented to person, place, and time.   Skin: Skin is warm and dry.   Psychiatric: She has a normal mood and affect.   Nursing note and vitals reviewed.      ASSESSMENT:       ICD-10-CM ICD-9-CM    1. Well woman exam with routine gynecological exam Z01.419 V72.31 Mammo Digital Screening Bilat      Plan:      Bambi was seen today for well woman.    Diagnoses and all orders for this visit:    Well woman exam with routine gynecological exam  -     Mammo Digital Screening Bilat; Future  - FOBT normal on 01/27/2020  - Pap smear normal in 2017    Orders Placed This Encounter   Procedures    Mammo Digital Screening Bilat       Follow up in about 1 year (around 1/31/2021) for [employer].    Counseling time: 15 minutes    Dann Avila

## 2020-01-31 NOTE — PATIENT INSTRUCTIONS
Prevention Guidelines, Women Ages 50 to 64  Screening tests and vaccines are an important part of managing your health. Health counseling is essential, too. Below are guidelines for these, for women ages 50 to 64. Talk with your healthcare provider to make sure youre up to date on what you need.  Screening Who needs it How often   Type 2 diabetes or prediabetes All adults beginning at age 45 and adults without symptoms at any age who are overweight or obese and have 1 or more additional risk factors for diabetes. At  least every 3 years   Alcohol misuse All women in this age group At routine exams   Blood pressure All women in this age group Every 2 years if your blood pressure is less than 120/80 mm Hg; yearly if your systolic blood pressure is 120 to 139 mm Hg, or your diastolic blood pressure reading is 80 to 89 mm Hg   Breast cancer All women in this age group Yearly mammogram and clinical breast exam1   Cervical cancer All women in this age group, except women who have had a complete hysterectomy Pap test every 3 years or Pap test with human papillomavirus (HPV) test every 5 years   Chlamydia Women at increased risk for infection At routine exams   Colorectal cancer All women in this age group Flexible sigmoidoscopy every 5 years, or colonoscopy every 10 years, or double-contrast barium enema every 5 years; yearly fecal occult blood test or fecal immunochemical test; or a stool DNA test as often as your health care provider advises; talk with your health care provider about which tests are best for you   Depression All women in this age group At routine exams   Gonorrhea Sexually active women at increased risk for infection At routine exams   Hepatitis C Anyone at increased risk; 1 time for those born between 1945 and 1965 At routine exams   High cholesterol or triglycerides All women in this age group who are at risk for coronary artery disease At least every 5 years   HIV All women At routine exams   Lung  cancer Adults age 55 to 80 who have smoked Yearly screening in smokers with 30 pack-year history of smoking or who quit within 15 years   Obesity All women in this age group At routine exams   Osteoporosis Women who are postmenopausal Ask your healthcare provider   Syphilis Women at increased risk for infection - talk with your healthcare provider At routine exams   Tuberculosis Women at increased risk for infection - talk with your healthcare provider Ask your healthcare provider   Vision All women in this age group Ask your healthcare provider   Vaccine Who needs it How often   Chickenpox (varicella) All women in this age group who have no record of this infection or vaccine 2 doses; the second dose should be given at least 4 weeks after the first dose   Hepatitis A Women at increased risk for infection - talk with your healthcare provider 2 doses given at least 6 months apart   Hepatitis B Women at increased risk for infection - talk with your healthcare provider 3 doses over 6 months; second dose should be given 1 month after the first dose; the third dose should be given at least 2 months after the second dose and at least 4 months after the first dose   Haemophilus influenzaeType B (HIB) Women at increased risk for infection - talk with your healthcare provider 1 to 3 doses   Influenza (flu) All women in this age group Once a year   Measles, mumps, rubella (MMR) Women in this age group through their late 50s who have no record of these infections or vaccines 1 dose   Meningococcal Women at increased risk for infection - talk with your healthcare provider 1 or more doses   Pneumococcal conjugate vaccine (PCV13) and pneumococcal polysaccharide vaccine (PPSV23) Women at increased risk for infection - talk with your healthcare provider PCV13: 1 dose ages 19 to 65 (protects against 13 types of pneumococcal bacteria)  PPSV23: 1 to 2 doses through age 64, or 1 dose at 65 or older (protects against 23 types of  pneumococcal bacteria)   Tetanus/diphtheria/pertussis (Td/Tdap) booster All women in this age group Td every 10 years, or a one-time dose of Tdap instead of a Td booster after age 18, then Td every 10 years   Zoster All women ages 60 and older 1 dose   Counseling Who needs it How often   BRCA gene mutation testing for breast and ovarian cancer susceptibility Women with increased risk for having gene mutation When your risk is known   Breast cancer and chemoprevention Women at high risk for breast cancer When your risk is known   Diet and exercise Women who are overweight or obese When diagnosed, and then at routine exams   Sexually transmitted infection prevention Women at increased risk for infection - talk with your healthcare provider At routine exams   Use of daily aspirin Women ages 55 and up in this age group who are at risk for cardiovascular health problems such as stroke When your risk is known   Use of tobacco and the health effects it can cause All women in this age group Every exam   1[employer]  Date Last Reviewed: 1/26/2016  © 9909-9726 [employer]. [address] [place] [address], [place] [address]. All rights reserved. This information is not intended as a substitute for professional medical care. Always follow your healthcare professional's instructions.

## 2020-02-27 ENCOUNTER — HOSPITAL ENCOUNTER (OUTPATIENT)
Dept: RADIOLOGY | Facility: HOSPITAL | Age: 60
Discharge: HOME | End: 2020-02-27
Attending: OBSTETRICS & GYNECOLOGY
Payer: COMMERCIAL

## 2020-02-27 VITALS — BODY MASS INDEX: 48.82 KG/M2 | WEIGHT: 293 LBS | HEIGHT: 65 IN

## 2020-02-27 DIAGNOSIS — Z12.31 BREAST CANCER SCREENING BY MAMMOGRAM: ICD-10-CM

## 2020-02-27 DIAGNOSIS — Z01.419 WELL WOMAN EXAM WITH ROUTINE GYNECOLOGICAL EXAM: ICD-10-CM

## 2020-02-27 PROCEDURE — 77067 SCR MAMMO BI INCL CAD: CPT | Mod: 26,,, | Performed by: RADIOLOGY

## 2020-02-27 PROCEDURE — 77063 MAMMO DIGITAL SCREENING BILAT WITH TOMOSYNTHESIS_CAD: ICD-10-PCS | Mod: 26,,, | Performed by: RADIOLOGY

## 2020-02-27 PROCEDURE — 77067 MAMMO DIGITAL SCREENING BILAT WITH TOMOSYNTHESIS_CAD: ICD-10-PCS | Mod: 26,,, | Performed by: RADIOLOGY

## 2020-02-27 PROCEDURE — 77067 SCR MAMMO BI INCL CAD: CPT | Mod: TC

## 2020-02-27 PROCEDURE — 77063 BREAST TOMOSYNTHESIS BI: CPT | Mod: 26,,, | Performed by: RADIOLOGY

## 2020-03-04 ENCOUNTER — OFFICE VISIT (OUTPATIENT)
Dept: URGENT CARE | Facility: CLINIC | Age: 60
End: 2020-03-04
Payer: COMMERCIAL

## 2020-03-04 VITALS
OXYGEN SATURATION: 99 % | TEMPERATURE: 97 F | HEIGHT: 65 IN | HEART RATE: 84 BPM | BODY MASS INDEX: 48.82 KG/M2 | DIASTOLIC BLOOD PRESSURE: 66 MMHG | WEIGHT: 293 LBS | RESPIRATION RATE: 18 BRPM | SYSTOLIC BLOOD PRESSURE: 133 MMHG

## 2020-03-04 DIAGNOSIS — M25.512 ACUTE PAIN OF LEFT SHOULDER: Primary | ICD-10-CM

## 2020-03-04 DIAGNOSIS — S46.912A STRAIN OF LEFT SHOULDER, INITIAL ENCOUNTER: ICD-10-CM

## 2020-03-04 PROCEDURE — 96372 PR INJECTION,THERAP/PROPH/DIAG2ST, IM OR SUBCUT: ICD-10-PCS | Mod: S$GLB,,, | Performed by: PHYSICIAN ASSISTANT

## 2020-03-04 PROCEDURE — 99214 OFFICE O/P EST MOD 30 MIN: CPT | Mod: 25,S$GLB,, | Performed by: PHYSICIAN ASSISTANT

## 2020-03-04 PROCEDURE — 99214 PR OFFICE/OUTPT VISIT, EST, LEVL IV, 30-39 MIN: ICD-10-PCS | Mod: 25,S$GLB,, | Performed by: PHYSICIAN ASSISTANT

## 2020-03-04 PROCEDURE — 73030 X-RAY EXAM OF SHOULDER: CPT | Mod: LT,S$GLB,, | Performed by: RADIOLOGY

## 2020-03-04 PROCEDURE — 73030 XR SHOULDER TRAUMA 3 VIEW LEFT: ICD-10-PCS | Mod: LT,S$GLB,, | Performed by: RADIOLOGY

## 2020-03-04 PROCEDURE — 96372 THER/PROPH/DIAG INJ SC/IM: CPT | Mod: S$GLB,,, | Performed by: PHYSICIAN ASSISTANT

## 2020-03-04 RX ORDER — DICLOFENAC SODIUM 10 MG/G
4 GEL TOPICAL 4 TIMES DAILY
Qty: 100 G | Refills: 0 | Status: SHIPPED | OUTPATIENT
Start: 2020-03-04 | End: 2021-12-10 | Stop reason: SDUPTHER

## 2020-03-04 RX ORDER — MELOXICAM 15 MG/1
15 TABLET ORAL DAILY
Qty: 21 TABLET | Refills: 0 | Status: SHIPPED | OUTPATIENT
Start: 2020-03-04 | End: 2021-02-15 | Stop reason: ALTCHOICE

## 2020-03-04 RX ORDER — KETOROLAC TROMETHAMINE 30 MG/ML
60 INJECTION, SOLUTION INTRAMUSCULAR; INTRAVENOUS
Status: COMPLETED | OUTPATIENT
Start: 2020-03-04 | End: 2020-03-04

## 2020-03-04 RX ADMIN — KETOROLAC TROMETHAMINE 60 MG: 30 INJECTION, SOLUTION INTRAMUSCULAR; INTRAVENOUS at 02:03

## 2020-03-04 NOTE — PROGRESS NOTES
"Subjective:       Patient ID: Bambi Carrera is a 59 y.o. female.    Vitals:  height is 5' 5" (1.651 m) and weight is 136.1 kg (300 lb). Her temperature is 96.6 °F (35.9 °C). Her blood pressure is 133/66 and her pulse is 84. Her respiration is 18 and oxygen saturation is 99%.     Chief Complaint: Arm Pain    Left shoulder pain. No trauma noted. She states she has had left shoulder and left arm pain for two weeks now that is constant.  The pain is described as an ache and throb.  Denies any numbness or paresthesias in her hands.  Denies any warmth or redness of the shoulder itself.      Arm Pain    The incident occurred more than 1 week ago. There was no injury mechanism. The pain is present in the left shoulder. The pain is at a severity of 9/10. The pain is moderate. Pertinent negatives include no chest pain. The symptoms are aggravated by lifting and movement. She has tried acetaminophen, immobilization, NSAIDs and rest for the symptoms. The treatment provided no relief.       Constitution: Negative for chills, fatigue and fever.   HENT: Negative for congestion and sore throat.    Neck: Negative for painful lymph nodes.   Cardiovascular: Negative for chest pain and leg swelling.   Eyes: Negative for double vision and blurred vision.   Respiratory: Negative for cough and shortness of breath.    Gastrointestinal: Negative for nausea, vomiting and diarrhea.   Genitourinary: Negative for dysuria, frequency, urgency and history of kidney stones.   Musculoskeletal: Positive for pain. Negative for joint pain, joint swelling, muscle cramps and muscle ache.   Skin: Negative for color change, pale, rash and bruising.   Allergic/Immunologic: Negative for seasonal allergies.   Neurological: Negative for dizziness, history of vertigo, light-headedness, passing out and headaches.   Hematologic/Lymphatic: Negative for swollen lymph nodes.   Psychiatric/Behavioral: Negative for nervous/anxious, sleep disturbance and " depression. The patient is not nervous/anxious.        Objective:      Physical Exam   Constitutional: She is oriented to person, place, and time. Vital signs are normal. She appears well-developed and well-nourished. She is active and cooperative. No distress.   HENT:   Head: Normocephalic and atraumatic.   Nose: Nose normal.   Mouth/Throat: Oropharynx is clear and moist and mucous membranes are normal.   Eyes: Conjunctivae and lids are normal.   Neck: Trachea normal, normal range of motion, full passive range of motion without pain and phonation normal. Neck supple.   Cardiovascular: Normal rate, regular rhythm, normal heart sounds, intact distal pulses and normal pulses. Exam reveals no gallop and no friction rub.   No murmur heard.  Pulmonary/Chest: Effort normal and breath sounds normal. No stridor. No respiratory distress. She has no wheezes. She has no rales.   Abdominal: Soft. Normal appearance and bowel sounds are normal. She exhibits no abdominal bruit, no pulsatile midline mass and no mass.   Musculoskeletal: She exhibits tenderness. She exhibits no edema or deformity.        Left shoulder: She exhibits tenderness and pain. She exhibits normal range of motion, no bony tenderness, no swelling, no effusion, no crepitus, no deformity, no laceration, no spasm, normal pulse and normal strength.        Arms:  Neurological: She is alert and oriented to person, place, and time. She has normal strength and normal reflexes. No sensory deficit.   Skin: Skin is warm, dry, intact and not diaphoretic.   Psychiatric: She has a normal mood and affect. Her speech is normal and behavior is normal. Judgment and thought content normal. Cognition and memory are normal.   Nursing note and vitals reviewed.        LEFT SHOULDER XR:    FINDINGS:  No fracture or dislocation.  No bone destruction identified      Impression       See above       Assessment:       1. Acute pain of left shoulder    2. Strain of left shoulder, initial  encounter        Plan:     reviewed x-ray results with patient discussing that there is no evidence of fracture, dislocation or DJD.  Based on patient's exam with tenderness to palpation although range of motion preserved patient requires further evaluation by Orthopedics.  Toradol injection given at time of visit and patient tolerated well.  Discussed alternate between ice and heat.  Voltaren gel prescribed for resolution of inflammation as well as meloxicam.    Acute pain of left shoulder  -     XR SHOULDER TRAUMA 3 VIEW LEFT; Future; Expected date: 03/04/2020  -     ketorolac injection 60 mg    Strain of left shoulder, initial encounter  -     diclofenac sodium (VOLTAREN) 1 % Gel; Apply 4 g topically 4 (four) times daily.  Dispense: 100 g; Refill: 0  -     meloxicam (MOBIC) 15 MG tablet; Take 1 tablet (15 mg total) by mouth once daily.  Dispense: 21 tablet; Refill: 0  -     Ambulatory referral/consult to Orthopedics      Patient Instructions     Shoulder Pain with Uncertain Cause  Shoulder pain can have many causes. Pain often comes from the structures that surround the shoulder joint. These are the joint capsule, ligaments, tendons, muscles, and bursa. Pain can also come from cartilage in the joint. Cartilage can become worn out or injured. Its important to know whats causing your pain so the healthcare provider can use the correct treatment. But sometimes its difficult to find the exact cause of shoulder pain. You may need to see a specialist (orthopedist). You may also need special tests such as a CT scan or MRI. The provider may need to use special tools to look inside the joint (arthroscopy).  Shoulder pain can be treated with a sling or a device that keeps your shoulder from moving. You can take an anti-inflammatory medicine such as ibuprofen to ease pain. You may need to do special shoulder exercises. Follow up with a specialist if the pain is severe or doesnt go away after a few weeks.  Home  care  Follow these tips when caring for yourself at home:  · If a sling was given to you, leave it in place for the time advised by your healthcare provider. If you arent sure how long to wear it, ask for advice. If the sling becomes loose, adjust it so that your forearm is level with the ground. Your shoulder should feel well supported.  · Put an ice pack on the injured area for 20 minutes every 1 to 2 hours the first day. You can make your own ice pack by putting ice cubes in a plastic bag. Wrap the bag in a thin towel. Continue with ice packs 3 to 4 times a day for the next 2 days. Then use the pack as needed to ease pain and swelling.  · You may use acetaminophen or ibuprofen to control pain, unless another pain medicine was prescribed. If you have chronic liver or kidney disease, talk with your healthcare provider before using these medicines. Also talk with your provider if youve ever had a stomach ulcer or GI bleeding.  · Shoulder pain may seem worse at night, when there is less to distract you from the pain. If you sleep on your side, try to keep weight off your painful shoulder. Propping pillows behind you may stop you from rolling over onto that shoulder during sleep.   · Shoulder and elbow joints can become stiff if left in a sling for too long. You should start range of motion exercises about 7 to 10 days after the injury. Talk with your provider to find out what type of exercises to do and how soon to start.  · You can take the sling off to shower or bathe.  Follow-up care  Follow up with your healthcare provider if you dont start to get better in the next 5 days.  When to seek medical advice  Call your healthcare provider right away if any of these occur:  · Pain or swelling gets worse or continues for more than a few days  · Your hand or fingers become cold, blue, numb, or tingly  · Large amount of bruising on your shoulder or upper arm  · Difficulty moving your hand or fingers  · Weakness in your  hand or fingers  · Your shoulder becomes stiff  · It feels like your shoulder is popping out  · You are less able to do your daily activities  Date Last Reviewed: 10/1/2016  © 5738-8916 [employer]. [address] [place] [address], [place] [address]. All rights reserved. This information is not intended as a substitute for professional medical care. Always follow your healthcare professional's instructions.      You will receive a call from [facility] in the next 2-3 days regarding the scheduling of the Specialist appointment.  Should you not receive a call, you may call the patient  at [phone].     Please follow up with your Primary care provider within 2-5 days if your signs and symptoms have not resolved or worsen.     If your condition worsens or fails to improve we recommend that you receive another evaluation at the emergency room immediately or contact your primary medical clinic to discuss your concerns.   You must understand that you have received an Urgent Care treatment only and that you may be released before all of your medical problems are known or treated. You, the patient, will arrange for follow up care as instructed.     RED FLAGS/WARNING SYMPTOMS DISCUSSED WITH PATIENT THAT WOULD WARRANT EMERGENT MEDICAL ATTENTION. PATIENT VERBALIZED UNDERSTANDING.

## 2020-03-04 NOTE — PATIENT INSTRUCTIONS
Shoulder Pain with Uncertain Cause  Shoulder pain can have many causes. Pain often comes from the structures that surround the shoulder joint. These are the joint capsule, ligaments, tendons, muscles, and bursa. Pain can also come from cartilage in the joint. Cartilage can become worn out or injured. Its important to know whats causing your pain so the healthcare provider can use the correct treatment. But sometimes its difficult to find the exact cause of shoulder pain. You may need to see a specialist (orthopedist). You may also need special tests such as a CT scan or MRI. The provider may need to use special tools to look inside the joint (arthroscopy).  Shoulder pain can be treated with a sling or a device that keeps your shoulder from moving. You can take an anti-inflammatory medicine such as ibuprofen to ease pain. You may need to do special shoulder exercises. Follow up with a specialist if the pain is severe or doesnt go away after a few weeks.  Home care  Follow these tips when caring for yourself at home:  · If a sling was given to you, leave it in place for the time advised by your healthcare provider. If you arent sure how long to wear it, ask for advice. If the sling becomes loose, adjust it so that your forearm is level with the ground. Your shoulder should feel well supported.  · Put an ice pack on the injured area for 20 minutes every 1 to 2 hours the first day. You can make your own ice pack by putting ice cubes in a plastic bag. Wrap the bag in a thin towel. Continue with ice packs 3 to 4 times a day for the next 2 days. Then use the pack as needed to ease pain and swelling.  · You may use acetaminophen or ibuprofen to control pain, unless another pain medicine was prescribed. If you have chronic liver or kidney disease, talk with your healthcare provider before using these medicines. Also talk with your provider if youve ever had a stomach ulcer or GI bleeding.  · Shoulder pain may seem  worse at night, when there is less to distract you from the pain. If you sleep on your side, try to keep weight off your painful shoulder. Propping pillows behind you may stop you from rolling over onto that shoulder during sleep.   · Shoulder and elbow joints can become stiff if left in a sling for too long. You should start range of motion exercises about 7 to 10 days after the injury. Talk with your provider to find out what type of exercises to do and how soon to start.  · You can take the sling off to shower or bathe.  Follow-up care  Follow up with your healthcare provider if you dont start to get better in the next 5 days.  When to seek medical advice  Call your healthcare provider right away if any of these occur:  · Pain or swelling gets worse or continues for more than a few days  · Your hand or fingers become cold, blue, numb, or tingly  · Large amount of bruising on your shoulder or upper arm  · Difficulty moving your hand or fingers  · Weakness in your hand or fingers  · Your shoulder becomes stiff  · It feels like your shoulder is popping out  · You are less able to do your daily activities  Date Last Reviewed: 10/1/2016  © 5229-6156 [employer]. [address] [place] [address]. All rights reserved. This information is not intended as a substitute for professional medical care. Always follow your healthcare professional's instructions.      You will receive a call from [facility] in the next 2-3 days regarding the scheduling of the Specialist appointment.  Should you not receive a call, you may call the patient  at [phone].     Please follow up with your Primary care provider within 2-5 days if your signs and symptoms have not resolved or worsen.     If your condition worsens or fails to improve we recommend that you receive another evaluation at the emergency room immediately or contact your primary medical clinic to discuss your concerns.   You must understand that you  have received an Urgent Care treatment only and that you may be released before all of your medical problems are known or treated. You, the patient, will arrange for follow up care as instructed.     RED FLAGS/WARNING SYMPTOMS DISCUSSED WITH PATIENT THAT WOULD WARRANT EMERGENT MEDICAL ATTENTION. PATIENT VERBALIZED UNDERSTANDING.

## 2020-04-07 ENCOUNTER — OFFICE VISIT (OUTPATIENT)
Dept: URGENT CARE | Facility: CLINIC | Age: 60
End: 2020-04-07
Payer: COMMERCIAL

## 2020-04-07 ENCOUNTER — TELEPHONE (OUTPATIENT)
Dept: FAMILY MEDICINE | Facility: CLINIC | Age: 60
End: 2020-04-07

## 2020-04-07 VITALS
HEART RATE: 89 BPM | WEIGHT: 293 LBS | BODY MASS INDEX: 48.82 KG/M2 | DIASTOLIC BLOOD PRESSURE: 90 MMHG | SYSTOLIC BLOOD PRESSURE: 142 MMHG | OXYGEN SATURATION: 97 % | TEMPERATURE: 98 F | HEIGHT: 65 IN

## 2020-04-07 DIAGNOSIS — R42 DIZZINESS: Primary | ICD-10-CM

## 2020-04-07 LAB
BILIRUB UR QL STRIP: POSITIVE
GLUCOSE SERPL-MCNC: 82 MG/DL (ref 70–110)
GLUCOSE UR QL STRIP: NEGATIVE
KETONES UR QL STRIP: NEGATIVE
LEUKOCYTE ESTERASE UR QL STRIP: NEGATIVE
PH, POC UA: 6 (ref 5–8)
POC BLOOD, URINE: NEGATIVE
POC NITRATES, URINE: NEGATIVE
PROT UR QL STRIP: NEGATIVE
SP GR UR STRIP: 1 (ref 1–1.03)
UROBILINOGEN UR STRIP-ACNC: ABNORMAL (ref 0.1–1.1)

## 2020-04-07 PROCEDURE — 81003 POCT URINALYSIS, DIPSTICK, AUTOMATED, W/O SCOPE: ICD-10-PCS | Mod: QW,S$GLB,, | Performed by: PHYSICIAN ASSISTANT

## 2020-04-07 PROCEDURE — 93010 ELECTROCARDIOGRAM REPORT: CPT | Mod: S$GLB,,, | Performed by: INTERNAL MEDICINE

## 2020-04-07 PROCEDURE — 81003 URINALYSIS AUTO W/O SCOPE: CPT | Mod: QW,S$GLB,, | Performed by: PHYSICIAN ASSISTANT

## 2020-04-07 PROCEDURE — 93005 ELECTROCARDIOGRAM TRACING: CPT | Mod: S$GLB,,, | Performed by: PHYSICIAN ASSISTANT

## 2020-04-07 PROCEDURE — 99214 OFFICE O/P EST MOD 30 MIN: CPT | Mod: 25,S$GLB,, | Performed by: PHYSICIAN ASSISTANT

## 2020-04-07 PROCEDURE — 93005 EKG 12-LEAD: ICD-10-PCS | Mod: S$GLB,,, | Performed by: PHYSICIAN ASSISTANT

## 2020-04-07 PROCEDURE — 93010 EKG 12-LEAD: ICD-10-PCS | Mod: S$GLB,,, | Performed by: INTERNAL MEDICINE

## 2020-04-07 PROCEDURE — 99214 PR OFFICE/OUTPT VISIT, EST, LEVL IV, 30-39 MIN: ICD-10-PCS | Mod: 25,S$GLB,, | Performed by: PHYSICIAN ASSISTANT

## 2020-04-07 PROCEDURE — 82962 GLUCOSE BLOOD TEST: CPT | Mod: S$GLB,,, | Performed by: PHYSICIAN ASSISTANT

## 2020-04-07 PROCEDURE — 82962 POCT GLUCOSE, HAND-HELD DEVICE: ICD-10-PCS | Mod: S$GLB,,, | Performed by: PHYSICIAN ASSISTANT

## 2020-04-07 RX ORDER — MECLIZINE HYDROCHLORIDE 25 MG/1
25 TABLET ORAL 3 TIMES DAILY PRN
Qty: 15 TABLET | Refills: 0 | Status: SHIPPED | OUTPATIENT
Start: 2020-04-07 | End: 2023-10-16

## 2020-04-07 RX ORDER — MULTIVITAMIN
1 TABLET ORAL DAILY
COMMUNITY

## 2020-04-07 NOTE — PROGRESS NOTES
"Subjective:       Patient ID: Bambi Carrera is a 60 y.o. female.    Vitals:  height is 5' 5" (1.651 m) and weight is 136.1 kg (300 lb). Her temperature is 98 °F (36.7 °C). Her pulse is 89. Her oxygen saturation is 97%.     Chief Complaint: Dizziness    Pt states having slight headache. Took tylenol today for symptoms without any improvement. Pt states passing out Sunday and becoming light headed Monday. No nausea, fever. Pt states dizziness as constant. Vision is blurred     Dizziness:   Chronicity:  New  Onset:  In the past 7 days  Frequency:  Constantly  Dizziness characteristics:  Off-balance   Associated symptoms: headaches, visual disturbances, light-headedness and syncope.no hearing loss, no ear congestion, no fever, no nausea, no vomiting, no weakness and no chest pain.  Improvements on treatment:  No relief      Constitution: Negative for chills, fatigue and fever.   HENT: Negative for hearing loss, congestion and sore throat.    Neck: Negative for painful lymph nodes.   Cardiovascular: Positive for passing out. Negative for chest pain and leg swelling.   Eyes: Negative for double vision and blurred vision.   Respiratory: Negative for cough and shortness of breath.    Gastrointestinal: Negative for nausea, vomiting and diarrhea.   Genitourinary: Negative for dysuria, frequency, urgency and history of kidney stones.   Musculoskeletal: Negative for joint pain, joint swelling, muscle cramps and muscle ache.   Skin: Negative for color change, pale, rash and bruising.   Allergic/Immunologic: Negative for seasonal allergies.   Neurological: Positive for dizziness, light-headedness and headaches. Negative for history of vertigo and passing out.   Hematologic/Lymphatic: Negative for swollen lymph nodes.   Psychiatric/Behavioral: Negative for nervous/anxious, sleep disturbance and depression. The patient is not nervous/anxious.        Objective:      Physical Exam      Assessment:       No diagnosis found.  "   Plan:         There are no diagnoses linked to this encounter.

## 2020-04-07 NOTE — PROGRESS NOTES
"Subjective:       Patient ID: Bambi Carrera is a 60 y.o. female.    Vitals:  height is 5' 5" (1.651 m) and weight is 136.1 kg (300 lb). Her temperature is 98 °F (36.7 °C). Her blood pressure is 142/90 (abnormal) and her pulse is 89. Her oxygen saturation is 97%.     Chief Complaint: Dizziness    Nikki is a 59yo female with a PMHx of HTN, Prediabetes, menopausal states (HRT) who presents to the urgent care for evaluation of dizziness x 2 days. She describes the dizziness as "feeling off balance." States the feeling "comes and goes" and is most often associated with movement (ie. Going from sitting to standing). Associated symptoms include "being tired."  States BP is well controlled with HCTZ. She denies loss of consciousness, falls, headaches, changes in vision, ear pain,  neck pain/stiffness, confusion, chest pain, palpitations, shortness of breath, abdominal pain, N/V/D, numbness/tingling in extremities, lower extremity swelling, generalized weakness, slurred speech. States she has not yet taken any medication for symptoms. Denies any known cardiac history besides HTN. Denies being a smoker.     Dizziness:   Chronicity:  New  Onset: Sunday (2 days)  Frequency:  Every few hours  Severity:  Moderate  Duration: brief episodes   Dizziness characteristics:  Off-balance  Frequency of Spells:  Dailyno ear congestion, no ear pain, no fever, no headaches, no tinnitus, no nausea, no vomiting, no diaphoresis, no weakness, no visual disturbances, no light-headedness, no syncope, no palpitations, no facial weakness, no slurred speech, no numbness in extremities and no chest pain.      Constitution: Positive for fatigue. Negative for chills, sweating and fever.   HENT: Negative for ear pain, tinnitus, congestion, postnasal drip and sore throat.    Neck: Negative for neck pain and neck stiffness.   Cardiovascular: Negative for chest pain, leg swelling, palpitations, sob on exertion and passing out.   Eyes: Negative for " photophobia, vision loss, double vision and blurred vision.   Respiratory: Negative for chest tightness, cough, sputum production, COPD, shortness of breath, wheezing and asthma.    Gastrointestinal: Negative for abdominal pain, nausea, vomiting, constipation, diarrhea and bright red blood in stool.   Genitourinary: Negative for dysuria, frequency, urgency, urine decreased, flank pain, bladder incontinence and hematuria.   Musculoskeletal: Negative for joint pain and muscle ache.   Skin: Negative for rash.   Allergic/Immunologic: Negative for asthma.   Neurological: Positive for dizziness. Negative for history of vertigo, light-headedness, passing out, headaches, loss of consciousness, numbness and tingling.   Psychiatric/Behavioral: Negative for confusion.       Objective:      Physical Exam   Constitutional: She is oriented to person, place, and time. She appears well-developed and well-nourished. She is cooperative.  Non-toxic appearance. She does not have a sickly appearance. She does not appear ill. No distress.   Patient is sitting on exam table in no acute distress. Nontoxic appearing.    HENT:   Head: Normocephalic and atraumatic.   Right Ear: Hearing, external ear and ear canal normal. A middle ear effusion (mild) is present.   Left Ear: Hearing, external ear and ear canal normal. A middle ear effusion (mild) is present.   Nose: Nose normal. No mucosal edema, rhinorrhea or nasal deformity. No epistaxis. Right sinus exhibits no maxillary sinus tenderness and no frontal sinus tenderness. Left sinus exhibits no maxillary sinus tenderness and no frontal sinus tenderness.   Mouth/Throat: Uvula is midline, oropharynx is clear and moist and mucous membranes are normal. No trismus in the jaw. Normal dentition. No uvula swelling. No posterior oropharyngeal erythema.   Eyes: Pupils are equal, round, and reactive to light. Conjunctivae, EOM and lids are normal. Right eye exhibits no discharge. Left eye exhibits no  discharge. No scleral icterus.   Neck: Trachea normal, normal range of motion, full passive range of motion without pain and phonation normal. Neck supple. No spinous process tenderness present. No neck rigidity. Normal range of motion present.   Cardiovascular: Normal rate, regular rhythm, normal heart sounds, intact distal pulses and normal pulses. Exam reveals no decreased pulses.   The EKG shows a Sinus Rhythm with PVC noted, at a rate of 62, there are not ST Changes. There is not a previous EKG for comparison. This EKG was interpreted by me (and discussed with Dr. Anguiano).   Pulmonary/Chest: Effort normal and breath sounds normal. No respiratory distress. She has no wheezes. She has no rhonchi.   Abdominal: Soft. Normal appearance and bowel sounds are normal. She exhibits no distension, no pulsatile midline mass and no mass. There is no tenderness.   Musculoskeletal: Normal range of motion. She exhibits no edema or deformity.   Strength, sensation, and pulses are intact and symmetrical throughout extremities.   No peripheral pitting edema noted.   Neurological: She is alert and oriented to person, place, and time. She has normal strength. She is not disoriented. No cranial nerve deficit. She exhibits normal muscle tone. She displays a negative Romberg sign. Coordination and gait normal.   Reflex Scores:       Bicep reflexes are 2+ on the right side and 2+ on the left side.       Patellar reflexes are 2+ on the right side and 2+ on the left side.  Grossly intact. No cranial nerve deficits noted. Rapid and foot movements intact. Negative romberg.    Skin: Skin is warm, dry, intact, not diaphoretic and not pale.   Psychiatric: She has a normal mood and affect. Her speech is normal and behavior is normal. Judgment and thought content normal. Cognition and memory are normal.   Nursing note and vitals reviewed.        Results for orders placed or performed in visit on 04/07/20   POCT Urinalysis, Dipstick, Automated,  W/O Scope   Result Value Ref Range    POC Blood, Urine Negative Negative    POC Bilirubin, Urine Positive (A) Negative    POC Urobilinogen, Urine norm 0.1 - 1.1    POC Ketones, Urine Negative Negative    POC Protein, Urine Negative Negative    POC Nitrates, Urine Negative Negative    POC Glucose, Urine Negative Negative    pH, UA 6.0 5 - 8    POC Specific Gravity, Urine 1.005 1.003 - 1.029    POC Leukocytes, Urine Negative Negative   POCT Glucose, Hand-Held Device   Result Value Ref Range    POC Glucose 82 70 - 110 MG/DL     Orthostatic vitals reviewed and non significant. UA negative. Unable to get chem 8 in clinic. Glucose finger stick 82.   PVC noted on EKG. Advised to follow up with PCP. Will refer to ENT for further workup/ evaluation of dizziness. Will start on meclizine for dizziness. Advised on return/follow-up precautions. Advised on ER precautions. Answered all patient questions. Patient verbalized understanding and voiced agreement with current treatment plan.  Discussed patient with Dr. Anguiano, who agrees with plan of care.     Assessment:       1. Dizziness        Plan:         Dizziness  -     EKG 12-lead  -     POCT Chemistry Panel  -     Orthostatic vital signs  -     POCT Urinalysis, Dipstick, Automated, W/O Scope  -     POCT Glucose, Hand-Held Device  -     meclizine (ANTIVERT) 25 mg tablet; Take 1 tablet (25 mg total) by mouth 3 (three) times daily as needed for Dizziness (vertigo).  Dispense: 15 tablet; Refill: 0  -     Ambulatory referral/consult to ENT      Patient Instructions       Follow up with PCP as we have discussed.    You were given a referral to ENT for further evaluation of dizziness. Please call [phone] to schedule an appointment as we have discussed.    You can take meclizine (antivert) as prescribed as needed to help with dizziness.     If you develop new or worsening symptoms such as passing out, chest pain, shortness of breath, etc. Please go to the ER immediately for  further evaluation.    You must understand that you've received an urgent care treatment only and that you may be released before all your medical problems are known or treated. You the patient will arrange for followup care as instructed.           Dizziness (Uncertain Cause)  Dizziness is a common symptom. It may be described as lightheadedness, spinning, or feeling like you are going to faint. Dizziness can have many causes.  Be sure to tell the healthcare provider about:  · All medicines you take, including prescription, over-the-counter, herbs, and supplements  · Any other symptoms you have  · Any health problems you are being treated for  · Anything that causes the dizziness to get worse or better  Today's exam did not show an exact cause for your dizziness. Other tests may be needed. Follow up with your healthcare provider.  Home care  · Dizziness that occurs with sudden standing may be a sign of mild dehydration. Drink extra fluids for the next few days.  · If you recently started a new medicine, stopped a medicine, or had the dose of a current medicine changed, talk with the prescribing healthcare provider. Your medicine plan may need adjustment.  · If dizziness lasts more than a few seconds, sit or lie down until it passes. This may help prevent injury in case you pass out.  · Do not drive or use power tools or dangerous equipment until you have had no dizziness for at least 48 hours.  Follow-up care  Follow up with your healthcare provider for further evaluation within the next 7 days or as advised.  When to seek medical advice  Call your healthcare provider for any of the following:  · Worsening of symptoms or new symptoms  · Passing out or seizure  · Repeated vomiting  · Headache  · Palpitations (the sense that your heart is fluttering or beating fast or hard)  · Shortness of breath  · Blood in vomit or stool (black or red color)  · Weakness of an arm or leg or one side of the face  · Vision or hearing  changes  · Trouble walking or speaking  · Chest, arm, neck, back, or jaw pain  Date Last Reviewed: 8/23/2015  © 0830-7304 [employer]. [address] [place] [address], [place] [address]. All rights reserved. This information is not intended as a substitute for professional medical care. Always follow your healthcare professional's instructions.        Dizziness (Vertigo) and Balance Problems: Staying Safe     Replace burned-out lightbulbs to keep your home safe and well lit.   Falls or accidents can lead to pain, broken bones, and fear of future falls. Protect yourself and others by preparing for episodes. Simple steps can help you stay safe at home and wherever you go.  Lighting  Keep all areas well lit. This helps your eyes send the right signals to the brain. It also makes you less likely to trip and fall. If bright lights make symptoms worse, dim the lights or lie in a dark room until the dizziness passes. Then turn the lights back to their normal level.  Tips:  · Keep a flashlight by the bed.  · Place nightlights in bathrooms and hallways.  · Replace burned-out bulbs, or have someone replace them for you.  Preventing falls  To reduce your risk of falling:  · Get out of bed or up from a chair slowly.  · Wear low-heeled shoes that fit properly and have slip-resistant soles.  · Remove throw rugs. Clear clutter from walkways.  · Use handrails on stairs. Have handrails installed or adjusted if needed.  · Install grab bars in the bathroom. Don't use towel racks for balance.  · Use a shower stool. Also put adhesive strips in the shower or on the tub floor.  Going out  With a little time and preparation, you can get around safely.  Tips:  · Bring a cane or walking aid if needed.  · Give yourself plenty of time in case you start to get dizzy.  · Ask your healthcare provider what type of exercise is safe for your condition.  · Be patient. If an activity such as walking through a crowded shop causes you stress, you  may not be ready for it yet.  Driving  If you become dizzy or disoriented while driving, you could hurt yourself and others. That's why it's best to not drive until symptoms have gone away. In some cases, your license may be temporarily held until it's safe for you to drive again.  For safety:  · Ask a friend to drive for you.  · Take public transportation.  · Walk to stores and other places when you can.  Asking for help  Don't be afraid to ask for help running errands, cooking meals, and doing exercise. Whether it's a friend, loved one, neighbor, or stranger on the street, a little help can make a world of difference.   Date Last Reviewed: 11/1/2016  © 4865-0814 [employer]. [address] [place] [address], [place] [address]. All rights reserved. This information is not intended as a substitute for professional medical care. Always follow your healthcare professional's instructions.

## 2020-04-07 NOTE — PATIENT INSTRUCTIONS
Follow up with PCP as we have discussed.    You were given a referral to ENT for further evaluation of dizziness. Please call [phone] to schedule an appointment as we have discussed.    You can take meclizine (antivert) as prescribed as needed to help with dizziness.     If you develop new or worsening symptoms such as passing out, chest pain, shortness of breath, etc. Please go to the ER immediately for further evaluation.    You must understand that you've received an urgent care treatment only and that you may be released before all your medical problems are known or treated. You the patient will arrange for followup care as instructed.           Dizziness (Uncertain Cause)  Dizziness is a common symptom. It may be described as lightheadedness, spinning, or feeling like you are going to faint. Dizziness can have many causes.  Be sure to tell the healthcare provider about:  · All medicines you take, including prescription, over-the-counter, herbs, and supplements  · Any other symptoms you have  · Any health problems you are being treated for  · Anything that causes the dizziness to get worse or better  Today's exam did not show an exact cause for your dizziness. Other tests may be needed. Follow up with your healthcare provider.  Home care  · Dizziness that occurs with sudden standing may be a sign of mild dehydration. Drink extra fluids for the next few days.  · If you recently started a new medicine, stopped a medicine, or had the dose of a current medicine changed, talk with the prescribing healthcare provider. Your medicine plan may need adjustment.  · If dizziness lasts more than a few seconds, sit or lie down until it passes. This may help prevent injury in case you pass out.  · Do not drive or use power tools or dangerous equipment until you have had no dizziness for at least 48 hours.  Follow-up care  Follow up with your healthcare provider for further evaluation within the next 7 days or as  advised.  When to seek medical advice  Call your healthcare provider for any of the following:  · Worsening of symptoms or new symptoms  · Passing out or seizure  · Repeated vomiting  · Headache  · Palpitations (the sense that your heart is fluttering or beating fast or hard)  · Shortness of breath  · Blood in vomit or stool (black or red color)  · Weakness of an arm or leg or one side of the face  · Vision or hearing changes  · Trouble walking or speaking  · Chest, arm, neck, back, or jaw pain  Date Last Reviewed: 8/23/2015  © 9682-5416 [employer]. [address] [place] [address]. All rights reserved. This information is not intended as a substitute for professional medical care. Always follow your healthcare professional's instructions.        Dizziness (Vertigo) and Balance Problems: Staying Safe     Replace burned-out lightbulbs to keep your home safe and well lit.   Falls or accidents can lead to pain, broken bones, and fear of future falls. Protect yourself and others by preparing for episodes. Simple steps can help you stay safe at home and wherever you go.  Lighting  Keep all areas well lit. This helps your eyes send the right signals to the brain. It also makes you less likely to trip and fall. If bright lights make symptoms worse, dim the lights or lie in a dark room until the dizziness passes. Then turn the lights back to their normal level.  Tips:  · Keep a flashlight by the bed.  · Place nightlights in bathrooms and hallways.  · Replace burned-out bulbs, or have someone replace them for you.  Preventing falls  To reduce your risk of falling:  · Get out of bed or up from a chair slowly.  · Wear low-heeled shoes that fit properly and have slip-resistant soles.  · Remove throw rugs. Clear clutter from walkways.  · Use handrails on stairs. Have handrails installed or adjusted if needed.  · Install grab bars in the bathroom. Don't use towel racks for balance.  · Use a shower stool.  Also put adhesive strips in the shower or on the tub floor.  Going out  With a little time and preparation, you can get around safely.  Tips:  · Bring a cane or walking aid if needed.  · Give yourself plenty of time in case you start to get dizzy.  · Ask your healthcare provider what type of exercise is safe for your condition.  · Be patient. If an activity such as walking through a crowded shop causes you stress, you may not be ready for it yet.  Driving  If you become dizzy or disoriented while driving, you could hurt yourself and others. That's why it's best to not drive until symptoms have gone away. In some cases, your license may be temporarily held until it's safe for you to drive again.  For safety:  · Ask a friend to drive for you.  · Take public transportation.  · Walk to stores and other places when you can.  Asking for help  Don't be afraid to ask for help running errands, cooking meals, and doing exercise. Whether it's a friend, loved one, neighbor, or stranger on the street, a little help can make a world of difference.   Date Last Reviewed: 11/1/2016  © 6814-1621 [employer]. [address] [place] [address], [place] [address]. All rights reserved. This information is not intended as a substitute for professional medical care. Always follow your healthcare professional's instructions.

## 2020-04-08 ENCOUNTER — TELEPHONE (OUTPATIENT)
Dept: OTOLARYNGOLOGY | Facility: CLINIC | Age: 60
End: 2020-04-08

## 2020-04-08 DIAGNOSIS — E55.9 VITAMIN D DEFICIENCY: ICD-10-CM

## 2020-04-08 NOTE — TELEPHONE ENCOUNTER
Called Patient and explained to her that she needs to be on a full wk of the Antibiotic that was prescribed before reaching out to ENT.  Patient has full understanding.

## 2020-04-08 NOTE — TELEPHONE ENCOUNTER
----- Message from Ricarda Maria sent at 4/8/2020 12:23 PM CDT -----  Contact: Self  Pt is calling because she was seen in UC yesterday for fluid behind her left ear.  She will be a NP calling to be scheduled for a follow up.    She can be reached at [phone].    Thank you.

## 2020-04-10 ENCOUNTER — TELEPHONE (OUTPATIENT)
Dept: URGENT CARE | Facility: CLINIC | Age: 60
End: 2020-04-10

## 2020-04-11 NOTE — TELEPHONE ENCOUNTER
Pt called urgent care to state that she was evaluated for dizziness and diagnosed with Vertigo on 4/7 and was sent home with Meclizine for symptoms. Pt states that the meclizine only helps for a few minutes and then the symptoms start again. Pt also c/o moderate frontal headache. Denies nausea, vomiting, chest pain, SOB, palpitations, syncope, or vision changes. Instructed patient to treat headache with OTC excedrine or ibuprofen. Instructed to go immediately to the ED if symptoms worsen because she will need a CT scan of her head. Discussed results/diagnosis/plan with patient in clinic. Patient verbally understood and agreed with treatment plan.

## 2020-04-14 RX ORDER — ERGOCALCIFEROL 1.25 MG/1
CAPSULE ORAL
Qty: 12 CAPSULE | Refills: 3 | Status: SHIPPED | OUTPATIENT
Start: 2020-04-14 | End: 2021-03-10

## 2020-04-14 NOTE — TELEPHONE ENCOUNTER
Refill Authorization Note     is requesting a refill authorization.    Brief assessment and rationale for refill: DEFER: labs outdated          Medication Therapy Plan: Vitamin D levels last evaluated 3/9/17; outside [facility] protocol to renew. Pended for your review. Defer reqeust to you.                              Comments:   Refill [facility] Care Gap Closure protocols temporarily suspended.   Requested Prescriptions   Pending Prescriptions Disp Refills    ergocalciferol (ERGOCALCIFEROL) 50,000 unit Cap [Pharmacy Med Name: Vitamin D (Ergocalciferol) 91833 UNIT Oral Capsule]  0     Sig: Take 1 capsule by mouth once a week       Endocrinology:  Vitamins - Vitamin D Supplementation Failed - 4/8/2020 10:30 AM        Failed - Vitamin D is 20 or above and within 360 days     Vit D, 25-Hydroxy   Date Value Ref Range Status   03/09/2017 32 30 - 96 ng/mL Final     Comment:     Vitamin D deficiency.........<10 ng/mL                              Vitamin D insufficiency......10-29 ng/mL       Vitamin D sufficiency........> or equal to 30 ng/mL  Vitamin D toxicity............>100 ng/mL     09/02/2016 20 (L) 30 - 96 ng/mL Final     Comment:     Vitamin D deficiency.........<10 ng/mL                              Vitamin D insufficiency......10-29 ng/mL       Vitamin D sufficiency........> or equal to 30 ng/mL  Vitamin D toxicity............>100 ng/mL                Passed - Patient is at least 18 years old        Passed - Hypercalcemia is not present on problem list        Passed - Office visit in past 12 months or future 90 days.     Recent Outpatient Visits            1 week ago Dizziness    [facility] Stanley Virgen PA-C    1 month ago Acute pain of left shoulder    [facility] Hoang Carranza PA-C    2 months ago Well woman exam with routine gynecological exam    [facility] Dann Avila MD    2 months ago Costochondritis, acute    [facility] Wong Whitmore MD    5 months ago Left foot pain    [facility] Wong Whitmore MD                    Passed - Ca in normal range and within 360 days     Calcium   Date Value Ref Range Status   05/04/2019 9.9 8.7 - 10.5 mg/dL Final   07/10/2017 9.6 8.7 - 10.5 mg/dL Final   03/09/2016 9.4 8.7 - 10.5 mg/dL Final               Appointments  past 12m or future 3m with PCP    Date Provider   Last Visit   1/16/2020 Wong Whitmore MD   Next Visit   Visit date not found Wong Whitmore MD   .  ED visits in past 90 days: [unfilled]       Note composed:11:00 AM 04/14/2020

## 2020-05-01 ENCOUNTER — TELEPHONE (OUTPATIENT)
Dept: FAMILY MEDICINE | Facility: CLINIC | Age: 60
End: 2020-05-01

## 2020-05-01 NOTE — TELEPHONE ENCOUNTER
----- Message from Nina Smiley sent at 5/1/2020 12:48 PM CDT -----  Contact: Patient   Type:  Needs Medical Advice/Symptom-based Call    Who Called: Patient     Symptoms (please be specific): Arm pain     How long has patient had these symptoms:   2 weeks     Would the patient rather a call back or a response via [facility]? Call back     Best Call Back Number:  [phone]        [facility] [place] - [address] [place], [place] [address]YANIRA SARBJIT BAIRD [id number]  Phone: [phone] Fax: [phone]

## 2020-05-01 NOTE — TELEPHONE ENCOUNTER
Patient states that she has been experiencing arm pain for the past two weeks. She would like to know what she should do?

## 2020-05-06 DIAGNOSIS — M62.830 MUSCLE SPASM OF BACK: ICD-10-CM

## 2020-05-07 DIAGNOSIS — I10 HYPERTENSION: ICD-10-CM

## 2020-05-07 RX ORDER — NAPROXEN 500 MG/1
TABLET ORAL
Qty: 30 TABLET | Refills: 0 | Status: SHIPPED | OUTPATIENT
Start: 2020-05-07 | End: 2021-02-15 | Stop reason: ALTCHOICE

## 2020-05-07 NOTE — PROGRESS NOTES
Refill Routing Note    Medication(s) are not appropriate for processing by [facility]:       Outside of protocol           Medication reconciliation completed: No        Automatic Epic Protocol Generated Data:    Requested Prescriptions   Pending Prescriptions Disp Refills    naproxen (NAPROSYN) 500 MG tablet [Pharmacy Med Name: Naproxen 500 MG Oral Tablet] 30 tablet 0     Sig: Take 1 tablet by mouth twice daily as needed       NSAIDs Protocol Failed - 5/6/2020  2:33 PM        Failed - Serum Creatinine less than 1.4 on file in the past 12 months     Lab Results   Component Value Date    CREATININE 0.9 05/04/2019    CREATININE 0.9 07/10/2017    CREATININE 1.0 03/09/2016     Lab Results   Component Value Date    ESTGFRAFRICA >60.0 05/04/2019    ESTGFRAFRICA >60.0 07/10/2017    ESTGFRAFRICA >60.0 03/09/2016               Failed - HGB greater than 10 or HCT greater than 30 in past 12 months        Failed - AST in past 12 months      Lab Results   Component Value Date    AST 14 05/04/2019    AST 17 07/10/2017    AST 15 03/09/2016              Failed - Serum Potassium less than 5.2 on file in the past 12 months     Lab Results   Component Value Date    K 4.0 05/04/2019    K 3.8 07/10/2017    K 4.7 03/09/2016                  Failed - ALT less than 95 in past 12 months     Lab Results   Component Value Date    ALT 14 05/04/2019    ALT 20 07/10/2017    ALT 16 03/09/2016              Passed - Patient not currently pregnant        Passed - No positive pregnancy test in past 12 months         Passed - Visit with authorizing provider in past 12 months or upcoming 90 days        Passed - Blood Pressure below 139/89 on file in past 12 months      BP Readings from Last 3 Encounters:   04/07/20 (!) 142/90   03/04/20 133/66   01/31/20 127/68                Appointments [id number] or future 3m with PCP    Date Provider   Last Visit   1/16/2020 Wong Whitmore MD   Next Visit   Visit date not found Wong Whitmore MD   ED  visits in past 90 days: 0     Note composed:10:56 PM 05/06/2020

## 2020-05-25 ENCOUNTER — OFFICE VISIT (OUTPATIENT)
Dept: URGENT CARE | Facility: CLINIC | Age: 60
End: 2020-05-25
Payer: COMMERCIAL

## 2020-05-25 VITALS
SYSTOLIC BLOOD PRESSURE: 133 MMHG | DIASTOLIC BLOOD PRESSURE: 77 MMHG | WEIGHT: 293 LBS | RESPIRATION RATE: 18 BRPM | HEIGHT: 65 IN | TEMPERATURE: 98 F | OXYGEN SATURATION: 98 % | HEART RATE: 75 BPM | BODY MASS INDEX: 48.82 KG/M2

## 2020-05-25 DIAGNOSIS — M62.838 CERVICAL PARASPINAL MUSCLE SPASM: Primary | ICD-10-CM

## 2020-05-25 PROCEDURE — 96372 THER/PROPH/DIAG INJ SC/IM: CPT | Mod: S$GLB,,, | Performed by: FAMILY MEDICINE

## 2020-05-25 PROCEDURE — 96372 PR INJECTION,THERAP/PROPH/DIAG2ST, IM OR SUBCUT: ICD-10-PCS | Mod: S$GLB,,, | Performed by: FAMILY MEDICINE

## 2020-05-25 PROCEDURE — 99213 PR OFFICE/OUTPT VISIT, EST, LEVL III, 20-29 MIN: ICD-10-PCS | Mod: 25,S$GLB,, | Performed by: NURSE PRACTITIONER

## 2020-05-25 PROCEDURE — 99213 OFFICE O/P EST LOW 20 MIN: CPT | Mod: 25,S$GLB,, | Performed by: NURSE PRACTITIONER

## 2020-05-25 RX ORDER — KETOROLAC TROMETHAMINE 30 MG/ML
30 INJECTION, SOLUTION INTRAMUSCULAR; INTRAVENOUS
Status: COMPLETED | OUTPATIENT
Start: 2020-05-25 | End: 2020-05-25

## 2020-05-25 RX ORDER — TIZANIDINE 4 MG/1
4 TABLET ORAL 2 TIMES DAILY PRN
Qty: 20 TABLET | Refills: 0 | Status: SHIPPED | OUTPATIENT
Start: 2020-05-25 | End: 2021-02-15

## 2020-05-25 RX ADMIN — KETOROLAC TROMETHAMINE 30 MG: 30 INJECTION, SOLUTION INTRAMUSCULAR; INTRAVENOUS at 09:05

## 2020-05-25 NOTE — PROGRESS NOTES
"Subjective:       Patient ID: Bambi Carrera is a 60 y.o. female.    Vitals:  height is 5' 5" (1.651 m) and weight is 136.1 kg (300 lb). Her temperature is 97.8 °F (36.6 °C). Her blood pressure is 133/77 and her pulse is 75. Her respiration is 18 and oxygen saturation is 98%.     Chief Complaint: Neck Pain    Ambulatory with chief complaint of right neck pain that started approximately 2 weeks ago.  Patient denies any history of trauma she denies any new activities.  She denies sleeping wrong.  Patient states she has tried NSAIDs to help with the pain she has a prescription of Mobic that she takes on a daily basis.  She states she occasionally has painful swallowing and occasionally hurts when she moves her neck.    Neck Pain    This is a new problem. The current episode started 1 to 4 weeks ago. The problem occurs constantly. The problem has been unchanged. The pain is associated with nothing. The pain is present in the right side. The quality of the pain is described as aching. The pain is at a severity of 5/10. Nothing aggravates the symptoms. The pain is worse during the night. Stiffness is present at night. Pertinent negatives include no chest pain, fever, headaches or weakness. She has tried heat for the symptoms.       Constitution: Negative for chills, fatigue and fever.   HENT: Negative for congestion and sore throat.    Neck: Positive for neck pain. Negative for painful lymph nodes.   Cardiovascular: Negative for chest pain and leg swelling.   Eyes: Negative for double vision and blurred vision.   Respiratory: Negative for cough and shortness of breath.    Gastrointestinal: Negative for nausea, vomiting and diarrhea.   Genitourinary: Negative for dysuria, frequency, urgency and history of kidney stones.   Musculoskeletal: Positive for pain. Negative for joint pain, joint swelling, muscle cramps and muscle ache.   Skin: Negative for color change, pale, rash and bruising.   Allergic/Immunologic: Negative " for seasonal allergies.   Neurological: Negative for dizziness, history of vertigo, light-headedness, passing out and headaches.   Hematologic/Lymphatic: Negative for swollen lymph nodes.   Psychiatric/Behavioral: Negative for nervous/anxious, sleep disturbance and depression. The patient is not nervous/anxious.        Objective:      Physical Exam   Constitutional: She is oriented to person, place, and time. She appears well-developed and well-nourished. She is cooperative.  Non-toxic appearance. She does not appear ill. No distress.   HENT:   Head: Normocephalic and atraumatic.   Right Ear: Hearing, tympanic membrane, external ear and ear canal normal.   Left Ear: Hearing, tympanic membrane, external ear and ear canal normal.   Nose: Nose normal. No mucosal edema, rhinorrhea or nasal deformity. No epistaxis. Right sinus exhibits no maxillary sinus tenderness and no frontal sinus tenderness. Left sinus exhibits no maxillary sinus tenderness and no frontal sinus tenderness.   Mouth/Throat: Uvula is midline, oropharynx is clear and moist and mucous membranes are normal. No trismus in the jaw. Normal dentition. No uvula swelling. No posterior oropharyngeal erythema.   Eyes: Conjunctivae and lids are normal. Right eye exhibits no discharge. Left eye exhibits no discharge. No scleral icterus.   Neck: Trachea normal, normal range of motion, full passive range of motion without pain and phonation normal. Neck supple.   Cardiovascular: Normal rate, regular rhythm, normal heart sounds, intact distal pulses and normal pulses.   Pulmonary/Chest: Effort normal and breath sounds normal. No respiratory distress.   Abdominal: Soft. Normal appearance and bowel sounds are normal. She exhibits no distension, no pulsatile midline mass and no mass. There is no tenderness.   Musculoskeletal: Normal range of motion. She exhibits no edema or deformity.        Right shoulder: She exhibits tenderness, pain and spasm. She exhibits normal  range of motion.        Arms:  Neurological: She is alert and oriented to person, place, and time. She exhibits normal muscle tone. Coordination normal.   Skin: Skin is warm, dry, intact, not diaphoretic and not pale.   Psychiatric: She has a normal mood and affect. Her speech is normal and behavior is normal. Judgment and thought content normal. Cognition and memory are normal.   Nursing note and vitals reviewed.        Assessment:       1. Cervical paraspinal muscle spasm        Plan:         Cervical paraspinal muscle spasm  -     ketorolac injection 30 mg  -     tiZANidine (ZANAFLEX) 4 MG tablet; Take 1 tablet (4 mg total) by mouth 2 (two) times daily as needed (muscle spasms). Take off duty only. May cause drowsiness.  Dispense: 20 tablet; Refill: 0          Patient Instructions   Take your prescription naproxen as directed on the bottle.  Do not take Mobic while your taking naproxen.  Do not take medicine on an empty stomach.  Apply ice and heat alternating to area.  Follow-up with your primary care doctor if your symptoms do not improve in 3-5 days  Neck Sprain or Strain  A sudden force that causes turning or bending of the neck can cause sprain or strain. An example would be the force from a car accident. This can stretch or tear muscles called a strain. It can also stretch or tear ligaments called a sprain. Either of these can cause neck pain. Sometimes neck pain occurs after a simple awkward movement. In either case, muscle spasm is commonly present and contributes to the pain.     Unless you had a forceful physical injury (for example, a car accident or fall), X-rays are usually not ordered for the initial evaluation of neck pain. If pain continues and dose not respond to medical treatment, X-rays and other tests may be performed at a later time.  Home care  · You may feel more soreness and spasm the first few days after the injury. Rest until symptoms begin to improve.  · When lying down, use a  comfortable pillow or a rolled towel that supports the head and keeps the spine in a neutral position. The position of the head should not be tilted forward or backward.  · Apply an ice pack over the injured area for 15 to 20 minutes every 3 to 6 hours. You should do this for the first 24 to 48 hours. You can make an ice pack by filling a plastic bag that seals at the top with ice cubes and then wrapping it with a thin towel. After 48 hours, apply heat (warm shower or warm bath) for 15 to 20 minutes several times a day, or alternate ice and heat.  · You may use over-the-counter pain medicine to control pain, unless another pain medicine was prescribed. If you have chronic liver or kidney disease or ever had a stomach ulcer or GI bleeding, talk with your healthcare provider before using these medicines.  · If a soft cervical collar was prescribed, it should be worn only for periods of increased pain. It should not be worn for more than 3 hours a day, or for a period longer than 1 to 2 weeks.  Follow-up care  Follow up with your healthcare provider as directed. Physical therapy may be needed.  Sometimes fractures dont show up on the first X-ray. Bruises and sprains can sometimes hurt as much as a fracture. These injuries can take time to heal completely. If your symptoms dont improve or they get worse, talk with your healthcare provider. You may need a repeat X-ray or other tests. If X-rays were taken, you will be told of any new findings that may affect your care.  Call 911  Call 911 if you have:  · Neck swelling, difficulty or painful swallowing  · Difficulty breathing  · Chest pain  When to seek medical advice  Call your healthcare provider right away if any of these occur:  · Pain becomes worse or spreads into your arms  · Weakness or numbness in one or both arms  Date Last Reviewed: 11/19/2015  © 7194-7019 [employer]. [address] [place] [address], [place] [address]. All rights reserved. This  information is not intended as a substitute for professional medical care. Always follow your healthcare professional's instructions.        Neck Sprain or Strain  A sudden force that causes turning or bending of the neck can cause sprain or strain. An example would be the force from a car accident. This can stretch or tear muscles called a strain. It can also stretch or tear ligaments called a sprain. Either of these can cause neck pain. Sometimes neck pain occurs after a simple awkward movement. In either case, muscle spasm is commonly present and contributes to the pain.     Unless you had a forceful physical injury (for example, a car accident or fall), X-rays are usually not ordered for the initial evaluation of neck pain. If pain continues and dose not respond to medical treatment, X-rays and other tests may be performed at a later time.  Home care  · You may feel more soreness and spasm the first few days after the injury. Rest until symptoms begin to improve.  · When lying down, use a comfortable pillow or a rolled towel that supports the head and keeps the spine in a neutral position. The position of the head should not be tilted forward or backward.  · Apply an ice pack over the injured area for 15 to 20 minutes every 3 to 6 hours. You should do this for the first 24 to 48 hours. You can make an ice pack by filling a plastic bag that seals at the top with ice cubes and then wrapping it with a thin towel. After 48 hours, apply heat (warm shower or warm bath) for 15 to 20 minutes several times a day, or alternate ice and heat.  · You may use over-the-counter pain medicine to control pain, unless another pain medicine was prescribed. If you have chronic liver or kidney disease or ever had a stomach ulcer or GI bleeding, talk with your healthcare provider before using these medicines.  · If a soft cervical collar was prescribed, it should be worn only for periods of increased pain. It should not be worn for  more than 3 hours a day, or for a period longer than 1 to 2 weeks.  Follow-up care  Follow up with your healthcare provider as directed. Physical therapy may be needed.  Sometimes fractures dont show up on the first X-ray. Bruises and sprains can sometimes hurt as much as a fracture. These injuries can take time to heal completely. If your symptoms dont improve or they get worse, talk with your healthcare provider. You may need a repeat X-ray or other tests. If X-rays were taken, you will be told of any new findings that may affect your care.  Call 911  Call 911 if you have:  · Neck swelling, difficulty or painful swallowing  · Difficulty breathing  · Chest pain  When to seek medical advice  Call your healthcare provider right away if any of these occur:  · Pain becomes worse or spreads into your arms  · Weakness or numbness in one or both arms  Date Last Reviewed: 11/19/2015  © 1159-1616 [employer]. [address] [place] [address], [place] [address]. All rights reserved. This information is not intended as a substitute for professional medical care. Always follow your healthcare professional's instructions.

## 2020-05-25 NOTE — PATIENT INSTRUCTIONS
Take your prescription naproxen as directed on the bottle.  Do not take Mobic while your taking naproxen.  Do not take medicine on an empty stomach.  Apply ice and heat alternating to area.  Follow-up with your primary care doctor if your symptoms do not improve in 3-5 days  Neck Sprain or Strain  A sudden force that causes turning or bending of the neck can cause sprain or strain. An example would be the force from a car accident. This can stretch or tear muscles called a strain. It can also stretch or tear ligaments called a sprain. Either of these can cause neck pain. Sometimes neck pain occurs after a simple awkward movement. In either case, muscle spasm is commonly present and contributes to the pain.     Unless you had a forceful physical injury (for example, a car accident or fall), X-rays are usually not ordered for the initial evaluation of neck pain. If pain continues and dose not respond to medical treatment, X-rays and other tests may be performed at a later time.  Home care  · You may feel more soreness and spasm the first few days after the injury. Rest until symptoms begin to improve.  · When lying down, use a comfortable pillow or a rolled towel that supports the head and keeps the spine in a neutral position. The position of the head should not be tilted forward or backward.  · Apply an ice pack over the injured area for 15 to 20 minutes every 3 to 6 hours. You should do this for the first 24 to 48 hours. You can make an ice pack by filling a plastic bag that seals at the top with ice cubes and then wrapping it with a thin towel. After 48 hours, apply heat (warm shower or warm bath) for 15 to 20 minutes several times a day, or alternate ice and heat.  · You may use over-the-counter pain medicine to control pain, unless another pain medicine was prescribed. If you have chronic liver or kidney disease or ever had a stomach ulcer or GI bleeding, talk with your healthcare provider before using these  medicines.  · If a soft cervical collar was prescribed, it should be worn only for periods of increased pain. It should not be worn for more than 3 hours a day, or for a period longer than 1 to 2 weeks.  Follow-up care  Follow up with your healthcare provider as directed. Physical therapy may be needed.  Sometimes fractures dont show up on the first X-ray. Bruises and sprains can sometimes hurt as much as a fracture. These injuries can take time to heal completely. If your symptoms dont improve or they get worse, talk with your healthcare provider. You may need a repeat X-ray or other tests. If X-rays were taken, you will be told of any new findings that may affect your care.  Call 911  Call 911 if you have:  · Neck swelling, difficulty or painful swallowing  · Difficulty breathing  · Chest pain  When to seek medical advice  Call your healthcare provider right away if any of these occur:  · Pain becomes worse or spreads into your arms  · Weakness or numbness in one or both arms  Date Last Reviewed: 11/19/2015 © 2000-2017 [employer]. [address] [place] [address]. All rights reserved. This information is not intended as a substitute for professional medical care. Always follow your healthcare professional's instructions.        Neck Sprain or Strain  A sudden force that causes turning or bending of the neck can cause sprain or strain. An example would be the force from a car accident. This can stretch or tear muscles called a strain. It can also stretch or tear ligaments called a sprain. Either of these can cause neck pain. Sometimes neck pain occurs after a simple awkward movement. In either case, muscle spasm is commonly present and contributes to the pain.     Unless you had a forceful physical injury (for example, a car accident or fall), X-rays are usually not ordered for the initial evaluation of neck pain. If pain continues and dose not respond to medical treatment, X-rays and  other tests may be performed at a later time.  Home care  · You may feel more soreness and spasm the first few days after the injury. Rest until symptoms begin to improve.  · When lying down, use a comfortable pillow or a rolled towel that supports the head and keeps the spine in a neutral position. The position of the head should not be tilted forward or backward.  · Apply an ice pack over the injured area for 15 to 20 minutes every 3 to 6 hours. You should do this for the first 24 to 48 hours. You can make an ice pack by filling a plastic bag that seals at the top with ice cubes and then wrapping it with a thin towel. After 48 hours, apply heat (warm shower or warm bath) for 15 to 20 minutes several times a day, or alternate ice and heat.  · You may use over-the-counter pain medicine to control pain, unless another pain medicine was prescribed. If you have chronic liver or kidney disease or ever had a stomach ulcer or GI bleeding, talk with your healthcare provider before using these medicines.  · If a soft cervical collar was prescribed, it should be worn only for periods of increased pain. It should not be worn for more than 3 hours a day, or for a period longer than 1 to 2 weeks.  Follow-up care  Follow up with your healthcare provider as directed. Physical therapy may be needed.  Sometimes fractures dont show up on the first X-ray. Bruises and sprains can sometimes hurt as much as a fracture. These injuries can take time to heal completely. If your symptoms dont improve or they get worse, talk with your healthcare provider. You may need a repeat X-ray or other tests. If X-rays were taken, you will be told of any new findings that may affect your care.  Call 911  Call 911 if you have:  · Neck swelling, difficulty or painful swallowing  · Difficulty breathing  · Chest pain  When to seek medical advice  Call your healthcare provider right away if any of these occur:  · Pain becomes worse or spreads into your  arms  · Weakness or numbness in one or both arms  Date Last Reviewed: 11/19/2015  © 7236-9277 [employer]. [address] [place] [address], [place] [address]. All rights reserved. This information is not intended as a substitute for professional medical care. Always follow your healthcare professional's instructions.

## 2020-06-03 DIAGNOSIS — I10 ESSENTIAL HYPERTENSION: Chronic | ICD-10-CM

## 2020-06-03 RX ORDER — HYDROCHLOROTHIAZIDE 25 MG/1
TABLET ORAL
Qty: 90 TABLET | Refills: 0 | Status: SHIPPED | OUTPATIENT
Start: 2020-06-03 | End: 2020-08-23

## 2020-06-04 NOTE — PROGRESS NOTES
Refill Authorization Note    is requesting a refill authorization.    Brief assessment and rationale for refill: APPROVE: Needs Labs     Medication-related problems identified: Requires labs    Medication Therapy Plan: NTBO (Lipid)/NTBS (Lipid/CMP); approve 3 more    Medication reconciliation completed: No                         Comments:      Requested Prescriptions   Signed Prescriptions Disp Refills    hydroCHLOROthiazide (HYDRODIURIL) 25 MG tablet 90 tablet 0     Sig: Take 1 tablet by mouth once daily       Cardiovascular: Diuretics - Thiazide Failed - 6/3/2020  2:21 PM        Failed - Ca in normal range and within 360 days     Calcium   Date Value Ref Range Status   05/04/2019 9.9 8.7 - 10.5 mg/dL Final   07/10/2017 9.6 8.7 - 10.5 mg/dL Final   03/09/2016 9.4 8.7 - 10.5 mg/dL Final              Failed - Cr is 1.3 or below and within 180 days     Creatinine   Date Value Ref Range Status   05/04/2019 0.9 0.5 - 1.4 mg/dL Final   07/10/2017 0.9 0.5 - 1.4 mg/dL Final   03/09/2016 1.0 0.5 - 1.4 mg/dL Final              Failed - K in normal range and within 180 days     Potassium   Date Value Ref Range Status   05/04/2019 4.0 3.5 - 5.1 mmol/L Final   07/10/2017 3.8 3.5 - 5.1 mmol/L Final   03/09/2016 4.7 3.5 - 5.1 mmol/L Final              Failed - Na is between 130 and 148 and within 180 days     Sodium   Date Value Ref Range Status   05/04/2019 139 136 - 145 mmol/L Final   07/10/2017 139 136 - 145 mmol/L Final   03/09/2016 141 136 - 145 mmol/L Final              Failed - eGFR within 180 days     eGFR if non    Date Value Ref Range Status   05/04/2019 >60.0 >60 mL/min/1.73 m^2 Final     Comment:     Calculation used to obtain the estimated glomerular filtration  rate (eGFR) is the CKD-EPI equation.      07/10/2017 >60.0 >60 mL/min/1.73 m^2 Final     Comment:     Calculation used to obtain the estimated glomerular filtration  rate (eGFR) is the CKD-EPI equation. Since race is unknown   in our  information system, the eGFR values for   -American and Non--American patients are given   for each creatinine result.     03/09/2016 >60.0 >60 mL/min/1.73 m^2 Final     Comment:     Calculation used to obtain the estimated glomerular filtration  rate (eGFR) is the CKD-EPI equation. Since race is unknown   in our information system, the eGFR values for   -American and Non--American patients are given   for each creatinine result.       eGFR if    Date Value Ref Range Status   05/04/2019 >60.0 >60 mL/min/1.73 m^2 Final   07/10/2017 >60.0 >60 mL/min/1.73 m^2 Final   03/09/2016 >60.0 >60 mL/min/1.73 m^2 Final              Passed - Patient is at least 18 years old        Passed - Last BP in normal range within 360 days.     BP Readings from Last 3 Encounters:   05/25/20 133/77   04/07/20 (!) 142/90   03/04/20 133/66              Passed - Office visit in past 12 months or future 90 days.     Recent Outpatient Visits            1 week ago Cervical paraspinal muscle spasm    [facility] ALLAN Otto    1 month ago Dizziness    [facility] Stanley Virgen PA-C    3 months ago Acute pain of left shoulder    [facility] Hoang Carranza PA-C    4 months ago Well woman exam with routine gynecological exam    [facility] OB[facility] Dann Avila MD    4 months ago Costochondritis, acute    [facility] Wong Whitmore MD                     Appointments  past 12m or future 3m with PCP    Date Provider   Last Visit   1/16/2020 Wong Whitmore MD   Next Visit   Visit date not found Wong Whitmore MD   ED visits in past 90 days: 0     Note composed:8:36 PM 06/03/2020

## 2020-06-04 NOTE — TELEPHONE ENCOUNTER
Provider Staff:     Please schedule patient for Labs (Lipid/CMP)    Please also check with your provider if any further labs need to be added and scheduled together.    Thanks!  [facility]     Appointments  past 12m or future 3m with PCP    Date Provider   Last Visit   1/16/2020 Wong Whitmore MD   Next Visit   Visit date not found Wong Whitmore MD     Note composed:8:36 PM 06/03/2020

## 2020-07-15 ENCOUNTER — OFFICE VISIT (OUTPATIENT)
Dept: FAMILY MEDICINE | Facility: CLINIC | Age: 60
End: 2020-07-15
Payer: COMMERCIAL

## 2020-07-15 ENCOUNTER — LAB VISIT (OUTPATIENT)
Dept: LAB | Facility: HOSPITAL | Age: 60
End: 2020-07-15
Attending: FAMILY MEDICINE
Payer: COMMERCIAL

## 2020-07-15 VITALS
OXYGEN SATURATION: 99 % | HEIGHT: 65 IN | SYSTOLIC BLOOD PRESSURE: 130 MMHG | DIASTOLIC BLOOD PRESSURE: 80 MMHG | WEIGHT: 293 LBS | HEART RATE: 65 BPM | BODY MASS INDEX: 48.82 KG/M2 | TEMPERATURE: 97 F

## 2020-07-15 DIAGNOSIS — R73.03 PREDIABETES: ICD-10-CM

## 2020-07-15 DIAGNOSIS — I10 ESSENTIAL HYPERTENSION: Chronic | ICD-10-CM

## 2020-07-15 DIAGNOSIS — J30.9 ALLERGIC RHINITIS, UNSPECIFIED SEASONALITY, UNSPECIFIED TRIGGER: Primary | ICD-10-CM

## 2020-07-15 LAB
ALBUMIN SERPL BCP-MCNC: 3.5 G/DL (ref 3.5–5.2)
ALP SERPL-CCNC: 116 U/L (ref 55–135)
ALT SERPL W/O P-5'-P-CCNC: 13 U/L (ref 10–44)
ANION GAP SERPL CALC-SCNC: 8 MMOL/L (ref 8–16)
AST SERPL-CCNC: 11 U/L (ref 10–40)
BASOPHILS # BLD AUTO: 0.03 K/UL (ref 0–0.2)
BASOPHILS NFR BLD: 0.4 % (ref 0–1.9)
BILIRUB SERPL-MCNC: 0.3 MG/DL (ref 0.1–1)
BUN SERPL-MCNC: 18 MG/DL (ref 6–20)
CALCIUM SERPL-MCNC: 9.4 MG/DL (ref 8.7–10.5)
CHLORIDE SERPL-SCNC: 103 MMOL/L (ref 95–110)
CHOLEST SERPL-MCNC: 166 MG/DL (ref 120–199)
CHOLEST/HDLC SERPL: 2.6 {RATIO} (ref 2–5)
CO2 SERPL-SCNC: 27 MMOL/L (ref 23–29)
CREAT SERPL-MCNC: 1 MG/DL (ref 0.5–1.4)
DIFFERENTIAL METHOD: ABNORMAL
EOSINOPHIL # BLD AUTO: 0.1 K/UL (ref 0–0.5)
EOSINOPHIL NFR BLD: 1.5 % (ref 0–8)
ERYTHROCYTE [DISTWIDTH] IN BLOOD BY AUTOMATED COUNT: 15.9 % (ref 11.5–14.5)
EST. GFR  (AFRICAN AMERICAN): >60 ML/MIN/1.73 M^2
EST. GFR  (NON AFRICAN AMERICAN): >60 ML/MIN/1.73 M^2
GLUCOSE SERPL-MCNC: 108 MG/DL (ref 70–110)
HCT VFR BLD AUTO: 39.1 % (ref 37–48.5)
HDLC SERPL-MCNC: 63 MG/DL (ref 40–75)
HDLC SERPL: 38 % (ref 20–50)
HGB BLD-MCNC: 12 G/DL (ref 12–16)
IMM GRANULOCYTES # BLD AUTO: 0.01 K/UL (ref 0–0.04)
IMM GRANULOCYTES NFR BLD AUTO: 0.1 % (ref 0–0.5)
LDLC SERPL CALC-MCNC: 86 MG/DL (ref 63–159)
LYMPHOCYTES # BLD AUTO: 3.5 K/UL (ref 1–4.8)
LYMPHOCYTES NFR BLD: 47.6 % (ref 18–48)
MCH RBC QN AUTO: 27.5 PG (ref 27–31)
MCHC RBC AUTO-ENTMCNC: 30.7 G/DL (ref 32–36)
MCV RBC AUTO: 90 FL (ref 82–98)
MONOCYTES # BLD AUTO: 0.7 K/UL (ref 0.3–1)
MONOCYTES NFR BLD: 9 % (ref 4–15)
NEUTROPHILS # BLD AUTO: 3.1 K/UL (ref 1.8–7.7)
NEUTROPHILS NFR BLD: 41.4 % (ref 38–73)
NONHDLC SERPL-MCNC: 103 MG/DL
NRBC BLD-RTO: 0 /100 WBC
PLATELET # BLD AUTO: 260 K/UL (ref 150–350)
PMV BLD AUTO: 11.3 FL (ref 9.2–12.9)
POTASSIUM SERPL-SCNC: 3.9 MMOL/L (ref 3.5–5.1)
PROT SERPL-MCNC: 8.1 G/DL (ref 6–8.4)
RBC # BLD AUTO: 4.36 M/UL (ref 4–5.4)
SODIUM SERPL-SCNC: 138 MMOL/L (ref 136–145)
TRIGL SERPL-MCNC: 85 MG/DL (ref 30–150)
WBC # BLD AUTO: 7.43 K/UL (ref 3.9–12.7)

## 2020-07-15 PROCEDURE — 99214 PR OFFICE/OUTPT VISIT, EST, LEVL IV, 30-39 MIN: ICD-10-PCS | Mod: S$GLB,,, | Performed by: FAMILY MEDICINE

## 2020-07-15 PROCEDURE — 99999 PR PBB SHADOW E&M-EST. PATIENT-LVL III: CPT | Mod: PBBFAC,,, | Performed by: FAMILY MEDICINE

## 2020-07-15 PROCEDURE — 3008F PR BODY MASS INDEX (BMI) DOCUMENTED: ICD-10-PCS | Mod: CPTII,S$GLB,, | Performed by: FAMILY MEDICINE

## 2020-07-15 PROCEDURE — 85025 COMPLETE CBC W/AUTO DIFF WBC: CPT

## 2020-07-15 PROCEDURE — 80053 COMPREHEN METABOLIC PANEL: CPT

## 2020-07-15 PROCEDURE — 3075F PR MOST RECENT SYSTOLIC BLOOD PRESS GE 130-139MM HG: ICD-10-PCS | Mod: CPTII,S$GLB,, | Performed by: FAMILY MEDICINE

## 2020-07-15 PROCEDURE — 3008F BODY MASS INDEX DOCD: CPT | Mod: CPTII,S$GLB,, | Performed by: FAMILY MEDICINE

## 2020-07-15 PROCEDURE — 3079F DIAST BP 80-89 MM HG: CPT | Mod: CPTII,S$GLB,, | Performed by: FAMILY MEDICINE

## 2020-07-15 PROCEDURE — 3075F SYST BP GE 130 - 139MM HG: CPT | Mod: CPTII,S$GLB,, | Performed by: FAMILY MEDICINE

## 2020-07-15 PROCEDURE — 36415 COLL VENOUS BLD VENIPUNCTURE: CPT | Mod: PO

## 2020-07-15 PROCEDURE — 83036 HEMOGLOBIN GLYCOSYLATED A1C: CPT

## 2020-07-15 PROCEDURE — 3079F PR MOST RECENT DIASTOLIC BLOOD PRESSURE 80-89 MM HG: ICD-10-PCS | Mod: CPTII,S$GLB,, | Performed by: FAMILY MEDICINE

## 2020-07-15 PROCEDURE — 80061 LIPID PANEL: CPT

## 2020-07-15 PROCEDURE — 99999 PR PBB SHADOW E&M-EST. PATIENT-LVL III: ICD-10-PCS | Mod: PBBFAC,,, | Performed by: FAMILY MEDICINE

## 2020-07-15 PROCEDURE — 99214 OFFICE O/P EST MOD 30 MIN: CPT | Mod: S$GLB,,, | Performed by: FAMILY MEDICINE

## 2020-07-15 RX ORDER — LORATADINE 10 MG/1
10 TABLET ORAL DAILY PRN
Qty: 30 TABLET | Refills: 3 | Status: SHIPPED | OUTPATIENT
Start: 2020-07-15 | End: 2020-11-10 | Stop reason: SDUPTHER

## 2020-07-15 NOTE — PROGRESS NOTES
[facility]  Progress Note    SUBJECTIVE:     Chief Complaint   Patient presents with    Sinusitis       HPI   Bambi Carrera  is a 60 y.o. female here for issues with her sinuses. Been having sinus drip but no fever, chills, cough. Tried zyrtec which doesn't help much. Also here for follow-up of her chronic conditions. Patient has no other new complaints/problems at this time.      Review of patient's allergies indicates:  No Known Allergies    Past Medical History:   Diagnosis Date    Borderline diabetic     Depression     Eye injury     fb os     Hypertension     Menopause     Nuclear sclerosis of both eyes 7/1/2019     Past Surgical History:   Procedure Laterality Date    LASIK Bilateral 07/15/2016     Family History   Problem Relation Age of Onset    Cancer Mother     No Known Problems Father     No Known Problems Sister     No Known Problems Brother     No Known Problems Maternal Aunt     No Known Problems Maternal Uncle     No Known Problems Paternal Aunt     No Known Problems Paternal Uncle     No Known Problems Maternal Grandmother     No Known Problems Maternal Grandfather     No Known Problems Paternal Grandmother     No Known Problems Paternal Grandfather     Amblyopia Neg Hx     Blindness Neg Hx     Cataracts Neg Hx     Diabetes Neg Hx     Glaucoma Neg Hx     Hypertension Neg Hx     Macular degeneration Neg Hx     Retinal detachment Neg Hx     Strabismus Neg Hx     Stroke Neg Hx     Thyroid disease Neg Hx      Social History     Tobacco Use    Smoking status: Never Smoker    Smokeless tobacco: Never Used   Substance Use Topics    Alcohol use: No     Alcohol/week: 0.0 standard drinks    Drug use: No        Review of Systems   Constitutional: Negative for chills, fever and malaise/fatigue.   HENT: Negative.    Respiratory: Negative.  Negative for cough and shortness of breath.    Cardiovascular: Negative.  Negative for chest pain.   Gastrointestinal:  Negative.  Negative for abdominal pain, nausea and vomiting.   Genitourinary: Negative.    Neurological: Negative for weakness and headaches.   All other systems reviewed and are negative.    OBJECTIVE:     Vitals:    07/15/20 0849   BP: 130/80   Pulse: 65   Temp: 97.2 °F (36.2 °C)     Body mass index is 48.83 kg/m².    Physical Exam   Constitutional: She is oriented to person, place, and time and well-developed, well-nourished, and in no distress. No distress.   HENT:   Head: Normocephalic and atraumatic.   Nose: Nose normal.   Eyes: Conjunctivae and EOM are normal.   Cardiovascular: Normal rate, regular rhythm and normal heart sounds. Exam reveals no gallop and no friction rub.   No murmur heard.  Pulmonary/Chest: Effort normal and breath sounds normal. No respiratory distress. She has no wheezes. She has no rales. She exhibits no tenderness.   Abdominal: Soft. Bowel sounds are normal. She exhibits no distension. There is no abdominal tenderness. There is no rebound.   Neurological: She is alert and oriented to person, place, and time.   Skin: Skin is warm. She is not diaphoretic.       Old records were reviewed. Labs and/or images were independently reviewed.    ASSESSMENT     1. Allergic rhinitis, unspecified seasonality, unspecified trigger    2. Essential hypertension    3. Prediabetes        PLAN:     Allergic rhinitis, unspecified seasonality, unspecified trigger  -     Start loratadine (CLARITIN) 10 mg tablet; Take 1 tablet (10 mg total) by mouth daily as needed for Allergies (or runny nose).  Dispense: 30 tablet; Refill: 3    Essential hypertension  -     Comprehensive metabolic panel; Future  -     Hemoglobin A1C; Future  -     Lipid Panel; Future  -     CBC auto differential; Future  -     Stable. Continue current regimen.    Prediabetes  -     Comprehensive metabolic panel; Future  -     Hemoglobin A1C; Future  -     Lipid Panel; Future  -     CBC auto differential; Future  -     Counseled patient about  healthy diet, exercise habits, and to increase physical activity.      RTC PRZAIN Whitmore MD  07/15/2020 9:03 AM

## 2020-07-16 LAB
ESTIMATED AVG GLUCOSE: 126 MG/DL (ref 68–131)
HBA1C MFR BLD HPLC: 6 % (ref 4–5.6)

## 2020-08-21 DIAGNOSIS — I10 ESSENTIAL HYPERTENSION: Chronic | ICD-10-CM

## 2020-08-21 NOTE — TELEPHONE ENCOUNTER
No new care gaps identified.  Powered by [employer]. Reference number: [id number]. 8/21/2020 6:21:59 PM CDT

## 2020-08-23 RX ORDER — HYDROCHLOROTHIAZIDE 25 MG/1
TABLET ORAL
Qty: 90 TABLET | Refills: 1 | Status: SHIPPED | OUTPATIENT
Start: 2020-08-23 | End: 2020-08-25 | Stop reason: SDUPTHER

## 2020-08-24 NOTE — PROGRESS NOTES
Refill Authorization Note    is requesting a refill authorization.    Brief assessment and rationale for refill: APPROVE: prr          Medication Therapy Plan: CDMR. approve     Medication reconciliation completed: No                       Comments:          Requested Prescriptions   Pending Prescriptions Disp Refills    hydroCHLOROthiazide (HYDRODIURIL) 25 MG tablet [Pharmacy Med Name: hydroCHLOROthiazide 25 MG Oral Tablet] 90 tablet 1     Sig: Take 1 tablet by mouth once daily       Cardiovascular: Diuretics - Thiazide Passed - 8/23/2020  7:05 PM        Passed - Patient is at least 18 years old        Passed - Last BP in normal range within 360 days.     BP Readings from Last 3 Encounters:   07/15/20 130/80   05/25/20 133/77   04/07/20 (!) 142/90              Passed - Office visit in past 12 months or future 90 days.     Recent Outpatient Visits            1 month ago Allergic rhinitis, unspecified seasonality, unspecified trigger    [facility] Wong Whitmore MD    3 months ago Cervical paraspinal muscle spasm    [facility] [place] ALLAN Otto    4 months ago Dizziness    [facility] [place] Stanley Virgen PA-C    5 months ago Acute pain of left shoulder    [facility] [place] Hoang Carranza PA-C    6 months ago Well woman exam with routine gynecological exam    [facility] Dann Avila MD                    Passed - Ca in normal range and within 360 days     Calcium   Date Value Ref Range Status   07/15/2020 9.4 8.7 - 10.5 mg/dL Final   05/04/2019 9.9 8.7 - 10.5 mg/dL Final   07/10/2017 9.6 8.7 - 10.5 mg/dL Final              Passed - Cr is 1.3 or below and within 180 days     Creatinine   Date Value Ref Range Status   07/15/2020 1.0 0.5 - 1.4 mg/dL Final   05/04/2019 0.9 0.5 - 1.4 mg/dL Final   07/10/2017 0.9 0.5 - 1.4 mg/dL Final              Passed - K in normal range and within 180 days     Potassium   Date Value  Ref Range Status   07/15/2020 3.9 3.5 - 5.1 mmol/L Final   05/04/2019 4.0 3.5 - 5.1 mmol/L Final   07/10/2017 3.8 3.5 - 5.1 mmol/L Final              Passed - Na is between 130 and 148 and within 180 days     Sodium   Date Value Ref Range Status   07/15/2020 138 136 - 145 mmol/L Final   05/04/2019 139 136 - 145 mmol/L Final   07/10/2017 139 136 - 145 mmol/L Final              Passed - eGFR within 180 days     eGFR if non    Date Value Ref Range Status   07/15/2020 >60.0 >60 mL/min/1.73 m^2 Final     Comment:     Calculation used to obtain the estimated glomerular filtration  rate (eGFR) is the CKD-EPI equation.      05/04/2019 >60.0 >60 mL/min/1.73 m^2 Final     Comment:     Calculation used to obtain the estimated glomerular filtration  rate (eGFR) is the CKD-EPI equation.      07/10/2017 >60.0 >60 mL/min/1.73 m^2 Final     Comment:     Calculation used to obtain the estimated glomerular filtration  rate (eGFR) is the CKD-EPI equation. Since race is unknown   in our information system, the eGFR values for   -American and Non--American patients are given   for each creatinine result.       eGFR if    Date Value Ref Range Status   07/15/2020 >60.0 >60 mL/min/1.73 m^2 Final   05/04/2019 >60.0 >60 mL/min/1.73 m^2 Final   07/10/2017 >60.0 >60 mL/min/1.73 m^2 Final                  Appointments  past 12m or future 3m with PCP    Date Provider   Last Visit   7/15/2020 Wong Whitmore MD   Next Visit   Visit date not found Wong Whitmore MD   ED visits in past 90 days: 0     Note composed:7:06 PM 08/23/2020

## 2020-08-25 DIAGNOSIS — I10 ESSENTIAL HYPERTENSION: Chronic | ICD-10-CM

## 2020-08-25 RX ORDER — HYDROCHLOROTHIAZIDE 25 MG/1
25 TABLET ORAL DAILY
Qty: 90 TABLET | Refills: 0 | Status: SHIPPED | OUTPATIENT
Start: 2020-08-25 | End: 2021-06-09

## 2020-08-25 NOTE — TELEPHONE ENCOUNTER
No new care gaps identified.  Powered by [employer]. Reference number: [id number]. 8/25/2020 1:41:28 PM CDT

## 2020-10-06 ENCOUNTER — PATIENT OUTREACH (OUTPATIENT)
Dept: ADMINISTRATIVE | Facility: OTHER | Age: 60
End: 2020-10-06

## 2020-10-06 ENCOUNTER — OFFICE VISIT (OUTPATIENT)
Dept: OPTOMETRY | Facility: CLINIC | Age: 60
End: 2020-10-06
Payer: COMMERCIAL

## 2020-10-06 DIAGNOSIS — H25.13 NUCLEAR SCLEROSIS OF BOTH EYES: ICD-10-CM

## 2020-10-06 DIAGNOSIS — Z98.890 HX OF LASIK: ICD-10-CM

## 2020-10-06 DIAGNOSIS — H04.123 DRY EYE SYNDROME, BILATERAL: Primary | ICD-10-CM

## 2020-10-06 DIAGNOSIS — R73.03 PREDIABETES: ICD-10-CM

## 2020-10-06 PROCEDURE — 92014 PR EYE EXAM, EST PATIENT,COMPREHESV: ICD-10-PCS | Mod: S$GLB,,, | Performed by: OPTOMETRIST

## 2020-10-06 PROCEDURE — 99999 PR PBB SHADOW E&M-EST. PATIENT-LVL III: CPT | Mod: PBBFAC,,, | Performed by: OPTOMETRIST

## 2020-10-06 PROCEDURE — 92014 COMPRE OPH EXAM EST PT 1/>: CPT | Mod: S$GLB,,, | Performed by: OPTOMETRIST

## 2020-10-06 PROCEDURE — 99999 PR PBB SHADOW E&M-EST. PATIENT-LVL III: ICD-10-PCS | Mod: PBBFAC,,, | Performed by: OPTOMETRIST

## 2020-10-06 NOTE — PROGRESS NOTES
Subjective:       Patient ID: Bambi Carrera is a 60 y.o. female      Chief Complaint   Patient presents with    Concerns About Ocular Health    Diabetic Eye Exam     History of Present Illness  Dls: 7/1/19 Dr. Rider     59 y/o female presents today for diabetic eye exam.   Pt c/o film over ou off/on. Pt c/o dry eyes ou.   Pt wears bifocal glasses.     LBS ?     No tearing  No itching  + burning  No pain  No ha's  No floaters  No flashes    Eye meds  Refresh plus ou prn     Hemoglobin A1C       Date                     Value               Ref Range           Status                07/15/2020               6.0 (H)             4.0 - 5.6 %         Final                  05/04/2019               6.0 (H)             4.0 - 5.6 %         Final                 07/10/2017               6.0 (H)             4.0 - 5.6 %         Final                  Assessment/Plan:     1. Dry eye syndrome, bilateral  2. Hx of LASIK  Pt with h/o LASIK OS, also taking oral anti-histamine.  Recommend artificial tears (Systane/Refresh) 1 drop 2-4x per day and PRN. Discussed different drop options - AT/PFAT/gel/ointment. Chronicity of disease and treatment discussed. Avoid Visine and Clear Eyes. Counseled on external factors that can worsen symptom such as air vents and ceiling fans if they are blowing directly toward patient for extended amounts of time.     3. Prediabetes  No diabetic retinopathy. Discussed with pt the effects of diabetes on vision, importance of good blood sugar control, compliance with meds, and follow up care with PCP. Return in 1 year for dilated eye exam, sooner PRN.    4. Nuclear sclerosis of both eyes  NVS. Monitor.     Follow up in about 1 year (around 10/6/2021).

## 2020-11-10 DIAGNOSIS — J30.9 ALLERGIC RHINITIS, UNSPECIFIED SEASONALITY, UNSPECIFIED TRIGGER: ICD-10-CM

## 2020-11-10 RX ORDER — LORATADINE 10 MG/1
TABLET ORAL
Qty: 30 TABLET | Refills: 0 | OUTPATIENT
Start: 2020-11-10

## 2020-11-10 RX ORDER — LORATADINE 10 MG/1
10 TABLET ORAL DAILY PRN
Qty: 90 TABLET | Refills: 3 | Status: SHIPPED | OUTPATIENT
Start: 2020-11-10 | End: 2021-09-03

## 2020-11-10 NOTE — TELEPHONE ENCOUNTER
No new care gaps identified.  Powered by [employer]. Reference number: [id number]. 11/10/2020 3:31:55 PM   CST

## 2020-11-10 NOTE — TELEPHONE ENCOUNTER
No new care gaps identified.  Powered by [employer]. Reference number: [id number]. 11/10/2020 4:12:32 PM   CST

## 2020-11-11 NOTE — PROGRESS NOTES
Lola MORATAYA. Duplicate Request    Refill Authorization Note   Bambi Carrera is requesting a refill authorization.    Brief assessment and rationale for refill: Quick Discontinue       Medication Therapy Plan: [facility]  Medication reconciliation completed: No    Comments:   Pended Medication(s)       Requested Prescriptions     Refused Prescriptions Disp Refills    loratadine (CLARITIN) 10 mg tablet [Pharmacy Med Name: LORATADINE 10MG     TAB] 30 tablet 0     Sig: TAKE 1 TABLET BY MOUTH ONCE DAILY AS NEEDED FOR ALLERGIES     Refused By: ADRIANA TAN     Reason for Refusal: Duplicate        Duplicate Pended Encounter(s)/ Last Prescribed Details:    Ordering Provider: -- JERROD #:  -- NPI:  --    Authorizing Provider: Wong Whitmore MD JERROD #:  [id number] NPI:  [id number]    Ordering User:  Wong Whitmore MD            Diagnosis Association: Allergic rhinitis, unspecified seasonality, unspecified trigger (J30.9)      Original Order:  loratadine (CLARITIN) 10 mg tablet [[id number]]      Pharmacy:  [address]   JERROD #:  --   Pharmacy Comments: --          Fill quantity remaining: -- Fill quantity used: -- Next fill due: --       Outpatient Medication Detail     Disp Refills Start End CLEMENTINA   loratadine (CLARITIN) 10 mg tablet 90 tablet 3 11/10/2020 11/10/2021 No   Sig - Route: Take 1 tablet (10 mg total) by mouth daily as needed for Allergies (or runny nose). - Oral   Sent to pharmacy as: loratadine (CLARITIN) 10 mg tablet   Class: Normal   Order: [id number]   Date/Time Signed: 11/10/2020 19:50       E-Prescribing Status: Receipt confirmed by pharmacy (11/10/2020  7:50 PM CST)   Ordering Encounter Report    Associated Reports   View Encounter                Note composed:9:16 PM 11/10/2020

## 2020-12-02 ENCOUNTER — PATIENT MESSAGE (OUTPATIENT)
Dept: ADMINISTRATIVE | Facility: HOSPITAL | Age: 60
End: 2020-12-02

## 2021-02-03 DIAGNOSIS — Z12.11 COLON CANCER SCREENING: ICD-10-CM

## 2021-02-15 ENCOUNTER — OFFICE VISIT (OUTPATIENT)
Dept: URGENT CARE | Facility: CLINIC | Age: 61
End: 2021-02-15
Payer: COMMERCIAL

## 2021-02-15 VITALS
DIASTOLIC BLOOD PRESSURE: 80 MMHG | TEMPERATURE: 97 F | SYSTOLIC BLOOD PRESSURE: 124 MMHG | OXYGEN SATURATION: 98 % | HEART RATE: 76 BPM

## 2021-02-15 DIAGNOSIS — M25.511 STERNOCLAVICULAR JOINT PAIN, RIGHT: Primary | ICD-10-CM

## 2021-02-15 DIAGNOSIS — M25.512 CHRONIC LEFT SHOULDER PAIN: ICD-10-CM

## 2021-02-15 DIAGNOSIS — G89.29 CHRONIC LEFT SHOULDER PAIN: ICD-10-CM

## 2021-02-15 PROCEDURE — 99214 OFFICE O/P EST MOD 30 MIN: CPT | Mod: S$GLB,,, | Performed by: PHYSICIAN ASSISTANT

## 2021-02-15 PROCEDURE — 99214 PR OFFICE/OUTPT VISIT, EST, LEVL IV, 30-39 MIN: ICD-10-PCS | Mod: S$GLB,,, | Performed by: PHYSICIAN ASSISTANT

## 2021-02-15 PROCEDURE — 1125F AMNT PAIN NOTED PAIN PRSNT: CPT | Mod: S$GLB,,, | Performed by: PHYSICIAN ASSISTANT

## 2021-02-15 PROCEDURE — 73000 X-RAY EXAM OF COLLAR BONE: CPT | Mod: RT,S$GLB,, | Performed by: RADIOLOGY

## 2021-02-15 PROCEDURE — 1125F PR PAIN SEVERITY QUANTIFIED, PAIN PRESENT: ICD-10-PCS | Mod: S$GLB,,, | Performed by: PHYSICIAN ASSISTANT

## 2021-02-15 PROCEDURE — 73000 XR CLAVICLE RIGHT: ICD-10-PCS | Mod: RT,S$GLB,, | Performed by: RADIOLOGY

## 2021-02-15 RX ORDER — MELOXICAM 15 MG/1
15 TABLET ORAL DAILY
Qty: 30 TABLET | Refills: 0 | Status: SHIPPED | OUTPATIENT
Start: 2021-02-15 | End: 2022-01-07

## 2021-02-15 RX ORDER — TIZANIDINE 4 MG/1
4 TABLET ORAL EVERY 6 HOURS PRN
Qty: 30 TABLET | Refills: 0 | Status: SHIPPED | OUTPATIENT
Start: 2021-02-15 | End: 2021-02-25

## 2021-02-19 ENCOUNTER — PATIENT OUTREACH (OUTPATIENT)
Dept: ADMINISTRATIVE | Facility: OTHER | Age: 61
End: 2021-02-19

## 2021-02-22 ENCOUNTER — OFFICE VISIT (OUTPATIENT)
Dept: OBSTETRICS AND GYNECOLOGY | Facility: CLINIC | Age: 61
End: 2021-02-22
Payer: COMMERCIAL

## 2021-02-22 ENCOUNTER — TELEPHONE (OUTPATIENT)
Dept: FAMILY MEDICINE | Facility: CLINIC | Age: 61
End: 2021-02-22

## 2021-02-22 DIAGNOSIS — Z01.419 WELL WOMAN EXAM WITH ROUTINE GYNECOLOGICAL EXAM: Primary | ICD-10-CM

## 2021-02-22 DIAGNOSIS — Z12.31 BREAST CANCER SCREENING BY MAMMOGRAM: Primary | ICD-10-CM

## 2021-02-22 DIAGNOSIS — M25.50 ARTHRALGIA, UNSPECIFIED JOINT: ICD-10-CM

## 2021-02-22 PROCEDURE — 87625 HPV TYPES 16 & 18 ONLY: CPT | Mod: 59

## 2021-02-22 PROCEDURE — 88175 CYTOPATH C/V AUTO FLUID REDO: CPT

## 2021-02-22 PROCEDURE — 99396 PREV VISIT EST AGE 40-64: CPT | Mod: S$GLB,,, | Performed by: OBSTETRICS & GYNECOLOGY

## 2021-02-22 PROCEDURE — 87624 HPV HI-RISK TYP POOLED RSLT: CPT

## 2021-02-22 PROCEDURE — 99396 PR PREVENTIVE VISIT,EST,40-64: ICD-10-PCS | Mod: S$GLB,,, | Performed by: OBSTETRICS & GYNECOLOGY

## 2021-02-22 PROCEDURE — 99999 PR PBB SHADOW E&M-EST. PATIENT-LVL II: ICD-10-PCS | Mod: PBBFAC,,, | Performed by: OBSTETRICS & GYNECOLOGY

## 2021-02-22 PROCEDURE — 99999 PR PBB SHADOW E&M-EST. PATIENT-LVL II: CPT | Mod: PBBFAC,,, | Performed by: OBSTETRICS & GYNECOLOGY

## 2021-02-23 ENCOUNTER — HOSPITAL ENCOUNTER (OUTPATIENT)
Dept: RADIOLOGY | Facility: HOSPITAL | Age: 61
Discharge: HOME | End: 2021-02-23
Attending: ORTHOPAEDIC SURGERY
Payer: COMMERCIAL

## 2021-02-23 DIAGNOSIS — M51.36 DDD (DEGENERATIVE DISC DISEASE), LUMBAR: ICD-10-CM

## 2021-02-23 PROCEDURE — 72100 X-RAY EXAM L-S SPINE 2/3 VWS: CPT | Mod: 26,,, | Performed by: RADIOLOGY

## 2021-02-23 PROCEDURE — 72100 XR LUMBAR SPINE AP AND LAT WITH FLEX/EXT: ICD-10-PCS | Mod: 26,,, | Performed by: RADIOLOGY

## 2021-02-23 PROCEDURE — 72050 X-RAY EXAM NECK SPINE 4/5VWS: CPT | Mod: 26,,, | Performed by: RADIOLOGY

## 2021-02-23 PROCEDURE — 72120 XR LUMBAR SPINE AP AND LAT WITH FLEX/EXT: ICD-10-PCS | Mod: 26,,, | Performed by: RADIOLOGY

## 2021-02-23 PROCEDURE — 72050 X-RAY EXAM NECK SPINE 4/5VWS: CPT | Mod: TC

## 2021-02-23 PROCEDURE — 72120 X-RAY BEND ONLY L-S SPINE: CPT | Mod: TC

## 2021-02-23 PROCEDURE — 72050 XR CERVICAL SPINE AP LAT WITH FLEX EXTEN: ICD-10-PCS | Mod: 26,,, | Performed by: RADIOLOGY

## 2021-02-23 PROCEDURE — 72120 X-RAY BEND ONLY L-S SPINE: CPT | Mod: 26,,, | Performed by: RADIOLOGY

## 2021-02-25 ENCOUNTER — HOSPITAL ENCOUNTER (OUTPATIENT)
Dept: RADIOLOGY | Facility: HOSPITAL | Age: 61
Discharge: HOME | End: 2021-02-25
Attending: ORTHOPAEDIC SURGERY
Payer: COMMERCIAL

## 2021-02-25 ENCOUNTER — OFFICE VISIT (OUTPATIENT)
Dept: ORTHOPEDICS | Facility: CLINIC | Age: 61
End: 2021-02-25
Payer: COMMERCIAL

## 2021-02-25 VITALS — WEIGHT: 292.75 LBS | HEIGHT: 65 IN | BODY MASS INDEX: 48.78 KG/M2

## 2021-02-25 DIAGNOSIS — M43.10 SPONDYLOLISTHESIS, UNSPECIFIED SPINAL REGION: ICD-10-CM

## 2021-02-25 DIAGNOSIS — M25.50 ARTHRALGIA, UNSPECIFIED JOINT: ICD-10-CM

## 2021-02-25 DIAGNOSIS — M43.10 SPONDYLOLISTHESIS, UNSPECIFIED SPINAL REGION: Primary | ICD-10-CM

## 2021-02-25 PROCEDURE — 3008F PR BODY MASS INDEX (BMI) DOCUMENTED: ICD-10-PCS | Mod: CPTII,S$GLB,, | Performed by: ORTHOPAEDIC SURGERY

## 2021-02-25 PROCEDURE — 72148 MRI LUMBAR SPINE WITHOUT CONTRAST: ICD-10-PCS | Mod: 26,,, | Performed by: RADIOLOGY

## 2021-02-25 PROCEDURE — 99244 PR OFFICE CONSULTATION,LEVEL IV: ICD-10-PCS | Mod: S$GLB,,, | Performed by: ORTHOPAEDIC SURGERY

## 2021-02-25 PROCEDURE — 99999 PR PBB SHADOW E&M-EST. PATIENT-LVL III: CPT | Mod: PBBFAC,,, | Performed by: ORTHOPAEDIC SURGERY

## 2021-02-25 PROCEDURE — 99244 OFF/OP CNSLTJ NEW/EST MOD 40: CPT | Mod: S$GLB,,, | Performed by: ORTHOPAEDIC SURGERY

## 2021-02-25 PROCEDURE — 99999 PR PBB SHADOW E&M-EST. PATIENT-LVL III: ICD-10-PCS | Mod: PBBFAC,,, | Performed by: ORTHOPAEDIC SURGERY

## 2021-02-25 PROCEDURE — 3008F BODY MASS INDEX DOCD: CPT | Mod: CPTII,S$GLB,, | Performed by: ORTHOPAEDIC SURGERY

## 2021-02-25 PROCEDURE — 1125F AMNT PAIN NOTED PAIN PRSNT: CPT | Mod: S$GLB,,, | Performed by: ORTHOPAEDIC SURGERY

## 2021-02-25 PROCEDURE — 72148 MRI LUMBAR SPINE W/O DYE: CPT | Mod: TC

## 2021-02-25 PROCEDURE — 72148 MRI LUMBAR SPINE W/O DYE: CPT | Mod: 26,,, | Performed by: RADIOLOGY

## 2021-02-25 PROCEDURE — 1125F PR PAIN SEVERITY QUANTIFIED, PAIN PRESENT: ICD-10-PCS | Mod: S$GLB,,, | Performed by: ORTHOPAEDIC SURGERY

## 2021-02-25 RX ORDER — METHYLPREDNISOLONE 4 MG/1
TABLET ORAL
Qty: 1 PACKAGE | Refills: 0 | Status: SHIPPED | OUTPATIENT
Start: 2021-02-25 | End: 2021-03-09

## 2021-03-01 LAB
CLINICAL INFO: NORMAL
CYTO CVX: NORMAL
CYTOLOGIST CVX/VAG CYTO: NORMAL
CYTOLOGIST CVX/VAG CYTO: NORMAL
CYTOLOGY CMNT CVX/VAG CYTO-IMP: NORMAL
CYTOLOGY PAP THIN PREP EXPLANATION: NORMAL
DATE OF PREVIOUS PAP: NORMAL
DATE PREVIOUS BX: NO
HPV I/H RISK 4 DNA CVX QL NAA+PROBE: DETECTED
LMP START DATE: NORMAL
SPECIMEN SOURCE CVX/VAG CYTO: NORMAL
STAT OF ADQ CVX/VAG CYTO-IMP: NORMAL

## 2021-03-02 LAB
HPV16 DNA CVX QL PROBE+SIG AMP: NOT DETECTED
HPV18 DNA CVX QL PROBE+SIG AMP: NOT DETECTED

## 2021-03-09 ENCOUNTER — OFFICE VISIT (OUTPATIENT)
Dept: FAMILY MEDICINE | Facility: CLINIC | Age: 61
End: 2021-03-09
Payer: COMMERCIAL

## 2021-03-09 VITALS
HEIGHT: 65 IN | DIASTOLIC BLOOD PRESSURE: 80 MMHG | OXYGEN SATURATION: 97 % | WEIGHT: 293 LBS | SYSTOLIC BLOOD PRESSURE: 132 MMHG | BODY MASS INDEX: 48.82 KG/M2 | TEMPERATURE: 98 F | HEART RATE: 63 BPM

## 2021-03-09 DIAGNOSIS — J02.9 SORE THROAT: Primary | ICD-10-CM

## 2021-03-09 DIAGNOSIS — E55.9 VITAMIN D DEFICIENCY: ICD-10-CM

## 2021-03-09 DIAGNOSIS — R13.19 OTHER DYSPHAGIA: ICD-10-CM

## 2021-03-09 PROCEDURE — 99214 PR OFFICE/OUTPT VISIT, EST, LEVL IV, 30-39 MIN: ICD-10-PCS | Mod: S$GLB,,, | Performed by: FAMILY MEDICINE

## 2021-03-09 PROCEDURE — 3008F PR BODY MASS INDEX (BMI) DOCUMENTED: ICD-10-PCS | Mod: CPTII,S$GLB,, | Performed by: FAMILY MEDICINE

## 2021-03-09 PROCEDURE — 99999 PR PBB SHADOW E&M-EST. PATIENT-LVL IV: CPT | Mod: PBBFAC,,, | Performed by: FAMILY MEDICINE

## 2021-03-09 PROCEDURE — 3008F BODY MASS INDEX DOCD: CPT | Mod: CPTII,S$GLB,, | Performed by: FAMILY MEDICINE

## 2021-03-09 PROCEDURE — 3075F PR MOST RECENT SYSTOLIC BLOOD PRESS GE 130-139MM HG: ICD-10-PCS | Mod: CPTII,S$GLB,, | Performed by: FAMILY MEDICINE

## 2021-03-09 PROCEDURE — 3079F PR MOST RECENT DIASTOLIC BLOOD PRESSURE 80-89 MM HG: ICD-10-PCS | Mod: CPTII,S$GLB,, | Performed by: FAMILY MEDICINE

## 2021-03-09 PROCEDURE — 3079F DIAST BP 80-89 MM HG: CPT | Mod: CPTII,S$GLB,, | Performed by: FAMILY MEDICINE

## 2021-03-09 PROCEDURE — 1126F AMNT PAIN NOTED NONE PRSNT: CPT | Mod: S$GLB,,, | Performed by: FAMILY MEDICINE

## 2021-03-09 PROCEDURE — 3075F SYST BP GE 130 - 139MM HG: CPT | Mod: CPTII,S$GLB,, | Performed by: FAMILY MEDICINE

## 2021-03-09 PROCEDURE — 1126F PR PAIN SEVERITY QUANTIFIED, NO PAIN PRESENT: ICD-10-PCS | Mod: S$GLB,,, | Performed by: FAMILY MEDICINE

## 2021-03-09 PROCEDURE — 99999 PR PBB SHADOW E&M-EST. PATIENT-LVL IV: ICD-10-PCS | Mod: PBBFAC,,, | Performed by: FAMILY MEDICINE

## 2021-03-09 PROCEDURE — 99214 OFFICE O/P EST MOD 30 MIN: CPT | Mod: S$GLB,,, | Performed by: FAMILY MEDICINE

## 2021-03-10 DIAGNOSIS — Z01.812 PRE-PROCEDURE LAB EXAM: Primary | ICD-10-CM

## 2021-03-10 RX ORDER — ERGOCALCIFEROL 1.25 MG/1
CAPSULE ORAL
Qty: 12 CAPSULE | Refills: 0 | Status: SHIPPED | OUTPATIENT
Start: 2021-03-10 | End: 2021-05-19

## 2021-03-13 ENCOUNTER — LAB VISIT (OUTPATIENT)
Dept: INTERNAL MEDICINE | Facility: CLINIC | Age: 61
End: 2021-03-13
Payer: COMMERCIAL

## 2021-03-13 DIAGNOSIS — Z01.812 PRE-PROCEDURE LAB EXAM: ICD-10-CM

## 2021-03-13 PROCEDURE — U0005 INFEC AGEN DETEC AMPLI PROBE: HCPCS | Performed by: OTOLARYNGOLOGY

## 2021-03-13 PROCEDURE — U0003 INFECTIOUS AGENT DETECTION BY NUCLEIC ACID (DNA OR RNA); SEVERE ACUTE RESPIRATORY SYNDROME CORONAVIRUS 2 (SARS-COV-2) (CORONAVIRUS DISEASE [COVID-19]), AMPLIFIED PROBE TECHNIQUE, MAKING USE OF HIGH THROUGHPUT TECHNOLOGIES AS DESCRIBED BY CMS-2020-01-R: HCPCS | Performed by: OTOLARYNGOLOGY

## 2021-03-14 LAB — SARS-COV-2 RNA RESP QL NAA+PROBE: NOT DETECTED

## 2021-03-16 ENCOUNTER — OFFICE VISIT (OUTPATIENT)
Dept: OTOLARYNGOLOGY | Facility: CLINIC | Age: 61
End: 2021-03-16
Payer: COMMERCIAL

## 2021-03-16 ENCOUNTER — HOSPITAL ENCOUNTER (OUTPATIENT)
Dept: RADIOLOGY | Facility: HOSPITAL | Age: 61
Discharge: HOME | End: 2021-03-16
Attending: FAMILY MEDICINE
Payer: COMMERCIAL

## 2021-03-16 VITALS — DIASTOLIC BLOOD PRESSURE: 90 MMHG | HEART RATE: 89 BPM | SYSTOLIC BLOOD PRESSURE: 145 MMHG

## 2021-03-16 DIAGNOSIS — K21.9 LARYNGOPHARYNGEAL REFLUX (LPR): Primary | ICD-10-CM

## 2021-03-16 DIAGNOSIS — Z12.31 BREAST CANCER SCREENING BY MAMMOGRAM: ICD-10-CM

## 2021-03-16 DIAGNOSIS — J30.2 SEASONAL ALLERGIC RHINITIS, UNSPECIFIED TRIGGER: ICD-10-CM

## 2021-03-16 PROCEDURE — 3077F SYST BP >= 140 MM HG: CPT | Mod: CPTII,S$GLB,, | Performed by: OTOLARYNGOLOGY

## 2021-03-16 PROCEDURE — 77067 MAMMO DIGITAL SCREENING BILAT WITH TOMO: ICD-10-PCS | Mod: 26,,, | Performed by: RADIOLOGY

## 2021-03-16 PROCEDURE — 31575 PR LARYNGOSCOPY, FLEXIBLE; DIAGNOSTIC: ICD-10-PCS | Mod: S$GLB,,, | Performed by: OTOLARYNGOLOGY

## 2021-03-16 PROCEDURE — 99204 OFFICE O/P NEW MOD 45 MIN: CPT | Mod: 25,S$GLB,, | Performed by: OTOLARYNGOLOGY

## 2021-03-16 PROCEDURE — 1126F AMNT PAIN NOTED NONE PRSNT: CPT | Mod: S$GLB,,, | Performed by: OTOLARYNGOLOGY

## 2021-03-16 PROCEDURE — 77067 SCR MAMMO BI INCL CAD: CPT | Mod: TC,PO

## 2021-03-16 PROCEDURE — 31575 DIAGNOSTIC LARYNGOSCOPY: CPT | Mod: S$GLB,,, | Performed by: OTOLARYNGOLOGY

## 2021-03-16 PROCEDURE — 99204 PR OFFICE/OUTPT VISIT, NEW, LEVL IV, 45-59 MIN: ICD-10-PCS | Mod: 25,S$GLB,, | Performed by: OTOLARYNGOLOGY

## 2021-03-16 PROCEDURE — 3080F PR MOST RECENT DIASTOLIC BLOOD PRESSURE >= 90 MM HG: ICD-10-PCS | Mod: CPTII,S$GLB,, | Performed by: OTOLARYNGOLOGY

## 2021-03-16 PROCEDURE — 3077F PR MOST RECENT SYSTOLIC BLOOD PRESSURE >= 140 MM HG: ICD-10-PCS | Mod: CPTII,S$GLB,, | Performed by: OTOLARYNGOLOGY

## 2021-03-16 PROCEDURE — 77067 SCR MAMMO BI INCL CAD: CPT | Mod: 26,,, | Performed by: RADIOLOGY

## 2021-03-16 PROCEDURE — 3080F DIAST BP >= 90 MM HG: CPT | Mod: CPTII,S$GLB,, | Performed by: OTOLARYNGOLOGY

## 2021-03-16 PROCEDURE — 77063 MAMMO DIGITAL SCREENING BILAT WITH TOMO: ICD-10-PCS | Mod: 26,,, | Performed by: RADIOLOGY

## 2021-03-16 PROCEDURE — 1126F PR PAIN SEVERITY QUANTIFIED, NO PAIN PRESENT: ICD-10-PCS | Mod: S$GLB,,, | Performed by: OTOLARYNGOLOGY

## 2021-03-16 PROCEDURE — 77063 BREAST TOMOSYNTHESIS BI: CPT | Mod: 26,,, | Performed by: RADIOLOGY

## 2021-03-16 RX ORDER — PANTOPRAZOLE SODIUM 40 MG/1
40 TABLET, DELAYED RELEASE ORAL DAILY
Qty: 42 TABLET | Refills: 1 | Status: SHIPPED | OUTPATIENT
Start: 2021-03-16 | End: 2021-06-08

## 2021-04-06 ENCOUNTER — PATIENT MESSAGE (OUTPATIENT)
Dept: ADMINISTRATIVE | Facility: HOSPITAL | Age: 61
End: 2021-04-06

## 2021-05-19 ENCOUNTER — TELEPHONE (OUTPATIENT)
Dept: OTOLARYNGOLOGY | Facility: CLINIC | Age: 61
End: 2021-05-19

## 2021-05-19 DIAGNOSIS — E55.9 VITAMIN D DEFICIENCY: ICD-10-CM

## 2021-05-19 RX ORDER — ERGOCALCIFEROL 1.25 MG/1
CAPSULE ORAL
Qty: 12 CAPSULE | Refills: 1 | Status: SHIPPED | OUTPATIENT
Start: 2021-05-19 | End: 2021-11-15

## 2021-06-01 ENCOUNTER — PATIENT MESSAGE (OUTPATIENT)
Dept: ENDOSCOPY | Facility: HOSPITAL | Age: 61
End: 2021-06-01

## 2021-06-01 DIAGNOSIS — Z12.11 SPECIAL SCREENING FOR MALIGNANT NEOPLASMS, COLON: Primary | ICD-10-CM

## 2021-06-01 RX ORDER — POLYETHYLENE GLYCOL 3350, SODIUM SULFATE ANHYDROUS, SODIUM BICARBONATE, SODIUM CHLORIDE, POTASSIUM CHLORIDE 236; 22.74; 6.74; 5.86; 2.97 G/4L; G/4L; G/4L; G/4L; G/4L
4 POWDER, FOR SOLUTION ORAL ONCE
Qty: 4000 ML | Refills: 0 | Status: SHIPPED | OUTPATIENT
Start: 2021-06-01 | End: 2021-06-01

## 2021-06-08 DIAGNOSIS — I10 ESSENTIAL HYPERTENSION: Chronic | ICD-10-CM

## 2021-06-09 RX ORDER — HYDROCHLOROTHIAZIDE 25 MG/1
TABLET ORAL
Qty: 90 TABLET | Refills: 0 | Status: SHIPPED | OUTPATIENT
Start: 2021-06-09 | End: 2021-09-04

## 2021-06-13 ENCOUNTER — HOSPITAL ENCOUNTER (EMERGENCY)
Facility: HOSPITAL | Age: 61
Discharge: HOME | End: 2021-06-13
Attending: INTERNAL MEDICINE
Payer: COMMERCIAL

## 2021-06-13 VITALS
HEART RATE: 87 BPM | WEIGHT: 290 LBS | DIASTOLIC BLOOD PRESSURE: 83 MMHG | OXYGEN SATURATION: 98 % | BODY MASS INDEX: 48.32 KG/M2 | RESPIRATION RATE: 18 BRPM | SYSTOLIC BLOOD PRESSURE: 142 MMHG | TEMPERATURE: 99 F | HEIGHT: 65 IN

## 2021-06-13 DIAGNOSIS — N30.01 ACUTE CYSTITIS WITH HEMATURIA: Primary | ICD-10-CM

## 2021-06-13 DIAGNOSIS — K57.92 DIVERTICULITIS: ICD-10-CM

## 2021-06-13 DIAGNOSIS — E27.8 ADRENAL MASS, LEFT: ICD-10-CM

## 2021-06-13 LAB
ALBUMIN SERPL-MCNC: 3.8 G/DL (ref 3.3–5.5)
ALP SERPL-CCNC: 121 U/L (ref 42–141)
BASOPHILS # BLD AUTO: 0.03 K/UL (ref 0–0.2)
BASOPHILS NFR BLD: 0.2 % (ref 0–1.9)
BILIRUB SERPL-MCNC: 0.7 MG/DL (ref 0.2–1.6)
BILIRUBIN, POC UA: NEGATIVE
BLOOD, POC UA: ABNORMAL
BUN SERPL-MCNC: 10 MG/DL (ref 7–22)
CALCIUM SERPL-MCNC: 9.8 MG/DL (ref 8–10.3)
CHLORIDE SERPL-SCNC: 101 MMOL/L (ref 98–108)
CLARITY, POC UA: CLEAR
COLOR, POC UA: YELLOW
CREAT SERPL-MCNC: 0.7 MG/DL (ref 0.6–1.2)
DIFFERENTIAL METHOD: ABNORMAL
EOSINOPHIL # BLD AUTO: 0 K/UL (ref 0–0.5)
EOSINOPHIL NFR BLD: 0.2 % (ref 0–8)
ERYTHROCYTE [DISTWIDTH] IN BLOOD BY AUTOMATED COUNT: 15 % (ref 11.5–14.5)
GLUCOSE SERPL-MCNC: 119 MG/DL (ref 73–118)
GLUCOSE, POC UA: NEGATIVE
HCT VFR BLD AUTO: 39.7 % (ref 37–48.5)
HGB BLD-MCNC: 12.7 G/DL (ref 12–16)
IMM GRANULOCYTES # BLD AUTO: 0.05 K/UL (ref 0–0.04)
IMM GRANULOCYTES NFR BLD AUTO: 0.4 % (ref 0–0.5)
KETONES, POC UA: NEGATIVE
LEUKOCYTE EST, POC UA: ABNORMAL
LYMPHOCYTES # BLD AUTO: 1.8 K/UL (ref 1–4.8)
LYMPHOCYTES NFR BLD: 14.4 % (ref 18–48)
MCH RBC QN AUTO: 28.1 PG (ref 27–31)
MCHC RBC AUTO-ENTMCNC: 32 G/DL (ref 32–36)
MCV RBC AUTO: 88 FL (ref 82–98)
MONOCYTES # BLD AUTO: 0.9 K/UL (ref 0.3–1)
MONOCYTES NFR BLD: 7.5 % (ref 4–15)
NEUTROPHILS # BLD AUTO: 9.7 K/UL (ref 1.8–7.7)
NEUTROPHILS NFR BLD: 77.3 % (ref 38–73)
NITRITE, POC UA: NEGATIVE
NRBC BLD-RTO: 0 /100 WBC
PH UR STRIP: 6.5 [PH]
PLATELET # BLD AUTO: 301 K/UL (ref 150–450)
PMV BLD AUTO: 10.3 FL (ref 9.2–12.9)
POC ALT (SGPT): 23 U/L (ref 10–47)
POC AST (SGOT): 27 U/L (ref 11–38)
POC TCO2: 28 MMOL/L (ref 18–33)
POTASSIUM BLD-SCNC: 3.3 MMOL/L (ref 3.6–5.1)
PROTEIN, POC UA: ABNORMAL
PROTEIN, POC: 9.2 G/DL (ref 6.4–8.1)
RBC # BLD AUTO: 4.52 M/UL (ref 4–5.4)
SODIUM BLD-SCNC: 140 MMOL/L (ref 128–145)
SPECIFIC GRAVITY, POC UA: 1.02
UROBILINOGEN, POC UA: 0.2 E.U./DL
WBC # BLD AUTO: 12.47 K/UL (ref 3.9–12.7)

## 2021-06-13 PROCEDURE — 81003 URINALYSIS AUTO W/O SCOPE: CPT | Mod: ER

## 2021-06-13 PROCEDURE — 25000003 PHARM REV CODE 250: Mod: ER | Performed by: INTERNAL MEDICINE

## 2021-06-13 PROCEDURE — 85025 COMPLETE CBC W/AUTO DIFF WBC: CPT | Performed by: INTERNAL MEDICINE

## 2021-06-13 PROCEDURE — 99285 EMERGENCY DEPT VISIT HI MDM: CPT | Mod: 25,ER

## 2021-06-13 PROCEDURE — 80053 COMPREHEN METABOLIC PANEL: CPT | Mod: ER

## 2021-06-13 PROCEDURE — 25500020 PHARM REV CODE 255: Mod: ER | Performed by: INTERNAL MEDICINE

## 2021-06-13 RX ORDER — PHENAZOPYRIDINE HYDROCHLORIDE 100 MG/1
200 TABLET, FILM COATED ORAL
Status: COMPLETED | OUTPATIENT
Start: 2021-06-13 | End: 2021-06-13

## 2021-06-13 RX ORDER — PHENAZOPYRIDINE HYDROCHLORIDE 200 MG/1
200 TABLET, FILM COATED ORAL 3 TIMES DAILY
Qty: 6 TABLET | Refills: 0 | Status: SHIPPED | OUTPATIENT
Start: 2021-06-13 | End: 2021-06-15

## 2021-06-13 RX ORDER — PHENAZOPYRIDINE HYDROCHLORIDE 200 MG/1
200 TABLET, FILM COATED ORAL 3 TIMES DAILY
Qty: 6 TABLET | Refills: 0 | Status: SHIPPED | OUTPATIENT
Start: 2021-06-13 | End: 2021-06-13 | Stop reason: SDUPTHER

## 2021-06-13 RX ORDER — NITROFURANTOIN 25; 75 MG/1; MG/1
100 CAPSULE ORAL 2 TIMES DAILY
Qty: 14 CAPSULE | Refills: 0 | Status: SHIPPED | OUTPATIENT
Start: 2021-06-13 | End: 2021-06-13 | Stop reason: SDUPTHER

## 2021-06-13 RX ORDER — NITROFURANTOIN 25; 75 MG/1; MG/1
100 CAPSULE ORAL
Status: COMPLETED | OUTPATIENT
Start: 2021-06-13 | End: 2021-06-13

## 2021-06-13 RX ORDER — CIPROFLOXACIN 500 MG/1
500 TABLET ORAL
Status: COMPLETED | OUTPATIENT
Start: 2021-06-13 | End: 2021-06-13

## 2021-06-13 RX ORDER — CIPROFLOXACIN 500 MG/1
500 TABLET ORAL 2 TIMES DAILY
Qty: 20 TABLET | Refills: 0 | Status: SHIPPED | OUTPATIENT
Start: 2021-06-13 | End: 2021-06-23

## 2021-06-13 RX ORDER — METRONIDAZOLE 500 MG/1
500 TABLET ORAL
Status: COMPLETED | OUTPATIENT
Start: 2021-06-13 | End: 2021-06-13

## 2021-06-13 RX ORDER — METRONIDAZOLE 500 MG/1
500 TABLET ORAL 3 TIMES DAILY
Qty: 30 TABLET | Refills: 0 | Status: SHIPPED | OUTPATIENT
Start: 2021-06-13 | End: 2021-06-23

## 2021-06-13 RX ADMIN — CIPROFLOXACIN HYDROCHLORIDE 500 MG: 500 TABLET, FILM COATED ORAL at 11:06

## 2021-06-13 RX ADMIN — METRONIDAZOLE 500 MG: 500 TABLET ORAL at 11:06

## 2021-06-13 RX ADMIN — IOHEXOL 100 ML: 350 INJECTION, SOLUTION INTRAVENOUS at 10:06

## 2021-06-13 RX ADMIN — PHENAZOPYRIDINE HYDROCHLORIDE 200 MG: 100 TABLET ORAL at 09:06

## 2021-06-13 RX ADMIN — NITROFURANTOIN (MONOHYDRATE/MACROCRYSTALS) 100 MG: 75; 25 CAPSULE ORAL at 09:06

## 2021-06-14 ENCOUNTER — TELEPHONE (OUTPATIENT)
Dept: ENDOSCOPY | Facility: HOSPITAL | Age: 61
End: 2021-06-14

## 2021-06-18 ENCOUNTER — TELEPHONE (OUTPATIENT)
Dept: ENDOSCOPY | Facility: HOSPITAL | Age: 61
End: 2021-06-18

## 2021-06-18 ENCOUNTER — PATIENT MESSAGE (OUTPATIENT)
Dept: ENDOSCOPY | Facility: HOSPITAL | Age: 61
End: 2021-06-18

## 2021-06-18 ENCOUNTER — TELEPHONE (OUTPATIENT)
Dept: FAMILY MEDICINE | Facility: CLINIC | Age: 61
End: 2021-06-18

## 2021-06-22 ENCOUNTER — PATIENT MESSAGE (OUTPATIENT)
Dept: ENDOSCOPY | Facility: HOSPITAL | Age: 61
End: 2021-06-22

## 2021-06-29 ENCOUNTER — OFFICE VISIT (OUTPATIENT)
Dept: FAMILY MEDICINE | Facility: CLINIC | Age: 61
End: 2021-06-29
Payer: COMMERCIAL

## 2021-06-29 VITALS
TEMPERATURE: 98 F | WEIGHT: 293 LBS | HEART RATE: 72 BPM | DIASTOLIC BLOOD PRESSURE: 86 MMHG | BODY MASS INDEX: 47.09 KG/M2 | OXYGEN SATURATION: 98 % | HEIGHT: 66 IN | SYSTOLIC BLOOD PRESSURE: 132 MMHG

## 2021-06-29 DIAGNOSIS — B37.9 YEAST INFECTION: ICD-10-CM

## 2021-06-29 DIAGNOSIS — K57.92 DIVERTICULITIS: Primary | ICD-10-CM

## 2021-06-29 DIAGNOSIS — E27.8 ADRENAL MASS, LEFT: ICD-10-CM

## 2021-06-29 PROBLEM — K57.90 DIVERTICULOSIS: Status: ACTIVE | Noted: 2021-06-29

## 2021-06-29 PROBLEM — N30.01 ACUTE CYSTITIS WITH HEMATURIA: Status: RESOLVED | Noted: 2021-06-13 | Resolved: 2021-06-29

## 2021-06-29 PROBLEM — K57.90 DIVERTICULOSIS: Chronic | Status: ACTIVE | Noted: 2021-06-29

## 2021-06-29 PROCEDURE — 99214 OFFICE O/P EST MOD 30 MIN: CPT | Mod: S$GLB,,, | Performed by: FAMILY MEDICINE

## 2021-06-29 PROCEDURE — 99999 PR PBB SHADOW E&M-EST. PATIENT-LVL IV: CPT | Mod: PBBFAC,,, | Performed by: FAMILY MEDICINE

## 2021-06-29 PROCEDURE — 3008F PR BODY MASS INDEX (BMI) DOCUMENTED: ICD-10-PCS | Mod: CPTII,S$GLB,, | Performed by: FAMILY MEDICINE

## 2021-06-29 PROCEDURE — 1126F PR PAIN SEVERITY QUANTIFIED, NO PAIN PRESENT: ICD-10-PCS | Mod: S$GLB,,, | Performed by: FAMILY MEDICINE

## 2021-06-29 PROCEDURE — 1126F AMNT PAIN NOTED NONE PRSNT: CPT | Mod: S$GLB,,, | Performed by: FAMILY MEDICINE

## 2021-06-29 PROCEDURE — 99214 PR OFFICE/OUTPT VISIT, EST, LEVL IV, 30-39 MIN: ICD-10-PCS | Mod: S$GLB,,, | Performed by: FAMILY MEDICINE

## 2021-06-29 PROCEDURE — 99999 PR PBB SHADOW E&M-EST. PATIENT-LVL IV: ICD-10-PCS | Mod: PBBFAC,,, | Performed by: FAMILY MEDICINE

## 2021-06-29 PROCEDURE — 3008F BODY MASS INDEX DOCD: CPT | Mod: CPTII,S$GLB,, | Performed by: FAMILY MEDICINE

## 2021-06-29 RX ORDER — POLYETHYLENE GLYCOL 3350, SODIUM SULFATE ANHYDROUS, SODIUM BICARBONATE, SODIUM CHLORIDE, POTASSIUM CHLORIDE 236; 22.74; 6.74; 5.86; 2.97 G/4L; G/4L; G/4L; G/4L; G/4L
POWDER, FOR SOLUTION ORAL
COMMUNITY
Start: 2021-06-02 | End: 2021-09-28

## 2021-06-29 RX ORDER — FLUCONAZOLE 150 MG/1
150 TABLET ORAL DAILY
Qty: 1 TABLET | Refills: 0 | Status: SHIPPED | OUTPATIENT
Start: 2021-06-29 | End: 2021-06-30

## 2021-06-29 RX ORDER — DOCUSATE SODIUM 100 MG/1
100 CAPSULE, LIQUID FILLED ORAL 2 TIMES DAILY PRN
Qty: 60 CAPSULE | Refills: 3 | Status: SHIPPED | OUTPATIENT
Start: 2021-06-29 | End: 2022-01-07

## 2021-06-30 ENCOUNTER — OFFICE VISIT (OUTPATIENT)
Dept: SURGERY | Facility: CLINIC | Age: 61
End: 2021-06-30
Payer: COMMERCIAL

## 2021-06-30 ENCOUNTER — LAB VISIT (OUTPATIENT)
Dept: LAB | Facility: HOSPITAL | Age: 61
End: 2021-06-30
Attending: FAMILY MEDICINE
Payer: COMMERCIAL

## 2021-06-30 VITALS
HEIGHT: 66 IN | DIASTOLIC BLOOD PRESSURE: 73 MMHG | WEIGHT: 293 LBS | BODY MASS INDEX: 47.09 KG/M2 | HEART RATE: 88 BPM | SYSTOLIC BLOOD PRESSURE: 105 MMHG

## 2021-06-30 DIAGNOSIS — E27.8 ADRENAL MASS, LEFT: Primary | ICD-10-CM

## 2021-06-30 DIAGNOSIS — E27.8 ADRENAL MASS, LEFT: ICD-10-CM

## 2021-06-30 PROCEDURE — 3008F PR BODY MASS INDEX (BMI) DOCUMENTED: ICD-10-PCS | Mod: CPTII,S$GLB,, | Performed by: SURGERY

## 2021-06-30 PROCEDURE — 84585 ASSAY OF URINE VMA: CPT | Performed by: FAMILY MEDICINE

## 2021-06-30 PROCEDURE — 99204 OFFICE O/P NEW MOD 45 MIN: CPT | Mod: S$GLB,,, | Performed by: SURGERY

## 2021-06-30 PROCEDURE — 1126F AMNT PAIN NOTED NONE PRSNT: CPT | Mod: S$GLB,,, | Performed by: SURGERY

## 2021-06-30 PROCEDURE — 3008F BODY MASS INDEX DOCD: CPT | Mod: CPTII,S$GLB,, | Performed by: SURGERY

## 2021-06-30 PROCEDURE — 99204 PR OFFICE/OUTPT VISIT, NEW, LEVL IV, 45-59 MIN: ICD-10-PCS | Mod: S$GLB,,, | Performed by: SURGERY

## 2021-06-30 PROCEDURE — 1126F PR PAIN SEVERITY QUANTIFIED, NO PAIN PRESENT: ICD-10-PCS | Mod: S$GLB,,, | Performed by: SURGERY

## 2021-06-30 PROCEDURE — 83835 ASSAY OF METANEPHRINES: CPT | Performed by: FAMILY MEDICINE

## 2021-07-04 LAB
COLLECT DURATION TIME SPEC: 24 HR
CREAT 24H UR-MRATE: NORMAL MG/D (ref 500–1400)
CREAT UR-MCNC: 49 MG/DL
SPECIMEN VOL ?TM UR: NORMAL ML
VMA & CREAT 24H UR-IMP: NORMAL
VMA 24H UR-MRATE: NORMAL MG/D (ref 0–7)
VMA UR-MCNC: 1.5 MG/L
VMA/CREAT 24H UR: 3 MG/GCR (ref 0–6)

## 2021-07-05 LAB
ANNOTATION COMMENT IMP: NORMAL
COLLECT DURATION TIME UR: 24 H
METANEPH 24H UR-MRATE: 139 MCG/24 H
METANEPHS 24H UR-MRATE: 629 MCG/24 H
NORMETANEPHRINE 24H UR-MRATE: 490 MCG/24 H
SPECIMEN VOL 24H UR: 2900 ML

## 2021-07-06 ENCOUNTER — PATIENT MESSAGE (OUTPATIENT)
Dept: ADMINISTRATIVE | Facility: HOSPITAL | Age: 61
End: 2021-07-06

## 2021-07-14 ENCOUNTER — ANESTHESIA EVENT (OUTPATIENT)
Dept: ENDOSCOPY | Facility: HOSPITAL | Age: 61
End: 2021-07-14
Payer: COMMERCIAL

## 2021-07-15 ENCOUNTER — ANESTHESIA (OUTPATIENT)
Dept: ENDOSCOPY | Facility: HOSPITAL | Age: 61
End: 2021-07-15
Payer: COMMERCIAL

## 2021-07-15 ENCOUNTER — HOSPITAL ENCOUNTER (OUTPATIENT)
Facility: HOSPITAL | Age: 61
Discharge: HOME | End: 2021-07-15
Attending: INTERNAL MEDICINE | Admitting: INTERNAL MEDICINE
Payer: COMMERCIAL

## 2021-07-15 VITALS
OXYGEN SATURATION: 100 % | SYSTOLIC BLOOD PRESSURE: 123 MMHG | DIASTOLIC BLOOD PRESSURE: 58 MMHG | TEMPERATURE: 97 F | RESPIRATION RATE: 18 BRPM | HEART RATE: 72 BPM

## 2021-07-15 DIAGNOSIS — Z12.11 SCREEN FOR COLON CANCER: ICD-10-CM

## 2021-07-15 PROCEDURE — 63600175 PHARM REV CODE 636 W HCPCS: Performed by: NURSE ANESTHETIST, CERTIFIED REGISTERED

## 2021-07-15 PROCEDURE — 45380 COLONOSCOPY AND BIOPSY: CPT | Mod: ,,, | Performed by: INTERNAL MEDICINE

## 2021-07-15 PROCEDURE — D9220A PRA ANESTHESIA: Mod: ANES,,, | Performed by: ANESTHESIOLOGY

## 2021-07-15 PROCEDURE — D9220A PRA ANESTHESIA: ICD-10-PCS | Mod: CRNA,,, | Performed by: NURSE ANESTHETIST, CERTIFIED REGISTERED

## 2021-07-15 PROCEDURE — 88305 TISSUE EXAM BY PATHOLOGIST: CPT | Performed by: PATHOLOGY

## 2021-07-15 PROCEDURE — 27201012 HC FORCEPS, HOT/COLD, DISP: Performed by: INTERNAL MEDICINE

## 2021-07-15 PROCEDURE — 45380 COLONOSCOPY AND BIOPSY: CPT | Performed by: INTERNAL MEDICINE

## 2021-07-15 PROCEDURE — 88305 TISSUE EXAM BY PATHOLOGIST: CPT | Mod: 26,,, | Performed by: PATHOLOGY

## 2021-07-15 PROCEDURE — D9220A PRA ANESTHESIA: ICD-10-PCS | Mod: ANES,,, | Performed by: ANESTHESIOLOGY

## 2021-07-15 PROCEDURE — 45380 PR COLONOSCOPY,BIOPSY: ICD-10-PCS | Mod: ,,, | Performed by: INTERNAL MEDICINE

## 2021-07-15 PROCEDURE — D9220A PRA ANESTHESIA: Mod: CRNA,,, | Performed by: NURSE ANESTHETIST, CERTIFIED REGISTERED

## 2021-07-15 PROCEDURE — 25000003 PHARM REV CODE 250: Performed by: ANESTHESIOLOGY

## 2021-07-15 PROCEDURE — 37000008 HC ANESTHESIA 1ST 15 MINUTES: Performed by: INTERNAL MEDICINE

## 2021-07-15 PROCEDURE — 88305 TISSUE EXAM BY PATHOLOGIST: ICD-10-PCS | Mod: 26,,, | Performed by: PATHOLOGY

## 2021-07-15 PROCEDURE — 37000009 HC ANESTHESIA EA ADD 15 MINS: Performed by: INTERNAL MEDICINE

## 2021-07-15 RX ORDER — SODIUM CHLORIDE 9 MG/ML
INJECTION, SOLUTION INTRAVENOUS CONTINUOUS
Status: DISCONTINUED | OUTPATIENT
Start: 2021-07-15 | End: 2021-07-15 | Stop reason: HOSPADM

## 2021-07-15 RX ORDER — PROPOFOL 10 MG/ML
INJECTION, EMULSION INTRAVENOUS
Status: DISCONTINUED
Start: 2021-07-15 | End: 2021-07-15 | Stop reason: HOSPADM

## 2021-07-15 RX ORDER — LIDOCAINE HYDROCHLORIDE 20 MG/ML
INJECTION, SOLUTION EPIDURAL; INFILTRATION; INTRACAUDAL; PERINEURAL
Status: DISCONTINUED
Start: 2021-07-15 | End: 2021-07-15 | Stop reason: HOSPADM

## 2021-07-15 RX ORDER — PROPOFOL 10 MG/ML
VIAL (ML) INTRAVENOUS
Status: DISCONTINUED | OUTPATIENT
Start: 2021-07-15 | End: 2021-07-15

## 2021-07-15 RX ADMIN — PROPOFOL 50 MG: 10 INJECTION, EMULSION INTRAVENOUS at 03:07

## 2021-07-15 RX ADMIN — PROPOFOL 50 MG: 10 INJECTION, EMULSION INTRAVENOUS at 04:07

## 2021-07-15 RX ADMIN — SODIUM CHLORIDE: 0.9 INJECTION, SOLUTION INTRAVENOUS at 03:07

## 2021-07-15 RX ADMIN — PROPOFOL 100 MG: 10 INJECTION, EMULSION INTRAVENOUS at 03:07

## 2021-07-20 DIAGNOSIS — K21.9 LARYNGOPHARYNGEAL REFLUX (LPR): Primary | ICD-10-CM

## 2021-07-20 LAB
FINAL PATHOLOGIC DIAGNOSIS: NORMAL
GROSS: NORMAL
Lab: NORMAL

## 2021-07-20 RX ORDER — PANTOPRAZOLE SODIUM 40 MG/1
40 TABLET, DELAYED RELEASE ORAL DAILY
Qty: 30 TABLET | Refills: 11 | Status: SHIPPED | OUTPATIENT
Start: 2021-07-20 | End: 2022-01-07

## 2021-08-20 ENCOUNTER — TELEPHONE (OUTPATIENT)
Dept: FAMILY MEDICINE | Facility: CLINIC | Age: 61
End: 2021-08-20

## 2021-08-26 ENCOUNTER — OFFICE VISIT (OUTPATIENT)
Dept: OBSTETRICS AND GYNECOLOGY | Facility: CLINIC | Age: 61
End: 2021-08-26
Payer: COMMERCIAL

## 2021-08-26 VITALS
DIASTOLIC BLOOD PRESSURE: 88 MMHG | BODY MASS INDEX: 48.69 KG/M2 | HEIGHT: 65 IN | SYSTOLIC BLOOD PRESSURE: 126 MMHG | WEIGHT: 292.25 LBS

## 2021-08-26 DIAGNOSIS — N89.8 VAGINAL ODOR: Primary | ICD-10-CM

## 2021-08-26 PROCEDURE — 3008F BODY MASS INDEX DOCD: CPT | Mod: CPTII,S$GLB,, | Performed by: OBSTETRICS & GYNECOLOGY

## 2021-08-26 PROCEDURE — 1159F MED LIST DOCD IN RCRD: CPT | Mod: CPTII,S$GLB,, | Performed by: OBSTETRICS & GYNECOLOGY

## 2021-08-26 PROCEDURE — 3008F PR BODY MASS INDEX (BMI) DOCUMENTED: ICD-10-PCS | Mod: CPTII,S$GLB,, | Performed by: OBSTETRICS & GYNECOLOGY

## 2021-08-26 PROCEDURE — 87491 CHLMYD TRACH DNA AMP PROBE: CPT | Performed by: OBSTETRICS & GYNECOLOGY

## 2021-08-26 PROCEDURE — 99999 PR PBB SHADOW E&M-EST. PATIENT-LVL III: ICD-10-PCS | Mod: PBBFAC,,, | Performed by: OBSTETRICS & GYNECOLOGY

## 2021-08-26 PROCEDURE — 99213 OFFICE O/P EST LOW 20 MIN: CPT | Mod: S$GLB,,, | Performed by: OBSTETRICS & GYNECOLOGY

## 2021-08-26 PROCEDURE — 1160F RVW MEDS BY RX/DR IN RCRD: CPT | Mod: CPTII,S$GLB,, | Performed by: OBSTETRICS & GYNECOLOGY

## 2021-08-26 PROCEDURE — 1160F PR REVIEW ALL MEDS BY PRESCRIBER/CLIN PHARMACIST DOCUMENTED: ICD-10-PCS | Mod: CPTII,S$GLB,, | Performed by: OBSTETRICS & GYNECOLOGY

## 2021-08-26 PROCEDURE — 3079F DIAST BP 80-89 MM HG: CPT | Mod: CPTII,S$GLB,, | Performed by: OBSTETRICS & GYNECOLOGY

## 2021-08-26 PROCEDURE — 99999 PR PBB SHADOW E&M-EST. PATIENT-LVL III: CPT | Mod: PBBFAC,,, | Performed by: OBSTETRICS & GYNECOLOGY

## 2021-08-26 PROCEDURE — 87591 N.GONORRHOEAE DNA AMP PROB: CPT | Mod: 59 | Performed by: OBSTETRICS & GYNECOLOGY

## 2021-08-26 PROCEDURE — 99213 PR OFFICE/OUTPT VISIT, EST, LEVL III, 20-29 MIN: ICD-10-PCS | Mod: S$GLB,,, | Performed by: OBSTETRICS & GYNECOLOGY

## 2021-08-26 PROCEDURE — 87661 TRICHOMONAS VAGINALIS AMPLIF: CPT | Performed by: OBSTETRICS & GYNECOLOGY

## 2021-08-26 PROCEDURE — 3079F PR MOST RECENT DIASTOLIC BLOOD PRESSURE 80-89 MM HG: ICD-10-PCS | Mod: CPTII,S$GLB,, | Performed by: OBSTETRICS & GYNECOLOGY

## 2021-08-26 PROCEDURE — 3074F SYST BP LT 130 MM HG: CPT | Mod: CPTII,S$GLB,, | Performed by: OBSTETRICS & GYNECOLOGY

## 2021-08-26 PROCEDURE — 87481 CANDIDA DNA AMP PROBE: CPT | Mod: 59 | Performed by: OBSTETRICS & GYNECOLOGY

## 2021-08-26 PROCEDURE — 3074F PR MOST RECENT SYSTOLIC BLOOD PRESSURE < 130 MM HG: ICD-10-PCS | Mod: CPTII,S$GLB,, | Performed by: OBSTETRICS & GYNECOLOGY

## 2021-08-26 PROCEDURE — 1159F PR MEDICATION LIST DOCUMENTED IN MEDICAL RECORD: ICD-10-PCS | Mod: CPTII,S$GLB,, | Performed by: OBSTETRICS & GYNECOLOGY

## 2021-08-26 RX ORDER — FLUCONAZOLE 150 MG/1
150 TABLET ORAL ONCE
Qty: 1 TABLET | Refills: 1 | Status: SHIPPED | OUTPATIENT
Start: 2021-08-26 | End: 2021-08-26

## 2021-09-01 LAB
BACTERIAL VAGINOSIS DNA: NEGATIVE
CANDIDA GLABRATA DNA: NEGATIVE
CANDIDA KRUSEI DNA: NEGATIVE
CANDIDA RRNA VAG QL PROBE: POSITIVE
T VAGINALIS RRNA GENITAL QL PROBE: NEGATIVE

## 2021-09-02 LAB
C TRACH DNA SPEC QL NAA+PROBE: NOT DETECTED
N GONORRHOEA DNA SPEC QL NAA+PROBE: NOT DETECTED

## 2021-09-03 DIAGNOSIS — I10 ESSENTIAL HYPERTENSION: Chronic | ICD-10-CM

## 2021-09-03 DIAGNOSIS — J30.9 ALLERGIC RHINITIS, UNSPECIFIED SEASONALITY, UNSPECIFIED TRIGGER: ICD-10-CM

## 2021-09-03 RX ORDER — LORATADINE 10 MG/1
TABLET ORAL
Qty: 90 TABLET | Refills: 3 | Status: SHIPPED | OUTPATIENT
Start: 2021-09-03 | End: 2022-08-31

## 2021-09-04 RX ORDER — HYDROCHLOROTHIAZIDE 25 MG/1
TABLET ORAL
Qty: 90 TABLET | Refills: 3 | Status: SHIPPED | OUTPATIENT
Start: 2021-09-04 | End: 2022-09-08

## 2021-09-13 ENCOUNTER — TELEPHONE (OUTPATIENT)
Dept: OTOLARYNGOLOGY | Facility: CLINIC | Age: 61
End: 2021-09-13

## 2021-09-13 DIAGNOSIS — Z01.812 PRE-PROCEDURE LAB EXAM: Primary | ICD-10-CM

## 2021-09-21 ENCOUNTER — LAB VISIT (OUTPATIENT)
Dept: FAMILY MEDICINE | Facility: CLINIC | Age: 61
End: 2021-09-21
Payer: COMMERCIAL

## 2021-09-21 DIAGNOSIS — Z01.812 PRE-PROCEDURE LAB EXAM: ICD-10-CM

## 2021-09-21 LAB
SARS-COV-2 RNA RESP QL NAA+PROBE: NOT DETECTED
SARS-COV-2- CYCLE NUMBER: NORMAL

## 2021-09-21 PROCEDURE — U0005 INFEC AGEN DETEC AMPLI PROBE: HCPCS | Performed by: OTOLARYNGOLOGY

## 2021-09-21 PROCEDURE — U0003 INFECTIOUS AGENT DETECTION BY NUCLEIC ACID (DNA OR RNA); SEVERE ACUTE RESPIRATORY SYNDROME CORONAVIRUS 2 (SARS-COV-2) (CORONAVIRUS DISEASE [COVID-19]), AMPLIFIED PROBE TECHNIQUE, MAKING USE OF HIGH THROUGHPUT TECHNOLOGIES AS DESCRIBED BY CMS-2020-01-R: HCPCS | Performed by: OTOLARYNGOLOGY

## 2021-09-24 ENCOUNTER — OFFICE VISIT (OUTPATIENT)
Dept: OTOLARYNGOLOGY | Facility: CLINIC | Age: 61
End: 2021-09-24
Payer: COMMERCIAL

## 2021-09-24 VITALS
DIASTOLIC BLOOD PRESSURE: 81 MMHG | TEMPERATURE: 97 F | OXYGEN SATURATION: 95 % | HEART RATE: 78 BPM | SYSTOLIC BLOOD PRESSURE: 142 MMHG | WEIGHT: 293 LBS | BODY MASS INDEX: 48.82 KG/M2 | HEIGHT: 65 IN | RESPIRATION RATE: 18 BRPM

## 2021-09-24 DIAGNOSIS — J30.2 SEASONAL ALLERGIC RHINITIS, UNSPECIFIED TRIGGER: ICD-10-CM

## 2021-09-24 DIAGNOSIS — K21.9 LARYNGOPHARYNGEAL REFLUX (LPR): Primary | ICD-10-CM

## 2021-09-24 PROCEDURE — 1160F PR REVIEW ALL MEDS BY PRESCRIBER/CLIN PHARMACIST DOCUMENTED: ICD-10-PCS | Mod: CPTII,S$GLB,, | Performed by: OTOLARYNGOLOGY

## 2021-09-24 PROCEDURE — 31575 PR LARYNGOSCOPY, FLEXIBLE; DIAGNOSTIC: ICD-10-PCS | Mod: S$GLB,,, | Performed by: OTOLARYNGOLOGY

## 2021-09-24 PROCEDURE — 1159F PR MEDICATION LIST DOCUMENTED IN MEDICAL RECORD: ICD-10-PCS | Mod: CPTII,S$GLB,, | Performed by: OTOLARYNGOLOGY

## 2021-09-24 PROCEDURE — 3079F DIAST BP 80-89 MM HG: CPT | Mod: CPTII,S$GLB,, | Performed by: OTOLARYNGOLOGY

## 2021-09-24 PROCEDURE — 3079F PR MOST RECENT DIASTOLIC BLOOD PRESSURE 80-89 MM HG: ICD-10-PCS | Mod: CPTII,S$GLB,, | Performed by: OTOLARYNGOLOGY

## 2021-09-24 PROCEDURE — 3077F PR MOST RECENT SYSTOLIC BLOOD PRESSURE >= 140 MM HG: ICD-10-PCS | Mod: CPTII,S$GLB,, | Performed by: OTOLARYNGOLOGY

## 2021-09-24 PROCEDURE — 3008F BODY MASS INDEX DOCD: CPT | Mod: CPTII,S$GLB,, | Performed by: OTOLARYNGOLOGY

## 2021-09-24 PROCEDURE — 1159F MED LIST DOCD IN RCRD: CPT | Mod: CPTII,S$GLB,, | Performed by: OTOLARYNGOLOGY

## 2021-09-24 PROCEDURE — 3077F SYST BP >= 140 MM HG: CPT | Mod: CPTII,S$GLB,, | Performed by: OTOLARYNGOLOGY

## 2021-09-24 PROCEDURE — 99214 OFFICE O/P EST MOD 30 MIN: CPT | Mod: 25,S$GLB,, | Performed by: OTOLARYNGOLOGY

## 2021-09-24 PROCEDURE — 31575 DIAGNOSTIC LARYNGOSCOPY: CPT | Mod: S$GLB,,, | Performed by: OTOLARYNGOLOGY

## 2021-09-24 PROCEDURE — 99214 PR OFFICE/OUTPT VISIT, EST, LEVL IV, 30-39 MIN: ICD-10-PCS | Mod: 25,S$GLB,, | Performed by: OTOLARYNGOLOGY

## 2021-09-24 PROCEDURE — 3008F PR BODY MASS INDEX (BMI) DOCUMENTED: ICD-10-PCS | Mod: CPTII,S$GLB,, | Performed by: OTOLARYNGOLOGY

## 2021-09-24 PROCEDURE — 1160F RVW MEDS BY RX/DR IN RCRD: CPT | Mod: CPTII,S$GLB,, | Performed by: OTOLARYNGOLOGY

## 2021-09-24 RX ORDER — FAMOTIDINE 40 MG/1
40 TABLET, FILM COATED ORAL NIGHTLY
Qty: 30 TABLET | Refills: 11 | Status: SHIPPED | OUTPATIENT
Start: 2021-09-24 | End: 2022-01-07

## 2021-09-24 RX ORDER — AZELASTINE 1 MG/ML
1 SPRAY, METERED NASAL 2 TIMES DAILY
Qty: 30 ML | Refills: 3 | Status: SHIPPED | OUTPATIENT
Start: 2021-09-24 | End: 2023-10-16

## 2021-10-13 DIAGNOSIS — M62.830 MUSCLE SPASM OF BACK: ICD-10-CM

## 2021-10-14 RX ORDER — NAPROXEN 500 MG/1
TABLET ORAL
Qty: 60 TABLET | Refills: 0 | Status: SHIPPED | OUTPATIENT
Start: 2021-10-14 | End: 2022-01-07

## 2021-11-15 DIAGNOSIS — E55.9 VITAMIN D DEFICIENCY: ICD-10-CM

## 2021-11-15 RX ORDER — ERGOCALCIFEROL 1.25 MG/1
CAPSULE ORAL
Qty: 12 CAPSULE | Refills: 2 | Status: SHIPPED | OUTPATIENT
Start: 2021-11-15 | End: 2022-07-28

## 2021-11-18 ENCOUNTER — OFFICE VISIT (OUTPATIENT)
Dept: OTOLARYNGOLOGY | Facility: CLINIC | Age: 61
End: 2021-11-18
Payer: COMMERCIAL

## 2021-11-18 VITALS
TEMPERATURE: 98 F | HEART RATE: 72 BPM | RESPIRATION RATE: 16 BRPM | WEIGHT: 293 LBS | DIASTOLIC BLOOD PRESSURE: 77 MMHG | SYSTOLIC BLOOD PRESSURE: 162 MMHG | OXYGEN SATURATION: 98 % | HEIGHT: 65 IN | BODY MASS INDEX: 48.82 KG/M2

## 2021-11-18 DIAGNOSIS — J30.2 SEASONAL ALLERGIC RHINITIS, UNSPECIFIED TRIGGER: ICD-10-CM

## 2021-11-18 DIAGNOSIS — K21.9 LARYNGOPHARYNGEAL REFLUX (LPR): Primary | ICD-10-CM

## 2021-11-18 PROCEDURE — 3077F PR MOST RECENT SYSTOLIC BLOOD PRESSURE >= 140 MM HG: ICD-10-PCS | Mod: CPTII,S$GLB,, | Performed by: OTOLARYNGOLOGY

## 2021-11-18 PROCEDURE — 1160F RVW MEDS BY RX/DR IN RCRD: CPT | Mod: CPTII,S$GLB,, | Performed by: OTOLARYNGOLOGY

## 2021-11-18 PROCEDURE — 1160F PR REVIEW ALL MEDS BY PRESCRIBER/CLIN PHARMACIST DOCUMENTED: ICD-10-PCS | Mod: CPTII,S$GLB,, | Performed by: OTOLARYNGOLOGY

## 2021-11-18 PROCEDURE — 99213 PR OFFICE/OUTPT VISIT, EST, LEVL III, 20-29 MIN: ICD-10-PCS | Mod: 25,S$GLB,, | Performed by: OTOLARYNGOLOGY

## 2021-11-18 PROCEDURE — 3008F PR BODY MASS INDEX (BMI) DOCUMENTED: ICD-10-PCS | Mod: CPTII,S$GLB,, | Performed by: OTOLARYNGOLOGY

## 2021-11-18 PROCEDURE — 3078F DIAST BP <80 MM HG: CPT | Mod: CPTII,S$GLB,, | Performed by: OTOLARYNGOLOGY

## 2021-11-18 PROCEDURE — 1159F PR MEDICATION LIST DOCUMENTED IN MEDICAL RECORD: ICD-10-PCS | Mod: CPTII,S$GLB,, | Performed by: OTOLARYNGOLOGY

## 2021-11-18 PROCEDURE — 99213 OFFICE O/P EST LOW 20 MIN: CPT | Mod: 25,S$GLB,, | Performed by: OTOLARYNGOLOGY

## 2021-11-18 PROCEDURE — 31575 PR LARYNGOSCOPY, FLEXIBLE; DIAGNOSTIC: ICD-10-PCS | Mod: S$GLB,,, | Performed by: OTOLARYNGOLOGY

## 2021-11-18 PROCEDURE — 3008F BODY MASS INDEX DOCD: CPT | Mod: CPTII,S$GLB,, | Performed by: OTOLARYNGOLOGY

## 2021-11-18 PROCEDURE — 3077F SYST BP >= 140 MM HG: CPT | Mod: CPTII,S$GLB,, | Performed by: OTOLARYNGOLOGY

## 2021-11-18 PROCEDURE — 31575 DIAGNOSTIC LARYNGOSCOPY: CPT | Mod: S$GLB,,, | Performed by: OTOLARYNGOLOGY

## 2021-11-18 PROCEDURE — 3078F PR MOST RECENT DIASTOLIC BLOOD PRESSURE < 80 MM HG: ICD-10-PCS | Mod: CPTII,S$GLB,, | Performed by: OTOLARYNGOLOGY

## 2021-11-18 PROCEDURE — 1159F MED LIST DOCD IN RCRD: CPT | Mod: CPTII,S$GLB,, | Performed by: OTOLARYNGOLOGY

## 2021-12-07 ENCOUNTER — TELEPHONE (OUTPATIENT)
Dept: FAMILY MEDICINE | Facility: CLINIC | Age: 61
End: 2021-12-07
Payer: COMMERCIAL

## 2021-12-09 ENCOUNTER — TELEPHONE (OUTPATIENT)
Dept: FAMILY MEDICINE | Facility: CLINIC | Age: 61
End: 2021-12-09
Payer: COMMERCIAL

## 2021-12-10 ENCOUNTER — OFFICE VISIT (OUTPATIENT)
Dept: FAMILY MEDICINE | Facility: CLINIC | Age: 61
End: 2021-12-10
Payer: COMMERCIAL

## 2021-12-10 VITALS
SYSTOLIC BLOOD PRESSURE: 120 MMHG | BODY MASS INDEX: 48.82 KG/M2 | TEMPERATURE: 98 F | WEIGHT: 293 LBS | OXYGEN SATURATION: 98 % | HEART RATE: 73 BPM | HEIGHT: 65 IN | DIASTOLIC BLOOD PRESSURE: 78 MMHG

## 2021-12-10 DIAGNOSIS — E66.01 MORBID OBESITY: ICD-10-CM

## 2021-12-10 DIAGNOSIS — M54.50 CHRONIC BILATERAL LOW BACK PAIN WITHOUT SCIATICA: Primary | ICD-10-CM

## 2021-12-10 DIAGNOSIS — M51.36 DDD (DEGENERATIVE DISC DISEASE), LUMBAR: ICD-10-CM

## 2021-12-10 DIAGNOSIS — G89.29 CHRONIC BILATERAL LOW BACK PAIN WITHOUT SCIATICA: Primary | ICD-10-CM

## 2021-12-10 PROCEDURE — 96372 THER/PROPH/DIAG INJ SC/IM: CPT | Mod: S$GLB,,, | Performed by: NURSE PRACTITIONER

## 2021-12-10 PROCEDURE — 99999 PR PBB SHADOW E&M-EST. PATIENT-LVL IV: ICD-10-PCS | Mod: PBBFAC,,, | Performed by: NURSE PRACTITIONER

## 2021-12-10 PROCEDURE — 96372 PR INJECTION,THERAP/PROPH/DIAG2ST, IM OR SUBCUT: ICD-10-PCS | Mod: S$GLB,,, | Performed by: NURSE PRACTITIONER

## 2021-12-10 PROCEDURE — 99214 OFFICE O/P EST MOD 30 MIN: CPT | Mod: 25,S$GLB,, | Performed by: NURSE PRACTITIONER

## 2021-12-10 PROCEDURE — 99214 PR OFFICE/OUTPT VISIT, EST, LEVL IV, 30-39 MIN: ICD-10-PCS | Mod: 25,S$GLB,, | Performed by: NURSE PRACTITIONER

## 2021-12-10 PROCEDURE — 99999 PR PBB SHADOW E&M-EST. PATIENT-LVL IV: CPT | Mod: PBBFAC,,, | Performed by: NURSE PRACTITIONER

## 2021-12-10 RX ORDER — KETOROLAC TROMETHAMINE 30 MG/ML
30 INJECTION, SOLUTION INTRAMUSCULAR; INTRAVENOUS
Status: COMPLETED | OUTPATIENT
Start: 2021-12-10 | End: 2021-12-10

## 2021-12-10 RX ORDER — DICLOFENAC SODIUM 10 MG/G
4 GEL TOPICAL 4 TIMES DAILY
Qty: 100 G | Refills: 0 | Status: SHIPPED | OUTPATIENT
Start: 2021-12-10 | End: 2022-01-07

## 2021-12-10 RX ORDER — METHYLPREDNISOLONE 4 MG/1
4 TABLET ORAL DAILY
Qty: 10 TABLET | Refills: 0 | Status: SHIPPED | OUTPATIENT
Start: 2021-12-10 | End: 2021-12-20

## 2021-12-10 RX ADMIN — KETOROLAC TROMETHAMINE 30 MG: 30 INJECTION, SOLUTION INTRAMUSCULAR; INTRAVENOUS at 09:12

## 2021-12-28 ENCOUNTER — OFFICE VISIT (OUTPATIENT)
Dept: FAMILY MEDICINE | Facility: CLINIC | Age: 61
End: 2021-12-28
Payer: COMMERCIAL

## 2021-12-28 VITALS
WEIGHT: 293 LBS | HEIGHT: 65 IN | SYSTOLIC BLOOD PRESSURE: 140 MMHG | DIASTOLIC BLOOD PRESSURE: 80 MMHG | OXYGEN SATURATION: 96 % | TEMPERATURE: 98 F | BODY MASS INDEX: 48.82 KG/M2 | HEART RATE: 78 BPM

## 2021-12-28 DIAGNOSIS — R22.2 NODULE OF SKIN OF BACK: Primary | ICD-10-CM

## 2021-12-28 PROCEDURE — 3077F SYST BP >= 140 MM HG: CPT | Mod: CPTII,S$GLB,, | Performed by: STUDENT IN AN ORGANIZED HEALTH CARE EDUCATION/TRAINING PROGRAM

## 2021-12-28 PROCEDURE — 99999 PR PBB SHADOW E&M-EST. PATIENT-LVL IV: ICD-10-PCS | Mod: PBBFAC,,, | Performed by: STUDENT IN AN ORGANIZED HEALTH CARE EDUCATION/TRAINING PROGRAM

## 2021-12-28 PROCEDURE — 3077F PR MOST RECENT SYSTOLIC BLOOD PRESSURE >= 140 MM HG: ICD-10-PCS | Mod: CPTII,S$GLB,, | Performed by: STUDENT IN AN ORGANIZED HEALTH CARE EDUCATION/TRAINING PROGRAM

## 2021-12-28 PROCEDURE — 3008F PR BODY MASS INDEX (BMI) DOCUMENTED: ICD-10-PCS | Mod: CPTII,S$GLB,, | Performed by: STUDENT IN AN ORGANIZED HEALTH CARE EDUCATION/TRAINING PROGRAM

## 2021-12-28 PROCEDURE — 3079F DIAST BP 80-89 MM HG: CPT | Mod: CPTII,S$GLB,, | Performed by: STUDENT IN AN ORGANIZED HEALTH CARE EDUCATION/TRAINING PROGRAM

## 2021-12-28 PROCEDURE — 3008F BODY MASS INDEX DOCD: CPT | Mod: CPTII,S$GLB,, | Performed by: STUDENT IN AN ORGANIZED HEALTH CARE EDUCATION/TRAINING PROGRAM

## 2021-12-28 PROCEDURE — 99214 OFFICE O/P EST MOD 30 MIN: CPT | Mod: S$GLB,,, | Performed by: STUDENT IN AN ORGANIZED HEALTH CARE EDUCATION/TRAINING PROGRAM

## 2021-12-28 PROCEDURE — 99214 PR OFFICE/OUTPT VISIT, EST, LEVL IV, 30-39 MIN: ICD-10-PCS | Mod: S$GLB,,, | Performed by: STUDENT IN AN ORGANIZED HEALTH CARE EDUCATION/TRAINING PROGRAM

## 2021-12-28 PROCEDURE — 1159F MED LIST DOCD IN RCRD: CPT | Mod: CPTII,S$GLB,, | Performed by: STUDENT IN AN ORGANIZED HEALTH CARE EDUCATION/TRAINING PROGRAM

## 2021-12-28 PROCEDURE — 3079F PR MOST RECENT DIASTOLIC BLOOD PRESSURE 80-89 MM HG: ICD-10-PCS | Mod: CPTII,S$GLB,, | Performed by: STUDENT IN AN ORGANIZED HEALTH CARE EDUCATION/TRAINING PROGRAM

## 2021-12-28 PROCEDURE — 99999 PR PBB SHADOW E&M-EST. PATIENT-LVL IV: CPT | Mod: PBBFAC,,, | Performed by: STUDENT IN AN ORGANIZED HEALTH CARE EDUCATION/TRAINING PROGRAM

## 2021-12-28 PROCEDURE — 1159F PR MEDICATION LIST DOCUMENTED IN MEDICAL RECORD: ICD-10-PCS | Mod: CPTII,S$GLB,, | Performed by: STUDENT IN AN ORGANIZED HEALTH CARE EDUCATION/TRAINING PROGRAM

## 2021-12-28 NOTE — PROGRESS NOTES
12/28/2021    Bambi Carrera  [id number]    Chief Complaint   Patient presents with    Back Pain     middle       HPI    Left sided upper back pain  Present for the last 2 weeks  Complains throbbing pain that prevents form laying on back  Has tried aleve/ ibuprofen  No fevers or chills    HTN: above goal   HCTZ  Has not taken BP meds today      Negative 10 point ROS outside of HPI    Social History     Socioeconomic History    Marital status: Single   Occupational History     Employer: [employer]   Tobacco Use    Smoking status: Never Smoker    Smokeless tobacco: Never Used   Substance and Sexual Activity    Alcohol use: No     Alcohol/week: 0.0 standard drinks    Drug use: No    Sexual activity: Not Currently           Current Outpatient Medications:     ALLERGY RELIEF, LORATADINE, 10 mg tablet, TAKE 1 TABLET BY MOUTH ONCE DAILY AS NEEDED FOR ALLERGIES AND FOR RUNNY NOSE, Disp: 90 tablet, Rfl: 3    azelastine (ASTELIN) 137 mcg (0.1 %) nasal spray, 1 spray (137 mcg total) by Nasal route 2 (two) times daily., Disp: 30 mL, Rfl: 3    diclofenac sodium (VOLTAREN) 1 % Gel, Apply 4 g topically 4 (four) times daily., Disp: 100 g, Rfl: 0    ergocalciferol (ERGOCALCIFEROL) 50,000 unit Cap, Take 1 capsule by mouth once a week, Disp: 12 capsule, Rfl: 2    famotidine (PEPCID) 40 MG tablet, Take 1 tablet (40 mg total) by mouth every evening., Disp: 30 tablet, Rfl: 11    fluticasone propionate (FLONASE) 50 mcg/actuation nasal spray, USE 1 SPRAY IN EACH NOSTRIL 2 TIMES DAILY, Disp: 16 g, Rfl: 5    hydroCHLOROthiazide (HYDRODIURIL) 25 MG tablet, Take 1 tablet by mouth once daily, Disp: 90 tablet, Rfl: 3    meclizine (ANTIVERT) 25 mg tablet, Take 1 tablet (25 mg total) by mouth 3 (three) times daily as needed for Dizziness (vertigo)., Disp: 15 tablet, Rfl: 0    meloxicam (MOBIC) 15 MG tablet, Take 1 tablet (15 mg total) by mouth once daily. With meals, Disp: 30 tablet, Rfl: 0    multivitamin (THERAGRAN) per  tablet, Take 1 tablet by mouth once daily., Disp: , Rfl:     naproxen (NAPROSYN) 500 MG tablet, Take 1 tablet by mouth twice daily as needed, Disp: 60 tablet, Rfl: 0    docusate sodium (COLACE) 100 MG capsule, Take 1 capsule (100 mg total) by mouth 2 (two) times daily as needed for Constipation. (Patient not taking: No sig reported), Disp: 60 capsule, Rfl: 3    pantoprazole (PROTONIX) 40 MG tablet, Take 1 tablet (40 mg total) by mouth once daily., Disp: 30 tablet, Rfl: 11      Physical Exam  Vitals:    12/28/21 1308   BP: (!) 140/80   Pulse: 78   Temp: 97.8 °F (36.6 °C)         Gen: well appearing, NAD  Resp: non labored breathing, no crackles, no wheezes, CTAB  CV: RRR no murmur, gallops, rubs, no LE edema  Back: upper left back with fullness approx 6x6 cm no discrete borders tender to touch no overlying skin changes no erythema no warmth    1. Nodule of skin of back  - US Soft Tissue Chest_Upper Back; Future  Will obtain US, possibly lipoma    RTC pending US results    Beth Bowman MD  Family Medicine

## 2021-12-30 ENCOUNTER — HOSPITAL ENCOUNTER (OUTPATIENT)
Dept: RADIOLOGY | Facility: HOSPITAL | Age: 61
Discharge: HOME | End: 2021-12-30
Attending: STUDENT IN AN ORGANIZED HEALTH CARE EDUCATION/TRAINING PROGRAM
Payer: COMMERCIAL

## 2021-12-30 DIAGNOSIS — R22.2 NODULE OF SKIN OF BACK: ICD-10-CM

## 2021-12-30 PROCEDURE — 76604 US SOFT TISSUE CHEST_UPPER BACK: ICD-10-PCS | Mod: 26,,, | Performed by: RADIOLOGY

## 2021-12-30 PROCEDURE — 76604 US EXAM CHEST: CPT | Mod: 26,,, | Performed by: RADIOLOGY

## 2021-12-30 PROCEDURE — 76604 US EXAM CHEST: CPT | Mod: TC

## 2022-01-07 ENCOUNTER — LAB VISIT (OUTPATIENT)
Dept: LAB | Facility: HOSPITAL | Age: 62
End: 2022-01-07
Attending: FAMILY MEDICINE
Payer: COMMERCIAL

## 2022-01-07 ENCOUNTER — OFFICE VISIT (OUTPATIENT)
Dept: FAMILY MEDICINE | Facility: CLINIC | Age: 62
End: 2022-01-07
Payer: COMMERCIAL

## 2022-01-07 VITALS
TEMPERATURE: 98 F | SYSTOLIC BLOOD PRESSURE: 120 MMHG | BODY MASS INDEX: 48.82 KG/M2 | HEIGHT: 65 IN | OXYGEN SATURATION: 98 % | WEIGHT: 293 LBS | HEART RATE: 72 BPM | DIASTOLIC BLOOD PRESSURE: 88 MMHG

## 2022-01-07 DIAGNOSIS — E66.01 MORBID OBESITY: Chronic | ICD-10-CM

## 2022-01-07 DIAGNOSIS — I10 PRIMARY HYPERTENSION: Chronic | ICD-10-CM

## 2022-01-07 DIAGNOSIS — Z00.00 ROUTINE PHYSICAL EXAMINATION: Primary | ICD-10-CM

## 2022-01-07 DIAGNOSIS — R73.03 PREDIABETES: ICD-10-CM

## 2022-01-07 DIAGNOSIS — Z23 NEED FOR SHINGLES VACCINE: ICD-10-CM

## 2022-01-07 DIAGNOSIS — Z00.00 ROUTINE PHYSICAL EXAMINATION: ICD-10-CM

## 2022-01-07 PROBLEM — Z12.11 SCREEN FOR COLON CANCER: Status: RESOLVED | Noted: 2021-07-15 | Resolved: 2022-01-07

## 2022-01-07 LAB
ALBUMIN SERPL BCP-MCNC: 3.5 G/DL (ref 3.5–5.2)
ALP SERPL-CCNC: 117 U/L (ref 55–135)
ALT SERPL W/O P-5'-P-CCNC: 12 U/L (ref 10–44)
ANION GAP SERPL CALC-SCNC: 8 MMOL/L (ref 8–16)
AST SERPL-CCNC: 13 U/L (ref 10–40)
BASOPHILS # BLD AUTO: 0.04 K/UL (ref 0–0.2)
BASOPHILS NFR BLD: 0.7 % (ref 0–1.9)
BILIRUB SERPL-MCNC: 0.4 MG/DL (ref 0.1–1)
BUN SERPL-MCNC: 18 MG/DL (ref 8–23)
CALCIUM SERPL-MCNC: 9.5 MG/DL (ref 8.7–10.5)
CHLORIDE SERPL-SCNC: 104 MMOL/L (ref 95–110)
CHOLEST SERPL-MCNC: 170 MG/DL (ref 120–199)
CHOLEST/HDLC SERPL: 2.7 {RATIO} (ref 2–5)
CO2 SERPL-SCNC: 27 MMOL/L (ref 23–29)
CREAT SERPL-MCNC: 0.8 MG/DL (ref 0.5–1.4)
DIFFERENTIAL METHOD: ABNORMAL
EOSINOPHIL # BLD AUTO: 0.1 K/UL (ref 0–0.5)
EOSINOPHIL NFR BLD: 1.5 % (ref 0–8)
ERYTHROCYTE [DISTWIDTH] IN BLOOD BY AUTOMATED COUNT: 15.9 % (ref 11.5–14.5)
EST. GFR  (AFRICAN AMERICAN): >60 ML/MIN/1.73 M^2
EST. GFR  (NON AFRICAN AMERICAN): >60 ML/MIN/1.73 M^2
ESTIMATED AVG GLUCOSE: 126 MG/DL (ref 68–131)
GLUCOSE SERPL-MCNC: 117 MG/DL (ref 70–110)
HBA1C MFR BLD: 6 % (ref 4–5.6)
HCT VFR BLD AUTO: 40.6 % (ref 37–48.5)
HDLC SERPL-MCNC: 62 MG/DL (ref 40–75)
HDLC SERPL: 36.5 % (ref 20–50)
HGB BLD-MCNC: 12.5 G/DL (ref 12–16)
IMM GRANULOCYTES # BLD AUTO: 0.01 K/UL (ref 0–0.04)
IMM GRANULOCYTES NFR BLD AUTO: 0.2 % (ref 0–0.5)
LDLC SERPL CALC-MCNC: 91.4 MG/DL (ref 63–159)
LYMPHOCYTES # BLD AUTO: 2.7 K/UL (ref 1–4.8)
LYMPHOCYTES NFR BLD: 46.2 % (ref 18–48)
MCH RBC QN AUTO: 28.2 PG (ref 27–31)
MCHC RBC AUTO-ENTMCNC: 30.8 G/DL (ref 32–36)
MCV RBC AUTO: 91 FL (ref 82–98)
MONOCYTES # BLD AUTO: 0.6 K/UL (ref 0.3–1)
MONOCYTES NFR BLD: 9.4 % (ref 4–15)
NEUTROPHILS # BLD AUTO: 2.5 K/UL (ref 1.8–7.7)
NEUTROPHILS NFR BLD: 42 % (ref 38–73)
NONHDLC SERPL-MCNC: 108 MG/DL
NRBC BLD-RTO: 0 /100 WBC
PLATELET # BLD AUTO: 267 K/UL (ref 150–450)
PMV BLD AUTO: 10.6 FL (ref 9.2–12.9)
POTASSIUM SERPL-SCNC: 4.2 MMOL/L (ref 3.5–5.1)
PROT SERPL-MCNC: 7.9 G/DL (ref 6–8.4)
RBC # BLD AUTO: 4.44 M/UL (ref 4–5.4)
SODIUM SERPL-SCNC: 139 MMOL/L (ref 136–145)
T4 FREE SERPL-MCNC: 0.95 NG/DL (ref 0.71–1.51)
TRIGL SERPL-MCNC: 83 MG/DL (ref 30–150)
TSH SERPL DL<=0.005 MIU/L-ACNC: 1.63 UIU/ML (ref 0.4–4)
WBC # BLD AUTO: 5.93 K/UL (ref 3.9–12.7)

## 2022-01-07 PROCEDURE — 3079F DIAST BP 80-89 MM HG: CPT | Mod: CPTII,S$GLB,, | Performed by: FAMILY MEDICINE

## 2022-01-07 PROCEDURE — 83036 HEMOGLOBIN GLYCOSYLATED A1C: CPT | Performed by: FAMILY MEDICINE

## 2022-01-07 PROCEDURE — 90471 IMMUNIZATION ADMIN: CPT | Mod: S$GLB,,, | Performed by: FAMILY MEDICINE

## 2022-01-07 PROCEDURE — 3074F PR MOST RECENT SYSTOLIC BLOOD PRESSURE < 130 MM HG: ICD-10-PCS | Mod: CPTII,S$GLB,, | Performed by: FAMILY MEDICINE

## 2022-01-07 PROCEDURE — 3008F BODY MASS INDEX DOCD: CPT | Mod: CPTII,S$GLB,, | Performed by: FAMILY MEDICINE

## 2022-01-07 PROCEDURE — 85025 COMPLETE CBC W/AUTO DIFF WBC: CPT | Performed by: FAMILY MEDICINE

## 2022-01-07 PROCEDURE — 1159F PR MEDICATION LIST DOCUMENTED IN MEDICAL RECORD: ICD-10-PCS | Mod: CPTII,S$GLB,, | Performed by: FAMILY MEDICINE

## 2022-01-07 PROCEDURE — 84439 ASSAY OF FREE THYROXINE: CPT | Performed by: FAMILY MEDICINE

## 2022-01-07 PROCEDURE — 3008F PR BODY MASS INDEX (BMI) DOCUMENTED: ICD-10-PCS | Mod: CPTII,S$GLB,, | Performed by: FAMILY MEDICINE

## 2022-01-07 PROCEDURE — 99396 PR PREVENTIVE VISIT,EST,40-64: ICD-10-PCS | Mod: 25,S$GLB,, | Performed by: FAMILY MEDICINE

## 2022-01-07 PROCEDURE — 36415 COLL VENOUS BLD VENIPUNCTURE: CPT | Mod: PO | Performed by: FAMILY MEDICINE

## 2022-01-07 PROCEDURE — 90750 HZV VACC RECOMBINANT IM: CPT | Mod: S$GLB,,, | Performed by: FAMILY MEDICINE

## 2022-01-07 PROCEDURE — 99396 PREV VISIT EST AGE 40-64: CPT | Mod: 25,S$GLB,, | Performed by: FAMILY MEDICINE

## 2022-01-07 PROCEDURE — 3079F PR MOST RECENT DIASTOLIC BLOOD PRESSURE 80-89 MM HG: ICD-10-PCS | Mod: CPTII,S$GLB,, | Performed by: FAMILY MEDICINE

## 2022-01-07 PROCEDURE — 1159F MED LIST DOCD IN RCRD: CPT | Mod: CPTII,S$GLB,, | Performed by: FAMILY MEDICINE

## 2022-01-07 PROCEDURE — 3074F SYST BP LT 130 MM HG: CPT | Mod: CPTII,S$GLB,, | Performed by: FAMILY MEDICINE

## 2022-01-07 PROCEDURE — 80061 LIPID PANEL: CPT | Performed by: FAMILY MEDICINE

## 2022-01-07 PROCEDURE — 99999 PR PBB SHADOW E&M-EST. PATIENT-LVL III: ICD-10-PCS | Mod: PBBFAC,,, | Performed by: FAMILY MEDICINE

## 2022-01-07 PROCEDURE — 90471 ZOSTER RECOMBINANT VACCINE: ICD-10-PCS | Mod: S$GLB,,, | Performed by: FAMILY MEDICINE

## 2022-01-07 PROCEDURE — 84443 ASSAY THYROID STIM HORMONE: CPT | Performed by: FAMILY MEDICINE

## 2022-01-07 PROCEDURE — 90750 ZOSTER RECOMBINANT VACCINE: ICD-10-PCS | Mod: S$GLB,,, | Performed by: FAMILY MEDICINE

## 2022-01-07 PROCEDURE — 99999 PR PBB SHADOW E&M-EST. PATIENT-LVL III: CPT | Mod: PBBFAC,,, | Performed by: FAMILY MEDICINE

## 2022-01-07 PROCEDURE — 80053 COMPREHEN METABOLIC PANEL: CPT | Performed by: FAMILY MEDICINE

## 2022-01-07 NOTE — PROGRESS NOTES
Patient tolerated injection well, instructed to remain in clinic for 15 min. To monitor for adverse reaction. Patient voiced understanding at this time.

## 2022-01-07 NOTE — PROGRESS NOTES
[facility]  Progress Note    SUBJECTIVE:     Chief Complaint   Patient presents with    Annual Exam       HPI   Bambi Carrera  is a 61 y.o. female here for routine physical exam. Patient has no other new complaints/problems at this time.      Review of patient's allergies indicates:  No Known Allergies    Past Medical History:   Diagnosis Date    Borderline diabetic     Depression     Eye injury     fb os     Hypertension     Menopause     Nuclear sclerosis of both eyes 7/1/2019     Past Surgical History:   Procedure Laterality Date    COLONOSCOPY N/A 7/15/2021    Procedure: COLONOSCOPY;  Surgeon: Geetha Walton MD;  Location: [facility];  Service: Endoscopy;  Laterality: N/A;  Fully Vaccinated 3/25/21  see media tab 6/1/21, prep instr portal, and mailed -ml    LASIK Bilateral 07/15/2016     Family History   Problem Relation Age of Onset    Cancer Mother     No Known Problems Father     No Known Problems Sister     No Known Problems Brother     No Known Problems Maternal Aunt     No Known Problems Maternal Uncle     No Known Problems Paternal Aunt     No Known Problems Paternal Uncle     No Known Problems Maternal Grandmother     No Known Problems Maternal Grandfather     No Known Problems Paternal Grandmother     No Known Problems Paternal Grandfather     Amblyopia Neg Hx     Blindness Neg Hx     Cataracts Neg Hx     Diabetes Neg Hx     Glaucoma Neg Hx     Hypertension Neg Hx     Macular degeneration Neg Hx     Retinal detachment Neg Hx     Strabismus Neg Hx     Stroke Neg Hx     Thyroid disease Neg Hx      Social History     Tobacco Use    Smoking status: Never Smoker    Smokeless tobacco: Never Used   Substance Use Topics    Alcohol use: No     Alcohol/week: 0.0 standard drinks    Drug use: No        Review of Systems   Constitutional: Negative for chills, diaphoresis and fever.   HENT: Negative for congestion, ear pain and sore throat.    Eyes: Negative for  photophobia and discharge.   Respiratory: Negative for cough, shortness of breath and wheezing.    Cardiovascular: Negative for chest pain and palpitations.   Gastrointestinal: Negative for abdominal pain, constipation, diarrhea, nausea and vomiting.   Genitourinary: Negative for dysuria and hematuria.   Musculoskeletal: Negative for back pain and myalgias.   Skin: Negative for itching and rash.   Neurological: Negative for dizziness, sensory change, focal weakness, weakness and headaches.   All other systems reviewed and are negative.    OBJECTIVE:     Vitals:    01/07/22 0923   BP: 120/88   Pulse: 72   Temp: 97.9 °F (36.6 °C)     Body mass index is 49.01 kg/m².    Physical Exam  Constitutional:       General: She is not in acute distress.     Appearance: She is not diaphoretic.   HENT:      Head: Normocephalic and atraumatic.      Right Ear: Tympanic membrane and ear canal normal. No hemotympanum. Tympanic membrane is not perforated, erythematous or bulging.      Left Ear: Tympanic membrane and ear canal normal. No hemotympanum. Tympanic membrane is not perforated, erythematous or bulging.      Mouth/Throat:      Pharynx: No oropharyngeal exudate.   Eyes:      Conjunctiva/sclera: Conjunctivae normal.      Pupils: Pupils are equal, round, and reactive to light.   Neck:      Thyroid: No thyromegaly.   Cardiovascular:      Rate and Rhythm: Normal rate and regular rhythm.      Heart sounds: Normal heart sounds. No murmur heard.  No friction rub. No gallop.    Pulmonary:      Effort: Pulmonary effort is normal. No respiratory distress.      Breath sounds: Normal breath sounds. No wheezing or rales.   Abdominal:      General: Bowel sounds are normal. There is no distension.      Palpations: Abdomen is soft.      Tenderness: There is no abdominal tenderness. There is no guarding or rebound.   Musculoskeletal:         General: No tenderness. Normal range of motion.   Lymphadenopathy:      Cervical: No cervical adenopathy.    Skin:     General: Skin is warm.      Findings: No erythema or rash.   Neurological:      Mental Status: She is alert and oriented to person, place, and time.         Old records were reviewed. Labs and/or images were independently reviewed.    ASSESSMENT     1. Routine physical examination    2. Prediabetes    3. Morbid obesity    4. Primary hypertension        PLAN:     Routine physical examination  -     CBC Auto Differential; Future  -     Comprehensive Metabolic Panel; Future  -     Hemoglobin A1C; Future  -     Lipid Panel; Future  -     TSH; Future  -     T4, Free; Future  -     We briefly discussed diet, exercise, and routine preventive exams. All questions and comments addressed.    Prediabetes/Morbid obesity   -     Counseled patient about healthy diet, exercise habits, and to increase physical activity.    Primary hypertension   -     Stable. Continue current regimen.      RTC DILMA Whitmore MD  01/07/2022 9:36 AM

## 2022-02-22 ENCOUNTER — OFFICE VISIT (OUTPATIENT)
Dept: OBSTETRICS AND GYNECOLOGY | Facility: CLINIC | Age: 62
End: 2022-02-22
Payer: COMMERCIAL

## 2022-02-22 VITALS
SYSTOLIC BLOOD PRESSURE: 130 MMHG | DIASTOLIC BLOOD PRESSURE: 84 MMHG | BODY MASS INDEX: 48.74 KG/M2 | HEIGHT: 65 IN | WEIGHT: 292.56 LBS

## 2022-02-22 DIAGNOSIS — Z01.419 WELL WOMAN EXAM WITH ROUTINE GYNECOLOGICAL EXAM: Primary | ICD-10-CM

## 2022-02-22 DIAGNOSIS — Z12.31 BREAST CANCER SCREENING BY MAMMOGRAM: ICD-10-CM

## 2022-02-22 DIAGNOSIS — N89.8 VAGINAL DISCHARGE: ICD-10-CM

## 2022-02-22 PROCEDURE — 87481 CANDIDA DNA AMP PROBE: CPT | Mod: 59 | Performed by: OBSTETRICS & GYNECOLOGY

## 2022-02-22 PROCEDURE — 1159F MED LIST DOCD IN RCRD: CPT | Mod: CPTII,S$GLB,, | Performed by: OBSTETRICS & GYNECOLOGY

## 2022-02-22 PROCEDURE — 3079F PR MOST RECENT DIASTOLIC BLOOD PRESSURE 80-89 MM HG: ICD-10-PCS | Mod: CPTII,S$GLB,, | Performed by: OBSTETRICS & GYNECOLOGY

## 2022-02-22 PROCEDURE — 3008F BODY MASS INDEX DOCD: CPT | Mod: CPTII,S$GLB,, | Performed by: OBSTETRICS & GYNECOLOGY

## 2022-02-22 PROCEDURE — 1160F PR REVIEW ALL MEDS BY PRESCRIBER/CLIN PHARMACIST DOCUMENTED: ICD-10-PCS | Mod: CPTII,S$GLB,, | Performed by: OBSTETRICS & GYNECOLOGY

## 2022-02-22 PROCEDURE — 3079F DIAST BP 80-89 MM HG: CPT | Mod: CPTII,S$GLB,, | Performed by: OBSTETRICS & GYNECOLOGY

## 2022-02-22 PROCEDURE — 1160F RVW MEDS BY RX/DR IN RCRD: CPT | Mod: CPTII,S$GLB,, | Performed by: OBSTETRICS & GYNECOLOGY

## 2022-02-22 PROCEDURE — 3044F PR MOST RECENT HEMOGLOBIN A1C LEVEL <7.0%: ICD-10-PCS | Mod: CPTII,S$GLB,, | Performed by: OBSTETRICS & GYNECOLOGY

## 2022-02-22 PROCEDURE — 3008F PR BODY MASS INDEX (BMI) DOCUMENTED: ICD-10-PCS | Mod: CPTII,S$GLB,, | Performed by: OBSTETRICS & GYNECOLOGY

## 2022-02-22 PROCEDURE — 99396 PR PREVENTIVE VISIT,EST,40-64: ICD-10-PCS | Mod: S$GLB,,, | Performed by: OBSTETRICS & GYNECOLOGY

## 2022-02-22 PROCEDURE — 99396 PREV VISIT EST AGE 40-64: CPT | Mod: S$GLB,,, | Performed by: OBSTETRICS & GYNECOLOGY

## 2022-02-22 PROCEDURE — 99999 PR PBB SHADOW E&M-EST. PATIENT-LVL III: ICD-10-PCS | Mod: PBBFAC,,, | Performed by: OBSTETRICS & GYNECOLOGY

## 2022-02-22 PROCEDURE — 3075F SYST BP GE 130 - 139MM HG: CPT | Mod: CPTII,S$GLB,, | Performed by: OBSTETRICS & GYNECOLOGY

## 2022-02-22 PROCEDURE — 3075F PR MOST RECENT SYSTOLIC BLOOD PRESS GE 130-139MM HG: ICD-10-PCS | Mod: CPTII,S$GLB,, | Performed by: OBSTETRICS & GYNECOLOGY

## 2022-02-22 PROCEDURE — 99999 PR PBB SHADOW E&M-EST. PATIENT-LVL III: CPT | Mod: PBBFAC,,, | Performed by: OBSTETRICS & GYNECOLOGY

## 2022-02-22 PROCEDURE — 1159F PR MEDICATION LIST DOCUMENTED IN MEDICAL RECORD: ICD-10-PCS | Mod: CPTII,S$GLB,, | Performed by: OBSTETRICS & GYNECOLOGY

## 2022-02-22 PROCEDURE — 3044F HG A1C LEVEL LT 7.0%: CPT | Mod: CPTII,S$GLB,, | Performed by: OBSTETRICS & GYNECOLOGY

## 2022-02-22 NOTE — PROGRESS NOTES
History & Physical  Gynecology      SUBJECTIVE:     Chief Complaint: Annual Exam       History of Present Illness:  Annual Exam-Postmenopausal  Ms. Carrera is a 62 y/o female who presents for annual exam. The patient has no complaints today. The patient is not sexually active. GYN screening history: last pap: approximate date  and was normal and last mammogram: approximate date  and was normal. The patient is not taking hormone replacement therapy. Patient denies post-menopausal vaginal bleeding. The patient wears seatbelts: yes. The patient participates in regular exercise: no. Has the patient ever been transfused or tattooed?: not asked. The patient reports that there is not domestic violence in her life.      Review of patient's allergies indicates:  No Known Allergies    Past Medical History:   Diagnosis Date    Borderline diabetic     Depression     Eye injury     fb os     Hypertension     Menopause     Nuclear sclerosis of both eyes 2019     Past Surgical History:   Procedure Laterality Date    COLONOSCOPY N/A 7/15/2021    Procedure: COLONOSCOPY;  Surgeon: Geetha Walton MD;  Location: [facility];  Service: Endoscopy;  Laterality: N/A;  Fully Vaccinated 3/25/21  see media tab 21, prep instr portal, and mailed -ml    LASIK Bilateral 07/15/2016     OB History        3    Para   3    Term   3       0    AB   0    Living   3       SAB   0    IAB   0    Ectopic   0    Multiple   0    Live Births                   Family History   Problem Relation Age of Onset    Cancer Mother     No Known Problems Father     No Known Problems Sister     No Known Problems Brother     No Known Problems Maternal Aunt     No Known Problems Maternal Uncle     No Known Problems Paternal Aunt     No Known Problems Paternal Uncle     No Known Problems Maternal Grandmother     No Known Problems Maternal Grandfather     No Known Problems Paternal Grandmother     No Known Problems Paternal  Grandfather     Amblyopia Neg Hx     Blindness Neg Hx     Cataracts Neg Hx     Diabetes Neg Hx     Glaucoma Neg Hx     Hypertension Neg Hx     Macular degeneration Neg Hx     Retinal detachment Neg Hx     Strabismus Neg Hx     Stroke Neg Hx     Thyroid disease Neg Hx      Social History     Tobacco Use    Smoking status: Never Smoker    Smokeless tobacco: Never Used   Substance Use Topics    Alcohol use: No     Alcohol/week: 0.0 standard drinks    Drug use: No       Current Outpatient Medications   Medication Sig    ALLERGY RELIEF, LORATADINE, 10 mg tablet TAKE 1 TABLET BY MOUTH ONCE DAILY AS NEEDED FOR ALLERGIES AND FOR RUNNY NOSE    ergocalciferol (ERGOCALCIFEROL) 50,000 unit Cap Take 1 capsule by mouth once a week    hydroCHLOROthiazide (HYDRODIURIL) 25 MG tablet Take 1 tablet by mouth once daily    multivitamin (THERAGRAN) per tablet Take 1 tablet by mouth once daily.    azelastine (ASTELIN) 137 mcg (0.1 %) nasal spray 1 spray (137 mcg total) by Nasal route 2 (two) times daily.    meclizine (ANTIVERT) 25 mg tablet Take 1 tablet (25 mg total) by mouth 3 (three) times daily as needed for Dizziness (vertigo).     No current facility-administered medications for this visit.         Review of Systems:  Review of Systems   Constitutional: Negative for chills and fever.   Eyes: Negative for visual disturbance.   Respiratory: Negative for cough and wheezing.    Cardiovascular: Negative for chest pain and palpitations.   Gastrointestinal: Negative for abdominal pain, nausea and vomiting.   Genitourinary: Negative for dysuria, frequency, hematuria, pelvic pain, vaginal bleeding, vaginal discharge and vaginal pain.   Neurological: Negative for headaches.   Psychiatric/Behavioral: Negative for depression.        OBJECTIVE:     Physical Exam:  Physical Exam  Vitals and nursing note reviewed. Exam conducted with a chaperone present.   Constitutional:       Appearance: She is well-developed.    Cardiovascular:      Rate and Rhythm: Normal rate.   Pulmonary:      Effort: Pulmonary effort is normal. No respiratory distress.   Chest:   Breasts: Breasts are symmetrical.       Abdominal:      General: There is no distension.      Palpations: Abdomen is soft.      Tenderness: There is no abdominal tenderness.   Genitourinary:     Vagina: No vaginal discharge.   Skin:     General: Skin is warm and dry.   Neurological:      Mental Status: She is alert and oriented to person, place, and time.       ASSESSMENT:       ICD-10-CM ICD-9-CM    1. Well woman exam with routine gynecological exam  Z01.419 V72.31 Mammo Digital Screening Bilat   2. Breast cancer screening by mammogram  Z12.31 V76.12 Mammo Digital Screening Bilat      Plan:      Bambi was seen today for annual exam.    Diagnoses and all orders for this visit:    Well woman exam with routine gynecological exam  -     Mammo Digital Screening Bilat; Future  - Pap smear normal 2021  - Colonoscopy 2021; repeat in 7 years     Breast cancer screening by mammogram  -     Mammo Digital Screening Bilat; Future        Orders Placed This Encounter   Procedures    Mammo Digital Screening Bilat       Follow up in about 1 year (around 2/22/2023) for Well Woman/Annual.    Counseling time: 15 minutes    Dann Avila

## 2022-03-01 LAB
BACTERIAL VAGINOSIS DNA: NEGATIVE
CANDIDA GLABRATA DNA: NEGATIVE
CANDIDA KRUSEI DNA: NEGATIVE
CANDIDA RRNA VAG QL PROBE: NEGATIVE
T VAGINALIS RRNA GENITAL QL PROBE: NEGATIVE

## 2022-03-04 ENCOUNTER — TELEPHONE (OUTPATIENT)
Dept: FAMILY MEDICINE | Facility: CLINIC | Age: 62
End: 2022-03-04
Payer: COMMERCIAL

## 2022-03-04 DIAGNOSIS — Z23 NEED FOR SHINGLES VACCINE: Primary | ICD-10-CM

## 2022-03-07 ENCOUNTER — CLINICAL SUPPORT (OUTPATIENT)
Dept: FAMILY MEDICINE | Facility: CLINIC | Age: 62
End: 2022-03-07
Payer: COMMERCIAL

## 2022-03-07 VITALS — TEMPERATURE: 98 F

## 2022-03-07 DIAGNOSIS — Z23 NEED FOR SHINGLES VACCINE: Primary | ICD-10-CM

## 2022-03-07 PROCEDURE — 90750 HZV VACC RECOMBINANT IM: CPT | Mod: S$GLB,,, | Performed by: FAMILY MEDICINE

## 2022-03-07 PROCEDURE — 90750 ZOSTER RECOMBINANT VACCINE: ICD-10-PCS | Mod: S$GLB,,, | Performed by: FAMILY MEDICINE

## 2022-03-07 PROCEDURE — 99999 PR PBB SHADOW E&M-EST. PATIENT-LVL II: ICD-10-PCS | Mod: PBBFAC,,,

## 2022-03-07 PROCEDURE — 99999 PR PBB SHADOW E&M-EST. PATIENT-LVL II: CPT | Mod: PBBFAC,,,

## 2022-03-24 ENCOUNTER — HOSPITAL ENCOUNTER (OUTPATIENT)
Dept: RADIOLOGY | Facility: HOSPITAL | Age: 62
Discharge: HOME | End: 2022-03-24
Attending: OBSTETRICS & GYNECOLOGY
Payer: COMMERCIAL

## 2022-03-24 DIAGNOSIS — Z12.31 BREAST CANCER SCREENING BY MAMMOGRAM: ICD-10-CM

## 2022-03-24 DIAGNOSIS — Z01.419 WELL WOMAN EXAM WITH ROUTINE GYNECOLOGICAL EXAM: ICD-10-CM

## 2022-03-24 PROCEDURE — 77067 MAMMO DIGITAL SCREENING BILAT WITH TOMO: ICD-10-PCS | Mod: 26,,, | Performed by: RADIOLOGY

## 2022-03-24 PROCEDURE — 77063 BREAST TOMOSYNTHESIS BI: CPT | Mod: TC,PO

## 2022-03-24 PROCEDURE — 77067 SCR MAMMO BI INCL CAD: CPT | Mod: 26,,, | Performed by: RADIOLOGY

## 2022-03-24 PROCEDURE — 77063 MAMMO DIGITAL SCREENING BILAT WITH TOMO: ICD-10-PCS | Mod: 26,,, | Performed by: RADIOLOGY

## 2022-03-24 PROCEDURE — 77067 SCR MAMMO BI INCL CAD: CPT | Mod: TC,PO

## 2022-03-24 PROCEDURE — 77063 BREAST TOMOSYNTHESIS BI: CPT | Mod: 26,,, | Performed by: RADIOLOGY

## 2022-03-28 DIAGNOSIS — M62.830 MUSCLE SPASM OF BACK: ICD-10-CM

## 2022-03-30 RX ORDER — NAPROXEN 500 MG/1
500 TABLET ORAL 2 TIMES DAILY PRN
Qty: 60 TABLET | Refills: 0 | Status: SHIPPED | OUTPATIENT
Start: 2022-03-30 | End: 2022-06-22

## 2022-03-30 NOTE — TELEPHONE ENCOUNTER
This Rx Request does not qualify for assessment with the OR   Please review protocol details and the Care Due Message for extra clinical information    Reasons Rx Request may be deferred:  Medication is non-delegated    Note composed:5:10 AM 03/30/2022

## 2022-04-20 ENCOUNTER — TELEPHONE (OUTPATIENT)
Dept: FAMILY MEDICINE | Facility: CLINIC | Age: 62
End: 2022-04-20
Payer: COMMERCIAL

## 2022-04-20 NOTE — TELEPHONE ENCOUNTER
----- Message from Graeme Tee sent at 4/20/2022  8:45 AM CDT -----  Regarding: request for medication for covid  Type: Patient Call Back    Who called:Bambi     What is the request in detail: the patient is requesting medication for covid. She stated that she has taken 3 at home covid tests and they were all positive. Patient recently went on a cruise and believes that's where she may have gotten it. Patient started to feel the symptoms on Monday. Her symptoms are as listed: body pain, congestion, runny nose, sore throat, and fatigue.     If possible, patient would like medication sent to her pharmacy to help her fight it. Patient has been vaccinated as well. Please return call at earliest convenience.    Can the clinic reply by [facility]?no     Would the patient rather a call back or a response via [facility]? Call back     Best call back number:[phone]

## 2022-04-20 NOTE — TELEPHONE ENCOUNTER
Patient was informed to stay hydrated and she can Tylenol or Ibuprofen as needed for pain. Patient verbalized understanding.

## 2022-05-02 ENCOUNTER — OFFICE VISIT (OUTPATIENT)
Dept: FAMILY MEDICINE | Facility: CLINIC | Age: 62
End: 2022-05-02
Payer: COMMERCIAL

## 2022-05-02 VITALS
SYSTOLIC BLOOD PRESSURE: 128 MMHG | OXYGEN SATURATION: 98 % | HEART RATE: 70 BPM | DIASTOLIC BLOOD PRESSURE: 70 MMHG | BODY MASS INDEX: 48.82 KG/M2 | HEIGHT: 65 IN | WEIGHT: 293 LBS | TEMPERATURE: 98 F

## 2022-05-02 DIAGNOSIS — I10 PRIMARY HYPERTENSION: ICD-10-CM

## 2022-05-02 DIAGNOSIS — R94.31 ABNORMAL EKG: ICD-10-CM

## 2022-05-02 DIAGNOSIS — R00.2 PALPITATIONS: Primary | ICD-10-CM

## 2022-05-02 DIAGNOSIS — E66.01 MORBID OBESITY: Chronic | ICD-10-CM

## 2022-05-02 PROCEDURE — 99999 PR PBB SHADOW E&M-EST. PATIENT-LVL IV: CPT | Mod: PBBFAC,,, | Performed by: NURSE PRACTITIONER

## 2022-05-02 PROCEDURE — 1160F PR REVIEW ALL MEDS BY PRESCRIBER/CLIN PHARMACIST DOCUMENTED: ICD-10-PCS | Mod: CPTII,S$GLB,, | Performed by: NURSE PRACTITIONER

## 2022-05-02 PROCEDURE — 3008F PR BODY MASS INDEX (BMI) DOCUMENTED: ICD-10-PCS | Mod: CPTII,S$GLB,, | Performed by: NURSE PRACTITIONER

## 2022-05-02 PROCEDURE — 99214 PR OFFICE/OUTPT VISIT, EST, LEVL IV, 30-39 MIN: ICD-10-PCS | Mod: S$GLB,,, | Performed by: NURSE PRACTITIONER

## 2022-05-02 PROCEDURE — 1159F PR MEDICATION LIST DOCUMENTED IN MEDICAL RECORD: ICD-10-PCS | Mod: CPTII,S$GLB,, | Performed by: NURSE PRACTITIONER

## 2022-05-02 PROCEDURE — 93005 EKG 12-LEAD: ICD-10-PCS | Mod: S$GLB,,, | Performed by: NURSE PRACTITIONER

## 2022-05-02 PROCEDURE — 3008F BODY MASS INDEX DOCD: CPT | Mod: CPTII,S$GLB,, | Performed by: NURSE PRACTITIONER

## 2022-05-02 PROCEDURE — 99214 OFFICE O/P EST MOD 30 MIN: CPT | Mod: S$GLB,,, | Performed by: NURSE PRACTITIONER

## 2022-05-02 PROCEDURE — 93010 EKG 12-LEAD: ICD-10-PCS | Mod: S$GLB,,, | Performed by: INTERNAL MEDICINE

## 2022-05-02 PROCEDURE — 3044F HG A1C LEVEL LT 7.0%: CPT | Mod: CPTII,S$GLB,, | Performed by: NURSE PRACTITIONER

## 2022-05-02 PROCEDURE — 3078F PR MOST RECENT DIASTOLIC BLOOD PRESSURE < 80 MM HG: ICD-10-PCS | Mod: CPTII,S$GLB,, | Performed by: NURSE PRACTITIONER

## 2022-05-02 PROCEDURE — 1159F MED LIST DOCD IN RCRD: CPT | Mod: CPTII,S$GLB,, | Performed by: NURSE PRACTITIONER

## 2022-05-02 PROCEDURE — 3078F DIAST BP <80 MM HG: CPT | Mod: CPTII,S$GLB,, | Performed by: NURSE PRACTITIONER

## 2022-05-02 PROCEDURE — 93005 ELECTROCARDIOGRAM TRACING: CPT | Mod: S$GLB,,, | Performed by: NURSE PRACTITIONER

## 2022-05-02 PROCEDURE — 3044F PR MOST RECENT HEMOGLOBIN A1C LEVEL <7.0%: ICD-10-PCS | Mod: CPTII,S$GLB,, | Performed by: NURSE PRACTITIONER

## 2022-05-02 PROCEDURE — 93010 ELECTROCARDIOGRAM REPORT: CPT | Mod: S$GLB,,, | Performed by: INTERNAL MEDICINE

## 2022-05-02 PROCEDURE — 3074F PR MOST RECENT SYSTOLIC BLOOD PRESSURE < 130 MM HG: ICD-10-PCS | Mod: CPTII,S$GLB,, | Performed by: NURSE PRACTITIONER

## 2022-05-02 PROCEDURE — 1160F RVW MEDS BY RX/DR IN RCRD: CPT | Mod: CPTII,S$GLB,, | Performed by: NURSE PRACTITIONER

## 2022-05-02 PROCEDURE — 3074F SYST BP LT 130 MM HG: CPT | Mod: CPTII,S$GLB,, | Performed by: NURSE PRACTITIONER

## 2022-05-02 PROCEDURE — 99999 PR PBB SHADOW E&M-EST. PATIENT-LVL IV: ICD-10-PCS | Mod: PBBFAC,,, | Performed by: NURSE PRACTITIONER

## 2022-05-02 RX ORDER — PANTOPRAZOLE SODIUM 40 MG/1
40 TABLET, DELAYED RELEASE ORAL DAILY
COMMUNITY
Start: 2022-03-16 | End: 2022-07-11

## 2022-05-02 NOTE — PROGRESS NOTES
Chief Complaint  Chief Complaint   Patient presents with    Palpitations     2 weeks, since she had covid.       HPI    HPI   Ms. Bambi Carrera is a 62 y.o. female with medical problems as listed below. The patient presents to clinic with c/o palpitations when she is lying down. The patient states that she was diagnosed with COVID on March 20 and ever since then she has been having the palpitations. The patient denies any CP or SOB or any other symptoms.     The patient also states that she has has multiple family deaths recently and she feels like she thinks about it more at night.    Denies any symptoms now.    PAST MEDICAL HISTORY:  Past Medical History:   Diagnosis Date    Borderline diabetic     Depression     Eye injury     fb os     Hypertension     Menopause     Nuclear sclerosis of both eyes 7/1/2019       PAST SURGICAL HISTORY:  Past Surgical History:   Procedure Laterality Date    COLONOSCOPY N/A 7/15/2021    Procedure: COLONOSCOPY;  Surgeon: Geetha Walton MD;  Location: [facility];  Service: Endoscopy;  Laterality: N/A;  Fully Vaccinated 3/25/21  see media tab 6/1/21, prep instr portal, and mailed -ml    LASIK Bilateral 07/15/2016       SOCIAL HISTORY:  Social History     Socioeconomic History    Marital status: Single   Occupational History     Employer: [employer]   Tobacco Use    Smoking status: Never Smoker    Smokeless tobacco: Never Used   Substance and Sexual Activity    Alcohol use: No     Alcohol/week: 0.0 standard drinks    Drug use: No    Sexual activity: Not Currently       FAMILY HISTORY:  Family History   Problem Relation Age of Onset    Cancer Mother     No Known Problems Father     No Known Problems Sister     No Known Problems Brother     No Known Problems Maternal Aunt     No Known Problems Maternal Uncle     No Known Problems Paternal Aunt     No Known Problems Paternal Uncle     No Known Problems Maternal Grandmother     No Known Problems  SUBJECTIVE:    Patient ID: Nu Murphy is a 78 y.o. male. Chief Complaint   Patient presents with    Hypertension     follow up          HPI:  He returns about his HTN. He goes to Dr. Castillo for bph. He needs to go back for prostate biopsy  He is going to Dr. Radha Smith for his knee. He had a pelvis x ray and is now wearing a brace. He can not hardly walk. He has to stand side ways to take the pressure of his leg. He is taking his blood pressure, cholesterol, and blood sugar medication. He doesn't check his blood pressure or blood sugar at home. Patient's medications,allergies, past medical, surgical, social and family histories were reviewed and updated as appropriate. .  Current Outpatient Medications on File Prior to Visit   Medication Sig Dispense Refill    tamsulosin (FLOMAX) 0.4 MG capsule TAKE ONE CAPSULE BY MOUTH EVERY DAY 90 capsule 3    Lancets MISC 1 each by Does not apply route 2 times daily Dx E11.9  each 5    Glucose Blood DISK 1 each by In Vitro route 3 times daily  each 3    lovastatin (MEVACOR) 40 MG tablet TAKE ONE TABLET BY MOUTH NIGHTLY 90 tablet 3    metFORMIN (GLUCOPHAGE) 500 MG tablet TAKE ONE TABLET BY MOUTH EVERY DAY WITH BREAKFAST 90 tablet 3    blood glucose monitor strips Test 3 times a day & as needed for symptoms of irregular blood glucose. Dx E11. 9. Breeze 2 100 strip 3    oxybutynin (DITROPAN) 5 MG tablet Take 5 mg by mouth 2 times daily   6    Multiple Vitamins-Minerals (RA VISION-STONEY PRESERVE PO) Take by mouth 2 times daily Drops bid      glucose blood VI test strips (EXACTECH TEST) strip 1 each by In Vitro route daily Daily testing, DX:250.00 100 each 3    aspirin 81 MG tablet Take 81 mg by mouth daily.  vitamin B-12 (CYANOCOBALAMIN) 1000 MCG tablet Take 1,000 mcg by mouth daily.  Vitamin D (CHOLECALCIFEROL) 1000 UNITS CAPS capsule Take 1,000 Units by mouth daily.  Ascorbic Acid (VITAMIN C) 500 MG tablet Take 1,000 mg by mouth daily. Maternal Grandfather     No Known Problems Paternal Grandmother     No Known Problems Paternal Grandfather     Amblyopia Neg Hx     Blindness Neg Hx     Cataracts Neg Hx     Diabetes Neg Hx     Glaucoma Neg Hx     Hypertension Neg Hx     Macular degeneration Neg Hx     Retinal detachment Neg Hx     Strabismus Neg Hx     Stroke Neg Hx     Thyroid disease Neg Hx        ALLERGIES AND MEDICATIONS: updated and reviewed.  Review of patient's allergies indicates:  No Known Allergies  Current Outpatient Medications   Medication Sig Dispense Refill    ALLERGY RELIEF, LORATADINE, 10 mg tablet TAKE 1 TABLET BY MOUTH ONCE DAILY AS NEEDED FOR ALLERGIES AND FOR RUNNY NOSE 90 tablet 3    ergocalciferol (ERGOCALCIFEROL) 50,000 unit Cap Take 1 capsule by mouth once a week 12 capsule 2    hydroCHLOROthiazide (HYDRODIURIL) 25 MG tablet Take 1 tablet by mouth once daily 90 tablet 3    multivitamin (THERAGRAN) per tablet Take 1 tablet by mouth once daily.      naproxen (NAPROSYN) 500 MG tablet Take 1 tablet (500 mg total) by mouth 2 (two) times daily as needed. 60 tablet 0    pantoprazole (PROTONIX) 40 MG tablet Take 40 mg by mouth once daily.      azelastine (ASTELIN) 137 mcg (0.1 %) nasal spray 1 spray (137 mcg total) by Nasal route 2 (two) times daily. 30 mL 3    meclizine (ANTIVERT) 25 mg tablet Take 1 tablet (25 mg total) by mouth 3 (three) times daily as needed for Dizziness (vertigo). 15 tablet 0     No current facility-administered medications for this visit.       Patient Care Team:  Wong Whitmore MD as PCP - General (Family Medicine)  Daniella Ovalle MA (Inactive) as Care Coordinator    ROS  Review of Systems   Constitutional: Negative for chills, fatigue, fever and unexpected weight change.   HENT: Negative for congestion, ear discharge, ear pain and postnasal drip.    Eyes: Negative for photophobia, pain and visual disturbance.   Respiratory: Negative for cough, shortness of breath and wheezing.   No current facility-administered medications on file prior to visit. Review of Systems   Constitutional: Negative. HENT: Negative. Eyes: Negative. Respiratory: Negative. Cardiovascular: Negative. Gastrointestinal: Negative. Genitourinary: Negative. Musculoskeletal: Positive for arthralgias (right knee pain) and back pain. Skin: Negative. Neurological: Negative. Psychiatric/Behavioral: Negative. OBJECTIVE:  BP (!) 144/70   Pulse 61   Temp 98.8 °F (37.1 °C)   Wt 169 lb (76.7 kg)   SpO2 97%   BMI 22.92 kg/m²    Physical Exam  Vitals signs and nursing note reviewed. Constitutional:       Appearance: He is well-developed. HENT:      Head: Normocephalic and atraumatic. Eyes:      Conjunctiva/sclera: Conjunctivae normal.      Pupils: Pupils are equal, round, and reactive to light. Neck:      Musculoskeletal: Normal range of motion and neck supple. Thyroid: No thyromegaly. Vascular: No JVD. Cardiovascular:      Rate and Rhythm: Normal rate and regular rhythm. Heart sounds: No murmur. No friction rub. No gallop. Pulmonary:      Effort: Pulmonary effort is normal. No respiratory distress. Breath sounds: Normal breath sounds. No wheezing or rales. Abdominal:      General: Bowel sounds are normal. There is no distension. Palpations: Abdomen is soft. Tenderness: There is no abdominal tenderness. There is no guarding. Musculoskeletal:      Left knee: Tenderness found. Medial joint line tenderness noted. Lumbar back: He exhibits tenderness. Comments: Walking with cane     Skin:     General: Skin is warm and dry. Findings: No rash. Neurological:      Mental Status: He is alert and oriented to person, place, and time. Psychiatric:         Judgment: Judgment normal.         No results found for requested labs within last 30 days.        Hemoglobin A1C (%)   Date Value   07/01/2020 6.4 (H)     Microalbumin, Random "  Cardiovascular: Positive for palpitations. Negative for chest pain and leg swelling.   Gastrointestinal: Negative for abdominal pain, constipation, diarrhea, nausea and vomiting.   Genitourinary: Negative for dysuria, frequency, urgency and vaginal discharge.   Musculoskeletal: Negative for back pain, joint swelling and neck stiffness.   Skin: Negative for rash.   Neurological: Negative for weakness and headaches.   Psychiatric/Behavioral: Negative for dysphoric mood and sleep disturbance. The patient is not nervous/anxious.            Physical Exam  Vitals:    05/02/22 1346   BP: 128/70   Pulse: 70   Temp: 97.9 °F (36.6 °C)   TempSrc: Oral   SpO2: 98%   Weight: 134.7 kg (296 lb 15.4 oz)   Height: 5' 5" (1.651 m)    Body mass index is 49.42 kg/m².  Weight: 134.7 kg (296 lb 15.4 oz)   Height: 5' 5" (165.1 cm)     Physical Exam  Constitutional:       Appearance: She is well-developed.   HENT:      Head: Normocephalic and atraumatic.      Right Ear: External ear normal.      Left Ear: External ear normal.      Nose: Nose normal.   Eyes:      Conjunctiva/sclera: Conjunctivae normal.      Pupils: Pupils are equal, round, and reactive to light.   Neck:      Thyroid: No thyromegaly.   Cardiovascular:      Rate and Rhythm: Normal rate. Rhythm irregular.   Pulmonary:      Effort: Pulmonary effort is normal.      Breath sounds: Normal breath sounds. No wheezing.   Abdominal:      General: Bowel sounds are normal.      Palpations: Abdomen is soft. There is no hepatomegaly or splenomegaly.      Tenderness: There is no abdominal tenderness.   Genitourinary:     Vagina: Normal. No vaginal discharge.   Musculoskeletal:         General: Normal range of motion.      Cervical back: Normal range of motion and neck supple.   Lymphadenopathy:      Cervical: No cervical adenopathy.   Skin:     General: Skin is warm and dry.      Findings: No rash.   Neurological:      Mental Status: She is alert and oriented to person, place, and time. " Urine (mg/dL)   Date Value   04/22/2019 <1.20     LDL Calculated (mg/dL)   Date Value   08/20/2019 68           Lab Results   Component Value Date    TSH 1.17 10/02/2017         ASSESSMENT/PLAN:     Annel Lucia was seen today for hypertension. Diagnoses and all orders for this visit:    Type 2 diabetes mellitus without complication, without long-term current use of insulin (HCC)  -     CBC; Future  -     Comprehensive Metabolic Panel; Future  -     Hemoglobin A1C; Future  Stable. I advised him regarding diabetic diet, exercise and weight control. Also, I advised him to stay on the current medication, monitor his fingerstick closely. I am going to check the A1c every few months. I will check microalbumin on a yearly basis. I have also advised him to have a yearly eye exam and to monitor his feet closely. Along with instruction of possible complications related to diabetes, even with good control. A1C will be checked  in few months. Lab Results   Component Value Date    LABA1C 6.4 (H) 07/01/2020       Essential hypertension  -     lisinopril (PRINIVIL;ZESTRIL) 10 MG tablet; TAKE ONE TABLET BY MOUTH EVERY DAY    Thoracic aortic aneurysm without rupture (HCC)    Pure hypercholesterolemia  -     Lipid Panel; Future  I have advised him on low-fat diet, exercise and weight control. I am going to continue on current medication. I have also advised him on the possible side effects from the medication. I will monitor his liver functions and lipid profile every few months. Lipid well controlled. Lab Results   Component Value Date    LDLCALC 68 08/20/2019       Vitamin D deficiency  -     Vitamin B12 & Folate; Future  -     Vitamin D 25 Hydroxy;  Future    Need for influenza vaccination  -     INFLUENZA, QUADV, 3 YRS AND OLDER, IM, MDV, 0.5ML (AFLURIA QUADV)      Medications Discontinued During This Encounter   Medication Reason    lisinopril (PRINIVIL;ZESTRIL) 10 MG tablet REORDER      Cranial Nerves: No cranial nerve deficit.   Psychiatric:         Behavior: Behavior normal.         Thought Content: Thought content normal.         Judgment: Judgment normal.       Health Maintenance       Date Due Completion Date    COVID-19 Vaccine (4 - Booster for Pfizer series) 04/23/2022 12/23/2021    HIV Screening 07/15/2026 (Originally 4/1/1975) ---    Mammogram 03/24/2023 3/24/2022    Override on 6/10/2015: Done (normal)    Override on 6/10/2014: Done (normal)    Cervical Cancer Screening 02/22/2026 2/22/2021    Override on 6/3/2014: Done (normal)    TETANUS VACCINE 06/08/2026 6/8/2016    Override on 4/1/2005: Done    Lipid Panel 01/07/2027 1/7/2022    Colorectal Cancer Screening 07/15/2028 7/15/2021      Health maintenance reviewed at this time.    Assessment & Plan  Palpitations  -     IN OFFICE EKG 12-LEAD (to Muse)  -     CBC Auto Differential; Future; Expected date: 05/02/2022  -     Comprehensive Metabolic Panel; Future; Expected date: 05/02/2022  -     Ambulatory referral/consult to Cardiology; Future; Expected date: 05/09/2022    Abnormal EKG  -     Ambulatory referral/consult to Cardiology; Future; Expected date: 05/09/2022    Will refer to cardiology for further evaluation  ED precautions discussed. The patient verbalized understanding  Will get blood work to r/o cardiac abnormalities     Primary hypertension  The current medical regimen is effective;  continue present plan and medications.    Morbid obesity  The patient is asked to make an attempt to improve diet and exercise patterns to aid in medical management of this problem.           Follow-up: Follow up in about 2 days (around 5/4/2022) for f/u with cardiology; ED precautions discussed.

## 2022-05-04 ENCOUNTER — LAB VISIT (OUTPATIENT)
Dept: LAB | Facility: HOSPITAL | Age: 62
End: 2022-05-04
Attending: NURSE PRACTITIONER
Payer: COMMERCIAL

## 2022-05-04 ENCOUNTER — OFFICE VISIT (OUTPATIENT)
Dept: CARDIOLOGY | Facility: CLINIC | Age: 62
End: 2022-05-04
Payer: COMMERCIAL

## 2022-05-04 VITALS
HEIGHT: 65 IN | OXYGEN SATURATION: 98 % | DIASTOLIC BLOOD PRESSURE: 106 MMHG | SYSTOLIC BLOOD PRESSURE: 188 MMHG | RESPIRATION RATE: 15 BRPM | HEART RATE: 78 BPM | WEIGHT: 293 LBS | BODY MASS INDEX: 48.82 KG/M2

## 2022-05-04 DIAGNOSIS — R94.31 ABNORMAL EKG: ICD-10-CM

## 2022-05-04 DIAGNOSIS — R00.2 PALPITATIONS: ICD-10-CM

## 2022-05-04 DIAGNOSIS — E66.01 MORBID OBESITY: ICD-10-CM

## 2022-05-04 DIAGNOSIS — R00.2 PALPITATIONS: Primary | ICD-10-CM

## 2022-05-04 DIAGNOSIS — I10 PRIMARY HYPERTENSION: ICD-10-CM

## 2022-05-04 LAB
ALBUMIN SERPL BCP-MCNC: 3.6 G/DL (ref 3.5–5.2)
ALP SERPL-CCNC: 117 U/L (ref 55–135)
ALT SERPL W/O P-5'-P-CCNC: 15 U/L (ref 10–44)
ANION GAP SERPL CALC-SCNC: 10 MMOL/L (ref 8–16)
AST SERPL-CCNC: 13 U/L (ref 10–40)
BILIRUB SERPL-MCNC: 0.2 MG/DL (ref 0.1–1)
BUN SERPL-MCNC: 17 MG/DL (ref 8–23)
CALCIUM SERPL-MCNC: 9.2 MG/DL (ref 8.7–10.5)
CHLORIDE SERPL-SCNC: 104 MMOL/L (ref 95–110)
CO2 SERPL-SCNC: 26 MMOL/L (ref 23–29)
CREAT SERPL-MCNC: 1 MG/DL (ref 0.5–1.4)
EST. GFR  (AFRICAN AMERICAN): >60 ML/MIN/1.73 M^2
EST. GFR  (NON AFRICAN AMERICAN): >60 ML/MIN/1.73 M^2
GLUCOSE SERPL-MCNC: 91 MG/DL (ref 70–110)
POTASSIUM SERPL-SCNC: 4.1 MMOL/L (ref 3.5–5.1)
PROT SERPL-MCNC: 7.8 G/DL (ref 6–8.4)
SODIUM SERPL-SCNC: 140 MMOL/L (ref 136–145)

## 2022-05-04 PROCEDURE — 3077F SYST BP >= 140 MM HG: CPT | Mod: CPTII,S$GLB,, | Performed by: INTERNAL MEDICINE

## 2022-05-04 PROCEDURE — 3008F PR BODY MASS INDEX (BMI) DOCUMENTED: ICD-10-PCS | Mod: CPTII,S$GLB,, | Performed by: INTERNAL MEDICINE

## 2022-05-04 PROCEDURE — 3080F PR MOST RECENT DIASTOLIC BLOOD PRESSURE >= 90 MM HG: ICD-10-PCS | Mod: CPTII,S$GLB,, | Performed by: INTERNAL MEDICINE

## 2022-05-04 PROCEDURE — 99204 OFFICE O/P NEW MOD 45 MIN: CPT | Mod: S$GLB,,, | Performed by: INTERNAL MEDICINE

## 2022-05-04 PROCEDURE — 80053 COMPREHEN METABOLIC PANEL: CPT | Performed by: NURSE PRACTITIONER

## 2022-05-04 PROCEDURE — 3044F HG A1C LEVEL LT 7.0%: CPT | Mod: CPTII,S$GLB,, | Performed by: INTERNAL MEDICINE

## 2022-05-04 PROCEDURE — 1159F MED LIST DOCD IN RCRD: CPT | Mod: CPTII,S$GLB,, | Performed by: INTERNAL MEDICINE

## 2022-05-04 PROCEDURE — 3044F PR MOST RECENT HEMOGLOBIN A1C LEVEL <7.0%: ICD-10-PCS | Mod: CPTII,S$GLB,, | Performed by: INTERNAL MEDICINE

## 2022-05-04 PROCEDURE — 36415 COLL VENOUS BLD VENIPUNCTURE: CPT | Mod: PO | Performed by: NURSE PRACTITIONER

## 2022-05-04 PROCEDURE — 99999 PR PBB SHADOW E&M-EST. PATIENT-LVL IV: ICD-10-PCS | Mod: PBBFAC,,, | Performed by: INTERNAL MEDICINE

## 2022-05-04 PROCEDURE — 99999 PR PBB SHADOW E&M-EST. PATIENT-LVL IV: CPT | Mod: PBBFAC,,, | Performed by: INTERNAL MEDICINE

## 2022-05-04 PROCEDURE — 85025 COMPLETE CBC W/AUTO DIFF WBC: CPT | Performed by: NURSE PRACTITIONER

## 2022-05-04 PROCEDURE — 3008F BODY MASS INDEX DOCD: CPT | Mod: CPTII,S$GLB,, | Performed by: INTERNAL MEDICINE

## 2022-05-04 PROCEDURE — 3080F DIAST BP >= 90 MM HG: CPT | Mod: CPTII,S$GLB,, | Performed by: INTERNAL MEDICINE

## 2022-05-04 PROCEDURE — 1159F PR MEDICATION LIST DOCUMENTED IN MEDICAL RECORD: ICD-10-PCS | Mod: CPTII,S$GLB,, | Performed by: INTERNAL MEDICINE

## 2022-05-04 PROCEDURE — 99204 PR OFFICE/OUTPT VISIT, NEW, LEVL IV, 45-59 MIN: ICD-10-PCS | Mod: S$GLB,,, | Performed by: INTERNAL MEDICINE

## 2022-05-04 PROCEDURE — 3077F PR MOST RECENT SYSTOLIC BLOOD PRESSURE >= 140 MM HG: ICD-10-PCS | Mod: CPTII,S$GLB,, | Performed by: INTERNAL MEDICINE

## 2022-05-04 NOTE — PROGRESS NOTES
CARDIOLOGY CONSULTATION    REASON FOR CONSULT:   Bambi Carrera is a 62 y.o. female who presents for evaluation of palpitations.      HISTORY OF PRESENT ILLNESS:     Bambi Carrera presents for evaluation of palpitations.  Seen in family [facility] on 05/02/2022.  Complaints of palpitations at rest.  Patient states episodes since March 20th when she was diagnosed with COVID.  EKG from 05/02/2022 showed sinus rhythm with sinus arrhythmia.  Incomplete right bundle branch block.  PACs.  History of hypertension.  Blood pressure elevated today.  Patient states that her values are normal in the 130 systolic at home.  Thyroid function test from January within normal limits.  Denies any history of sleep apnea.    CARDIOVASCULAR HISTORY:     None    PAST MEDICAL HISTORY:     Past Medical History:   Diagnosis Date    Borderline diabetic     Depression     Eye injury     fb os     Hypertension     Menopause     Nuclear sclerosis of both eyes 7/1/2019       PAST SURGICAL HISTORY:     Past Surgical History:   Procedure Laterality Date    COLONOSCOPY N/A 7/15/2021    Procedure: COLONOSCOPY;  Surgeon: Geetha Walton MD;  Location: [facility];  Service: Endoscopy;  Laterality: N/A;  Fully Vaccinated 3/25/21  see media tab 6/1/21, prep instr portal, and mailed -ml    LASIK Bilateral 07/15/2016       ALLERGIES AND MEDICATION:   Review of patient's allergies indicates:  No Known Allergies     Medication List          Accurate as of May 4, 2022  3:36 PM. If you have any questions, ask your nurse or doctor.            CONTINUE taking these medications    ALLERGY RELIEF (LORATADINE) 10 mg tablet  Generic drug: loratadine  TAKE 1 TABLET BY MOUTH ONCE DAILY AS NEEDED FOR ALLERGIES AND FOR RUNNY NOSE     azelastine 137 mcg (0.1 %) nasal spray  Commonly known as: ASTELIN  1 spray (137 mcg total) by Nasal route 2 (two) times daily.     ergocalciferol 50,000 unit Cap  Commonly known as: ERGOCALCIFEROL  Take 1 capsule by  mouth once a week     hydroCHLOROthiazide 25 MG tablet  Commonly known as: HYDRODIURIL  Take 1 tablet by mouth once daily     meclizine 25 mg tablet  Commonly known as: ANTIVERT  Take 1 tablet (25 mg total) by mouth 3 (three) times daily as needed for Dizziness (vertigo).     multivitamin per tablet  Commonly known as: THERAGRAN     naproxen 500 MG tablet  Commonly known as: NAPROSYN  Take 1 tablet (500 mg total) by mouth 2 (two) times daily as needed.     pantoprazole 40 MG tablet  Commonly known as: PROTONIX            SOCIAL HISTORY:     Social History     Socioeconomic History    Marital status: Single   Occupational History     Employer: [employer]   Tobacco Use    Smoking status: Never Smoker    Smokeless tobacco: Never Used   Substance and Sexual Activity    Alcohol use: No     Alcohol/week: 0.0 standard drinks    Drug use: No    Sexual activity: Not Currently       FAMILY HISTORY:     Family History   Problem Relation Age of Onset    Cancer Mother     No Known Problems Father     No Known Problems Sister     No Known Problems Brother     No Known Problems Maternal Aunt     No Known Problems Maternal Uncle     No Known Problems Paternal Aunt     No Known Problems Paternal Uncle     No Known Problems Maternal Grandmother     No Known Problems Maternal Grandfather     No Known Problems Paternal Grandmother     No Known Problems Paternal Grandfather     Amblyopia Neg Hx     Blindness Neg Hx     Cataracts Neg Hx     Diabetes Neg Hx     Glaucoma Neg Hx     Hypertension Neg Hx     Macular degeneration Neg Hx     Retinal detachment Neg Hx     Strabismus Neg Hx     Stroke Neg Hx     Thyroid disease Neg Hx        REVIEW OF SYSTEMS:   Review of Systems   Constitutional: Negative for chills, diaphoresis, fever, malaise/fatigue and weight loss.   Eyes: Negative for blurred vision and pain.   Respiratory: Negative for sputum production, shortness of breath and wheezing.   "  Cardiovascular: Positive for palpitations. Negative for chest pain, orthopnea, claudication, leg swelling and PND.   Gastrointestinal: Negative for abdominal pain, heartburn, nausea and vomiting.   Musculoskeletal: Negative for back pain, falls, joint pain, myalgias and neck pain.   Neurological: Negative for dizziness, speech change, focal weakness, loss of consciousness, weakness and headaches.   Endo/Heme/Allergies: Does not bruise/bleed easily.   Psychiatric/Behavioral: Negative for depression, memory loss and substance abuse. The patient is not nervous/anxious.        PHYSICAL EXAM:     Vitals:    05/04/22 1514   BP: (!) 188/106   Pulse: 78   Resp: 15    Body mass index is 49.73 kg/m².  Weight: 135.6 kg (298 lb 13.3 oz)   Height: 5' 5" (165.1 cm)     Physical Exam  Constitutional:       General: She is not in acute distress.     Appearance: She is well-developed. She is obese. She is not diaphoretic.   HENT:      Head: Normocephalic.   Neck:      Vascular: No carotid bruit or JVD.   Cardiovascular:      Rate and Rhythm: Normal rate and regular rhythm.      Pulses: Normal pulses.      Heart sounds: Normal heart sounds.   Pulmonary:      Effort: Pulmonary effort is normal.      Breath sounds: Normal breath sounds.   Abdominal:      General: Bowel sounds are normal.      Palpations: Abdomen is soft.      Tenderness: There is no abdominal tenderness.   Skin:     General: Skin is warm and dry.   Neurological:      Mental Status: She is alert and oriented to person, place, and time.   Psychiatric:         Speech: Speech normal.         Behavior: Behavior normal.         Thought Content: Thought content normal.         DATA:   EKG: (personally reviewed tracing)  05/02/2022-sinus rhythm with sinus arrhythmia.  PACs.  Laboratory:  CBC:  Recent Labs   Lab 07/15/20  0920 06/13/21  2131 01/07/22  1055   WBC 7.43 12.47 5.93   Hemoglobin 12.0 12.7 12.5   Hematocrit 39.1 39.7 40.6   Platelets 260 301 267 "       CHEMISTRIES:  Recent Labs   Lab 07/15/20  0920 22  1055   Glucose 108 117 H   Sodium 138 139   Potassium 3.9 4.2   BUN 18 18   Creatinine 1.0 0.8   eGFR if African American >60.0 >60.0   eGFR if non African American >60.0 >60.0   Calcium 9.4 9.5       CARDIAC BIOMARKERS:        COAGS:        LIPIDS/LFTS:  Recent Labs   Lab 07/15/20  0920 22  1055   Cholesterol 166 170   Triglycerides 85 83   HDL 63 62   LDL Cholesterol 86.0 91.4   Non-HDL Cholesterol 103 108   AST 11 13   ALT 13 12       Cardiovascular Testing:      ASSESSMENT:     1. Palpitations  2. Abnormal EKG  3. Hypertension  4. Morbid obesity    PLAN:     1. Palpitations/abnormal EK hour Holter.  2D echocardiogram.  2. Hypertension:  Continue current management.  Monitor.  2D echocardiogram.  3. Return to clinic 1 month.            Enrique Hubbard MD, MPH, FACC, [facility]

## 2022-05-05 LAB
BASOPHILS # BLD AUTO: 0.03 K/UL (ref 0–0.2)
BASOPHILS NFR BLD: 0.5 % (ref 0–1.9)
DIFFERENTIAL METHOD: ABNORMAL
EOSINOPHIL # BLD AUTO: 0.1 K/UL (ref 0–0.5)
EOSINOPHIL NFR BLD: 1.6 % (ref 0–8)
ERYTHROCYTE [DISTWIDTH] IN BLOOD BY AUTOMATED COUNT: 15.4 % (ref 11.5–14.5)
HCT VFR BLD AUTO: 39 % (ref 37–48.5)
HGB BLD-MCNC: 11.8 G/DL (ref 12–16)
IMM GRANULOCYTES # BLD AUTO: 0.01 K/UL (ref 0–0.04)
IMM GRANULOCYTES NFR BLD AUTO: 0.2 % (ref 0–0.5)
LYMPHOCYTES # BLD AUTO: 2.9 K/UL (ref 1–4.8)
LYMPHOCYTES NFR BLD: 47.4 % (ref 18–48)
MCH RBC QN AUTO: 27 PG (ref 27–31)
MCHC RBC AUTO-ENTMCNC: 30.3 G/DL (ref 32–36)
MCV RBC AUTO: 89 FL (ref 82–98)
MONOCYTES # BLD AUTO: 0.6 K/UL (ref 0.3–1)
MONOCYTES NFR BLD: 10.4 % (ref 4–15)
NEUTROPHILS # BLD AUTO: 2.5 K/UL (ref 1.8–7.7)
NEUTROPHILS NFR BLD: 39.9 % (ref 38–73)
NRBC BLD-RTO: 0 /100 WBC
PLATELET # BLD AUTO: 334 K/UL (ref 150–450)
PMV BLD AUTO: 10.9 FL (ref 9.2–12.9)
RBC # BLD AUTO: 4.37 M/UL (ref 4–5.4)
WBC # BLD AUTO: 6.16 K/UL (ref 3.9–12.7)

## 2022-05-05 NOTE — PROGRESS NOTES
Your bloodwork looks fine and does not require any change in treatment.     Please contact me if you have any additional concerns.    Sincerely,    Kellie Davis

## 2022-05-20 ENCOUNTER — HOSPITAL ENCOUNTER (OUTPATIENT)
Dept: CARDIOLOGY | Facility: HOSPITAL | Age: 62
Discharge: HOME | End: 2022-05-20
Attending: INTERNAL MEDICINE
Payer: COMMERCIAL

## 2022-05-20 VITALS — WEIGHT: 293 LBS | HEIGHT: 65 IN | BODY MASS INDEX: 48.82 KG/M2

## 2022-05-20 DIAGNOSIS — I10 PRIMARY HYPERTENSION: ICD-10-CM

## 2022-05-20 DIAGNOSIS — R00.2 PALPITATIONS: ICD-10-CM

## 2022-05-20 DIAGNOSIS — R94.31 ABNORMAL EKG: ICD-10-CM

## 2022-05-20 LAB
AV INDEX (PROSTH): 0.63
AV MEAN GRADIENT: 6 MMHG
AV PEAK GRADIENT: 10 MMHG
AV VALVE AREA: 1.44 CM2
AV VELOCITY RATIO: 0.65
BSA FOR ECHO PROCEDURE: 2.49 M2
CV ECHO LV RWT: 0.33 CM
DOP CALC AO PEAK VEL: 1.55 M/S
DOP CALC AO VTI: 40.58 CM
DOP CALC LVOT AREA: 2.3 CM2
DOP CALC LVOT DIAMETER: 1.71 CM
DOP CALC LVOT PEAK VEL: 1 M/S
DOP CALC LVOT STROKE VOLUME: 58.3 CM3
DOP CALCLVOT PEAK VEL VTI: 25.4 CM
E WAVE DECELERATION TIME: 353.78 MSEC
E/A RATIO: 0.91
E/E' RATIO: 10.94 M/S
ECHO LV POSTERIOR WALL: 0.81 CM (ref 0.6–1.1)
EJECTION FRACTION: 65 %
FRACTIONAL SHORTENING: 32 % (ref 28–44)
INTERVENTRICULAR SEPTUM: 0.89 CM (ref 0.6–1.1)
LA MINOR: 5.97 CM
LA WIDTH: 5.01 CM
LEFT ATRIUM SIZE: 4.59 CM
LEFT INTERNAL DIMENSION IN SYSTOLE: 3.34 CM (ref 2.1–4)
LEFT VENTRICLE DIASTOLIC VOLUME INDEX: 47.89 ML/M2
LEFT VENTRICLE DIASTOLIC VOLUME: 112.06 ML
LEFT VENTRICLE MASS INDEX: 60 G/M2
LEFT VENTRICLE SYSTOLIC VOLUME INDEX: 19.4 ML/M2
LEFT VENTRICLE SYSTOLIC VOLUME: 45.48 ML
LEFT VENTRICULAR INTERNAL DIMENSION IN DIASTOLE: 4.89 CM (ref 3.5–6)
LEFT VENTRICULAR MASS: 141.43 G
LV LATERAL E/E' RATIO: 11.63 M/S
LV SEPTAL E/E' RATIO: 10.33 M/S
MV PEAK A VEL: 1.02 M/S
MV PEAK E VEL: 0.93 M/S
PISA TR MAX VEL: 2.34 M/S
PULM VEIN S/D RATIO: 2
PV PEAK D VEL: 0.38 M/S
PV PEAK S VEL: 0.76 M/S
PV PEAK VELOCITY: 1.24 CM/S
RV TISSUE DOPPLER FREE WALL SYSTOLIC VELOCITY 1 (APICAL 4 CHAMBER VIEW): 13.62 CM/S
SINUS: 2.75 CM
STJ: 2.61 CM
TDI LATERAL: 0.08 M/S
TDI SEPTAL: 0.09 M/S
TDI: 0.09 M/S
TR MAX PG: 22 MMHG
TRICUSPID ANNULAR PLANE SYSTOLIC EXCURSION: 2.36 CM

## 2022-05-20 PROCEDURE — 93227 XTRNL ECG REC<48 HR R&I: CPT | Mod: ,,, | Performed by: INTERNAL MEDICINE

## 2022-05-20 PROCEDURE — 93226 XTRNL ECG REC<48 HR SCAN A/R: CPT

## 2022-05-20 PROCEDURE — 93306 TTE W/DOPPLER COMPLETE: CPT

## 2022-05-20 PROCEDURE — 93227 HOLTER MONITOR - 48 HOUR (CUPID ONLY): ICD-10-PCS | Mod: ,,, | Performed by: INTERNAL MEDICINE

## 2022-05-20 PROCEDURE — 93306 ECHO (CUPID ONLY): ICD-10-PCS | Mod: 26,,, | Performed by: INTERNAL MEDICINE

## 2022-05-20 PROCEDURE — 93306 TTE W/DOPPLER COMPLETE: CPT | Mod: 26,,, | Performed by: INTERNAL MEDICINE

## 2022-05-26 LAB
OHS CV EVENT MONITOR DAY: 1
OHS CV HOLTER LENGTH DECIMAL HOURS: 48
OHS CV HOLTER LENGTH HOURS: 24
OHS CV HOLTER LENGTH MINUTES: 0
OHS CV HOLTER SINUS AVERAGE HR: 83
OHS CV HOLTER SINUS MAX HR: 129
OHS CV HOLTER SINUS MIN HR: 57

## 2022-05-31 ENCOUNTER — OFFICE VISIT (OUTPATIENT)
Dept: OTOLARYNGOLOGY | Facility: CLINIC | Age: 62
End: 2022-05-31
Payer: COMMERCIAL

## 2022-05-31 VITALS — TEMPERATURE: 98 F | HEART RATE: 70 BPM | DIASTOLIC BLOOD PRESSURE: 88 MMHG | SYSTOLIC BLOOD PRESSURE: 136 MMHG

## 2022-05-31 DIAGNOSIS — K21.9 LARYNGOPHARYNGEAL REFLUX (LPR): Primary | ICD-10-CM

## 2022-05-31 DIAGNOSIS — J30.2 SEASONAL ALLERGIC RHINITIS, UNSPECIFIED TRIGGER: ICD-10-CM

## 2022-05-31 PROCEDURE — 3079F PR MOST RECENT DIASTOLIC BLOOD PRESSURE 80-89 MM HG: ICD-10-PCS | Mod: CPTII,S$GLB,, | Performed by: OTOLARYNGOLOGY

## 2022-05-31 PROCEDURE — 31575 DIAGNOSTIC LARYNGOSCOPY: CPT | Mod: S$GLB,,, | Performed by: OTOLARYNGOLOGY

## 2022-05-31 PROCEDURE — 1159F MED LIST DOCD IN RCRD: CPT | Mod: CPTII,S$GLB,, | Performed by: OTOLARYNGOLOGY

## 2022-05-31 PROCEDURE — 3075F PR MOST RECENT SYSTOLIC BLOOD PRESS GE 130-139MM HG: ICD-10-PCS | Mod: CPTII,S$GLB,, | Performed by: OTOLARYNGOLOGY

## 2022-05-31 PROCEDURE — 99213 PR OFFICE/OUTPT VISIT, EST, LEVL III, 20-29 MIN: ICD-10-PCS | Mod: 25,S$GLB,, | Performed by: OTOLARYNGOLOGY

## 2022-05-31 PROCEDURE — 3044F HG A1C LEVEL LT 7.0%: CPT | Mod: CPTII,S$GLB,, | Performed by: OTOLARYNGOLOGY

## 2022-05-31 PROCEDURE — 1159F PR MEDICATION LIST DOCUMENTED IN MEDICAL RECORD: ICD-10-PCS | Mod: CPTII,S$GLB,, | Performed by: OTOLARYNGOLOGY

## 2022-05-31 PROCEDURE — 99213 OFFICE O/P EST LOW 20 MIN: CPT | Mod: 25,S$GLB,, | Performed by: OTOLARYNGOLOGY

## 2022-05-31 PROCEDURE — 31575 PR LARYNGOSCOPY, FLEXIBLE; DIAGNOSTIC: ICD-10-PCS | Mod: S$GLB,,, | Performed by: OTOLARYNGOLOGY

## 2022-05-31 PROCEDURE — 1160F RVW MEDS BY RX/DR IN RCRD: CPT | Mod: CPTII,S$GLB,, | Performed by: OTOLARYNGOLOGY

## 2022-05-31 PROCEDURE — 1160F PR REVIEW ALL MEDS BY PRESCRIBER/CLIN PHARMACIST DOCUMENTED: ICD-10-PCS | Mod: CPTII,S$GLB,, | Performed by: OTOLARYNGOLOGY

## 2022-05-31 PROCEDURE — 3079F DIAST BP 80-89 MM HG: CPT | Mod: CPTII,S$GLB,, | Performed by: OTOLARYNGOLOGY

## 2022-05-31 PROCEDURE — 3075F SYST BP GE 130 - 139MM HG: CPT | Mod: CPTII,S$GLB,, | Performed by: OTOLARYNGOLOGY

## 2022-05-31 PROCEDURE — 3044F PR MOST RECENT HEMOGLOBIN A1C LEVEL <7.0%: ICD-10-PCS | Mod: CPTII,S$GLB,, | Performed by: OTOLARYNGOLOGY

## 2022-05-31 RX ORDER — FAMOTIDINE 40 MG/1
40 TABLET, FILM COATED ORAL NIGHTLY
Qty: 30 TABLET | Refills: 3 | Status: SHIPPED | OUTPATIENT
Start: 2022-05-31 | End: 2022-12-09

## 2022-05-31 NOTE — PROGRESS NOTES
Subjective:     Chief Complaint:   Follow up    Bambi Carrera is a 62 y.o. female who was referred to me by Dr. Natalia Martin in consultation for sore throat.    Reports symptoms present for 2-3 months. Patient reports symptoms are worse when lying down and relaxing. Does not bother her during the day when she is busy. Reports some increased symptoms after meals. Reports irritation and globus sensation. Cough is present in the am. Denies regurgitation.     She does reports acid reflux occurs commonly. She does take mobic daily.       She also has seasonal allergy symptoms, worse in the spring. She has been on flonase and claritin without improvement of throat symptoms.     There is not a prior history of sinonasal surgery.  She is not an active smoker.    (9/24/21):  Patient returns for follow-up.  She reports continuing Protonix 40 mg with improvement of her acid reflux.  She does however have continued throat irritation globus sensation in throat clearing when she lays down at night.  These symptoms do not occur throughout the day.  She is consistently using her allergy medications including intranasal steroid spray and.  She does report worsened nasal at night.  She has cut back on caffeine.    (11/18/21):  Patient returns for follow-up.  She has been using Protonix 40 mg and famotidine 40 mg daily.  She is also using Astelin and Flonase nasal spray.  She reports significant improvement of her throat symptoms.  Denies any reflux symptoms.  Throat clearing has improved.    Today (5/31/22): Patient returns for follow up. Had Covid April 15. Reports recurrence of her throat symptoms since then.  She was previously treated with reflux medication with improvement of her throat symptoms.  Now she is experiencing some irritation and throat clearing on the right.  She notices this is worse with red gravy.  She also reports frequent coffee drinking and frequent eating of mints.  Her allergy symptoms have not been  bothersome.  She is using Astelin nasal spray.    Past Medical History  She has a past medical history of Borderline diabetic, Depression, Eye injury, Hypertension, Menopause, and Nuclear sclerosis of both eyes.    Past Surgical History  She has a past surgical history that includes LASIK (Bilateral, 07/15/2016) and Colonoscopy (N/A, 7/15/2021).    Family History  Her family history includes Cancer in her mother; No Known Problems in her brother, father, maternal aunt, maternal grandfather, maternal grandmother, maternal uncle, paternal aunt, paternal grandfather, paternal grandmother, paternal uncle, and sister.    Social History  She reports that she has never smoked. She has never used smokeless tobacco. She reports that she does not drink alcohol and does not use drugs.    Allergies  She has No Known Allergies.    Medications  She has a current medication list which includes the following prescription(s): allergy relief (loratadine), ergocalciferol, hydrochlorothiazide, multivitamin, naproxen, pantoprazole, azelastine, famotidine, and meclizine.    Review of Systems  Neg except for HPI     Objective:     /88 (BP Location: Right arm, Patient Position: Sitting, BP Method: Medium (Automatic))   Pulse 70   Temp 98.1 °F (36.7 °C) (Temporal)      Constitutional:   Vital signs are normal. She appears well-developed and well-nourished. Normal speech.      Head:  Normocephalic and atraumatic. Salivary glands normal.      Ears:  Right ear hearing normal to normal and whispered voice; external ear normal without scars, lesions, or masses; ear canal, tympanic membrane, and middle ear normal. and left ear hearing normal to normal and whispered voice; external ear normal without scars, lesions, or masses; ear canal, tympanic membrane, and middle ear normal..   Right Ear: No drainage, swelling or tenderness. No middle ear effusion. No decreased hearing is noted.   Left Ear: No drainage, swelling or tenderness.  No  middle ear effusion. No decreased hearing is noted.     Nose:  Nose normal including turbinates, nasal mucosa, sinuses and nasal septum. Mucosal edema present. No rhinorrhea, nose lacerations, sinus tenderness, septal deviation or nasal septal hematoma. Turbinate hypertrophy.  Right sinus exhibits no maxillary sinus tenderness and no frontal sinus tenderness. Left sinus exhibits no maxillary sinus tenderness and no frontal sinus tenderness.     Mouth/Throat  Oropharynx not clear and moist, abnormal uvula midline, lips, teeth, and gums normal and oropharynx normal. She does not have dentures. Normal dentition. No uvula swelling, dental caries or oral lesions. No oropharyngeal exudate, posterior oropharyngeal edema or posterior oropharyngeal erythema. Tonsils not present.      Neck:  Neck normal without thyromegaly masses, asymmetry, normal tracheal structure, crepitus, and tenderness, thyroid normal, phonation normal, full range of motion with neck supple and no adenopathy.     Pulmonary/Chest:   Effort normal and effort and breath sounds normal.     Psychiatric:   She has a normal mood and affect. Her speech is normal and behavior is normal.     Neurological:   She has neurological normal, alert and oriented.       Procedure    Flexible laryngoscopy performed.  See procedure note.    Nasal/Nasopharyngo/Laryn/Hypopharyngoscopy Procedures    Procedure:  Diagnostic nasal, nasopharyngoscopy, laryngoscopy and hypopharyngoscopy.    Routine preparation with local 1% neosynephrine and lidocaine     NOSE:   External:  No gross deformity   Intranasal:    Mucosa:  No polyps, ulcers or lesions.    Septum:  No gross deformity.    Turbinates:  Not enlarged.    Nasopharynx:  No lesions.   Mucosa:  No lesions.   Adenoids:  Present.   Posterior Choanae:  Patent.   Eustachian Tubes:  Patent.    Oropharynx:    BOT: No lesions or edema      Larynx/hypopharynx:   Epiglottis:  No lesions, without edema.   Arytenoids: no lesions;   interarytenoid edema moderate   AE Folds:  No lesions.   Vocal cords:  No masses, symmetric movement, good approximation    Subglottis: No obvious stenosis   Hypopharynx:  No lesions.   Piriform sinus:  No pooling or lesions.   Post Cricoid:  no edema, no erythema      Data Reviewed    WBC (K/uL)   Date Value   05/04/2022 6.16     Eosinophil % (%)   Date Value   05/04/2022 1.6     Eos # (K/uL)   Date Value   05/04/2022 0.1     Platelets (K/uL)   Date Value   05/04/2022 334     Glucose (mg/dL)   Date Value   05/04/2022 91     No results found for: IGE        Assessment:     1. Laryngopharyngeal reflux (LPR)    2. Seasonal allergic rhinitis, unspecified trigger          Plan:     I had a long discussion with the patient regarding her condition and the further workup and management options.  Discussed options for GI referral verses medical management.  She elects for diet and medical management.  We reiterated diet and lifestyle changes for management of reflux and handout was given.  Cont protonix 40 mg daily and will add famotidine 40 mg x 2 months.  Cont Astelin for her allergic rhinitis. She will notify me if her symptoms return.  Will plan on GI referral if her symptoms fail to improve or if they return in the future.  She will contact me in the next 1-2 months regarding her symptoms and follow-up.  All questions answered and patient encouraged to call with any questions or concerns.

## 2022-06-01 ENCOUNTER — OFFICE VISIT (OUTPATIENT)
Dept: CARDIOLOGY | Facility: CLINIC | Age: 62
End: 2022-06-01
Payer: COMMERCIAL

## 2022-06-01 VITALS
BODY MASS INDEX: 48.82 KG/M2 | WEIGHT: 293 LBS | OXYGEN SATURATION: 98 % | SYSTOLIC BLOOD PRESSURE: 132 MMHG | DIASTOLIC BLOOD PRESSURE: 80 MMHG | HEART RATE: 74 BPM | RESPIRATION RATE: 15 BRPM | HEIGHT: 65 IN

## 2022-06-01 DIAGNOSIS — R00.2 PALPITATIONS: Primary | ICD-10-CM

## 2022-06-01 DIAGNOSIS — R94.31 ABNORMAL EKG: ICD-10-CM

## 2022-06-01 DIAGNOSIS — E66.01 MORBID OBESITY: ICD-10-CM

## 2022-06-01 DIAGNOSIS — I10 PRIMARY HYPERTENSION: ICD-10-CM

## 2022-06-01 PROCEDURE — 3008F BODY MASS INDEX DOCD: CPT | Mod: CPTII,S$GLB,, | Performed by: INTERNAL MEDICINE

## 2022-06-01 PROCEDURE — 1159F PR MEDICATION LIST DOCUMENTED IN MEDICAL RECORD: ICD-10-PCS | Mod: CPTII,S$GLB,, | Performed by: INTERNAL MEDICINE

## 2022-06-01 PROCEDURE — 99214 PR OFFICE/OUTPT VISIT, EST, LEVL IV, 30-39 MIN: ICD-10-PCS | Mod: S$GLB,,, | Performed by: INTERNAL MEDICINE

## 2022-06-01 PROCEDURE — 3044F PR MOST RECENT HEMOGLOBIN A1C LEVEL <7.0%: ICD-10-PCS | Mod: CPTII,S$GLB,, | Performed by: INTERNAL MEDICINE

## 2022-06-01 PROCEDURE — 3079F DIAST BP 80-89 MM HG: CPT | Mod: CPTII,S$GLB,, | Performed by: INTERNAL MEDICINE

## 2022-06-01 PROCEDURE — 3075F PR MOST RECENT SYSTOLIC BLOOD PRESS GE 130-139MM HG: ICD-10-PCS | Mod: CPTII,S$GLB,, | Performed by: INTERNAL MEDICINE

## 2022-06-01 PROCEDURE — 3044F HG A1C LEVEL LT 7.0%: CPT | Mod: CPTII,S$GLB,, | Performed by: INTERNAL MEDICINE

## 2022-06-01 PROCEDURE — 3079F PR MOST RECENT DIASTOLIC BLOOD PRESSURE 80-89 MM HG: ICD-10-PCS | Mod: CPTII,S$GLB,, | Performed by: INTERNAL MEDICINE

## 2022-06-01 PROCEDURE — 99214 OFFICE O/P EST MOD 30 MIN: CPT | Mod: S$GLB,,, | Performed by: INTERNAL MEDICINE

## 2022-06-01 PROCEDURE — 99999 PR PBB SHADOW E&M-EST. PATIENT-LVL III: CPT | Mod: PBBFAC,,, | Performed by: INTERNAL MEDICINE

## 2022-06-01 PROCEDURE — 3075F SYST BP GE 130 - 139MM HG: CPT | Mod: CPTII,S$GLB,, | Performed by: INTERNAL MEDICINE

## 2022-06-01 PROCEDURE — 3008F PR BODY MASS INDEX (BMI) DOCUMENTED: ICD-10-PCS | Mod: CPTII,S$GLB,, | Performed by: INTERNAL MEDICINE

## 2022-06-01 PROCEDURE — 1159F MED LIST DOCD IN RCRD: CPT | Mod: CPTII,S$GLB,, | Performed by: INTERNAL MEDICINE

## 2022-06-01 PROCEDURE — 99999 PR PBB SHADOW E&M-EST. PATIENT-LVL III: ICD-10-PCS | Mod: PBBFAC,,, | Performed by: INTERNAL MEDICINE

## 2022-06-01 NOTE — PROGRESS NOTES
CARDIOLOGY CLINIC VISIT        HISTORY OF PRESENT ILLNESS:     Bambi Carrera presents for continued care. Seen 5/4/22 for evaluation of palpitations.  Seen in family Medicine Clinic on 05/02/2022.  Complaints of palpitations at rest.  Patient states episodes since March 20th when she was diagnosed with COVID.  EKG from 05/02/2022 showed sinus rhythm with sinus arrhythmia.  Incomplete right bundle branch block.  PACs.  History of hypertension.  Blood pressure elevated today.  Patient states that her values are normal in the 130 systolic at home.  Thyroid function test from January within normal limits.  Denies any history of sleep apnea.    06/01/2022:  Echocardiogram showed normal left ventricular systolic function with estimated ejection fraction of 65%.  No pulmonary hypertension.  Holter showed average heart rate 83 beats per minute.  Very rare PVCs.  Occasional PACs.    CARDIOVASCULAR HISTORY:     None    PAST MEDICAL HISTORY:     Past Medical History:   Diagnosis Date    Borderline diabetic     Depression     Eye injury     fb os     Hypertension     Menopause     Nuclear sclerosis of both eyes 7/1/2019       PAST SURGICAL HISTORY:     Past Surgical History:   Procedure Laterality Date    COLONOSCOPY N/A 7/15/2021    Procedure: COLONOSCOPY;  Surgeon: Geetha Walton MD;  Location: [facility];  Service: Endoscopy;  Laterality: N/A;  Fully Vaccinated 3/25/21  see media tab 6/1/21, prep instr portal, and mailed -ml    LASIK Bilateral 07/15/2016       ALLERGIES AND MEDICATION:   Review of patient's allergies indicates:  No Known Allergies     Medication List          Accurate as of June 1, 2022  2:00 PM. If you have any questions, ask your nurse or doctor.            CONTINUE taking these medications    ALLERGY RELIEF (LORATADINE) 10 mg tablet  Generic drug: loratadine  TAKE 1 TABLET BY MOUTH ONCE DAILY AS NEEDED FOR ALLERGIES AND FOR RUNNY NOSE     azelastine 137 mcg (0.1 %) nasal spray  Commonly known  as: ASTELIN  1 spray (137 mcg total) by Nasal route 2 (two) times daily.     ergocalciferol 50,000 unit Cap  Commonly known as: ERGOCALCIFEROL  Take 1 capsule by mouth once a week     famotidine 40 MG tablet  Commonly known as: PEPCID  Take 1 tablet (40 mg total) by mouth every evening.     hydroCHLOROthiazide 25 MG tablet  Commonly known as: HYDRODIURIL  Take 1 tablet by mouth once daily     meclizine 25 mg tablet  Commonly known as: ANTIVERT  Take 1 tablet (25 mg total) by mouth 3 (three) times daily as needed for Dizziness (vertigo).     multivitamin per tablet  Commonly known as: THERAGRAN     naproxen 500 MG tablet  Commonly known as: NAPROSYN  Take 1 tablet (500 mg total) by mouth 2 (two) times daily as needed.     pantoprazole 40 MG tablet  Commonly known as: PROTONIX            SOCIAL HISTORY:     Social History     Socioeconomic History    Marital status: Single   Occupational History     Employer: [employer]   Tobacco Use    Smoking status: Never Smoker    Smokeless tobacco: Never Used   Substance and Sexual Activity    Alcohol use: No     Alcohol/week: 0.0 standard drinks    Drug use: No    Sexual activity: Not Currently       FAMILY HISTORY:     Family History   Problem Relation Age of Onset    Cancer Mother     No Known Problems Father     No Known Problems Sister     No Known Problems Brother     No Known Problems Maternal Aunt     No Known Problems Maternal Uncle     No Known Problems Paternal Aunt     No Known Problems Paternal Uncle     No Known Problems Maternal Grandmother     No Known Problems Maternal Grandfather     No Known Problems Paternal Grandmother     No Known Problems Paternal Grandfather     Amblyopia Neg Hx     Blindness Neg Hx     Cataracts Neg Hx     Diabetes Neg Hx     Glaucoma Neg Hx     Hypertension Neg Hx     Macular degeneration Neg Hx     Retinal detachment Neg Hx     Strabismus Neg Hx     Stroke Neg Hx     Thyroid disease Neg Hx   "      REVIEW OF SYSTEMS:   Review of Systems   Constitutional: Negative for chills, diaphoresis, fever, malaise/fatigue and weight loss.   Eyes: Negative for blurred vision and pain.   Respiratory: Negative for sputum production, shortness of breath and wheezing.    Cardiovascular: Positive for palpitations. Negative for chest pain, orthopnea, claudication, leg swelling and PND.   Gastrointestinal: Negative for abdominal pain, heartburn, nausea and vomiting.   Musculoskeletal: Negative for back pain, falls, joint pain, myalgias and neck pain.   Neurological: Negative for dizziness, speech change, focal weakness, loss of consciousness, weakness and headaches.   Endo/Heme/Allergies: Does not bruise/bleed easily.   Psychiatric/Behavioral: Negative for depression, memory loss and substance abuse. The patient is not nervous/anxious.        PHYSICAL EXAM:     Vitals:    06/01/22 1339   BP: 132/80   Pulse: 74   Resp: 15    Body mass index is 49.89 kg/m².  Weight: 136 kg (299 lb 13.2 oz)   Height: 5' 5" (165.1 cm)     Physical Exam  Constitutional:       General: She is not in acute distress.     Appearance: She is well-developed. She is obese. She is not diaphoretic.   HENT:      Head: Normocephalic.   Neck:      Vascular: No carotid bruit or JVD.   Cardiovascular:      Rate and Rhythm: Normal rate and regular rhythm.      Pulses: Normal pulses.      Heart sounds: Normal heart sounds.   Pulmonary:      Effort: Pulmonary effort is normal.      Breath sounds: Normal breath sounds.   Abdominal:      General: Bowel sounds are normal.      Palpations: Abdomen is soft.      Tenderness: There is no abdominal tenderness.   Skin:     General: Skin is warm and dry.   Neurological:      Mental Status: She is alert and oriented to person, place, and time.   Psychiatric:         Speech: Speech normal.         Behavior: Behavior normal.         Thought Content: Thought content normal.         DATA:   EKG: (personally reviewed " tracing)  2022-sinus rhythm with sinus arrhythmia.  PACs.    Laboratory:  CBC:  Recent Labs   Lab 21  2131 22  1055 22  1550   WBC 12.47 5.93 6.16   Hemoglobin 12.7 12.5 11.8 L   Hematocrit 39.7 40.6 39.0   Platelets 301 267 334       CHEMISTRIES:  Recent Labs   Lab 07/15/20  0920 22  1055 22  1550   Glucose 108 117 H 91   Sodium 138 139 140   Potassium 3.9 4.2 4.1   BUN 18 18 17   Creatinine 1.0 0.8 1.0   eGFR if African American >60.0 >60.0 >60.0   eGFR if non African American >60.0 >60.0 >60.0   Calcium 9.4 9.5 9.2       CARDIAC BIOMARKERS:        COAGS:        LIPIDS/LFTS:  Recent Labs   Lab 07/15/20  0920 22  1055 22  1550   Cholesterol 166 170  --    Triglycerides 85 83  --    HDL 63 62  --    LDL Cholesterol 86.0 91.4  --    Non-HDL Cholesterol 103 108  --    AST 11 13 13   ALT 13 12 15       Cardiovascular Testing:    Echocardiogram 2022:    · The left ventricle is normal in size with normal systolic function.  · The estimated ejection fraction is 65%.  · Normal right ventricular size with normal right ventricular systolic function.    Holter 2022:    · Sinus rhythm with heart rates varying between 57 and 129 BPM with an average of 83BPM.  · There were very rare PVCs totalling 134 and averaging 2.79 per hour. There were 9 triplets.  · There were occasional PACs totalling 737 and averaging 15.35 per hour.  · The diary was not returned.      ASSESSMENT:     1. Palpitations  2. Abnormal EKG  3. Hypertension  4. Morbid obesity    PLAN:     1. Palpitations/abnormal EK hour Holter showed no significant abnormalities.  2D echocardiogram showed normal function.  No pulmonary hypertension.  2. Hypertension:  Continue current management.  Monitor.    3. Return to clinic 9 months.           Enrique Hubbard MD, MPH, FACC, [facility]

## 2022-07-06 ENCOUNTER — OFFICE VISIT (OUTPATIENT)
Dept: OPTOMETRY | Facility: CLINIC | Age: 62
End: 2022-07-06
Payer: COMMERCIAL

## 2022-07-06 DIAGNOSIS — Z13.5 GLAUCOMA SCREENING: ICD-10-CM

## 2022-07-06 DIAGNOSIS — H04.123 DRY EYE SYNDROME, BILATERAL: ICD-10-CM

## 2022-07-06 DIAGNOSIS — H52.4 PRESBYOPIA: ICD-10-CM

## 2022-07-06 DIAGNOSIS — H25.13 NUCLEAR SCLEROSIS OF BOTH EYES: Primary | ICD-10-CM

## 2022-07-06 DIAGNOSIS — Z98.890 HX OF LASIK: ICD-10-CM

## 2022-07-06 PROCEDURE — 92014 COMPRE OPH EXAM EST PT 1/>: CPT | Mod: S$GLB,,, | Performed by: OPTOMETRIST

## 2022-07-06 PROCEDURE — 1159F MED LIST DOCD IN RCRD: CPT | Mod: CPTII,S$GLB,, | Performed by: OPTOMETRIST

## 2022-07-06 PROCEDURE — 1159F PR MEDICATION LIST DOCUMENTED IN MEDICAL RECORD: ICD-10-PCS | Mod: CPTII,S$GLB,, | Performed by: OPTOMETRIST

## 2022-07-06 PROCEDURE — 92015 DETERMINE REFRACTIVE STATE: CPT | Mod: S$GLB,,, | Performed by: OPTOMETRIST

## 2022-07-06 PROCEDURE — 3044F PR MOST RECENT HEMOGLOBIN A1C LEVEL <7.0%: ICD-10-PCS | Mod: CPTII,S$GLB,, | Performed by: OPTOMETRIST

## 2022-07-06 PROCEDURE — 92014 PR EYE EXAM, EST PATIENT,COMPREHESV: ICD-10-PCS | Mod: S$GLB,,, | Performed by: OPTOMETRIST

## 2022-07-06 PROCEDURE — 3044F HG A1C LEVEL LT 7.0%: CPT | Mod: CPTII,S$GLB,, | Performed by: OPTOMETRIST

## 2022-07-06 PROCEDURE — 99999 PR PBB SHADOW E&M-EST. PATIENT-LVL III: CPT | Mod: PBBFAC,,, | Performed by: OPTOMETRIST

## 2022-07-06 PROCEDURE — 92015 PR REFRACTION: ICD-10-PCS | Mod: S$GLB,,, | Performed by: OPTOMETRIST

## 2022-07-06 PROCEDURE — 1160F PR REVIEW ALL MEDS BY PRESCRIBER/CLIN PHARMACIST DOCUMENTED: ICD-10-PCS | Mod: CPTII,S$GLB,, | Performed by: OPTOMETRIST

## 2022-07-06 PROCEDURE — 99999 PR PBB SHADOW E&M-EST. PATIENT-LVL III: ICD-10-PCS | Mod: PBBFAC,,, | Performed by: OPTOMETRIST

## 2022-07-06 PROCEDURE — 1160F RVW MEDS BY RX/DR IN RCRD: CPT | Mod: CPTII,S$GLB,, | Performed by: OPTOMETRIST

## 2022-07-06 NOTE — PROGRESS NOTES
HPI     MODESTO: 10/6/2020/Dr. Rider    Presents today for ocular health check.  Pt reports a shadow over left eye.  Pt wear Bifocal glasses.  No f/f  Refresh gtts  Hx Dry Eye    S/P LASIK OS    Hemoglobin A1C       Date                     Value               Ref Range             Status                01/07/2022               6.0 (H)             4.0 - 5.6 %           Final                 07/15/2020               6.0 (H)             4.0 - 5.6 %           Final                  05/04/2019               6.0 (H)             4.0 - 5.6 %           Final                  Last edited by Dayana Bullock MA on 7/6/2022  7:30 AM. (History)        ROS     Positive for: Eyes (LASIK OS)    Negative for: Constitutional, Gastrointestinal, Neurological, Skin,   Genitourinary, Musculoskeletal, HENT, Endocrine, Cardiovascular,   Respiratory, Psychiatric, Allergic/Imm, Heme/Lymph    Last edited by Saad Hart, OD on 7/6/2022  7:47 AM. (History)        Assessment /Plan     For exam results, see Encounter Report.    Nuclear sclerosis of both eyes    Hx of LASIK    Dry eye syndrome, bilateral    Glaucoma screening    Presbyopia      1. ROQUE sp LASIK OS--advised inc Ats to TID  2. Cat OS>>OD--pt wishes surgery    PLAN:    Surgical consult--Dr Angel

## 2022-08-03 ENCOUNTER — OFFICE VISIT (OUTPATIENT)
Dept: FAMILY MEDICINE | Facility: CLINIC | Age: 62
End: 2022-08-03
Payer: COMMERCIAL

## 2022-08-03 VITALS
BODY MASS INDEX: 48.82 KG/M2 | HEIGHT: 65 IN | DIASTOLIC BLOOD PRESSURE: 70 MMHG | WEIGHT: 293 LBS | SYSTOLIC BLOOD PRESSURE: 123 MMHG | HEART RATE: 73 BPM | OXYGEN SATURATION: 95 % | TEMPERATURE: 98 F

## 2022-08-03 DIAGNOSIS — M62.830 MUSCLE SPASM OF BACK: Primary | ICD-10-CM

## 2022-08-03 DIAGNOSIS — E66.01 MORBID OBESITY: Chronic | ICD-10-CM

## 2022-08-03 DIAGNOSIS — E27.8 ADRENAL MASS, LEFT: ICD-10-CM

## 2022-08-03 PROCEDURE — 3074F PR MOST RECENT SYSTOLIC BLOOD PRESSURE < 130 MM HG: ICD-10-PCS | Mod: CPTII,S$GLB,, | Performed by: FAMILY MEDICINE

## 2022-08-03 PROCEDURE — 3074F SYST BP LT 130 MM HG: CPT | Mod: CPTII,S$GLB,, | Performed by: FAMILY MEDICINE

## 2022-08-03 PROCEDURE — 3008F BODY MASS INDEX DOCD: CPT | Mod: CPTII,S$GLB,, | Performed by: FAMILY MEDICINE

## 2022-08-03 PROCEDURE — 99999 PR PBB SHADOW E&M-EST. PATIENT-LVL IV: ICD-10-PCS | Mod: PBBFAC,,, | Performed by: FAMILY MEDICINE

## 2022-08-03 PROCEDURE — 3078F DIAST BP <80 MM HG: CPT | Mod: CPTII,S$GLB,, | Performed by: FAMILY MEDICINE

## 2022-08-03 PROCEDURE — 99214 PR OFFICE/OUTPT VISIT, EST, LEVL IV, 30-39 MIN: ICD-10-PCS | Mod: 25,S$GLB,, | Performed by: FAMILY MEDICINE

## 2022-08-03 PROCEDURE — 96372 PR INJECTION,THERAP/PROPH/DIAG2ST, IM OR SUBCUT: ICD-10-PCS | Mod: S$GLB,,, | Performed by: FAMILY MEDICINE

## 2022-08-03 PROCEDURE — 1159F MED LIST DOCD IN RCRD: CPT | Mod: CPTII,S$GLB,, | Performed by: FAMILY MEDICINE

## 2022-08-03 PROCEDURE — 96372 THER/PROPH/DIAG INJ SC/IM: CPT | Mod: S$GLB,,, | Performed by: FAMILY MEDICINE

## 2022-08-03 PROCEDURE — 99214 OFFICE O/P EST MOD 30 MIN: CPT | Mod: 25,S$GLB,, | Performed by: FAMILY MEDICINE

## 2022-08-03 PROCEDURE — 3044F PR MOST RECENT HEMOGLOBIN A1C LEVEL <7.0%: ICD-10-PCS | Mod: CPTII,S$GLB,, | Performed by: FAMILY MEDICINE

## 2022-08-03 PROCEDURE — 3044F HG A1C LEVEL LT 7.0%: CPT | Mod: CPTII,S$GLB,, | Performed by: FAMILY MEDICINE

## 2022-08-03 PROCEDURE — 3008F PR BODY MASS INDEX (BMI) DOCUMENTED: ICD-10-PCS | Mod: CPTII,S$GLB,, | Performed by: FAMILY MEDICINE

## 2022-08-03 PROCEDURE — 1159F PR MEDICATION LIST DOCUMENTED IN MEDICAL RECORD: ICD-10-PCS | Mod: CPTII,S$GLB,, | Performed by: FAMILY MEDICINE

## 2022-08-03 PROCEDURE — 99999 PR PBB SHADOW E&M-EST. PATIENT-LVL IV: CPT | Mod: PBBFAC,,, | Performed by: FAMILY MEDICINE

## 2022-08-03 PROCEDURE — 3078F PR MOST RECENT DIASTOLIC BLOOD PRESSURE < 80 MM HG: ICD-10-PCS | Mod: CPTII,S$GLB,, | Performed by: FAMILY MEDICINE

## 2022-08-03 RX ORDER — TIZANIDINE 4 MG/1
4 TABLET ORAL EVERY 8 HOURS PRN
Qty: 30 TABLET | Refills: 0 | Status: SHIPPED | OUTPATIENT
Start: 2022-08-03 | End: 2023-02-06

## 2022-08-03 RX ORDER — KETOROLAC TROMETHAMINE 30 MG/ML
30 INJECTION, SOLUTION INTRAMUSCULAR; INTRAVENOUS ONCE
Status: COMPLETED | OUTPATIENT
Start: 2022-08-03 | End: 2022-08-03

## 2022-08-03 RX ORDER — NAPROXEN 500 MG/1
500 TABLET ORAL 2 TIMES DAILY PRN
Qty: 60 TABLET | Refills: 0 | Status: SHIPPED | OUTPATIENT
Start: 2022-08-03 | End: 2022-12-20 | Stop reason: SDUPTHER

## 2022-08-03 RX ADMIN — KETOROLAC TROMETHAMINE 30 MG: 30 INJECTION, SOLUTION INTRAMUSCULAR; INTRAVENOUS at 01:08

## 2022-08-03 NOTE — PROGRESS NOTES
[facility]  Progress Note    SUBJECTIVE:     Chief Complaint   Patient presents with    Back Pain       HPI   Bambi Carrera  is a 62 y.o. female here for c/o lower back pain. Rates pain as moderate. No falls/trauma. Trying to lose weight but having trouble. Pain doesn't radiate. Patient has no other new complaints/problems at this time.      Review of patient's allergies indicates:  No Known Allergies    Past Medical History:   Diagnosis Date    Borderline diabetic     Depression     Eye injury     fb os     Hypertension     Menopause     Nuclear sclerosis of both eyes 7/1/2019     Past Surgical History:   Procedure Laterality Date    COLONOSCOPY N/A 7/15/2021    Procedure: COLONOSCOPY;  Surgeon: Geetha Wlaton MD;  Location: [facility];  Service: Endoscopy;  Laterality: N/A;  Fully Vaccinated 3/25/21  see media tab 6/1/21, prep instr portal, and mailed -ml    LASIK Bilateral 07/15/2016     Family History   Problem Relation Age of Onset    Cancer Mother     No Known Problems Father     No Known Problems Sister     No Known Problems Brother     No Known Problems Maternal Aunt     No Known Problems Maternal Uncle     No Known Problems Paternal Aunt     No Known Problems Paternal Uncle     No Known Problems Maternal Grandmother     No Known Problems Maternal Grandfather     No Known Problems Paternal Grandmother     No Known Problems Paternal Grandfather     Amblyopia Neg Hx     Blindness Neg Hx     Cataracts Neg Hx     Diabetes Neg Hx     Glaucoma Neg Hx     Hypertension Neg Hx     Macular degeneration Neg Hx     Retinal detachment Neg Hx     Strabismus Neg Hx     Stroke Neg Hx     Thyroid disease Neg Hx      Social History     Tobacco Use    Smoking status: Never Smoker    Smokeless tobacco: Never Used   Substance Use Topics    Alcohol use: No     Alcohol/week: 0.0 standard drinks    Drug use: No        Review of Systems   Constitutional: Negative for chills, fever  and malaise/fatigue.   Respiratory: Negative.    Cardiovascular: Negative.    Gastrointestinal: Negative for abdominal pain, constipation and diarrhea.   Musculoskeletal: Positive for back pain. Negative for falls, myalgias and neck pain.   Neurological: Negative for weakness and headaches.     OBJECTIVE:     Vitals:    08/03/22 1322   BP: 123/70   Pulse: 73   Temp: 98.2 °F (36.8 °C)     Body mass index is 50.24 kg/m².    Physical Exam  Constitutional:       General: She is in acute distress (mild).      Appearance: She is not diaphoretic.   HENT:      Head: Normocephalic and atraumatic.   Musculoskeletal:         General: Tenderness (to palpation of paraspinal muscles) present.      Cervical back: Normal range of motion and neck supple.   Skin:     General: Skin is warm.   Neurological:      Mental Status: She is alert and oriented to person, place, and time.      Cranial Nerves: No cranial nerve deficit.         Old records were reviewed. Labs and/or images were independently reviewed.    ASSESSMENT     1. Muscle spasm of back    2. Morbid obesity    3. Adrenal mass, left        PLAN:     Muscle spasm of back  -     tiZANidine (ZANAFLEX) 4 MG tablet; Take 1 tablet (4 mg total) by mouth every 8 (eight) hours as needed.  Dispense: 30 tablet; Refill: 0  -     naproxen (NAPROSYN) 500 MG tablet; Take 1 tablet (500 mg total) by mouth 2 (two) times daily as needed.  Dispense: 60 tablet; Refill: 0  -     ketorolac injection 30 mg  -     Patient counseled on good posture and stretching exercises. Continue to place ice packs on affected areas 3-4 times daily. Take medications as prescribed. Instructed patient to call MD if symptoms persist or worsen.    Morbid obesity  -     Start semaglutide (OZEMPIC) 0.25 mg or 0.5 mg(2 mg/1.5 mL) pen injector; Inject 0.25 mg into the skin every 7 days.  Dispense: 4 pen; Refill: 5  -    I have discussed the common side effects of this medication with the patient and answered all of the  questions they had at the time of this visit regarding this medication.  -     Counseled patient about healthy diet, exercise habits, and to increase physical activity.    Adrenal mass, left  -     Ambulatory referral/consult to Endocrinology; Future; Expected date: 08/10/2022  -     Refer to endo for recommendations for adrenal mass.      RTC DILMA Whitmore MD  08/03/2022 1:42 PM

## 2022-08-08 ENCOUNTER — OFFICE VISIT (OUTPATIENT)
Dept: ENDOCRINOLOGY | Facility: CLINIC | Age: 62
End: 2022-08-08
Payer: COMMERCIAL

## 2022-08-08 VITALS
TEMPERATURE: 98 F | WEIGHT: 293 LBS | HEART RATE: 73 BPM | BODY MASS INDEX: 50.41 KG/M2 | SYSTOLIC BLOOD PRESSURE: 132 MMHG | DIASTOLIC BLOOD PRESSURE: 79 MMHG

## 2022-08-08 DIAGNOSIS — R73.03 PREDIABETES: ICD-10-CM

## 2022-08-08 DIAGNOSIS — E27.8 OTHER SPECIFIED DISORDERS OF ADRENAL GLAND: ICD-10-CM

## 2022-08-08 DIAGNOSIS — E27.8 ADRENAL MASS, LEFT: Chronic | ICD-10-CM

## 2022-08-08 DIAGNOSIS — E27.8 ADRENAL INCIDENTALOMA: Primary | ICD-10-CM

## 2022-08-08 DIAGNOSIS — E66.01 CLASS 3 SEVERE OBESITY DUE TO EXCESS CALORIES WITH SERIOUS COMORBIDITY AND BODY MASS INDEX (BMI) OF 50.0 TO 59.9 IN ADULT: ICD-10-CM

## 2022-08-08 PROBLEM — E66.813 CLASS 3 SEVERE OBESITY DUE TO EXCESS CALORIES WITH SERIOUS COMORBIDITY AND BODY MASS INDEX (BMI) OF 50.0 TO 59.9 IN ADULT: Status: ACTIVE | Noted: 2022-08-08

## 2022-08-08 PROCEDURE — 99204 PR OFFICE/OUTPT VISIT, NEW, LEVL IV, 45-59 MIN: ICD-10-PCS | Mod: S$GLB,,, | Performed by: HOSPITALIST

## 2022-08-08 PROCEDURE — 99204 OFFICE O/P NEW MOD 45 MIN: CPT | Mod: S$GLB,,, | Performed by: HOSPITALIST

## 2022-08-08 PROCEDURE — 3044F HG A1C LEVEL LT 7.0%: CPT | Mod: CPTII,S$GLB,, | Performed by: HOSPITALIST

## 2022-08-08 PROCEDURE — 1159F MED LIST DOCD IN RCRD: CPT | Mod: CPTII,S$GLB,, | Performed by: HOSPITALIST

## 2022-08-08 PROCEDURE — 3044F PR MOST RECENT HEMOGLOBIN A1C LEVEL <7.0%: ICD-10-PCS | Mod: CPTII,S$GLB,, | Performed by: HOSPITALIST

## 2022-08-08 PROCEDURE — 3078F DIAST BP <80 MM HG: CPT | Mod: CPTII,S$GLB,, | Performed by: HOSPITALIST

## 2022-08-08 PROCEDURE — 3075F SYST BP GE 130 - 139MM HG: CPT | Mod: CPTII,S$GLB,, | Performed by: HOSPITALIST

## 2022-08-08 PROCEDURE — 1160F PR REVIEW ALL MEDS BY PRESCRIBER/CLIN PHARMACIST DOCUMENTED: ICD-10-PCS | Mod: CPTII,S$GLB,, | Performed by: HOSPITALIST

## 2022-08-08 PROCEDURE — 3075F PR MOST RECENT SYSTOLIC BLOOD PRESS GE 130-139MM HG: ICD-10-PCS | Mod: CPTII,S$GLB,, | Performed by: HOSPITALIST

## 2022-08-08 PROCEDURE — 3078F PR MOST RECENT DIASTOLIC BLOOD PRESSURE < 80 MM HG: ICD-10-PCS | Mod: CPTII,S$GLB,, | Performed by: HOSPITALIST

## 2022-08-08 PROCEDURE — 3008F PR BODY MASS INDEX (BMI) DOCUMENTED: ICD-10-PCS | Mod: CPTII,S$GLB,, | Performed by: HOSPITALIST

## 2022-08-08 PROCEDURE — 99999 PR PBB SHADOW E&M-EST. PATIENT-LVL IV: CPT | Mod: PBBFAC,,, | Performed by: HOSPITALIST

## 2022-08-08 PROCEDURE — 99999 PR PBB SHADOW E&M-EST. PATIENT-LVL IV: ICD-10-PCS | Mod: PBBFAC,,, | Performed by: HOSPITALIST

## 2022-08-08 PROCEDURE — 3008F BODY MASS INDEX DOCD: CPT | Mod: CPTII,S$GLB,, | Performed by: HOSPITALIST

## 2022-08-08 PROCEDURE — 1160F RVW MEDS BY RX/DR IN RCRD: CPT | Mod: CPTII,S$GLB,, | Performed by: HOSPITALIST

## 2022-08-08 PROCEDURE — 1159F PR MEDICATION LIST DOCUMENTED IN MEDICAL RECORD: ICD-10-PCS | Mod: CPTII,S$GLB,, | Performed by: HOSPITALIST

## 2022-08-08 RX ORDER — DEXAMETHASONE 1 MG/1
TABLET ORAL
Qty: 1 TABLET | Refills: 0 | Status: SHIPPED | OUTPATIENT
Start: 2022-08-08 | End: 2022-08-18

## 2022-08-08 NOTE — ASSESSMENT & PLAN NOTE
- hx of IFG (impaired fasting glucose), Prediabetes  - Noted on lab work  - lifestyle modification discussed   - continue management with PCP, Ozempic will help

## 2022-08-08 NOTE — ASSESSMENT & PLAN NOTE
- Adrenal incidentaloma- reviewed incidence, role of adrenal glands in hormonal control discussed with patient  - 2021 Images reviewed personally, discuss with patient  - Other comorbidities: essential hypertension, prediabetes, obesity  - Lab works review:  Pertinent lab work 2021: WNL metanephrine/normetanephrine  - Will pursue dedicated adrenal imaging to assess HU and washout.  2022  - Hormonal testing to include:  A1c, 1 mg overnight DST, DHEAS, raffaele/renin ratio and plasma metanephrines  - pending testing above, will lead to further workup

## 2022-08-08 NOTE — PROGRESS NOTES
Subjective:      Patient ID: Bambi Carrera is a 62 y.o. female presented to [facility] on 8/8/2022.  Chief Complaint:  Adrenal Mass      History of Present Illness: Bambi Carrera is a 62 y.o. female here for left adrenal mass     Other significant past medical history:  Hypertension, obesity, prediabetes      With regards to adrenal mass, incidental finding   - Was found to have adrenal nodule on imaging performed for abd pain  - CT Abd 6/2021: A 17 x 13 mm left adrenal mass is present.  - Not smoker  - Hx of malignancy/cancer: no  - Pt reports no abdominal discomfort  - History of hypertension: on HCTZ  - No hypertensive emergency   - No diabetes, does have prediabetes  - Obesity on Ozempic by PCP    No easy bruisability or abnormal stretch marks   Denies weight gain over time   Denies cancer history     Lab Results   Component Value Date    NORMETANEPHR 490 06/30/2021       2) Prediabetes  - longstanding history, stable A1c on recent lab work  - also on Ozempic recently started, 0.25 mg once a week    Lab Results   Component Value Date    HGBA1C 6.0 (H) 01/07/2022       Reviewed past surgical, medical, family, social history and updated as appropriate.    Review of Systems: see HPI above    Objective:   /79 (BP Location: Left arm)   Pulse 73   Temp 97.8 °F (36.6 °C) (Oral)   Wt (!) 137.4 kg (302 lb 14.4 oz)   BMI 50.41 kg/m²     Body mass index is 50.41 kg/m².  Vital signs reviewed    Physical Exam  Vitals and nursing note reviewed.   Constitutional:       Appearance: Normal appearance. She is well-developed. She is obese. She is not ill-appearing.   Neck:      Thyroid: No thyromegaly.   Cardiovascular:      Rate and Rhythm: Normal rate and regular rhythm.   Pulmonary:      Effort: Pulmonary effort is normal. No respiratory distress.      Breath sounds: Normal breath sounds.   Abdominal:      General: There is no distension.   Musculoskeletal:         General: Normal range of  motion.      Cervical back: Normal range of motion.   Neurological:      General: No focal deficit present.      Mental Status: She is alert. Mental status is at baseline.   Psychiatric:         Mood and Affect: Mood normal.         Behavior: Behavior normal.         Lab Reviewed:   Lab Results   Component Value Date    HGBA1C 6.0 (H) 01/07/2022       Lab Results   Component Value Date    CHOL 170 01/07/2022    HDL 62 01/07/2022    LDLCALC 91.4 01/07/2022    TRIG 83 01/07/2022    CHOLHDL 36.5 01/07/2022       Lab Results   Component Value Date     05/04/2022    K 4.1 05/04/2022     05/04/2022    CO2 26 05/04/2022    GLU 91 05/04/2022    BUN 17 05/04/2022    CREATININE 1.0 05/04/2022    CALCIUM 9.2 05/04/2022    PROT 7.8 05/04/2022    ALBUMIN 3.6 05/04/2022    BILITOT 0.2 05/04/2022    ALKPHOS 117 05/04/2022    AST 13 05/04/2022    ALT 15 05/04/2022    ANIONGAP 10 05/04/2022    ESTGFRAFRICA >60.0 05/04/2022    EGFRNONAA >60.0 05/04/2022    TSH 1.630 01/07/2022        Lab Results   Component Value Date    HPDYJSWR57EH 32 03/09/2017    MFVZTEQT76DM 20 (L) 09/02/2016    CALCIUM 9.2 05/04/2022    CALCIUM 9.5 01/07/2022    CALCIUM 9.4 07/15/2020    ALKPHOS 117 05/04/2022    ALKPHOS 117 01/07/2022    ALKPHOS 116 07/15/2020    TSH 1.630 01/07/2022       Assessment     1. Adrenal incidentaloma  Cortisol, 8AM    ACTH    Metanephrines, Plasma Free    Renin    Aldosterone    dexAMETHasone (DECADRON) 1 MG Tab    Comprehensive Metabolic Panel    Dexamethasone   2. Other specified disorders of adrenal gland  CT Abdomen With Without Contrast    Comprehensive Metabolic Panel   3. Adrenal mass, left     4. Prediabetes     5. Class 3 severe obesity due to excess calories with serious comorbidity and body mass index (BMI) of 50.0 to 59.9 in adult          Plan     Adrenal mass, left  - Adrenal incidentaloma- reviewed incidence, role of adrenal glands in hormonal control discussed with patient  - 2021 Images reviewed  personally, discuss with patient  - Other comorbidities: essential hypertension, prediabetes, obesity  - Lab works review:  Pertinent lab work 2021: WNL metanephrine/normetanephrine  - Will pursue dedicated adrenal imaging to assess HU and washout.  2022  - Hormonal testing to include:  A1c, 1 mg overnight DST, DHEAS, raffaele/renin ratio and plasma metanephrines  - pending testing above, will lead to further workup    Prediabetes  - hx of IFG (impaired fasting glucose), Prediabetes  - Noted on lab work  - lifestyle modification discussed   - continue management with PCP, Ozempic will help    Class 3 severe obesity due to excess calories with serious comorbidity and body mass index (BMI) of 50.0 to 59.9 in adult  - Body mass index is 50.41 kg/m².  - dietary discussion as above  - on Ozempic PCP      Advised patient to follow up with PCP for routine health maintenance care.   RTC in pending work up      Sherif Carlton M.D.  Endocrinology  [facility]  8/8/2022      Disclaimer: This note has been generated in part with the use of voice-recognition software. There may be typographical errors that have been missed during proof-reading.

## 2022-08-09 ENCOUNTER — LAB VISIT (OUTPATIENT)
Dept: LAB | Facility: HOSPITAL | Age: 62
End: 2022-08-09
Attending: HOSPITALIST
Payer: COMMERCIAL

## 2022-08-09 DIAGNOSIS — E27.8 OTHER SPECIFIED DISORDERS OF ADRENAL GLAND: ICD-10-CM

## 2022-08-09 DIAGNOSIS — E27.8 ADRENAL INCIDENTALOMA: ICD-10-CM

## 2022-08-09 LAB
ALBUMIN SERPL BCP-MCNC: 3.6 G/DL (ref 3.5–5.2)
ALP SERPL-CCNC: 116 U/L (ref 55–135)
ALT SERPL W/O P-5'-P-CCNC: 15 U/L (ref 10–44)
ANION GAP SERPL CALC-SCNC: 12 MMOL/L (ref 8–16)
AST SERPL-CCNC: 12 U/L (ref 10–40)
BILIRUB SERPL-MCNC: 0.5 MG/DL (ref 0.1–1)
BUN SERPL-MCNC: 16 MG/DL (ref 8–23)
CALCIUM SERPL-MCNC: 9.4 MG/DL (ref 8.7–10.5)
CHLORIDE SERPL-SCNC: 105 MMOL/L (ref 95–110)
CO2 SERPL-SCNC: 22 MMOL/L (ref 23–29)
CORTIS SERPL-MCNC: 1.1 UG/DL (ref 4.3–22.4)
CREAT SERPL-MCNC: 1.1 MG/DL (ref 0.5–1.4)
EST. GFR  (NO RACE VARIABLE): 56.8 ML/MIN/1.73 M^2
GLUCOSE SERPL-MCNC: 125 MG/DL (ref 70–110)
POTASSIUM SERPL-SCNC: 3.9 MMOL/L (ref 3.5–5.1)
PROT SERPL-MCNC: 8.1 G/DL (ref 6–8.4)
SODIUM SERPL-SCNC: 139 MMOL/L (ref 136–145)

## 2022-08-09 PROCEDURE — 82542 COL CHROMOTOGRAPHY QUAL/QUAN: CPT | Performed by: HOSPITALIST

## 2022-08-09 PROCEDURE — 83835 ASSAY OF METANEPHRINES: CPT | Performed by: HOSPITALIST

## 2022-08-09 PROCEDURE — 82088 ASSAY OF ALDOSTERONE: CPT | Performed by: HOSPITALIST

## 2022-08-09 PROCEDURE — 84244 ASSAY OF RENIN: CPT | Performed by: HOSPITALIST

## 2022-08-09 PROCEDURE — 80053 COMPREHEN METABOLIC PANEL: CPT | Performed by: HOSPITALIST

## 2022-08-09 PROCEDURE — 82533 TOTAL CORTISOL: CPT | Performed by: HOSPITALIST

## 2022-08-09 PROCEDURE — 36415 COLL VENOUS BLD VENIPUNCTURE: CPT | Mod: PO | Performed by: HOSPITALIST

## 2022-08-09 PROCEDURE — 82024 ASSAY OF ACTH: CPT | Performed by: HOSPITALIST

## 2022-08-12 LAB
ACTH PLAS-MCNC: 23 PG/ML (ref 0–46)
ALDOST SERPL-MCNC: 7.2 NG/DL

## 2022-08-16 LAB — RENIN PLAS-CCNC: 0.8 NG/ML/H

## 2022-08-17 ENCOUNTER — HOSPITAL ENCOUNTER (OUTPATIENT)
Dept: RADIOLOGY | Facility: HOSPITAL | Age: 62
Discharge: HOME | End: 2022-08-17
Attending: HOSPITALIST
Payer: COMMERCIAL

## 2022-08-17 DIAGNOSIS — E27.8 OTHER SPECIFIED DISORDERS OF ADRENAL GLAND: ICD-10-CM

## 2022-08-17 LAB — DEXAMETHASONE SERPL-MCNC: 268 NG/DL

## 2022-08-17 PROCEDURE — 74150 CT ABDOMEN WITHOUT CONTRAST: ICD-10-PCS | Mod: 26,,, | Performed by: RADIOLOGY

## 2022-08-17 PROCEDURE — 74150 CT ABDOMEN W/O CONTRAST: CPT | Mod: TC

## 2022-08-17 PROCEDURE — 74150 CT ABDOMEN W/O CONTRAST: CPT | Mod: 26,,, | Performed by: RADIOLOGY

## 2022-08-19 LAB
METANEPH FREE SERPL-MCNC: 33 PG/ML
METANEPHS SERPL-MCNC: 116 PG/ML
NORMETANEPH FREE SERPL-MCNC: 83 PG/ML

## 2022-08-23 ENCOUNTER — OFFICE VISIT (OUTPATIENT)
Dept: OPHTHALMOLOGY | Facility: CLINIC | Age: 62
End: 2022-08-23
Attending: OPHTHALMOLOGY
Payer: COMMERCIAL

## 2022-08-23 DIAGNOSIS — Z98.890 S/P LASIK (LASER ASSISTED IN SITU KERATOMILEUSIS): ICD-10-CM

## 2022-08-23 DIAGNOSIS — H26.9 INCIPIENT CATARACT: Primary | ICD-10-CM

## 2022-08-23 PROCEDURE — 92004 COMPRE OPH EXAM NEW PT 1/>: CPT | Mod: S$GLB,,, | Performed by: OPHTHALMOLOGY

## 2022-08-23 PROCEDURE — 1159F MED LIST DOCD IN RCRD: CPT | Mod: CPTII,S$GLB,, | Performed by: OPHTHALMOLOGY

## 2022-08-23 PROCEDURE — 3044F PR MOST RECENT HEMOGLOBIN A1C LEVEL <7.0%: ICD-10-PCS | Mod: CPTII,S$GLB,, | Performed by: OPHTHALMOLOGY

## 2022-08-23 PROCEDURE — 99999 PR PBB SHADOW E&M-EST. PATIENT-LVL III: CPT | Mod: PBBFAC,,, | Performed by: OPHTHALMOLOGY

## 2022-08-23 PROCEDURE — 99999 PR PBB SHADOW E&M-EST. PATIENT-LVL III: ICD-10-PCS | Mod: PBBFAC,,, | Performed by: OPHTHALMOLOGY

## 2022-08-23 PROCEDURE — 3044F HG A1C LEVEL LT 7.0%: CPT | Mod: CPTII,S$GLB,, | Performed by: OPHTHALMOLOGY

## 2022-08-23 PROCEDURE — 1160F PR REVIEW ALL MEDS BY PRESCRIBER/CLIN PHARMACIST DOCUMENTED: ICD-10-PCS | Mod: CPTII,S$GLB,, | Performed by: OPHTHALMOLOGY

## 2022-08-23 PROCEDURE — 1160F RVW MEDS BY RX/DR IN RCRD: CPT | Mod: CPTII,S$GLB,, | Performed by: OPHTHALMOLOGY

## 2022-08-23 PROCEDURE — 92004 PR EYE EXAM, NEW PATIENT,COMPREHESV: ICD-10-PCS | Mod: S$GLB,,, | Performed by: OPHTHALMOLOGY

## 2022-08-23 PROCEDURE — 1159F PR MEDICATION LIST DOCUMENTED IN MEDICAL RECORD: ICD-10-PCS | Mod: CPTII,S$GLB,, | Performed by: OPHTHALMOLOGY

## 2022-08-23 NOTE — PROGRESS NOTES
HPI     Patient presents today for a Cataract Evaluation. Patient notes vision as   blurry. Patient notes difficulty driving at night and has issues with   glare. Patient notes no eye pain.     Last edited by Kong Conteh on 8/23/2022  1:15 PM. (History)            Assessment /Plan     For exam results, see Encounter Report.    Incipient cataract    S/P LASIK (laser assisted in situ keratomileusis)      Hx hyperopic LASIK OU with Astigmatism OS  Mild cataract, recommend glasses for now  Cont AT's

## 2022-12-20 ENCOUNTER — OFFICE VISIT (OUTPATIENT)
Dept: FAMILY MEDICINE | Facility: CLINIC | Age: 62
End: 2022-12-20
Payer: COMMERCIAL

## 2022-12-20 VITALS
HEIGHT: 65 IN | DIASTOLIC BLOOD PRESSURE: 88 MMHG | WEIGHT: 293 LBS | HEART RATE: 71 BPM | BODY MASS INDEX: 48.82 KG/M2 | TEMPERATURE: 98 F | SYSTOLIC BLOOD PRESSURE: 138 MMHG | OXYGEN SATURATION: 99 %

## 2022-12-20 DIAGNOSIS — M17.0 OSTEOARTHRITIS OF BOTH KNEES, UNSPECIFIED OSTEOARTHRITIS TYPE: Primary | ICD-10-CM

## 2022-12-20 DIAGNOSIS — M62.830 MUSCLE SPASM OF BACK: ICD-10-CM

## 2022-12-20 DIAGNOSIS — E66.01 MORBID OBESITY: Chronic | ICD-10-CM

## 2022-12-20 DIAGNOSIS — Z23 NEED FOR IMMUNIZATION AGAINST INFLUENZA: ICD-10-CM

## 2022-12-20 PROCEDURE — 96372 PR INJECTION,THERAP/PROPH/DIAG2ST, IM OR SUBCUT: ICD-10-PCS | Mod: 59,S$GLB,, | Performed by: FAMILY MEDICINE

## 2022-12-20 PROCEDURE — 99999 PR PBB SHADOW E&M-EST. PATIENT-LVL IV: CPT | Mod: PBBFAC,,, | Performed by: FAMILY MEDICINE

## 2022-12-20 PROCEDURE — 3079F DIAST BP 80-89 MM HG: CPT | Mod: CPTII,S$GLB,, | Performed by: FAMILY MEDICINE

## 2022-12-20 PROCEDURE — 96372 THER/PROPH/DIAG INJ SC/IM: CPT | Mod: 59,S$GLB,, | Performed by: FAMILY MEDICINE

## 2022-12-20 PROCEDURE — 3044F HG A1C LEVEL LT 7.0%: CPT | Mod: CPTII,S$GLB,, | Performed by: FAMILY MEDICINE

## 2022-12-20 PROCEDURE — 1159F PR MEDICATION LIST DOCUMENTED IN MEDICAL RECORD: ICD-10-PCS | Mod: CPTII,S$GLB,, | Performed by: FAMILY MEDICINE

## 2022-12-20 PROCEDURE — 90471 IMMUNIZATION ADMIN: CPT | Mod: S$GLB,,, | Performed by: FAMILY MEDICINE

## 2022-12-20 PROCEDURE — 3044F PR MOST RECENT HEMOGLOBIN A1C LEVEL <7.0%: ICD-10-PCS | Mod: CPTII,S$GLB,, | Performed by: FAMILY MEDICINE

## 2022-12-20 PROCEDURE — 99214 OFFICE O/P EST MOD 30 MIN: CPT | Mod: 25,S$GLB,, | Performed by: FAMILY MEDICINE

## 2022-12-20 PROCEDURE — 90686 FLU VACCINE (QUAD) GREATER THAN OR EQUAL TO 3YO PRESERVATIVE FREE IM: ICD-10-PCS | Mod: S$GLB,,, | Performed by: FAMILY MEDICINE

## 2022-12-20 PROCEDURE — 90471 FLU VACCINE (QUAD) GREATER THAN OR EQUAL TO 3YO PRESERVATIVE FREE IM: ICD-10-PCS | Mod: S$GLB,,, | Performed by: FAMILY MEDICINE

## 2022-12-20 PROCEDURE — 3075F PR MOST RECENT SYSTOLIC BLOOD PRESS GE 130-139MM HG: ICD-10-PCS | Mod: CPTII,S$GLB,, | Performed by: FAMILY MEDICINE

## 2022-12-20 PROCEDURE — 1159F MED LIST DOCD IN RCRD: CPT | Mod: CPTII,S$GLB,, | Performed by: FAMILY MEDICINE

## 2022-12-20 PROCEDURE — 90686 IIV4 VACC NO PRSV 0.5 ML IM: CPT | Mod: S$GLB,,, | Performed by: FAMILY MEDICINE

## 2022-12-20 PROCEDURE — 3008F BODY MASS INDEX DOCD: CPT | Mod: CPTII,S$GLB,, | Performed by: FAMILY MEDICINE

## 2022-12-20 PROCEDURE — 3079F PR MOST RECENT DIASTOLIC BLOOD PRESSURE 80-89 MM HG: ICD-10-PCS | Mod: CPTII,S$GLB,, | Performed by: FAMILY MEDICINE

## 2022-12-20 PROCEDURE — 3075F SYST BP GE 130 - 139MM HG: CPT | Mod: CPTII,S$GLB,, | Performed by: FAMILY MEDICINE

## 2022-12-20 PROCEDURE — 99999 PR PBB SHADOW E&M-EST. PATIENT-LVL IV: ICD-10-PCS | Mod: PBBFAC,,, | Performed by: FAMILY MEDICINE

## 2022-12-20 PROCEDURE — 99214 PR OFFICE/OUTPT VISIT, EST, LEVL IV, 30-39 MIN: ICD-10-PCS | Mod: 25,S$GLB,, | Performed by: FAMILY MEDICINE

## 2022-12-20 PROCEDURE — 3008F PR BODY MASS INDEX (BMI) DOCUMENTED: ICD-10-PCS | Mod: CPTII,S$GLB,, | Performed by: FAMILY MEDICINE

## 2022-12-20 RX ORDER — NAPROXEN 500 MG/1
500 TABLET ORAL 2 TIMES DAILY PRN
Qty: 60 TABLET | Refills: 0 | Status: SHIPPED | OUTPATIENT
Start: 2022-12-20 | End: 2023-06-22

## 2022-12-20 RX ORDER — TIZANIDINE 2 MG/1
2 TABLET ORAL EVERY 8 HOURS PRN
Qty: 30 TABLET | Refills: 0 | Status: SHIPPED | OUTPATIENT
Start: 2022-12-20 | End: 2022-12-30

## 2022-12-20 RX ORDER — KETOROLAC TROMETHAMINE 30 MG/ML
60 INJECTION, SOLUTION INTRAMUSCULAR; INTRAVENOUS
Status: COMPLETED | OUTPATIENT
Start: 2022-12-20 | End: 2022-12-20

## 2022-12-20 RX ADMIN — KETOROLAC TROMETHAMINE 60 MG: 30 INJECTION, SOLUTION INTRAMUSCULAR; INTRAVENOUS at 02:12

## 2022-12-20 NOTE — PROGRESS NOTES
[facility]  Progress Note    SUBJECTIVE:     Chief Complaint   Patient presents with    Generalized Body Aches    Groin Pain    Knee Pain       HPI   Bambi Carrera  is a 62 y.o. female here for follow-up of her chronic conditions. Also has issues with low back pain and knee pain. No falls/trauma. Rates pain as moderate. Took ibuprofen which helps. Patient has no other new complaints/problems at this time.      Review of patient's allergies indicates:  No Known Allergies    Past Medical History:   Diagnosis Date    Borderline diabetic     Depression     Eye injury     fb os     Hypertension     Menopause     Nuclear sclerosis of both eyes 7/1/2019     Past Surgical History:   Procedure Laterality Date    COLONOSCOPY N/A 7/15/2021    Procedure: COLONOSCOPY;  Surgeon: Geetha Walton MD;  Location: [facility];  Service: Endoscopy;  Laterality: N/A;  Fully Vaccinated 3/25/21  see media tab 6/1/21, prep instr portal, and mailed -ml    LASIK Bilateral 07/15/2016     Family History   Problem Relation Age of Onset    Cancer Mother     No Known Problems Father     No Known Problems Sister     No Known Problems Brother     No Known Problems Maternal Aunt     No Known Problems Maternal Uncle     No Known Problems Paternal Aunt     No Known Problems Paternal Uncle     No Known Problems Maternal Grandmother     No Known Problems Maternal Grandfather     No Known Problems Paternal Grandmother     No Known Problems Paternal Grandfather     Amblyopia Neg Hx     Blindness Neg Hx     Cataracts Neg Hx     Diabetes Neg Hx     Glaucoma Neg Hx     Hypertension Neg Hx     Macular degeneration Neg Hx     Retinal detachment Neg Hx     Strabismus Neg Hx     Stroke Neg Hx     Thyroid disease Neg Hx      Social History     Tobacco Use    Smoking status: Never    Smokeless tobacco: Never   Substance Use Topics    Alcohol use: No     Alcohol/week: 0.0 standard drinks    Drug use: No        Review of Systems   Constitutional:   Negative for chills and fever.   HENT: Negative.     Respiratory: Negative.  Negative for shortness of breath.    Cardiovascular: Negative.  Negative for chest pain.   Gastrointestinal: Negative.  Negative for abdominal pain, nausea and vomiting.   Genitourinary: Negative.    Neurological:  Negative for headaches.   All other systems reviewed and are negative.  OBJECTIVE:     Vitals:    12/20/22 1400   BP: 138/88   Pulse: 71   Temp: 98 °F (36.7 °C)     Body mass index is 50.68 kg/m².    Physical Exam  Constitutional:       General: She is not in acute distress.     Appearance: She is not diaphoretic.   HENT:      Head: Normocephalic and atraumatic.      Nose: Nose normal.   Eyes:      Conjunctiva/sclera: Conjunctivae normal.   Cardiovascular:      Rate and Rhythm: Normal rate and regular rhythm.      Heart sounds: Normal heart sounds. No murmur heard.    No friction rub. No gallop.   Pulmonary:      Effort: Pulmonary effort is normal. No respiratory distress.      Breath sounds: Normal breath sounds. No wheezing or rales.   Abdominal:      Palpations: Abdomen is soft.   Skin:     General: Skin is warm.   Neurological:      Mental Status: She is alert and oriented to person, place, and time.       Old records were reviewed. Labs and/or images were independently reviewed.    ASSESSMENT     1. Osteoarthritis of both knees, unspecified osteoarthritis type    2. Muscle spasm of back    3. Morbid obesity    4. Need for immunization against influenza        PLAN:     Osteoarthritis of both knees, unspecified osteoarthritis type  -     ketorolac injection 60 mg    Muscle spasm of back  -     tiZANidine (ZANAFLEX) 2 MG tablet; Take 1 tablet (2 mg total) by mouth every 8 (eight) hours as needed (muscle spasm).  Dispense: 30 tablet; Refill: 0  -     naproxen (NAPROSYN) 500 MG tablet; Take 1 tablet (500 mg total) by mouth 2 (two) times daily as needed.  Dispense: 60 tablet; Refill: 0  -     Patient counseled on good posture and  stretching exercises. Continue to place ice packs on affected areas 3-4 times daily. Take medications as prescribed. Instructed patient to call MD if symptoms persist or worsen.    Morbid obesity  -     Increase semaglutide (OZEMPIC) 0.25 mg or 0.5 mg(2 mg/1.5 mL) pen injector; Inject 0.5 mg into the skin every 7 days.  Dispense: 4 pen; Refill: 5  -     Counseled patient about healthy diet, exercise habits, and to increase physical activity.    Need for immunization against influenza  -     Influenza - Quadrivalent (PF)      RTC PRN     Wong Whitmore MD  12/20/2022 2:27 PM

## 2022-12-20 NOTE — PROGRESS NOTES
Patient given flu vaccine via injection. 0 complaints of, tolerated well. VIS given & advised to wait in lobby 15 mins for monitoring. Verbalized understanding. Also given toradol 60mg via injection. Tolerated well.

## 2023-01-05 ENCOUNTER — TELEPHONE (OUTPATIENT)
Dept: FAMILY MEDICINE | Facility: CLINIC | Age: 63
End: 2023-01-05
Payer: COMMERCIAL

## 2023-01-05 NOTE — TELEPHONE ENCOUNTER
----- Message from Beverly Klein sent at 1/5/2023 11:44 AM CST -----  Regarding: [phone]  Type: Patient Call Back    [facility]    What is the request in detail: pharmacist states that she is requesting another diagnosis code for semaglutide (OZEMPIC) 0.25 mg or 0.5 mg(2 mg/1.5 mL) pen injector, she states that the insurance is saying bad diagnosis code for the first one.    Can the clinic reply by [facility]?no    Would the patient rather a call back or a response via [facility]? Call back    Best call back number:[phone]    Additional Information:

## 2023-01-10 ENCOUNTER — TELEPHONE (OUTPATIENT)
Dept: FAMILY MEDICINE | Facility: CLINIC | Age: 63
End: 2023-01-10
Payer: COMMERCIAL

## 2023-01-10 NOTE — TELEPHONE ENCOUNTER
----- Message from Yamilka Mariano sent at 1/10/2023 11:41 AM CST -----  Regarding: Pharmacy call  .Type:  Pharmacy Calling to Clarify an RX    Name of Caller: Jose Daniel    [facility]armacy Name: [facility]     Prescription Name: semaglutide (OZEMPIC) 0.25 mg or 0.5 mg(2 mg/1.5 mL) pen     What do they need to clarify? Diagnostic code for diabetes     Best Call Back Number:  [phone]    Additional Information:

## 2023-01-10 NOTE — TELEPHONE ENCOUNTER
Pharmacy is asking for a diagnosis code so patient can receive her ozempic. The diagnosis code  is for weight loss and not for diabetes. Please advise!

## 2023-01-10 NOTE — TELEPHONE ENCOUNTER
----- Message from Devin Lang MA sent at 1/10/2023  2:47 PM CST -----  Type: Patient Call Back    Who called:Self    What is the request in detail: pt. States the pharmacy is waiting for the office to provide a diagnosis code for her medication ..     semaglutide (OZEMPIC) 0.25 mg or 0.5 mg(2 mg/1.5 mL) pen injector    Can the clinic reply by [facility]?No    Would the patient rather a call back or a response via [facility]? yes    Best call back number: [phone] (home)

## 2023-01-12 NOTE — TELEPHONE ENCOUNTER
Spoke to patient and informed her of the diagnosis code for her ozempic. Patient insurance isn't covering medication.

## 2023-01-13 ENCOUNTER — TELEPHONE (OUTPATIENT)
Dept: FAMILY MEDICINE | Facility: CLINIC | Age: 63
End: 2023-01-13
Payer: COMMERCIAL

## 2023-01-13 NOTE — TELEPHONE ENCOUNTER
----- Message from Chrissie Lara sent at 1/13/2023 10:11 AM CST -----  Type: RX Refill Request    Who Called: anjana allen [phone]    Have you contacted your pharmacy:    Refill or New Rx:semaglutide (OZEMPIC) 0.25 mg or 0.5 mg(2 mg/1.5 mL) pen injector - call pharmacy to give diagnosis code. It has to be diabetes. Call pharmacy    RX Name and Strength:    How is the patient currently taking it? (ex. 1XDay):    Is this a 30 day or 90 day RX:    Preferred Pharmacy with phone number:    Local or Mail Order:    Ordering Provider:    Would the patient rather a call back or a response via [facility]?     Best Call Back Number:    Additional Information:

## 2023-01-17 ENCOUNTER — TELEPHONE (OUTPATIENT)
Dept: FAMILY MEDICINE | Facility: CLINIC | Age: 63
End: 2023-01-17
Payer: COMMERCIAL

## 2023-01-17 DIAGNOSIS — E66.01 MORBID OBESITY: Chronic | ICD-10-CM

## 2023-01-17 DIAGNOSIS — R73.03 PREDIABETES: ICD-10-CM

## 2023-01-17 DIAGNOSIS — E27.8 ADRENAL MASS, LEFT: Primary | Chronic | ICD-10-CM

## 2023-01-17 NOTE — TELEPHONE ENCOUNTER
----- Message from Kalpana Abreu sent at 1/17/2023 10:26 AM CST -----  .Type: Patient Call Back    Who called:Self    What is the request in detail: Code on Ozempic has changed won't refill unless she's a diabetic. If there is something else he can prescribe for her.     Can the clinic reply by [facility]? No    Would the patient rather a call back or a response via [facility]? Call    Best call back number: .[phone](home)       Additional Information:

## 2023-01-23 ENCOUNTER — OFFICE VISIT (OUTPATIENT)
Dept: CARDIOLOGY | Facility: CLINIC | Age: 63
End: 2023-01-23
Payer: COMMERCIAL

## 2023-01-23 VITALS
OXYGEN SATURATION: 96 % | DIASTOLIC BLOOD PRESSURE: 66 MMHG | SYSTOLIC BLOOD PRESSURE: 115 MMHG | HEIGHT: 65 IN | BODY MASS INDEX: 48.82 KG/M2 | WEIGHT: 293 LBS | RESPIRATION RATE: 15 BRPM | HEART RATE: 82 BPM

## 2023-01-23 DIAGNOSIS — E66.01 MORBID OBESITY: ICD-10-CM

## 2023-01-23 DIAGNOSIS — I10 PRIMARY HYPERTENSION: Chronic | ICD-10-CM

## 2023-01-23 DIAGNOSIS — R09.81 SINUS CONGESTION: ICD-10-CM

## 2023-01-23 DIAGNOSIS — R00.2 PALPITATIONS: Primary | ICD-10-CM

## 2023-01-23 PROCEDURE — 1159F PR MEDICATION LIST DOCUMENTED IN MEDICAL RECORD: ICD-10-PCS | Mod: CPTII,S$GLB,, | Performed by: INTERNAL MEDICINE

## 2023-01-23 PROCEDURE — 3078F DIAST BP <80 MM HG: CPT | Mod: CPTII,S$GLB,, | Performed by: INTERNAL MEDICINE

## 2023-01-23 PROCEDURE — 1159F MED LIST DOCD IN RCRD: CPT | Mod: CPTII,S$GLB,, | Performed by: INTERNAL MEDICINE

## 2023-01-23 PROCEDURE — 3008F PR BODY MASS INDEX (BMI) DOCUMENTED: ICD-10-PCS | Mod: CPTII,S$GLB,, | Performed by: INTERNAL MEDICINE

## 2023-01-23 PROCEDURE — 99999 PR PBB SHADOW E&M-EST. PATIENT-LVL IV: CPT | Mod: PBBFAC,,, | Performed by: INTERNAL MEDICINE

## 2023-01-23 PROCEDURE — 93000 EKG 12-LEAD: ICD-10-PCS | Mod: S$GLB,,, | Performed by: INTERNAL MEDICINE

## 2023-01-23 PROCEDURE — 99213 OFFICE O/P EST LOW 20 MIN: CPT | Mod: S$GLB,,, | Performed by: INTERNAL MEDICINE

## 2023-01-23 PROCEDURE — 99213 PR OFFICE/OUTPT VISIT, EST, LEVL III, 20-29 MIN: ICD-10-PCS | Mod: S$GLB,,, | Performed by: INTERNAL MEDICINE

## 2023-01-23 PROCEDURE — 3074F PR MOST RECENT SYSTOLIC BLOOD PRESSURE < 130 MM HG: ICD-10-PCS | Mod: CPTII,S$GLB,, | Performed by: INTERNAL MEDICINE

## 2023-01-23 PROCEDURE — 3074F SYST BP LT 130 MM HG: CPT | Mod: CPTII,S$GLB,, | Performed by: INTERNAL MEDICINE

## 2023-01-23 PROCEDURE — 3078F PR MOST RECENT DIASTOLIC BLOOD PRESSURE < 80 MM HG: ICD-10-PCS | Mod: CPTII,S$GLB,, | Performed by: INTERNAL MEDICINE

## 2023-01-23 PROCEDURE — 93000 ELECTROCARDIOGRAM COMPLETE: CPT | Mod: S$GLB,,, | Performed by: INTERNAL MEDICINE

## 2023-01-23 PROCEDURE — 3008F BODY MASS INDEX DOCD: CPT | Mod: CPTII,S$GLB,, | Performed by: INTERNAL MEDICINE

## 2023-01-23 PROCEDURE — 99999 PR PBB SHADOW E&M-EST. PATIENT-LVL IV: ICD-10-PCS | Mod: PBBFAC,,, | Performed by: INTERNAL MEDICINE

## 2023-01-23 NOTE — PROGRESS NOTES
CARDIOLOGY CLINIC VISIT        HISTORY OF PRESENT ILLNESS:     Bambi Carrera presents for continued care.     Seen 5/4/22 for evaluation of palpitations.  Seen in [facility] on 05/02/2022.  Complaints of palpitations at rest.  Patient states episodes since March 20th when she was diagnosed with COVID.  EKG from 05/02/2022 showed sinus rhythm with sinus arrhythmia.  Incomplete right bundle branch block.  PACs.  History of hypertension.  Blood pressure elevated today.  Patient states that her values are normal in the 130 systolic at home.  Thyroid function test from January within normal limits.  Denies any history of sleep apnea.    06/01/2022:  Echocardiogram showed normal left ventricular systolic function with estimated ejection fraction of 65%.  No pulmonary hypertension.  Holter showed average heart rate 83 beats per minute.  Very rare PVCs.  Occasional PACs.    01/23/2023: c/o sinus congestion. Denies palpitations. No chest pain or shortness of breath. EKG today shows NSR.    CARDIOVASCULAR HISTORY:     None    PAST MEDICAL HISTORY:     Past Medical History:   Diagnosis Date    Borderline diabetic     Depression     Eye injury     fb os     Hypertension     Menopause     Nuclear sclerosis of both eyes 7/1/2019       PAST SURGICAL HISTORY:     Past Surgical History:   Procedure Laterality Date    COLONOSCOPY N/A 7/15/2021    Procedure: COLONOSCOPY;  Surgeon: Geetha Walton MD;  Location: [facility];  Service: Endoscopy;  Laterality: N/A;  Fully Vaccinated 3/25/21  see media tab 6/1/21, prep instr portal, and mailed -ml    LASIK Bilateral 07/15/2016       ALLERGIES AND MEDICATION:   Review of patient's allergies indicates:  No Known Allergies     Medication List            Accurate as of January 23, 2023 10:28 AM. If you have any questions, ask your nurse or doctor.                CONTINUE taking these medications      azelastine 137 mcg (0.1 %) nasal spray  Commonly known as: ASTELIN  1 spray  (137 mcg total) by Nasal route 2 (two) times daily.     ergocalciferol 50,000 unit Cap  Commonly known as: ERGOCALCIFEROL  Take 1 capsule by mouth once a week     famotidine 40 MG tablet  Commonly known as: PEPCID  TAKE 1 TABLET BY MOUTH ONCE DAILY IN THE EVENING     hydroCHLOROthiazide 25 MG tablet  Commonly known as: HYDRODIURIL  Take 1 tablet by mouth once daily     loratadine 10 mg tablet  Commonly known as: ALLERGY RELIEF (LORATADINE)  Take one tablet by mouth once daily     meclizine 25 mg tablet  Commonly known as: ANTIVERT  Take 1 tablet (25 mg total) by mouth 3 (three) times daily as needed for Dizziness (vertigo).     multivitamin per tablet  Commonly known as: THERAGRAN     naproxen 500 MG tablet  Commonly known as: NAPROSYN  Take 1 tablet (500 mg total) by mouth 2 (two) times daily as needed.     pantoprazole 40 MG tablet  Commonly known as: PROTONIX  Take 1 tablet by mouth once daily     semaglutide 0.25 mg or 0.5 mg(2 mg/1.5 mL) pen injector  Commonly known as: OZEMPIC  Inject 0.5 mg into the skin every 7 days.              SOCIAL HISTORY:     Social History     Socioeconomic History    Marital status: Single   Occupational History     Employer: [employer]   Tobacco Use    Smoking status: Never    Smokeless tobacco: Never   Substance and Sexual Activity    Alcohol use: No     Alcohol/week: 0.0 standard drinks    Drug use: No    Sexual activity: Not Currently       FAMILY HISTORY:     Family History   Problem Relation Age of Onset    Cancer Mother     No Known Problems Father     No Known Problems Sister     No Known Problems Brother     No Known Problems Maternal Aunt     No Known Problems Maternal Uncle     No Known Problems Paternal Aunt     No Known Problems Paternal Uncle     No Known Problems Maternal Grandmother     No Known Problems Maternal Grandfather     No Known Problems Paternal Grandmother     No Known Problems Paternal Grandfather     Amblyopia Neg Hx     Blindness Neg Hx      "Cataracts Neg Hx     Diabetes Neg Hx     Glaucoma Neg Hx     Hypertension Neg Hx     Macular degeneration Neg Hx     Retinal detachment Neg Hx     Strabismus Neg Hx     Stroke Neg Hx     Thyroid disease Neg Hx        REVIEW OF SYSTEMS:   Review of Systems   Constitutional:  Negative for chills, diaphoresis, fever, malaise/fatigue and weight loss.   HENT:  Positive for congestion. Negative for hearing loss, sinus pain, sore throat and tinnitus.    Eyes:  Negative for blurred vision, double vision, photophobia and pain.   Respiratory:  Negative for cough, hemoptysis, sputum production, shortness of breath, wheezing and stridor.    Cardiovascular:  Negative for chest pain, palpitations, orthopnea, claudication, leg swelling and PND.   Gastrointestinal:  Negative for abdominal pain, blood in stool, heartburn, melena, nausea and vomiting.   Musculoskeletal:  Negative for back pain, falls, joint pain, myalgias and neck pain.   Neurological:  Negative for dizziness, tingling, tremors, sensory change, speech change, focal weakness, seizures, loss of consciousness, weakness and headaches.   Endo/Heme/Allergies:  Does not bruise/bleed easily.   Psychiatric/Behavioral:  Negative for depression, memory loss and substance abuse. The patient is not nervous/anxious.      PHYSICAL EXAM:     Vitals:    01/23/23 1009   BP: 115/66   Pulse: 82   Resp: 15    Body mass index is 49.47 kg/m².  Weight: 134.8 kg (297 lb 4.6 oz)   Height: 5' 5" (165.1 cm)     Physical Exam  Constitutional:       General: She is not in acute distress.     Appearance: She is well-developed. She is obese. She is not diaphoretic.   HENT:      Head: Normocephalic.   Neck:      Vascular: No carotid bruit or JVD.   Cardiovascular:      Rate and Rhythm: Normal rate and regular rhythm.      Pulses: Normal pulses.      Heart sounds: Normal heart sounds.   Pulmonary:      Effort: Pulmonary effort is normal.      Breath sounds: Normal breath sounds.   Abdominal:      " General: Bowel sounds are normal.      Palpations: Abdomen is soft.      Tenderness: There is no abdominal tenderness.   Skin:     General: Skin is warm and dry.   Neurological:      Mental Status: She is alert and oriented to person, place, and time.   Psychiatric:         Speech: Speech normal.         Behavior: Behavior normal.         Thought Content: Thought content normal.       DATA:   EKG: (personally reviewed tracing)  01/23/2023- NSR  05/02/2022-sinus rhythm with sinus arrhythmia.  PACs.    Laboratory:  CBC:  Recent Labs   Lab 06/13/21  2131 01/07/22  1055 05/04/22  1550   WBC 12.47 5.93 6.16   Hemoglobin 12.7 12.5 11.8 L   Hematocrit 39.7 40.6 39.0   Platelets 301 267 334       CHEMISTRIES:  Recent Labs   Lab 07/15/20  0920 01/07/22  1055 05/04/22  1550 08/09/22  0835   Glucose 108 117 H 91 125 H   Sodium 138 139 140 139   Potassium 3.9 4.2 4.1 3.9   BUN 18 18 17 16   Creatinine 1.0 0.8 1.0 1.1   eGFR if African American >60.0 >60.0 >60.0  --    eGFR if non African American >60.0 >60.0 >60.0  --    Calcium 9.4 9.5 9.2 9.4       CARDIAC BIOMARKERS:        COAGS:        LIPIDS/LFTS:  Recent Labs   Lab 07/15/20  0920 01/07/22  1055 05/04/22  1550 08/09/22  0835   Cholesterol 166 170  --   --    Triglycerides 85 83  --   --    HDL 63 62  --   --    LDL Cholesterol 86.0 91.4  --   --    Non-HDL Cholesterol 103 108  --   --    AST 11 13 13 12   ALT 13 12 15 15       Cardiovascular Testing:    Echocardiogram 05/20/2022:    The left ventricle is normal in size with normal systolic function.  The estimated ejection fraction is 65%.  Normal right ventricular size with normal right ventricular systolic function.    Holter 05/20/2022:    Sinus rhythm with heart rates varying between 57 and 129 BPM with an average of 83BPM.  There were very rare PVCs totalling 134 and averaging 2.79 per hour. There were 9 triplets.  There were occasional PACs totalling 737 and averaging 15.35 per hour.  The diary was not  returned.      ASSESSMENT:     Palpitations: no recurrence  Hypertension  Sinus congestion  Obesity    PLAN:     Palpitations: prior evaluation showed no significant abnormalities.  Hypertension: continue current management.  Return to clinic as needed.          Enrique Hubbard MD, MPH, FACC, [facility]

## 2023-03-20 ENCOUNTER — HOSPITAL ENCOUNTER (EMERGENCY)
Facility: HOSPITAL | Age: 63
Discharge: HOME | End: 2023-03-21
Attending: STUDENT IN AN ORGANIZED HEALTH CARE EDUCATION/TRAINING PROGRAM
Payer: COMMERCIAL

## 2023-03-20 DIAGNOSIS — M79.605 LEFT LEG PAIN: ICD-10-CM

## 2023-03-20 DIAGNOSIS — R07.9 CHEST PAIN: ICD-10-CM

## 2023-03-20 LAB
BASOPHILS # BLD AUTO: 0.04 K/UL (ref 0–0.2)
BASOPHILS NFR BLD: 0.4 % (ref 0–1.9)
DIFFERENTIAL METHOD: ABNORMAL
EOSINOPHIL # BLD AUTO: 0.1 K/UL (ref 0–0.5)
EOSINOPHIL NFR BLD: 0.7 % (ref 0–8)
ERYTHROCYTE [DISTWIDTH] IN BLOOD BY AUTOMATED COUNT: 15.6 % (ref 11.5–14.5)
HCT VFR BLD AUTO: 38.5 % (ref 37–48.5)
HGB BLD-MCNC: 12.5 G/DL (ref 12–16)
IMM GRANULOCYTES # BLD AUTO: 0.03 K/UL (ref 0–0.04)
IMM GRANULOCYTES NFR BLD AUTO: 0.3 % (ref 0–0.5)
LYMPHOCYTES # BLD AUTO: 3.6 K/UL (ref 1–4.8)
LYMPHOCYTES NFR BLD: 35 % (ref 18–48)
MCH RBC QN AUTO: 28.5 PG (ref 27–31)
MCHC RBC AUTO-ENTMCNC: 32.5 G/DL (ref 32–36)
MCV RBC AUTO: 88 FL (ref 82–98)
MONOCYTES # BLD AUTO: 0.8 K/UL (ref 0.3–1)
MONOCYTES NFR BLD: 7.6 % (ref 4–15)
NEUTROPHILS # BLD AUTO: 5.8 K/UL (ref 1.8–7.7)
NEUTROPHILS NFR BLD: 56 % (ref 38–73)
NRBC BLD-RTO: 0 /100 WBC
PLATELET # BLD AUTO: 281 K/UL (ref 150–450)
PMV BLD AUTO: 9.9 FL (ref 9.2–12.9)
RBC # BLD AUTO: 4.38 M/UL (ref 4–5.4)
WBC # BLD AUTO: 10.38 K/UL (ref 3.9–12.7)

## 2023-03-20 PROCEDURE — 83880 ASSAY OF NATRIURETIC PEPTIDE: CPT | Performed by: STUDENT IN AN ORGANIZED HEALTH CARE EDUCATION/TRAINING PROGRAM

## 2023-03-20 PROCEDURE — 84484 ASSAY OF TROPONIN QUANT: CPT | Performed by: STUDENT IN AN ORGANIZED HEALTH CARE EDUCATION/TRAINING PROGRAM

## 2023-03-20 PROCEDURE — 99285 EMERGENCY DEPT VISIT HI MDM: CPT

## 2023-03-20 PROCEDURE — 93010 ELECTROCARDIOGRAM REPORT: CPT | Mod: ,,, | Performed by: INTERNAL MEDICINE

## 2023-03-20 PROCEDURE — 25000003 PHARM REV CODE 250: Performed by: STUDENT IN AN ORGANIZED HEALTH CARE EDUCATION/TRAINING PROGRAM

## 2023-03-20 PROCEDURE — 80053 COMPREHEN METABOLIC PANEL: CPT | Performed by: STUDENT IN AN ORGANIZED HEALTH CARE EDUCATION/TRAINING PROGRAM

## 2023-03-20 PROCEDURE — 93010 EKG 12-LEAD: ICD-10-PCS | Mod: ,,, | Performed by: INTERNAL MEDICINE

## 2023-03-20 PROCEDURE — 93005 ELECTROCARDIOGRAM TRACING: CPT

## 2023-03-20 PROCEDURE — 85025 COMPLETE CBC W/AUTO DIFF WBC: CPT | Performed by: STUDENT IN AN ORGANIZED HEALTH CARE EDUCATION/TRAINING PROGRAM

## 2023-03-20 RX ORDER — ASPIRIN 325 MG
325 TABLET ORAL
Status: COMPLETED | OUTPATIENT
Start: 2023-03-20 | End: 2023-03-20

## 2023-03-20 RX ADMIN — ASPIRIN 325 MG ORAL TABLET 325 MG: 325 PILL ORAL at 11:03

## 2023-03-21 VITALS
DIASTOLIC BLOOD PRESSURE: 74 MMHG | RESPIRATION RATE: 27 BRPM | HEIGHT: 65 IN | TEMPERATURE: 98 F | WEIGHT: 280 LBS | BODY MASS INDEX: 46.65 KG/M2 | OXYGEN SATURATION: 100 % | SYSTOLIC BLOOD PRESSURE: 160 MMHG | HEART RATE: 83 BPM

## 2023-03-21 LAB
ALBUMIN SERPL BCP-MCNC: 3.7 G/DL (ref 3.5–5.2)
ALP SERPL-CCNC: 132 U/L (ref 55–135)
ALT SERPL W/O P-5'-P-CCNC: 14 U/L (ref 10–44)
ANION GAP SERPL CALC-SCNC: 11 MMOL/L (ref 8–16)
AST SERPL-CCNC: 15 U/L (ref 10–40)
BILIRUB SERPL-MCNC: 0.2 MG/DL (ref 0.1–1)
BNP SERPL-MCNC: <10 PG/ML (ref 0–99)
BUN SERPL-MCNC: 19 MG/DL (ref 8–23)
CALCIUM SERPL-MCNC: 9.3 MG/DL (ref 8.7–10.5)
CHLORIDE SERPL-SCNC: 104 MMOL/L (ref 95–110)
CO2 SERPL-SCNC: 24 MMOL/L (ref 23–29)
CREAT SERPL-MCNC: 1 MG/DL (ref 0.5–1.4)
EST. GFR  (NO RACE VARIABLE): >60 ML/MIN/1.73 M^2
GLUCOSE SERPL-MCNC: 113 MG/DL (ref 70–110)
POTASSIUM SERPL-SCNC: 3.6 MMOL/L (ref 3.5–5.1)
PROT SERPL-MCNC: 8.3 G/DL (ref 6–8.4)
SODIUM SERPL-SCNC: 139 MMOL/L (ref 136–145)
TROPONIN I SERPL DL<=0.01 NG/ML-MCNC: <0.006 NG/ML (ref 0–0.03)

## 2023-03-21 NOTE — DISCHARGE INSTRUCTIONS

## 2023-03-21 NOTE — ED TRIAGE NOTES
Bambi Carrera is a 62 y.o female with PMHx of HTN to ED c/o beneath left breast, left sided neck pain, and left posterior thigh pain x2 days.  States that pain is intermittent. Denies any current pain.

## 2023-03-21 NOTE — ED PROVIDER NOTES
"Encounter Date: 3/20/2023       History     Chief Complaint   Patient presents with    Chest Pain     Intermittent cp with neck pain and left le burning since Saturday. Denies taking aspirin        62-year-old female presents for evaluation of multiple complaints.  She had 2 days of burning left posterior leg pain around the knee radiating up the back of the hamstring, constant worse with movement associated with minimal leg swelling which has since resolved.  She is concerned she has a blood clot in the leg.  She is also had a feeling of "my neck wants to pop" but can not actually relieved that feeling.  She denies weakness or numbness in the arms.  She denies trauma to the neck.  She also had an episode of sharp left-sided chest pain tonight which was brief, nonradiating, located beneath the left breast.  She denies cough.  She denies shortness of breath.    Review of patient's allergies indicates:  No Known Allergies  Past Medical History:   Diagnosis Date    Borderline diabetic     Depression     Eye injury     fb os     Hypertension     Menopause     Nuclear sclerosis of both eyes 7/1/2019     Past Surgical History:   Procedure Laterality Date    COLONOSCOPY N/A 7/15/2021    Procedure: COLONOSCOPY;  Surgeon: Geetha Walton MD;  Location: [facility];  Service: Endoscopy;  Laterality: N/A;  Fully Vaccinated 3/25/21  see media tab 6/1/21, prep instr portal, and mailed -ml    LASIK Bilateral 07/15/2016     Family History   Problem Relation Age of Onset    Cancer Mother     No Known Problems Father     No Known Problems Sister     No Known Problems Brother     No Known Problems Maternal Aunt     No Known Problems Maternal Uncle     No Known Problems Paternal Aunt     No Known Problems Paternal Uncle     No Known Problems Maternal Grandmother     No Known Problems Maternal Grandfather     No Known Problems Paternal Grandmother     No Known Problems Paternal Grandfather     Amblyopia Neg Hx     Blindness Neg Hx     " Cataracts Neg Hx     Diabetes Neg Hx     Glaucoma Neg Hx     Hypertension Neg Hx     Macular degeneration Neg Hx     Retinal detachment Neg Hx     Strabismus Neg Hx     Stroke Neg Hx     Thyroid disease Neg Hx      Social History     Tobacco Use    Smoking status: Never    Smokeless tobacco: Never   Substance Use Topics    Alcohol use: No     Alcohol/week: 0.0 standard drinks    Drug use: No     Review of Systems   Constitutional:  Negative for activity change, appetite change, chills, fever and unexpected weight change.   HENT:  Negative for dental problem and drooling.    Eyes:  Negative for discharge and itching.   Respiratory:  Negative for cough, chest tightness, shortness of breath, wheezing and stridor.    Cardiovascular:  Positive for chest pain. Negative for palpitations and leg swelling.   Gastrointestinal:  Negative for abdominal distention, abdominal pain, diarrhea and nausea.   Genitourinary:  Negative for difficulty urinating, dysuria, frequency and urgency.   Musculoskeletal:  Positive for arthralgias. Negative for back pain, gait problem and joint swelling.   Neurological:  Negative for dizziness, syncope, numbness and headaches.   Psychiatric/Behavioral:  Negative for agitation, behavioral problems and confusion.      Physical Exam     Initial Vitals [03/20/23 2308]   BP Pulse Resp Temp SpO2   (!) 155/74 104 18 98.4 °F (36.9 °C) 97 %      MAP       --         Physical Exam    Nursing note and vitals reviewed.  Constitutional: She appears well-developed and well-nourished. She is not diaphoretic.   HENT:   Head: Normocephalic and atraumatic.   Mouth/Throat: Oropharynx is clear and moist.   Eyes: EOM are normal. Pupils are equal, round, and reactive to light. Right eye exhibits no discharge. Left eye exhibits no discharge.   Neck: No tracheal deviation present.   Normal range of motion.  Cardiovascular:  Normal rate, regular rhythm and intact distal pulses.           Pulmonary/Chest: No respiratory  distress. She has no wheezes. She exhibits no tenderness.   Abdominal: Abdomen is soft. She exhibits no distension. There is no abdominal tenderness.   Musculoskeletal:         General: No tenderness or edema. Normal range of motion.      Cervical back: Normal range of motion.     Neurological: She is alert and oriented to person, place, and time. She has normal strength. No cranial nerve deficit or sensory deficit. GCS eye subscore is 4. GCS verbal subscore is 5. GCS motor subscore is 6.   Skin: Skin is warm and dry. No rash noted.   Psychiatric: She has a normal mood and affect. Her behavior is normal. Thought content normal.       ED Course   Procedures  Labs Reviewed   CBC W/ AUTO DIFFERENTIAL - Abnormal; Notable for the following components:       Result Value    RDW 15.6 (*)     All other components within normal limits   COMPREHENSIVE METABOLIC PANEL - Abnormal; Notable for the following components:    Glucose 113 (*)     All other components within normal limits   TROPONIN I   B-TYPE NATRIURETIC PEPTIDE          Imaging Results              US Lower Extremity Veins Left (Final result)  Result time 03/21/23 01:26:08      Final result by Mauro Lizarraga MD (03/21/23 01:26:08)                   Impression:      No evidence of deep venous thrombosis in the left lower extremity.      Electronically signed by: Mauro Lizarraga  Date:    03/21/2023  Time:    01:26               Narrative:    EXAMINATION:  US LOWER EXTREMITY VEINS LEFT    CLINICAL HISTORY:  Pain in left leg    TECHNIQUE:  Duplex and color flow Doppler evaluation and graded compression of the left lower extremity veins was performed.    COMPARISON:  None    FINDINGS:  Left thigh veins: The common femoral, femoral, popliteal, upper greater saphenous, and deep femoral veins are patent and free of thrombus. The veins are normally compressible and have normal phasic flow and augmentation response.    Left calf veins: The visualized calf veins are  patent.    Contralateral CFV: The contralateral (right) common femoral vein is patent and free of thrombus.    Miscellaneous: None                                       X-Ray Chest PA And Lateral (Final result)  Result time 03/21/23 01:17:28      Final result by Pankaj Chaudhari MD (03/21/23 01:17:28)                   Impression:      No acute findings.      Electronically signed by: Pankaj Chaudhari  Date:    03/21/2023  Time:    01:17               Narrative:    EXAMINATION:  XR CHEST PA AND LATERAL    CLINICAL HISTORY:  Chest pain, unspecified    TECHNIQUE:  PA and lateral views of the chest were performed.    COMPARISON:  Left shoulder 03/04/2020    FINDINGS:  Small calcified granulomas in the left mid lung.  No focal consolidation.  No pleural fluid or pneumothorax.  Normal heart size.                                       Medications   aspirin tablet 325 mg (325 mg Oral Given 3/20/23 8856)                 ED Course as of 03/21/23 0136   e Mar 21, 2023   0132 US Lower Extremity Veins Left [BS]   0132 X-Ray Chest PA And Lateral [BS]   0133 I reviewed the CXR independently of the radiologist.     Chest x-ray was negative for acute cardiopulmonary abnormalities, infiltrates or bony abnormalities.     [BS]   0133 B-Type natriuretic peptide (BNP) [BS]   0133 Comprehensive metabolic panel(!) [BS]   0133 CBC auto differential(!) [BS]   0133 EKG shows sinus rhythm, regular rate, no acute ST elevations or depressions, normal MI, QRS and QT intervals.  EKG shows so T-wave flattening in V2 which was present on prior EKG.  In addition has indeterminate morphology and 3 AVF, unchanged from prior. [BS]      ED Course User Index  [BS] Miguel Lentz MD               62 year old patient with hx of DM presents secondary to chest pain and left leg pain, now resolved. Vitals normal. Patient well appearing and non-toxic on my exam.  Burning leg pain coincided with low back pain, now resolved.  She is no neurological  deficits.  Left leg ultrasound without evidence of DVT Doubt cellulitis or abscess given lack of redness or tenderness.  No further workup indicated at this time.    Chest pain less likely to be ACS given EKG without acute ischemic changes, lack of risk factors and history less concerning for ACS.  Troponin within normal limits.  Heart score is 2. BNP within normal limits, patient is not volume overloaded on my exam, no pleural effusions on CXR, doubt CHF. Chest xray without infiltrate, patient has no fever and no cough, lessening concern for bacterial pneumonia. Breath sounds equal, respirations unlabored, CXR w/o evidence of pneumothorax. Patient well appearing with normal vital signs, no muffled heart tones, doubt tamponade. No emesis, dyspaghia, no mediastinal air on CXR, doubt esophageal rupture. Doubt pulmonary embolus, given patient is not tachycardic, lack of risk factors for PE, no unilateral leg swelling, negative DVT study. Doubt aortic dissection given patient is well appearing with equal pulses bilaterally, no abdominal pain, no peripheral pulse deficit, no new neurological deficit and no tearing chest pain to the back. Unclear etiology of chest pain at this time, but unlikely to be life threatening. No further workup warranted at this time.      Patient felt improved with interventions and is stable for discharge with outpatient follow-up. Return precautions given for new or worsening pain, exertional chest pain, worsening shortness of breath, or other new or worsening symptoms, patient understands and agrees with plan. All questions answered.  Instructed to follow up with PCP.     Clinical Impression:   Final diagnoses:  [R07.9] Chest pain  [M79.605] Left leg pain               Miguel Lentz MD  03/21/23 0137

## 2023-03-22 ENCOUNTER — TELEPHONE (OUTPATIENT)
Dept: FAMILY MEDICINE | Facility: CLINIC | Age: 63
End: 2023-03-22
Payer: COMMERCIAL

## 2023-03-22 NOTE — TELEPHONE ENCOUNTER
Spoke with pt she states she wanted to inform provider of her recent ER visit. Pt stated she's scheduling an appt with Cardiologist and will contact us if follow up is needed.

## 2023-03-22 NOTE — TELEPHONE ENCOUNTER
----- Message from Ninoska Joel sent at 3/22/2023 11:40 AM CDT -----  Type: Patient Call Back    Who called: self     What is the request in detail: pt asking for a call back     Can the clinic reply by [facility]?    Would the patient rather a call back or a response via [facility]?     Best call back number: [phone] (home)       Additional Information:

## 2023-03-23 DIAGNOSIS — Z12.31 BREAST CANCER SCREENING BY MAMMOGRAM: Primary | ICD-10-CM

## 2023-04-18 ENCOUNTER — OFFICE VISIT (OUTPATIENT)
Dept: OBSTETRICS AND GYNECOLOGY | Facility: CLINIC | Age: 63
End: 2023-04-18
Payer: COMMERCIAL

## 2023-04-18 ENCOUNTER — TELEPHONE (OUTPATIENT)
Dept: FAMILY MEDICINE | Facility: CLINIC | Age: 63
End: 2023-04-18
Payer: COMMERCIAL

## 2023-04-18 VITALS — BODY MASS INDEX: 51.07 KG/M2 | SYSTOLIC BLOOD PRESSURE: 162 MMHG | WEIGHT: 293 LBS | DIASTOLIC BLOOD PRESSURE: 98 MMHG

## 2023-04-18 DIAGNOSIS — R87.810 CERVICAL HIGH RISK HUMAN PAPILLOMAVIRUS (HPV) DNA TEST POSITIVE: ICD-10-CM

## 2023-04-18 DIAGNOSIS — Z01.419 WELL WOMAN EXAM WITH ROUTINE GYNECOLOGICAL EXAM: Primary | ICD-10-CM

## 2023-04-18 PROCEDURE — 3077F SYST BP >= 140 MM HG: CPT | Mod: CPTII,S$GLB,, | Performed by: OBSTETRICS & GYNECOLOGY

## 2023-04-18 PROCEDURE — 3077F PR MOST RECENT SYSTOLIC BLOOD PRESSURE >= 140 MM HG: ICD-10-PCS | Mod: CPTII,S$GLB,, | Performed by: OBSTETRICS & GYNECOLOGY

## 2023-04-18 PROCEDURE — 88175 CYTOPATH C/V AUTO FLUID REDO: CPT | Performed by: OBSTETRICS & GYNECOLOGY

## 2023-04-18 PROCEDURE — 99396 PR PREVENTIVE VISIT,EST,40-64: ICD-10-PCS | Mod: S$GLB,,, | Performed by: OBSTETRICS & GYNECOLOGY

## 2023-04-18 PROCEDURE — 99999 PR PBB SHADOW E&M-EST. PATIENT-LVL III: ICD-10-PCS | Mod: PBBFAC,,, | Performed by: OBSTETRICS & GYNECOLOGY

## 2023-04-18 PROCEDURE — 87624 HPV HI-RISK TYP POOLED RSLT: CPT | Performed by: OBSTETRICS & GYNECOLOGY

## 2023-04-18 PROCEDURE — 99999 PR PBB SHADOW E&M-EST. PATIENT-LVL III: CPT | Mod: PBBFAC,,, | Performed by: OBSTETRICS & GYNECOLOGY

## 2023-04-18 PROCEDURE — 3044F HG A1C LEVEL LT 7.0%: CPT | Mod: CPTII,S$GLB,, | Performed by: OBSTETRICS & GYNECOLOGY

## 2023-04-18 PROCEDURE — 87625 HPV TYPES 16 & 18 ONLY: CPT | Mod: 59 | Performed by: OBSTETRICS & GYNECOLOGY

## 2023-04-18 PROCEDURE — 3080F DIAST BP >= 90 MM HG: CPT | Mod: CPTII,S$GLB,, | Performed by: OBSTETRICS & GYNECOLOGY

## 2023-04-18 PROCEDURE — 3044F PR MOST RECENT HEMOGLOBIN A1C LEVEL <7.0%: ICD-10-PCS | Mod: CPTII,S$GLB,, | Performed by: OBSTETRICS & GYNECOLOGY

## 2023-04-18 PROCEDURE — 3008F BODY MASS INDEX DOCD: CPT | Mod: CPTII,S$GLB,, | Performed by: OBSTETRICS & GYNECOLOGY

## 2023-04-18 PROCEDURE — 3008F PR BODY MASS INDEX (BMI) DOCUMENTED: ICD-10-PCS | Mod: CPTII,S$GLB,, | Performed by: OBSTETRICS & GYNECOLOGY

## 2023-04-18 PROCEDURE — 3080F PR MOST RECENT DIASTOLIC BLOOD PRESSURE >= 90 MM HG: ICD-10-PCS | Mod: CPTII,S$GLB,, | Performed by: OBSTETRICS & GYNECOLOGY

## 2023-04-18 PROCEDURE — 99396 PREV VISIT EST AGE 40-64: CPT | Mod: S$GLB,,, | Performed by: OBSTETRICS & GYNECOLOGY

## 2023-04-18 NOTE — TELEPHONE ENCOUNTER
----- Message from Devin Lang MA sent at 4/18/2023  9:17 AM CDT -----  Type: Patient Call Back    Who called: Self    What is the request in detail:pt. Is asking to be scheduled with her provider stating she has back & leg pain ..     Can the clinic reply by [facility]?No    Would the patient rather a call back or a response via [facility]? yes    Best call back number: [phone] (home)

## 2023-04-18 NOTE — PROGRESS NOTES
History & Physical  Gynecology      SUBJECTIVE:     Chief Complaint: Well Woman       History of Present Illness:  Annual Exam-Postmenopausal  Ms. Carrera is a 64 y/o female who presents for annual exam. The patient has no complaints today. The patient is not sexually active. GYN screening history: last pap: approximate date  and was HPV positive and last mammogram: approximate date 2022 and was normal. The patient is not taking hormone replacement therapy. Patient denies post-menopausal vaginal bleeding. The patient wears seatbelts: yes. The patient participates in regular exercise: no. Has the patient ever been transfused or tattooed?: not asked. The patient reports that there is not domestic violence in her life.      Review of patient's allergies indicates:  No Known Allergies    Past Medical History:   Diagnosis Date    Borderline diabetic     Depression     Eye injury     fb os     Hypertension     Menopause     Nuclear sclerosis of both eyes 2019     Past Surgical History:   Procedure Laterality Date    COLONOSCOPY N/A 7/15/2021    Procedure: COLONOSCOPY;  Surgeon: Geetha Walton MD;  Location: [facility];  Service: Endoscopy;  Laterality: N/A;  Fully Vaccinated 3/25/21  see media tab 21, prep instr portal, and mailed -ml    LASIK Bilateral 07/15/2016     OB History          3    Para   3    Term   3       0    AB   0    Living   3         SAB   0    IAB   0    Ectopic   0    Multiple   0    Live Births                   Family History   Problem Relation Age of Onset    Cancer Mother     No Known Problems Father     No Known Problems Sister     No Known Problems Brother     No Known Problems Maternal Aunt     No Known Problems Maternal Uncle     No Known Problems Paternal Aunt     No Known Problems Paternal Uncle     No Known Problems Maternal Grandmother     No Known Problems Maternal Grandfather     No Known Problems Paternal Grandmother     No Known Problems Paternal  Grandfather     Amblyopia Neg Hx     Blindness Neg Hx     Cataracts Neg Hx     Diabetes Neg Hx     Glaucoma Neg Hx     Hypertension Neg Hx     Macular degeneration Neg Hx     Retinal detachment Neg Hx     Strabismus Neg Hx     Stroke Neg Hx     Thyroid disease Neg Hx      Social History     Tobacco Use    Smoking status: Never    Smokeless tobacco: Never   Substance Use Topics    Alcohol use: No     Alcohol/week: 0.0 standard drinks    Drug use: No       Current Outpatient Medications   Medication Sig    ergocalciferol (ERGOCALCIFEROL) 50,000 unit Cap Take 1 capsule by mouth once a week    famotidine (PEPCID) 40 MG tablet Take 1 tablet (40 mg total) by mouth every evening.    loratadine (ALLERGY RELIEF, LORATADINE,) 10 mg tablet Take one tablet by mouth once daily    multivitamin (THERAGRAN) per tablet Take 1 tablet by mouth once daily.    naproxen (NAPROSYN) 500 MG tablet Take 1 tablet (500 mg total) by mouth 2 (two) times daily as needed.    pantoprazole (PROTONIX) 40 MG tablet Take 1 tablet by mouth once daily    tiZANidine (ZANAFLEX) 4 MG tablet TAKE 1 TABLET BY MOUTH EVERY 8 HOURS AS NEEDED    azelastine (ASTELIN) 137 mcg (0.1 %) nasal spray 1 spray (137 mcg total) by Nasal route 2 (two) times daily.    hydroCHLOROthiazide (HYDRODIURIL) 25 MG tablet Take 2 tablets (50 mg total) by mouth once daily.    ibuprofen (ADVIL,MOTRIN) 800 MG tablet Take by mouth.    meclizine (ANTIVERT) 25 mg tablet Take 1 tablet (25 mg total) by mouth 3 (three) times daily as needed for Dizziness (vertigo).    tirzepatide (MOUNJARO) 2.5 mg/0.5 mL PnIj Inject 2.5 mg into the skin every 7 days.     No current facility-administered medications for this visit.         Review of Systems:  Review of Systems   Constitutional:  Negative for chills and fever.   Eyes:  Negative for visual disturbance.   Respiratory:  Negative for cough and wheezing.    Cardiovascular:  Negative for chest pain and palpitations.   Gastrointestinal:  Negative for  abdominal pain, nausea and vomiting.   Genitourinary:  Negative for dysuria, frequency, hematuria, pelvic pain, vaginal bleeding, vaginal discharge and vaginal pain.   Neurological:  Negative for headaches.   Psychiatric/Behavioral:  Negative for depression.       OBJECTIVE:     Physical Exam:  Physical Exam  Vitals and nursing note reviewed. Exam conducted with a chaperone present.   Constitutional:       Appearance: She is well-developed.   Cardiovascular:      Rate and Rhythm: Normal rate.   Pulmonary:      Effort: Pulmonary effort is normal. No respiratory distress.   Chest:   Breasts:     Breasts are symmetrical.   Abdominal:      General: There is no distension.      Palpations: Abdomen is soft.      Tenderness: There is no abdominal tenderness.   Genitourinary:     Vagina: No vaginal discharge.   Skin:     General: Skin is warm and dry.   Neurological:      Mental Status: She is alert and oriented to person, place, and time.         ASSESSMENT:       ICD-10-CM ICD-9-CM    1. Well woman exam with routine gynecological exam  Z01.419 V72.31 CANCELED: Liquid-Based Pap Smear, Screening      CANCELED: HPV High Risk Genotypes, PCR      2. Cervical high risk human papillomavirus (HPV) DNA test positive  R87.810 795.05 CANCELED: Liquid-Based Pap Smear, Screening      CANCELED: HPV High Risk Genotypes, PCR        Plan:      Bambi was seen today for well woman.    Diagnoses and all orders for this visit:    Well woman exam with routine gynecological exam  -   Liquid-Based Pap Smear, Screening  -    HPV High Risk Genotypes, PCR  - Mammogram normal 2022, scheduled for this year  - Colonoscopy normal 2021; repeat in 7 years        Orders Placed This Encounter   Procedures    HPV DNA, High Risk with Reflex to Genotype 16,18    HPV Genotypes 16 and 18 Cervical       Follow up in about 1 year (around 4/18/2024) for Well Woman/Annual.    Counseling time: 15 minutes    Dann Tran

## 2023-04-20 ENCOUNTER — NURSE TRIAGE (OUTPATIENT)
Dept: ADMINISTRATIVE | Facility: CLINIC | Age: 63
End: 2023-04-20
Payer: COMMERCIAL

## 2023-04-20 NOTE — TELEPHONE ENCOUNTER
Appointment scheduled , patient will take meds as ordered, monitor bp, bring bp log to appt, go to [facility] ED or UC if needed

## 2023-04-20 NOTE — TELEPHONE ENCOUNTER
[facility]OC Rn  Patient states her b/p has been running high.  167/110  just took her HCTZ ,    usually take around 11:00  but it has been running high.    Been suffering  on back pain  and leg pain,  been going on for a while.Been Talking to Dr. Montiel about that,    Only wants to see her Dr. No appt today  Has her own b/p cuff, taking it to work with her.    Advised her to see dr. Today since no appts with her PCP,  she needs to go to .  UC/ED. F or any new or worsening condition.  Needs to callback [facility] RN  Number given and patient VU.  Still wants an appt. Next available is 5/5/23   Refuses UC/ED,   Still wants an appt in may,   can someone reach to patient for appt or advise.    Reason for Disposition   Patient wants to be seen    Additional Information   Negative: Sounds like a life-threatening emergency to the triager   Negative: Systolic BP >= 160 OR Diastolic >= 100, and any cardiac or neurologic symptoms (e.g., chest pain, difficulty breathing, unsteady gait, blurred vision)   Negative: Pregnant 20 or more weeks (or postpartum < 6 weeks) with new hand or face swelling   Negative: Pregnant 20 or more weeks (or postpartum < 6 weeks) and Systolic BP >= 160 OR Diastolic >= 100   Negative: Patient sounds very sick or weak to the triager   Negative: Systolic BP >= 200 OR Diastolic >= 120 and having NO cardiac or neurologic symptoms   Negative: Pregnant 20 or more weeks (or postpartum < 6 weeks) with Systolic BP >= 140 OR Diastolic >= 90   Negative: Systolic BP >= 180 OR Diastolic >= 110, and missed most recent dose of blood pressure medication   Negative: Systolic BP >= 180 OR Diastolic >= 110    Protocols used: Blood Pressure - High-A-OH

## 2023-04-21 NOTE — PROGRESS NOTES
HPI     Chief Complaint:  Chief Complaint   Patient presents with    Hypertension       Bambi Carrera is a 63 y.o. female with multiple medical diagnoses as listed in the medical history and problem list that presents for elevated blood pressure.  Pt is not known to me with her last appointment 12/20/2022.      Hypertension  This is a new problem. The current episode started more than 1 month ago. Pertinent negatives include no chest pain or shortness of breath. Past treatments include diuretics. Pt reports elevated BP for about a month. BP at home 137-154/. Pt takes HCTZ 25mg daily.           Assessment & Plan     Problem List Items Addressed This Visit          Endocrine    Morbid obesity (Chronic)  Discussed healthier diet and exercise. Pt interested in Rx to assist with weight loss. Will obtain baseline labs before Rx written.    Relevant Orders    HEMOGLOBIN A1C    Prediabetes  Will obtain labs to assess blood glucose.     Other Visit Diagnoses       Hypertension, unspecified type    -  Primary    Relevant Orders  Pt with elevated BP for about one month. Pt taking HCTZ 25mg daily. Will increase HCTZ to 50mg. Recheck BP in 2 weeks. Obtain baseline labs.    TSH    Lipid Panel    Basic Metabolic Panel    CBC Auto Differential                        --------------------------------------------      Health Maintenance:  Health Maintenance         Date Due Completion Date    COVID-19 Vaccine (4 - Booster for Pfizer series) 02/17/2022 12/23/2021    Hemoglobin A1c (Prediabetes) 01/07/2023 1/7/2022    Mammogram 03/24/2023 3/24/2022    Override on 6/10/2015: Done (normal)    Override on 6/10/2014: Done (normal)    HIV Screening 07/15/2026 (Originally 4/1/1975) ---    Cervical Cancer Screening 04/18/2026 4/18/2023    Override on 6/3/2014: Done (normal)    TETANUS VACCINE 06/08/2026 6/8/2016    Override on 4/1/2005: Done    Lipid Panel 01/07/2027 1/7/2022    Colorectal Cancer Screening 07/15/2028 7/15/2021       "      Health maintenance reviewed    Follow Up:  Follow up in about 2 weeks (around 5/8/2023), or bp nurse visit.    Exam     Review of Systems:  (as noted above)  Review of Systems   Constitutional:  Negative for fever.   HENT:  Negative for trouble swallowing.    Eyes:  Negative for visual disturbance.   Respiratory:  Negative for chest tightness and shortness of breath.    Cardiovascular:  Negative for chest pain.   Gastrointestinal:  Negative for blood in stool.     Physical Exam:   Physical Exam  Constitutional:       General: She is not in acute distress.     Appearance: She is not ill-appearing or diaphoretic.   HENT:      Head: Normocephalic and atraumatic.   Cardiovascular:      Rate and Rhythm: Normal rate and regular rhythm.      Heart sounds: No murmur heard.    No friction rub. No gallop.   Pulmonary:      Effort: No respiratory distress.   Chest:      Chest wall: No tenderness.   Musculoskeletal:      Cervical back: No rigidity.   Skin:     Capillary Refill: Capillary refill takes 2 to 3 seconds.   Neurological:      General: No focal deficit present.      Mental Status: She is alert and oriented to person, place, and time.     Vitals:    04/24/23 1122   BP: (!) 140/84   Pulse: 71   Temp: 97.6 °F (36.4 °C)   TempSrc: Oral   SpO2: 96%   Weight: (!) 138.8 kg (306 lb)   Height: 5' 5" (1.651 m)      Body mass index is 50.92 kg/m².        History     Past Medical History:  Past Medical History:   Diagnosis Date    Borderline diabetic     Depression     Eye injury     fb os     Hypertension     Menopause     Nuclear sclerosis of both eyes 7/1/2019       Past Surgical History:  Past Surgical History:   Procedure Laterality Date    COLONOSCOPY N/A 7/15/2021    Procedure: COLONOSCOPY;  Surgeon: Geetha Walton MD;  Location: [facility];  Service: Endoscopy;  Laterality: N/A;  Fully Vaccinated 3/25/21  see media tab 6/1/21, prep instr portal, and mailed -ml    LASIK Bilateral 07/15/2016       Social " History:  Social History     Socioeconomic History    Marital status: Single   Occupational History     Employer: [employer]   Tobacco Use    Smoking status: Never    Smokeless tobacco: Never   Substance and Sexual Activity    Alcohol use: No     Alcohol/week: 0.0 standard drinks    Drug use: No    Sexual activity: Not Currently       Family History:  Family History   Problem Relation Age of Onset    Cancer Mother     No Known Problems Father     No Known Problems Sister     No Known Problems Brother     No Known Problems Maternal Aunt     No Known Problems Maternal Uncle     No Known Problems Paternal Aunt     No Known Problems Paternal Uncle     No Known Problems Maternal Grandmother     No Known Problems Maternal Grandfather     No Known Problems Paternal Grandmother     No Known Problems Paternal Grandfather     Amblyopia Neg Hx     Blindness Neg Hx     Cataracts Neg Hx     Diabetes Neg Hx     Glaucoma Neg Hx     Hypertension Neg Hx     Macular degeneration Neg Hx     Retinal detachment Neg Hx     Strabismus Neg Hx     Stroke Neg Hx     Thyroid disease Neg Hx        Allergies and Medications: (updated and reviewed)  Review of patient's allergies indicates:  No Known Allergies  Current Outpatient Medications   Medication Sig Dispense Refill    ergocalciferol (ERGOCALCIFEROL) 50,000 unit Cap Take 1 capsule by mouth once a week 12 capsule 2    famotidine (PEPCID) 40 MG tablet Take 1 tablet (40 mg total) by mouth every evening. 30 tablet 11    ibuprofen (ADVIL,MOTRIN) 800 MG tablet Take by mouth.      loratadine (ALLERGY RELIEF, LORATADINE,) 10 mg tablet Take one tablet by mouth once daily 90 tablet 3    meclizine (ANTIVERT) 25 mg tablet Take 1 tablet (25 mg total) by mouth 3 (three) times daily as needed for Dizziness (vertigo). 15 tablet 0    multivitamin (THERAGRAN) per tablet Take 1 tablet by mouth once daily.      naproxen (NAPROSYN) 500 MG tablet Take 1 tablet (500 mg total) by mouth 2 (two) times daily  as needed. 60 tablet 0    pantoprazole (PROTONIX) 40 MG tablet Take 1 tablet by mouth once daily 30 tablet 11    tiZANidine (ZANAFLEX) 4 MG tablet TAKE 1 TABLET BY MOUTH EVERY 8 HOURS AS NEEDED 30 tablet 2    azelastine (ASTELIN) 137 mcg (0.1 %) nasal spray 1 spray (137 mcg total) by Nasal route 2 (two) times daily. 30 mL 3    hydroCHLOROthiazide (HYDRODIURIL) 25 MG tablet Take 2 tablets (50 mg total) by mouth once daily. 90 tablet 0    semaglutide (OZEMPIC) 0.25 mg or 0.5 mg(2 mg/1.5 mL) pen injector Inject 0.5 mg into the skin every 7 days. (Patient not taking: Reported on 4/24/2023) 4 pen 5     No current facility-administered medications for this visit.       Patient Care Team:  Wong Whitmore MD as PCP - General (Family Medicine)  Daniella Ovalle MA (Inactive) as Care Coordinator         - The patient is given an After Visit Summary that lists all medications with directions, allergies, education, orders placed during this encounter and follow-up instructions.      - I have reviewed the patient's medical information including past medical, family, and social history sections including the medications and allergies.      - We discussed the patient's current medications.     This note was created by combination of typed  and MModal dictation.  Transcription errors may be present.  If there are any questions, please contact me.        Marleny Clinton NP

## 2023-04-24 ENCOUNTER — OFFICE VISIT (OUTPATIENT)
Dept: FAMILY MEDICINE | Facility: CLINIC | Age: 63
End: 2023-04-24
Payer: COMMERCIAL

## 2023-04-24 VITALS
TEMPERATURE: 98 F | BODY MASS INDEX: 48.82 KG/M2 | WEIGHT: 293 LBS | OXYGEN SATURATION: 96 % | HEIGHT: 65 IN | SYSTOLIC BLOOD PRESSURE: 140 MMHG | HEART RATE: 71 BPM | DIASTOLIC BLOOD PRESSURE: 84 MMHG

## 2023-04-24 DIAGNOSIS — E66.01 MORBID OBESITY: ICD-10-CM

## 2023-04-24 DIAGNOSIS — R73.03 PREDIABETES: ICD-10-CM

## 2023-04-24 DIAGNOSIS — I10 ESSENTIAL HYPERTENSION: Primary | Chronic | ICD-10-CM

## 2023-04-24 PROCEDURE — 99999 PR PBB SHADOW E&M-EST. PATIENT-LVL IV: ICD-10-PCS | Mod: PBBFAC,,,

## 2023-04-24 PROCEDURE — 3077F SYST BP >= 140 MM HG: CPT | Mod: CPTII,S$GLB,,

## 2023-04-24 PROCEDURE — 99999 PR PBB SHADOW E&M-EST. PATIENT-LVL IV: CPT | Mod: PBBFAC,,,

## 2023-04-24 PROCEDURE — 3077F PR MOST RECENT SYSTOLIC BLOOD PRESSURE >= 140 MM HG: ICD-10-PCS | Mod: CPTII,S$GLB,,

## 2023-04-24 PROCEDURE — 3008F PR BODY MASS INDEX (BMI) DOCUMENTED: ICD-10-PCS | Mod: CPTII,S$GLB,,

## 2023-04-24 PROCEDURE — 3079F PR MOST RECENT DIASTOLIC BLOOD PRESSURE 80-89 MM HG: ICD-10-PCS | Mod: CPTII,S$GLB,,

## 2023-04-24 PROCEDURE — 99214 PR OFFICE/OUTPT VISIT, EST, LEVL IV, 30-39 MIN: ICD-10-PCS | Mod: S$GLB,,,

## 2023-04-24 PROCEDURE — 3008F BODY MASS INDEX DOCD: CPT | Mod: CPTII,S$GLB,,

## 2023-04-24 PROCEDURE — 3079F DIAST BP 80-89 MM HG: CPT | Mod: CPTII,S$GLB,,

## 2023-04-24 PROCEDURE — 99214 OFFICE O/P EST MOD 30 MIN: CPT | Mod: S$GLB,,,

## 2023-04-24 PROCEDURE — 1159F MED LIST DOCD IN RCRD: CPT | Mod: CPTII,S$GLB,,

## 2023-04-24 PROCEDURE — 1159F PR MEDICATION LIST DOCUMENTED IN MEDICAL RECORD: ICD-10-PCS | Mod: CPTII,S$GLB,,

## 2023-04-24 RX ORDER — IBUPROFEN 800 MG/1
TABLET ORAL
COMMUNITY
Start: 2023-02-21 | End: 2023-06-22

## 2023-04-24 RX ORDER — HYDROCHLOROTHIAZIDE 25 MG/1
50 TABLET ORAL DAILY
Qty: 90 TABLET | Refills: 0 | Status: SHIPPED | OUTPATIENT
Start: 2023-04-24 | End: 2023-09-11

## 2023-04-24 NOTE — PATIENT INSTRUCTIONS
Medical Fitness--[phone]  Imaging, Xray, CT, MRI, Ultrasound---[phone]  Bariatrics---[phone]  Breast Surgery---[phone]  Case Management---[phone]  Colonoscopy---[phone]  DME---[phone]  Infectious Disease---[phone]  Interventional Radiology---[phone]  Medical Records---[phone]  [facility] On [facility]---[phone]  Optometry/Ophthalmology---[phone]  [facility] Bar---[phone]  Physical Therapy---[phone]  Psychiatry---[phone] or [phone]  Plastic Surgery---[phone]  Recovery--[phone] option 2, or [phone].  Sleep Study---[phone]  Smoking Cessation---[phone]  Wound Care---[phone]  Referral Desk---[phone]

## 2023-04-25 ENCOUNTER — LAB VISIT (OUTPATIENT)
Dept: LAB | Facility: HOSPITAL | Age: 63
End: 2023-04-25
Attending: FAMILY MEDICINE
Payer: COMMERCIAL

## 2023-04-25 DIAGNOSIS — I10 ESSENTIAL HYPERTENSION: Chronic | ICD-10-CM

## 2023-04-25 DIAGNOSIS — E66.01 MORBID OBESITY: ICD-10-CM

## 2023-04-25 LAB
ANION GAP SERPL CALC-SCNC: 13 MMOL/L (ref 8–16)
BASOPHILS # BLD AUTO: 0.04 K/UL (ref 0–0.2)
BASOPHILS NFR BLD: 0.6 % (ref 0–1.9)
BUN SERPL-MCNC: 22 MG/DL (ref 8–23)
CALCIUM SERPL-MCNC: 9 MG/DL (ref 8.7–10.5)
CHLORIDE SERPL-SCNC: 104 MMOL/L (ref 95–110)
CHOLEST SERPL-MCNC: 169 MG/DL (ref 120–199)
CHOLEST/HDLC SERPL: 2.8 {RATIO} (ref 2–5)
CLINICAL INFO: NORMAL
CO2 SERPL-SCNC: 24 MMOL/L (ref 23–29)
CREAT SERPL-MCNC: 1.1 MG/DL (ref 0.5–1.4)
CYTO CVX: NORMAL
CYTOLOGIST CVX/VAG CYTO: NORMAL
CYTOLOGIST CVX/VAG CYTO: NORMAL
CYTOLOGY CMNT CVX/VAG CYTO-IMP: NORMAL
CYTOLOGY PAP THIN PREP EXPLANATION: NORMAL
DATE OF PREVIOUS PAP: NORMAL
DATE PREVIOUS BX: NO
DIFFERENTIAL METHOD: ABNORMAL
EOSINOPHIL # BLD AUTO: 0.1 K/UL (ref 0–0.5)
EOSINOPHIL NFR BLD: 2 % (ref 0–8)
ERYTHROCYTE [DISTWIDTH] IN BLOOD BY AUTOMATED COUNT: 15.9 % (ref 11.5–14.5)
EST. GFR  (NO RACE VARIABLE): 56.5 ML/MIN/1.73 M^2
ESTIMATED AVG GLUCOSE: 126 MG/DL (ref 68–131)
GEN CATEG CVX/VAG CYTO-IMP: NORMAL
GLUCOSE SERPL-MCNC: 108 MG/DL (ref 70–110)
HBA1C MFR BLD: 6 % (ref 4–5.6)
HCT VFR BLD AUTO: 37.4 % (ref 37–48.5)
HDLC SERPL-MCNC: 61 MG/DL (ref 40–75)
HDLC SERPL: 36.1 % (ref 20–50)
HGB BLD-MCNC: 11.6 G/DL (ref 12–16)
HPV I/H RISK 4 DNA CVX QL NAA+PROBE: DETECTED
HPV16 DNA CVX QL PROBE+SIG AMP: NORMAL
HPV18 DNA CVX QL PROBE+SIG AMP: NORMAL
IMM GRANULOCYTES # BLD AUTO: 0.01 K/UL (ref 0–0.04)
IMM GRANULOCYTES NFR BLD AUTO: 0.2 % (ref 0–0.5)
LDLC SERPL CALC-MCNC: 90 MG/DL (ref 63–159)
LMP START DATE: NORMAL
LYMPHOCYTES # BLD AUTO: 3.3 K/UL (ref 1–4.8)
LYMPHOCYTES NFR BLD: 50.7 % (ref 18–48)
MCH RBC QN AUTO: 28 PG (ref 27–31)
MCHC RBC AUTO-ENTMCNC: 31 G/DL (ref 32–36)
MCV RBC AUTO: 90 FL (ref 82–98)
MICROORGANISM CVX/VAG CYTO: NORMAL
MONOCYTES # BLD AUTO: 0.6 K/UL (ref 0.3–1)
MONOCYTES NFR BLD: 9.6 % (ref 4–15)
NEUTROPHILS # BLD AUTO: 2.4 K/UL (ref 1.8–7.7)
NEUTROPHILS NFR BLD: 36.9 % (ref 38–73)
NONHDLC SERPL-MCNC: 108 MG/DL
NRBC BLD-RTO: 0 /100 WBC
PATHOLOGIST CVX/VAG CYTO: NORMAL
PLATELET # BLD AUTO: 277 K/UL (ref 150–450)
PMV BLD AUTO: 10.8 FL (ref 9.2–12.9)
POTASSIUM SERPL-SCNC: 3.7 MMOL/L (ref 3.5–5.1)
RBC # BLD AUTO: 4.14 M/UL (ref 4–5.4)
SERVICE CMNT-IMP: NORMAL
SODIUM SERPL-SCNC: 141 MMOL/L (ref 136–145)
SPECIMEN SOURCE CVX/VAG CYTO: NORMAL
STAT OF ADQ CVX/VAG CYTO-IMP: NORMAL
TRIGL SERPL-MCNC: 90 MG/DL (ref 30–150)
TSH SERPL DL<=0.005 MIU/L-ACNC: 2.96 UIU/ML (ref 0.4–4)
WBC # BLD AUTO: 6.45 K/UL (ref 3.9–12.7)

## 2023-04-25 PROCEDURE — 80048 BASIC METABOLIC PNL TOTAL CA: CPT

## 2023-04-25 PROCEDURE — 85025 COMPLETE CBC W/AUTO DIFF WBC: CPT

## 2023-04-25 PROCEDURE — 80061 LIPID PANEL: CPT

## 2023-04-25 PROCEDURE — 84443 ASSAY THYROID STIM HORMONE: CPT

## 2023-04-25 PROCEDURE — 83036 HEMOGLOBIN GLYCOSYLATED A1C: CPT

## 2023-04-25 PROCEDURE — 36415 COLL VENOUS BLD VENIPUNCTURE: CPT | Mod: PO

## 2023-04-26 ENCOUNTER — TELEPHONE (OUTPATIENT)
Dept: FAMILY MEDICINE | Facility: CLINIC | Age: 63
End: 2023-04-26
Payer: COMMERCIAL

## 2023-04-26 DIAGNOSIS — E66.01 MORBID OBESITY: Primary | ICD-10-CM

## 2023-04-26 DIAGNOSIS — R73.03 PREDIABETES: Primary | ICD-10-CM

## 2023-04-26 DIAGNOSIS — E66.01 MORBID OBESITY: ICD-10-CM

## 2023-04-26 RX ORDER — TIRZEPATIDE 2.5 MG/.5ML
2.5 INJECTION, SOLUTION SUBCUTANEOUS
Qty: 4 PEN | Refills: 0 | Status: SHIPPED | OUTPATIENT
Start: 2023-04-26 | End: 2023-04-26

## 2023-04-26 RX ORDER — TIRZEPATIDE 2.5 MG/.5ML
2.5 INJECTION, SOLUTION SUBCUTANEOUS
Qty: 4 PEN | Refills: 0 | Status: SHIPPED | OUTPATIENT
Start: 2023-04-26 | End: 2024-02-20

## 2023-04-26 NOTE — TELEPHONE ENCOUNTER
----- Message from Chanell Palafox sent at 4/26/2023  2:30 PM CDT -----  Type:  Pharmacy Calling to Clarify an RX    Name of Call[facility]: [facility]    [facility]    Prescription Name: tirzepatide (MOUNJARO) 2.5 mg/0.5 mL PnIj    What do they need to clarify? Diagnostics code      Can you be contacted via [facility]? call    Best Call Back Number: [phone]

## 2023-05-02 ENCOUNTER — HOSPITAL ENCOUNTER (OUTPATIENT)
Dept: RADIOLOGY | Facility: HOSPITAL | Age: 63
Discharge: HOME | End: 2023-05-02
Attending: OBSTETRICS & GYNECOLOGY
Payer: COMMERCIAL

## 2023-05-02 DIAGNOSIS — Z12.31 BREAST CANCER SCREENING BY MAMMOGRAM: ICD-10-CM

## 2023-05-02 PROCEDURE — 77067 SCR MAMMO BI INCL CAD: CPT | Mod: 26,,, | Performed by: RADIOLOGY

## 2023-05-02 PROCEDURE — 77067 SCR MAMMO BI INCL CAD: CPT | Mod: TC

## 2023-05-02 PROCEDURE — 77067 MAMMO DIGITAL SCREENING BILAT WITH TOMO: ICD-10-PCS | Mod: 26,,, | Performed by: RADIOLOGY

## 2023-05-02 PROCEDURE — 77063 MAMMO DIGITAL SCREENING BILAT WITH TOMO: ICD-10-PCS | Mod: 26,,, | Performed by: RADIOLOGY

## 2023-05-02 PROCEDURE — 77063 BREAST TOMOSYNTHESIS BI: CPT | Mod: 26,,, | Performed by: RADIOLOGY

## 2023-05-05 ENCOUNTER — TELEPHONE (OUTPATIENT)
Dept: FAMILY MEDICINE | Facility: CLINIC | Age: 63
End: 2023-05-05
Payer: COMMERCIAL

## 2023-05-05 DIAGNOSIS — E66.01 MORBID OBESITY: Primary | Chronic | ICD-10-CM

## 2023-05-05 NOTE — TELEPHONE ENCOUNTER
Pt states her mounjaro wasn't covered and was wondering if there is any other medication that could be called in for weight loss; Please advise

## 2023-05-05 NOTE — TELEPHONE ENCOUNTER
----- Message from Jr Bradford sent at 5/5/2023  1:09 PM CDT -----  Regarding: Prescription  Contact: SOPHIE RAMON [[id number]]  Name of Who is Calling: SOPHIE RAMON [[id number]]      What is the request in detail: Would like to speak with staff in regards to medication that was suppose to be called in. Please advise      Can the clinic reply by [facility]: no      What Number to Call Back if not in [facility]: [phone]

## 2023-05-08 ENCOUNTER — TELEPHONE (OUTPATIENT)
Dept: OBSTETRICS AND GYNECOLOGY | Facility: CLINIC | Age: 63
End: 2023-05-08
Payer: COMMERCIAL

## 2023-05-08 NOTE — TELEPHONE ENCOUNTER
Spoke with patient and scheduled Colposcopy on 06/12 at 1045am with Dr. Angel. ANI.    ----- Message from Dann Tran MD sent at 5/4/2023 10:55 AM CDT -----  Needs colpo

## 2023-05-09 NOTE — TELEPHONE ENCOUNTER
"Informed pt of note below from trupti gee, "Please inform the patient that as far as the GLP1 medication class is concerned Monjouro is the only one approved for weight loss. However if she is interested we can place a referral for bariatric medicine. This team has alternative treatment options which the patient may be interested in, and which are covered by her insurance"    Pt states she would like referral to be placed   "

## 2023-05-17 ENCOUNTER — TELEPHONE (OUTPATIENT)
Dept: BARIATRICS | Facility: CLINIC | Age: 63
End: 2023-05-17
Payer: COMMERCIAL

## 2023-05-17 NOTE — TELEPHONE ENCOUNTER
----- Message from Deb Vences sent at 5/17/2023  1:04 PM CDT -----  Regarding: referral appoinment request  Name of Who is Calling:SOPHIE RAMON [[id number]]          What is the request in detail: pt would like to make a appointment           Can the clinic reply by [facility]: no           What [facility]: [phone] (home)

## 2023-05-17 NOTE — TELEPHONE ENCOUNTER
Assist with scheduling a FC appointment for medical weight loss. Patient understands date and time.

## 2023-05-19 ENCOUNTER — TELEPHONE (OUTPATIENT)
Dept: FAMILY MEDICINE | Facility: CLINIC | Age: 63
End: 2023-05-19
Payer: COMMERCIAL

## 2023-05-19 NOTE — TELEPHONE ENCOUNTER
Spoke with Pharmacist Dolores at [facility]  informed that refill requested has been faxed over and over for few days. Stated that fax is up and working confirmed fax numbers with Pharmacist. Requesting ok to refill hydrochlorothiazide 25  changed to hydrochlorothiazide 50 mg 1 qd  with 2 refills . Okayed per NP Marleny Clinton.

## 2023-06-12 ENCOUNTER — PROCEDURE VISIT (OUTPATIENT)
Dept: OBSTETRICS AND GYNECOLOGY | Facility: CLINIC | Age: 63
End: 2023-06-12
Payer: COMMERCIAL

## 2023-06-12 VITALS — SYSTOLIC BLOOD PRESSURE: 130 MMHG | DIASTOLIC BLOOD PRESSURE: 80 MMHG

## 2023-06-12 DIAGNOSIS — N72 HIGH RISK HUMAN PAPILLOMA VIRUS (HPV) INFECTION OF CERVIX: Primary | ICD-10-CM

## 2023-06-12 DIAGNOSIS — B97.7 HIGH RISK HUMAN PAPILLOMA VIRUS (HPV) INFECTION OF CERVIX: Primary | ICD-10-CM

## 2023-06-12 PROCEDURE — 88305 TISSUE EXAM BY PATHOLOGIST: CPT | Mod: 26,,, | Performed by: PATHOLOGY

## 2023-06-12 PROCEDURE — 88305 TISSUE EXAM BY PATHOLOGIST: ICD-10-PCS | Mod: 26,,, | Performed by: PATHOLOGY

## 2023-06-12 PROCEDURE — 88305 TISSUE EXAM BY PATHOLOGIST: CPT | Mod: 59 | Performed by: PATHOLOGY

## 2023-06-12 PROCEDURE — 57454 COLPOSCOPY: ICD-10-PCS | Mod: S$GLB,,, | Performed by: OBSTETRICS & GYNECOLOGY

## 2023-06-12 PROCEDURE — 57454 BX/CURETT OF CERVIX W/SCOPE: CPT | Mod: S$GLB,,, | Performed by: OBSTETRICS & GYNECOLOGY

## 2023-06-12 NOTE — PROCEDURES
Colposcopy    Date/Time: 6/12/2023 10:45 AM  Performed by: Dann Tran MD  Authorized by: Dann Tran MD     Consent Done?:  Yes (Written)  Timeout:Immediately prior to procedure a time out was called to verify the correct patient, procedure, equipment, support staff and site/side marked as required  Prep:Patient was prepped and draped in the usual sterile fashion  Assistants?: No      Colposcopy Site:  Cervix  Position:  Supine   Patient was prepped and draped in the normal sterile fashion.  Acrowhite Lesion: No    Atypical Vessels: No    Transformation Zone Adequate?: Yes    Biopsy?: Yes         Location:  Cervix ((12 00 and 4 00))  ECC Performed?: Yes    LEEP Performed?: No     Patient tolerated the procedure well with no immediate complications.   Post-operative instructions were provided for the patient.   Patient was discharged and will follow up if any complications occur

## 2023-06-12 NOTE — PROCEDURES
Bambijose g Carrera is a 63 y.o. female patient.    BP: 130/80 (06/12/23 1039)  Weight:  (patient declines) (06/12/23 1039)       Procedures    6/12/2023

## 2023-06-13 NOTE — LETTER
May 28, 2018      Natalia Martin MD  [address] [facility]  [place] [address]           [facility]  [address] [facility] [address]  [place] [address]  Phone: [phone]  Fax: [phone]          Patient: Bambi Carrera   MR Number: [id number]   YOB: 1960   Date of Visit: 5/28/2018       Dear Dr. Natalia Martin:    Thank you for referring Bambi Carrera to me for evaluation. Attached you will find relevant portions of my assessment and plan of care.    If you have questions, please do not hesitate to call me. I look forward to following Bambi Carrera along with you.    Sincerely,    Tom Gorman MD    Enclosure  CC:  No Recipients    If you would like to receive this communication electronically, please contact [email or web address] or [phone] to request more information on [facility] access.    For providers and/or their staff who would like to refer a patient to [facility], please contact us through our one-stop-shop provider referral line, [facility], at [phone].    If you feel you have received this communication in error or would no longer like to receive these types of communications, please e-mail [email or web address]          Yes

## 2023-06-14 ENCOUNTER — TELEPHONE (OUTPATIENT)
Dept: OBSTETRICS AND GYNECOLOGY | Facility: HOSPITAL | Age: 63
End: 2023-06-14
Payer: COMMERCIAL

## 2023-06-14 LAB
FINAL PATHOLOGIC DIAGNOSIS: NORMAL
GROSS: NORMAL
Lab: NORMAL

## 2023-06-21 ENCOUNTER — PATIENT MESSAGE (OUTPATIENT)
Dept: ADMINISTRATIVE | Facility: HOSPITAL | Age: 63
End: 2023-06-21
Payer: COMMERCIAL

## 2023-06-22 ENCOUNTER — TELEPHONE (OUTPATIENT)
Dept: FAMILY MEDICINE | Facility: CLINIC | Age: 63
End: 2023-06-22

## 2023-06-22 ENCOUNTER — OFFICE VISIT (OUTPATIENT)
Dept: URGENT CARE | Facility: CLINIC | Age: 63
End: 2023-06-22
Payer: COMMERCIAL

## 2023-06-22 VITALS
HEART RATE: 76 BPM | HEIGHT: 65 IN | DIASTOLIC BLOOD PRESSURE: 82 MMHG | WEIGHT: 293 LBS | OXYGEN SATURATION: 98 % | BODY MASS INDEX: 48.82 KG/M2 | TEMPERATURE: 98 F | RESPIRATION RATE: 16 BRPM | SYSTOLIC BLOOD PRESSURE: 142 MMHG

## 2023-06-22 DIAGNOSIS — M25.551 HIP PAIN, RIGHT: ICD-10-CM

## 2023-06-22 DIAGNOSIS — J06.9 VIRAL URI WITH COUGH: Primary | ICD-10-CM

## 2023-06-22 LAB
CTP QC/QA: YES
SARS-COV-2 AG RESP QL IA.RAPID: NEGATIVE

## 2023-06-22 PROCEDURE — 87811 SARS-COV-2 COVID19 W/OPTIC: CPT | Mod: QW,S$GLB,,

## 2023-06-22 PROCEDURE — 99213 OFFICE O/P EST LOW 20 MIN: CPT | Mod: 25,S$GLB,,

## 2023-06-22 PROCEDURE — 96372 PR INJECTION,THERAP/PROPH/DIAG2ST, IM OR SUBCUT: ICD-10-PCS | Mod: S$GLB,,,

## 2023-06-22 PROCEDURE — 99213 PR OFFICE/OUTPT VISIT, EST, LEVL III, 20-29 MIN: ICD-10-PCS | Mod: 25,S$GLB,,

## 2023-06-22 PROCEDURE — 87811 SARS CORONAVIRUS 2 ANTIGEN POCT, MANUAL READ: ICD-10-PCS | Mod: QW,S$GLB,,

## 2023-06-22 PROCEDURE — 96372 THER/PROPH/DIAG INJ SC/IM: CPT | Mod: S$GLB,,,

## 2023-06-22 RX ORDER — IBUPROFEN 800 MG/1
800 TABLET ORAL EVERY 8 HOURS PRN
Qty: 20 TABLET | Refills: 0 | Status: SHIPPED | OUTPATIENT
Start: 2023-06-22 | End: 2024-01-11

## 2023-06-22 RX ORDER — KETOROLAC TROMETHAMINE 30 MG/ML
30 INJECTION, SOLUTION INTRAMUSCULAR; INTRAVENOUS
Status: COMPLETED | OUTPATIENT
Start: 2023-06-22 | End: 2023-06-22

## 2023-06-22 RX ORDER — BENZONATATE 200 MG/1
200 CAPSULE ORAL 3 TIMES DAILY PRN
Qty: 30 CAPSULE | Refills: 0 | Status: SHIPPED | OUTPATIENT
Start: 2023-06-22

## 2023-06-22 RX ADMIN — KETOROLAC TROMETHAMINE 30 MG: 30 INJECTION, SOLUTION INTRAMUSCULAR; INTRAVENOUS at 03:06

## 2023-06-22 NOTE — PROGRESS NOTES
"Subjective:      Patient ID: Bambi Carrera is a 63 y.o. female.    Vitals:  height is 5' 5" (1.651 m) and weight is 138.8 kg (306 lb) (abnormal). Her temperature is 98.2 °F (36.8 °C). Her blood pressure is 142/82 (abnormal) and her pulse is 76. Her respiration is 16 and oxygen saturation is 98%.     Chief Complaint: Cough    Patient is a 63-year-old female presenting with nonproductive coughing, headache x1 week.  No sick contacts.  Also complaining of right hip pain for the past 2-3 weeks.  States that the hip pain started while she was traveling to [place] and was doing a lot of walking.  Has taken Aleve with some mild improvement.  She denies any fever, chills, body aches, chest pain, SOB, abdominal pain, dysuria, frequency, urgency, back pain, hematuria, constipation, diarrhea, numbness or tingling.    Cough  This is a new problem. The current episode started 1 to 4 weeks ago. The problem has been unchanged. The cough is Non-productive. Associated symptoms include headaches. Pertinent negatives include no chest pain, chills, ear congestion, ear pain, fever, heartburn, hemoptysis, myalgias, nasal congestion, postnasal drip, rash, rhinorrhea, sore throat, shortness of breath, sweats, weight loss or wheezing. Nothing aggravates the symptoms. She has tried nothing for the symptoms. The treatment provided no relief. There is no history of asthma, bronchiectasis, bronchitis, COPD, emphysema, environmental allergies or pneumonia.     Constitution: Negative for chills and fever.   HENT:  Negative for ear pain, congestion, postnasal drip and sore throat.    Neck: Negative for neck pain and neck stiffness.   Cardiovascular:  Negative for chest pain.   Respiratory:  Positive for cough. Negative for bloody sputum, shortness of breath and wheezing.    Gastrointestinal:  Negative for heartburn.   Genitourinary:  Negative for dysuria, frequency, urgency, flank pain, hematuria and pelvic pain.   Musculoskeletal:  Positive " for joint pain. Negative for joint swelling and muscle ache.   Skin:  Negative for rash.   Allergic/Immunologic: Negative for environmental allergies, itching and sneezing.   Neurological:  Positive for headaches. Negative for dizziness, numbness and tingling.    Objective:     Physical Exam   Constitutional: She is oriented to person, place, and time. She appears well-developed.   HENT:   Head: Normocephalic and atraumatic.   Ears:   Right Ear: External ear normal.   Left Ear: External ear normal.   Nose: Nose normal.   Mouth/Throat: Oropharynx is clear and moist. Mucous membranes are moist. Oropharynx is clear.   Eyes: Conjunctivae, EOM and lids are normal.   Neck: Trachea normal and phonation normal. Neck supple.   Cardiovascular: Normal rate, regular rhythm, normal heart sounds and normal pulses.   Pulmonary/Chest: Effort normal and breath sounds normal. No respiratory distress.   Abdominal: She exhibits no distension. Soft. There is no abdominal tenderness.   Musculoskeletal: Normal range of motion.         General: Normal range of motion.   Neurological: no focal deficit. She is alert and oriented to person, place, and time.   Skin: Skin is warm, dry and intact. Capillary refill takes less than 2 seconds.   Psychiatric: Her speech is normal and behavior is normal. Judgment and thought content normal.   Nursing note and vitals reviewed.  Results for orders placed or performed in visit on 06/22/23   SARS Coronavirus 2 Antigen, POCT Manual Read   Result Value Ref Range    SARS Coronavirus 2 Antigen Negative Negative     Acceptable Yes      Assessment:     1. Viral URI with cough    2. Hip pain, right        Plan:       Viral URI with cough  -     SARS Coronavirus 2 Antigen, POCT Manual Read  -     benzonatate (TESSALON) 200 MG capsule; Take 1 capsule (200 mg total) by mouth 3 (three) times daily as needed for Cough.  Dispense: 30 capsule; Refill: 0    Hip pain, right  -     ketorolac injection 30  mg  -     ibuprofen (ADVIL,MOTRIN) 800 MG tablet; Take 1 tablet (800 mg total) by mouth every 8 (eight) hours as needed for Pain.  Dispense: 20 tablet; Refill: 0              Patient Instructions   - Rest.    - Drink plenty of fluids.  - Viral upper respiratory infections typically run their course in 10-14 days.      - You can take over-the-counter claritin, zyrtec, allegra, or xyzal as directed. These are antihistamines that can help with runny nose, nasal congestion, sneezing, and helps to dry up post-nasal drip, which usually causes sore throat and cough.              - If you do NOT have high blood pressure, you may use a decongestant form (D)  of this medication (ie. Claritin- D, zyrtec-D, allegra-D) or if you do not take the D form, you can take sudafed (pseudoephedrine) over the counter, which is a decongestant. Do NOT take two decongestant (D) medications at the same time (such as mucinex-D and claritin-D or plain sudafed and claritin D)    - If you DO have Hypertension, anxiety, or palpitations, it is safe to take Coricidin HBP for relief of sinus symptoms.     - You can use Flonase (fluticasone) nasal spray as directed for sinus congestion and postnasal drip. This is a steroid nasal spray that works locally over time to decrease the inflammation in your nose/sinuses and help with allergic symptoms. This is not an quick- relief spray like afrin, but it works well if used daily.  Discontinue if you develop nose bleed  - use nasal saline prior to Flonase.  - Use Ocean Spray Nasal Saline 1-3 puffs each nostril every 2-3 hours then blow out onto tissue. This is to irrigate the nasal passage way to clear the sinus openings. Use until sinus problem resolved.     - you can take plain Mucinex (guaifenesin) 1200 mg twice a day to help loosen mucous.      -warm salt water gargles can help with sore throat     - warm tea with honey can help with cough. Honey is a natural cough suppressant.     - Dextromethorphan (DM)  is a cough suppressant over the counter (ie. mucinex DM, robitussin, delsym; dayquil/nyquil has DM as well.)        - Follow up with your PCP or specialty clinic as directed in the next 1-2 weeks if not improved or as needed.  You can call [phone] to schedule an appointment with the appropriate provider.       - Go to the ER if you develop new or worsening symptoms.      - You must understand that you have received an Urgent Care treatment only and that you may be released before all of your medical problems are known or treated.   - You, the patient, will arrange for follow up care as instructed.   - If your condition worsens or fails to improve we recommend that you receive another evaluation at the ER immediately or contact your PCP to discuss your concerns or return here.

## 2023-06-22 NOTE — PATIENT INSTRUCTIONS

## 2023-07-06 ENCOUNTER — OFFICE VISIT (OUTPATIENT)
Dept: PODIATRY | Facility: CLINIC | Age: 63
End: 2023-07-06
Payer: COMMERCIAL

## 2023-07-06 ENCOUNTER — HOSPITAL ENCOUNTER (OUTPATIENT)
Dept: RADIOLOGY | Facility: HOSPITAL | Age: 63
Discharge: HOME | End: 2023-07-06
Attending: PODIATRIST
Payer: COMMERCIAL

## 2023-07-06 VITALS — BODY MASS INDEX: 48.82 KG/M2 | HEIGHT: 65 IN | WEIGHT: 293 LBS

## 2023-07-06 DIAGNOSIS — M79.672 PAIN IN BOTH FEET: ICD-10-CM

## 2023-07-06 DIAGNOSIS — M79.671 PAIN IN BOTH FEET: ICD-10-CM

## 2023-07-06 DIAGNOSIS — M77.40 METATARSALGIA, UNSPECIFIED LATERALITY: ICD-10-CM

## 2023-07-06 DIAGNOSIS — M77.40 METATARSALGIA, UNSPECIFIED LATERALITY: Primary | ICD-10-CM

## 2023-07-06 PROCEDURE — 99203 PR OFFICE/OUTPT VISIT, NEW, LEVL III, 30-44 MIN: ICD-10-PCS | Mod: S$GLB,,, | Performed by: PODIATRIST

## 2023-07-06 PROCEDURE — 3008F BODY MASS INDEX DOCD: CPT | Mod: CPTII,S$GLB,, | Performed by: PODIATRIST

## 2023-07-06 PROCEDURE — 99203 OFFICE O/P NEW LOW 30 MIN: CPT | Mod: S$GLB,,, | Performed by: PODIATRIST

## 2023-07-06 PROCEDURE — 99999 PR PBB SHADOW E&M-EST. PATIENT-LVL III: ICD-10-PCS | Mod: PBBFAC,,, | Performed by: PODIATRIST

## 2023-07-06 PROCEDURE — 3044F PR MOST RECENT HEMOGLOBIN A1C LEVEL <7.0%: ICD-10-PCS | Mod: CPTII,S$GLB,, | Performed by: PODIATRIST

## 2023-07-06 PROCEDURE — 73630 X-RAY EXAM OF FOOT: CPT | Mod: 26,50,, | Performed by: RADIOLOGY

## 2023-07-06 PROCEDURE — 3008F PR BODY MASS INDEX (BMI) DOCUMENTED: ICD-10-PCS | Mod: CPTII,S$GLB,, | Performed by: PODIATRIST

## 2023-07-06 PROCEDURE — 1159F MED LIST DOCD IN RCRD: CPT | Mod: CPTII,S$GLB,, | Performed by: PODIATRIST

## 2023-07-06 PROCEDURE — 1159F PR MEDICATION LIST DOCUMENTED IN MEDICAL RECORD: ICD-10-PCS | Mod: CPTII,S$GLB,, | Performed by: PODIATRIST

## 2023-07-06 PROCEDURE — 73630 XR FOOT COMPLETE 3 VIEW BILATERAL: ICD-10-PCS | Mod: 26,50,, | Performed by: RADIOLOGY

## 2023-07-06 PROCEDURE — 3044F HG A1C LEVEL LT 7.0%: CPT | Mod: CPTII,S$GLB,, | Performed by: PODIATRIST

## 2023-07-06 PROCEDURE — 73630 X-RAY EXAM OF FOOT: CPT | Mod: TC,50,FY,PO

## 2023-07-06 PROCEDURE — 99999 PR PBB SHADOW E&M-EST. PATIENT-LVL III: CPT | Mod: PBBFAC,,, | Performed by: PODIATRIST

## 2023-07-06 RX ORDER — MELOXICAM 15 MG/1
15 TABLET ORAL DAILY
Qty: 20 TABLET | Refills: 0 | Status: SHIPPED | OUTPATIENT
Start: 2023-07-06 | End: 2023-07-06

## 2023-07-06 RX ORDER — DICLOFENAC SODIUM 10 MG/G
2 GEL TOPICAL DAILY
Qty: 100 G | Refills: 3 | Status: SHIPPED | OUTPATIENT
Start: 2023-07-06

## 2023-07-06 NOTE — PROGRESS NOTES
Subjective:     Patient ID: Bambi Carrera is a 63 y.o. female.    Chief Complaint: Foot Pain (toes)    Bambi is a 63 y.o. female who presents to the podiatry clinic  with complaint of  bilateral foot pain. Onset of the symptoms was several months ago. Precipitating event: none known. Current symptoms include: ability to bear weight, but with some pain. Aggravating factors: any weight bearing. Symptoms have progressed to a point and plateaued. Patient has had no prior foot problems. Evaluation to date: none. Treatment to date: none. Patients rates pain 4/10 on pain scale.    Review of Systems   Constitutional: Negative for chills.   Cardiovascular:  Negative for chest pain and claudication.   Respiratory:  Negative for cough.    Skin:  Positive for color change, dry skin and nail changes.   Musculoskeletal:  Positive for joint pain.   Gastrointestinal:  Negative for nausea.   Neurological:  Positive for paresthesias. Negative for numbness.   Psychiatric/Behavioral:  The patient is not nervous/anxious.       Objective:     Physical Exam  Constitutional:       Appearance: She is well-developed.      Comments: Oriented to time, place, and person.   Cardiovascular:      Comments: DP and PT pulses are palpable bilaterally. 3 sec capillary refill time and toes and feet are warm to touch proximally .  There is  hair growth on the feet and toes b/l. There is no edema b/l. No spider veins or varicosities present b/l.     Musculoskeletal:      Comments: Equinus noted b/l ankles with < 10 deg DF noted. MMT 5/5 in DF/PF/Inv/Ev resistance with no reproduction of pain in any direction. Passive range of motion of ankle and pedal joints is painless b/l.    Moderate pain on palpation distal IM spaces 2-4 with pain radiating into adjacent toes     Feet:      Right foot:      Skin integrity: No callus or dry skin.      Left foot:      Skin integrity: No callus or dry skin.   Lymphadenopathy:      Comments: Negative  lymphadenopathy bilateral popliteal fossa and tarsal tunnel.   Skin:     Comments: No open lesions, lacerations or wounds noted.Interdigital spaces clean, dry and intact b/l. No erythema noted to b/l foot.  Nails normal color and trophic qualities.     Neurological:      Mental Status: She is alert.      Comments: Light touch, proprioception, and sharp/dull sensation are all intact bilaterally. Protective threshold with the [place]-Wienstein monofilament is intact bilaterally.    Psychiatric:         Behavior: Behavior is cooperative.         Assessment:      Encounter Diagnoses   Name Primary?    Metatarsalgia, unspecified laterality Yes    Pain in both feet      Plan:     Bambi was seen today for foot pain.    Diagnoses and all orders for this visit:    Metatarsalgia, unspecified laterality  -     X-Ray Foot Complete Bilateral; Future  -     Ankle Brachial Indices (AAMIR); Future    Pain in both feet    Other orders  -     diclofenac sodium (VOLTAREN) 1 % Gel; Apply 2 g topically once daily.  -     Discontinue: meloxicam (MOBIC) 15 MG tablet; Take 1 tablet (15 mg total) by mouth once daily.      I counseled the patient on her conditions, their implications and medical management.        Xray ordered B/L    Rx Voltaren gel to be applied to affected area up to 3-4 x daily as needed for pain    Patient instructed on adequate icing techniques. Patient should ice the affected area at least once per day x 10 minutes for 10 days . I advised the patient that extra icing would also be beneficial to ensure adequate anti inflammatory effect      Mobic prescribed. Patient was instructed on dosing information. Discontinue if adverse effects occur      Discussed conservative treatment with shoes of adequate dimensions, material, and style to alleviate symptoms and delay or prevent surgical intervention.    Metatarsal pads dispensed.     RTC PRN

## 2023-07-11 ENCOUNTER — TELEPHONE (OUTPATIENT)
Dept: SURGERY | Facility: CLINIC | Age: 63
End: 2023-07-11
Payer: COMMERCIAL

## 2023-07-14 ENCOUNTER — TELEPHONE (OUTPATIENT)
Dept: BARIATRICS | Facility: CLINIC | Age: 63
End: 2023-07-14
Payer: COMMERCIAL

## 2023-08-17 ENCOUNTER — HOSPITAL ENCOUNTER (OUTPATIENT)
Dept: CARDIOLOGY | Facility: HOSPITAL | Age: 63
Discharge: HOME | End: 2023-08-17
Attending: PODIATRIST
Payer: COMMERCIAL

## 2023-08-17 DIAGNOSIS — M77.40 METATARSALGIA, UNSPECIFIED LATERALITY: ICD-10-CM

## 2023-08-17 LAB
LEFT ABI: 1.13
LEFT ARM BP: 129 MMHG
LEFT DORSALIS PEDIS: 140 MMHG
LEFT POSTERIOR TIBIAL: 149 MMHG
LEFT TBI: 0.78
LEFT TOE PRESSURE: 103 MMHG
RIGHT ABI: 0.95
RIGHT ARM BP: 132 MMHG
RIGHT DORSALIS PEDIS: 100 MMHG
RIGHT POSTERIOR TIBIAL: 125 MMHG
RIGHT TBI: 0.77
RIGHT TOE PRESSURE: 101 MMHG

## 2023-08-17 PROCEDURE — 93922 UPR/L XTREMITY ART 2 LEVELS: CPT | Mod: 26,,, | Performed by: INTERNAL MEDICINE

## 2023-08-17 PROCEDURE — 93922 ANKLE BRACHIAL INDICES (ABI): ICD-10-PCS | Mod: 26,,, | Performed by: INTERNAL MEDICINE

## 2023-08-17 PROCEDURE — 93922 UPR/L XTREMITY ART 2 LEVELS: CPT

## 2023-08-21 ENCOUNTER — TELEPHONE (OUTPATIENT)
Dept: BARIATRICS | Facility: CLINIC | Age: 63
End: 2023-08-21
Payer: COMMERCIAL

## 2023-08-21 NOTE — TELEPHONE ENCOUNTER
----- Message from Saba Hilario sent at 8/21/2023  9:29 AM CDT -----  Regarding: Sooner Appointment  Contact: [phone]  Calling to schedule an appointment sooner than scheduled. Please call patient to schedule today.

## 2023-09-05 DIAGNOSIS — J30.9 ALLERGIC RHINITIS, UNSPECIFIED SEASONALITY, UNSPECIFIED TRIGGER: ICD-10-CM

## 2023-09-05 NOTE — TELEPHONE ENCOUNTER
No care due was identified.  [facility] Embedded Care Due Messages. Reference number: [id number].   9/05/2023 11:16:47 AM CDT

## 2023-09-06 DIAGNOSIS — M62.830 MUSCLE SPASM OF BACK: ICD-10-CM

## 2023-09-07 RX ORDER — NAPROXEN 500 MG/1
500 TABLET ORAL 2 TIMES DAILY PRN
Qty: 60 TABLET | Refills: 0 | Status: SHIPPED | OUTPATIENT
Start: 2023-09-07 | End: 2024-01-11

## 2023-09-07 RX ORDER — LORATADINE 10 MG/1
TABLET ORAL
Qty: 30 TABLET | Refills: 3 | Status: SHIPPED | OUTPATIENT
Start: 2023-09-07 | End: 2023-12-26

## 2023-09-11 ENCOUNTER — OFFICE VISIT (OUTPATIENT)
Dept: PODIATRY | Facility: CLINIC | Age: 63
End: 2023-09-11
Payer: COMMERCIAL

## 2023-09-11 ENCOUNTER — OFFICE VISIT (OUTPATIENT)
Dept: FAMILY MEDICINE | Facility: CLINIC | Age: 63
End: 2023-09-11
Payer: COMMERCIAL

## 2023-09-11 VITALS
SYSTOLIC BLOOD PRESSURE: 142 MMHG | BODY MASS INDEX: 48.82 KG/M2 | HEART RATE: 64 BPM | HEIGHT: 65 IN | OXYGEN SATURATION: 97 % | TEMPERATURE: 99 F | WEIGHT: 293 LBS | DIASTOLIC BLOOD PRESSURE: 84 MMHG

## 2023-09-11 VITALS — BODY MASS INDEX: 48.82 KG/M2 | WEIGHT: 293 LBS | HEIGHT: 65 IN

## 2023-09-11 DIAGNOSIS — M77.40 METATARSALGIA, UNSPECIFIED LATERALITY: Primary | ICD-10-CM

## 2023-09-11 DIAGNOSIS — E66.01 CLASS 3 SEVERE OBESITY DUE TO EXCESS CALORIES WITH SERIOUS COMORBIDITY AND BODY MASS INDEX (BMI) OF 50.0 TO 59.9 IN ADULT: ICD-10-CM

## 2023-09-11 DIAGNOSIS — N18.31 STAGE 3A CHRONIC KIDNEY DISEASE: ICD-10-CM

## 2023-09-11 DIAGNOSIS — M79.672 LEFT FOOT PAIN: ICD-10-CM

## 2023-09-11 DIAGNOSIS — I10 ESSENTIAL HYPERTENSION: Primary | ICD-10-CM

## 2023-09-11 DIAGNOSIS — R73.03 PREDIABETES: ICD-10-CM

## 2023-09-11 PROCEDURE — 3077F SYST BP >= 140 MM HG: CPT | Mod: CPTII,S$GLB,, | Performed by: NURSE PRACTITIONER

## 2023-09-11 PROCEDURE — 1159F PR MEDICATION LIST DOCUMENTED IN MEDICAL RECORD: ICD-10-PCS | Mod: CPTII,S$GLB,, | Performed by: PODIATRIST

## 2023-09-11 PROCEDURE — 3008F BODY MASS INDEX DOCD: CPT | Mod: CPTII,S$GLB,, | Performed by: NURSE PRACTITIONER

## 2023-09-11 PROCEDURE — 99214 OFFICE O/P EST MOD 30 MIN: CPT | Mod: S$GLB,,, | Performed by: NURSE PRACTITIONER

## 2023-09-11 PROCEDURE — 99214 PR OFFICE/OUTPT VISIT, EST, LEVL IV, 30-39 MIN: ICD-10-PCS | Mod: S$GLB,,, | Performed by: PODIATRIST

## 2023-09-11 PROCEDURE — 99999 PR PBB SHADOW E&M-EST. PATIENT-LVL III: CPT | Mod: PBBFAC,,, | Performed by: PODIATRIST

## 2023-09-11 PROCEDURE — 3079F PR MOST RECENT DIASTOLIC BLOOD PRESSURE 80-89 MM HG: ICD-10-PCS | Mod: CPTII,S$GLB,, | Performed by: NURSE PRACTITIONER

## 2023-09-11 PROCEDURE — 99214 PR OFFICE/OUTPT VISIT, EST, LEVL IV, 30-39 MIN: ICD-10-PCS | Mod: S$GLB,,, | Performed by: NURSE PRACTITIONER

## 2023-09-11 PROCEDURE — 3008F PR BODY MASS INDEX (BMI) DOCUMENTED: ICD-10-PCS | Mod: CPTII,S$GLB,, | Performed by: NURSE PRACTITIONER

## 2023-09-11 PROCEDURE — 99999 PR PBB SHADOW E&M-EST. PATIENT-LVL III: ICD-10-PCS | Mod: PBBFAC,,, | Performed by: PODIATRIST

## 2023-09-11 PROCEDURE — 3044F PR MOST RECENT HEMOGLOBIN A1C LEVEL <7.0%: ICD-10-PCS | Mod: CPTII,S$GLB,, | Performed by: NURSE PRACTITIONER

## 2023-09-11 PROCEDURE — 3077F PR MOST RECENT SYSTOLIC BLOOD PRESSURE >= 140 MM HG: ICD-10-PCS | Mod: CPTII,S$GLB,, | Performed by: NURSE PRACTITIONER

## 2023-09-11 PROCEDURE — 99214 OFFICE O/P EST MOD 30 MIN: CPT | Mod: S$GLB,,, | Performed by: PODIATRIST

## 2023-09-11 PROCEDURE — 3044F PR MOST RECENT HEMOGLOBIN A1C LEVEL <7.0%: ICD-10-PCS | Mod: CPTII,S$GLB,, | Performed by: PODIATRIST

## 2023-09-11 PROCEDURE — 3079F DIAST BP 80-89 MM HG: CPT | Mod: CPTII,S$GLB,, | Performed by: NURSE PRACTITIONER

## 2023-09-11 PROCEDURE — 3008F BODY MASS INDEX DOCD: CPT | Mod: CPTII,S$GLB,, | Performed by: PODIATRIST

## 2023-09-11 PROCEDURE — 1159F MED LIST DOCD IN RCRD: CPT | Mod: CPTII,S$GLB,, | Performed by: PODIATRIST

## 2023-09-11 PROCEDURE — 99999 PR PBB SHADOW E&M-EST. PATIENT-LVL V: CPT | Mod: PBBFAC,,, | Performed by: NURSE PRACTITIONER

## 2023-09-11 PROCEDURE — 3044F HG A1C LEVEL LT 7.0%: CPT | Mod: CPTII,S$GLB,, | Performed by: NURSE PRACTITIONER

## 2023-09-11 PROCEDURE — 3008F PR BODY MASS INDEX (BMI) DOCUMENTED: ICD-10-PCS | Mod: CPTII,S$GLB,, | Performed by: PODIATRIST

## 2023-09-11 PROCEDURE — 3044F HG A1C LEVEL LT 7.0%: CPT | Mod: CPTII,S$GLB,, | Performed by: PODIATRIST

## 2023-09-11 PROCEDURE — 99999 PR PBB SHADOW E&M-EST. PATIENT-LVL V: ICD-10-PCS | Mod: PBBFAC,,, | Performed by: NURSE PRACTITIONER

## 2023-09-11 RX ORDER — HYDROCHLOROTHIAZIDE 25 MG/1
25 TABLET ORAL DAILY
Qty: 30 TABLET | Refills: 2 | Status: SHIPPED | OUTPATIENT
Start: 2023-09-11 | End: 2023-09-19 | Stop reason: SDUPTHER

## 2023-09-11 RX ORDER — AMLODIPINE BESYLATE 5 MG/1
5 TABLET ORAL DAILY
Qty: 30 TABLET | Refills: 2 | Status: SHIPPED | OUTPATIENT
Start: 2023-09-11 | End: 2023-09-19 | Stop reason: SDUPTHER

## 2023-09-11 RX ORDER — MELOXICAM 15 MG/1
TABLET ORAL
COMMUNITY
Start: 2023-07-06 | End: 2024-01-11

## 2023-09-11 RX ORDER — HYDROCHLOROTHIAZIDE 50 MG/1
50 TABLET ORAL
COMMUNITY
Start: 2023-09-05 | End: 2023-09-11 | Stop reason: DRUGHIGH

## 2023-09-11 NOTE — PROGRESS NOTES
Subjective:     Patient ID: Bambi Carrera is a 63 y.o. female.    Chief Complaint: Foot Pain and Diabetic Foot Exam ([facility] 9/11/23)    Bambi is a 63 y.o. female who presents to the podiatry clinic  with complaint of  left foot pain. Onset of the symptoms was several weeks ago. Precipitating event: none known. Current symptoms include: ability to bear weight, but with some pain. Aggravating factors: any weight bearing. Symptoms have progressed to a point and plateaued. Patient has had no prior foot problems. Evaluation to date: none. Treatment to date: none. Patients rates pain 3/10 on pain scale.    Review of Systems   Constitutional: Negative for chills.   Cardiovascular:  Negative for chest pain and claudication.   Respiratory:  Negative for cough.    Skin:  Positive for color change, dry skin and nail changes.   Musculoskeletal:  Positive for joint pain.   Gastrointestinal:  Negative for nausea.   Neurological:  Positive for paresthesias. Negative for numbness.   Psychiatric/Behavioral:  The patient is not nervous/anxious.         Objective:     Physical Exam  Constitutional:       Appearance: She is well-developed.      Comments: Oriented to time, place, and person.   Cardiovascular:      Comments: DP and PT pulses are palpable bilaterally. 3 sec capillary refill time and toes and feet are warm to touch proximally .  There is  hair growth on the feet and toes b/l. There is no edema b/l. No spider veins or varicosities present b/l.     Musculoskeletal:      Comments: Equinus noted b/l ankles with < 10 deg DF noted. MMT 5/5 in DF/PF/Inv/Ev resistance with no reproduction of pain in any direction. Passive range of motion of ankle and pedal joints is painless b/l.     Feet:      Right foot:      Skin integrity: No callus or dry skin.      Left foot:      Skin integrity: No callus or dry skin.   Lymphadenopathy:      Comments: Negative lymphadenopathy bilateral popliteal fossa and tarsal tunnel.   Skin:      Comments: No open lesions, lacerations or wounds noted.Interdigital spaces clean, dry and intact b/l. No erythema noted to b/l foot.  Nails normal color and trophic qualities.     Neurological:      Mental Status: She is alert.      Comments: Light touch, proprioception, and sharp/dull sensation are all intact bilaterally. Protective threshold with the [place]-Wienstein monofilament is intact bilaterally.      Subjective paresthesias with no clearly identifiable source or trigger.        Psychiatric:         Behavior: Behavior is cooperative.           Assessment:      Encounter Diagnosis   Name Primary?    Metatarsalgia, unspecified laterality Yes     Plan:     Bambi was seen today for foot pain and diabetic foot exam.    Diagnoses and all orders for this visit:    Metatarsalgia, unspecified laterality  -     Ambulatory referral/consult to Physical/Occupational Therapy; Future      I counseled the patient on her conditions, their implications and medical management.      Referral placed to PT    Discussed conservative treatment with shoes of adequate dimensions, material, and style to alleviate symptoms and delay or prevent surgical intervention.    Information given on [employer]enco orthotics.     Discussed different treatment options for heel pain. including conservative and interventional.  I gave written and verbal instructions on heel cord stretching and this was demonstrated for the patient. Patient expressed understanding. Discussed wearing appropriate shoe gear and avoiding flats, slippers, sandals, barefoot, and sockfeet. Recommended arch supports. My recommendation for OTC supports is [employer]otics, [employer] tennis shoes.       Patient instructed on adequate icing techniques. Patient should ice the affected area at least once per day x 10 minutes for 10 days . I advised the  patient that extra icing would also be beneficial to ensure adequate anti inflammatory effect

## 2023-09-11 NOTE — PROGRESS NOTES
HPI     Chief Complaint:  Chief Complaint   Patient presents with    Blood Pressure Check       Bambi Carrera is a 63 y.o. female with multiple medical diagnoses as listed in the medical history and problem list that presents for f/u  Hypertension. Pt is new to [facility], but known to clinic with her last appointment 4/24/2023. Currently prescribed HCTZ 50 mg daily for high blood pressure. Pt reports intermittently taking 75 mg HCTZ a day for home elevated b/p readings for at least the past week and half. Stated she last taken 75 mg on this past Saturday d/t feeling lightheaded and dizzy while shopping in [place] [place] [place]. Reports b/p reading at [employer] in [employer] [place] [place] was 170/109. Went straight home from [facility] and taken an additional 25 mg HCTZ (for a total of 75 mg that day according to pt), rested in a dark room and reports feeling better afterwards. Denies n/v, SOB, chest pain, palpitations. She otherwise has no additional complaints on this visit.      Exam     Review of Systems:  (as noted above)  Review of Systems   Constitutional:  Negative for activity change, appetite change and fatigue.   Respiratory:  Negative for cough, chest tightness and shortness of breath.    Cardiovascular:  Negative for chest pain, palpitations and leg swelling.   Gastrointestinal: Negative.  Negative for abdominal distention, abdominal pain, constipation, diarrhea, nausea and vomiting.   Genitourinary: Negative.    Musculoskeletal: Negative.  Negative for joint swelling.   Neurological:  Negative for dizziness, speech difficulty, weakness, light-headedness, numbness and headaches.   Psychiatric/Behavioral: Negative.         Physical Exam:   Physical Exam  Vitals and nursing note reviewed.   Constitutional:       Appearance: Normal appearance. She is obese.   Cardiovascular:      Rate and Rhythm: Normal rate.      Pulses: Normal pulses.      Heart sounds: Normal heart sounds.   Pulmonary:      Effort: Pulmonary effort is normal. No respiratory  "distress.      Breath sounds: Normal breath sounds.   Abdominal:      General: Bowel sounds are normal. There is no distension.      Palpations: Abdomen is soft.      Tenderness: There is no abdominal tenderness.   Musculoskeletal:         General: Normal range of motion.      Cervical back: Normal range of motion and neck supple.      Right lower leg: No edema.      Left lower leg: No edema.   Neurological:      Mental Status: She is alert and oriented to person, place, and time.   Psychiatric:         Mood and Affect: Mood normal.         Behavior: Behavior normal.       Vitals:    09/11/23 1301   BP: (!) 142/84   BP Location: Left arm   Patient Position: Sitting   BP Method: Large (Manual)   Pulse: 64   Temp: 98.6 °F (37 °C)   TempSrc: Oral   SpO2: 97%   Weight: (!) 138.5 kg (305 lb 5.4 oz)   Height: 5' 5" (1.651 m)      Body mass index is 50.81 kg/m².    History     Past Medical History:  Past Medical History:   Diagnosis Date    Borderline diabetic     Depression     Eye injury     fb os     Hypertension     Menopause     Nuclear sclerosis of both eyes 7/1/2019       Past Surgical History:  Past Surgical History:   Procedure Laterality Date    COLONOSCOPY N/A 7/15/2021    Procedure: COLONOSCOPY;  Surgeon: Geetha Walton MD;  Location: [facility];  Service: Endoscopy;  Laterality: N/A;  Fully Vaccinated 3/25/21  see media tab 6/1/21, prep instr portal, and mailed -ml    LASIK Bilateral 07/15/2016       Social History:  Social History     Socioeconomic History    Marital status: Single   Occupational History     Employer: [employer]   Tobacco Use    Smoking status: Never    Smokeless tobacco: Never   Substance and Sexual Activity    Alcohol use: No     Alcohol/week: 0.0 standard drinks of alcohol    Drug use: No    Sexual activity: Not Currently       Allergies and Medications: (updated and reviewed)  Review of patient's allergies indicates:  No Known Allergies  Current Outpatient Medications "   Medication Sig Dispense Refill    benzonatate (TESSALON) 200 MG capsule Take 1 capsule (200 mg total) by mouth 3 (three) times daily as needed for Cough. 30 capsule 0    diclofenac sodium (VOLTAREN) 1 % Gel Apply 2 g topically once daily. 100 g 3    ergocalciferol (ERGOCALCIFEROL) 50,000 unit Cap Take 1 capsule by mouth once a week 12 capsule 2    famotidine (PEPCID) 40 MG tablet Take 1 tablet (40 mg total) by mouth every evening. 30 tablet 11    ibuprofen (ADVIL,MOTRIN) 800 MG tablet Take 1 tablet (800 mg total) by mouth every 8 (eight) hours as needed for Pain. 20 tablet 0    loratadine (CLARITIN) 10 mg tablet Take 1 tablet by mouth once daily 30 tablet 3    meclizine (ANTIVERT) 25 mg tablet Take 1 tablet (25 mg total) by mouth 3 (three) times daily as needed for Dizziness (vertigo). 15 tablet 0    meloxicam (MOBIC) 15 MG tablet       multivitamin (THERAGRAN) per tablet Take 1 tablet by mouth once daily.      naproxen (NAPROSYN) 500 MG tablet Take 1 tablet by mouth twice daily as needed 60 tablet 0    pantoprazole (PROTONIX) 40 MG tablet Take 1 tablet by mouth once daily 30 tablet 11    tiZANidine (ZANAFLEX) 4 MG tablet TAKE 1 TABLET BY MOUTH EVERY 8 HOURS AS NEEDED 30 tablet 2    amLODIPine (NORVASC) 5 MG tablet Take 1 tablet (5 mg total) by mouth once daily. 30 tablet 2    azelastine (ASTELIN) 137 mcg (0.1 %) nasal spray 1 spray (137 mcg total) by Nasal route 2 (two) times daily. 30 mL 3    hydroCHLOROthiazide (HYDRODIURIL) 25 MG tablet Take 1 tablet (25 mg total) by mouth once daily. 30 tablet 2    tirzepatide (MOUNJARO) 2.5 mg/0.5 mL PnIj Inject 2.5 mg into the skin every 7 days. (Patient not taking: Reported on 9/11/2023) 4 pen 0     No current facility-administered medications for this visit.       Patient Care Team:  Wong Whitmore MD as PCP - General (Family Medicine)  Daniella Ovalle MA (Inactive) as Care Coordinator      Assessment & Plan     1. Essential hypertension    - amLODIPine (NORVASC) 5 MG  tablet; Take 1 tablet (5 mg total) by mouth once daily.  Dispense: 30 tablet; Refill: 2  - hydroCHLOROthiazide (HYDRODIURIL) 25 MG tablet; Take 1 tablet (25 mg total) by mouth once daily.  Dispense: 30 tablet; Refill: 2     Medication education done. Blood pressure goal, diet, activity discussed.  Pt instructed to start taking HCTZ 25 mg once a day, starting tomorrow (not to take any more HCTZ today).  Take Amlodipine starting today as ordered, then daily starting tomorrow.  Only take medications as prescribed, do not take more than prescribed amounts.  Monitor b/p readings at home, keep a b/p log, bring to clinic.  Keep f/u clinic appointment as scheduled for next week.    2. Class 3 severe obesity due to excess calories with serious comorbidity and body mass index (BMI) of 50.0 to 59.9 in adult  The patient is asked to make an attempt to improve diet and exercise patterns to aid in medical management of this problem. We specifically discussed cutting calorie intake by 500-1000 calories per day for a goal of a 1-2 pound weight loss per week and recommendations for a mostly plant based diet with limited red meats/refined grains/processed foods/added sugars. Follow up with Bariatrics referral as previously ordered/scheduled.     3. Prediabetes  Discussed Hgb A1C goal, diet, along with increased physical activity 3 - 4 times a week as tolerated with safety precautions.  Hemoglobin A1C   Date Value Ref Range Status   04/25/2023 6.0 (H) 4.0 - 5.6 % Final             01/07/2022 6.0 (H) 4.0 - 5.6 % Final             07/15/2020 6.0 (H) 4.0 - 5.6 % Final               4. Stage 3a chronic kidney disease  Discussed b/p goal, diet, adequate hydration with water, avoiding NSAIDS unless prescribe by provider.    Health Maintenance:  Health Maintenance         Date Due Completion Date    COVID-19 Vaccine (4 - Pfizer series) 02/17/2022 12/23/2021    HIV Screening 07/15/2026 (Originally 4/1/1975) ---    Hemoglobin A1c (Prediabetes)  04/25/2024 4/25/2023    Mammogram 05/02/2024 5/2/2023    Override on 6/10/2015: Done (normal)    Override on 6/10/2014: Done (normal)    TETANUS VACCINE 06/08/2026 6/8/2016    Override on 4/1/2005: Done    Cervical Cancer Screening 04/18/2028 4/18/2023    Override on 6/3/2014: Done (normal)    Lipid Panel 04/25/2028 4/25/2023    Colorectal Cancer Screening 07/15/2028 7/15/2021            Follow Up:  Follow up if symptoms worsen or fail to improve.      The patient expressed understanding and no barriers to adherence were identified.      - The patient indicates understanding of these issues and agrees with the plan. Brief care plan is updated and reviewed with the patient as applicable.      - The patient is given an After Visit Summary that lists all medications with directions, allergies, education, orders placed during this encounter and follow-up instructions.      - I have reviewed the patient's medical information including past medical, family, and social history sections including the medications and allergies.      - We discussed the patient's current medications.     This note was created by combination of typed  and MModal dictation.  Transcription errors may be present.  If there are any questions, please contact me.       Rocío Hernandez NP

## 2023-09-19 ENCOUNTER — OFFICE VISIT (OUTPATIENT)
Dept: FAMILY MEDICINE | Facility: CLINIC | Age: 63
End: 2023-09-19
Payer: COMMERCIAL

## 2023-09-19 VITALS
HEIGHT: 65 IN | DIASTOLIC BLOOD PRESSURE: 78 MMHG | SYSTOLIC BLOOD PRESSURE: 134 MMHG | OXYGEN SATURATION: 98 % | HEART RATE: 77 BPM | BODY MASS INDEX: 50.81 KG/M2 | TEMPERATURE: 99 F

## 2023-09-19 DIAGNOSIS — K21.9 GASTROESOPHAGEAL REFLUX DISEASE, UNSPECIFIED WHETHER ESOPHAGITIS PRESENT: ICD-10-CM

## 2023-09-19 DIAGNOSIS — Z00.00 ROUTINE PHYSICAL EXAMINATION: Primary | ICD-10-CM

## 2023-09-19 DIAGNOSIS — Z71.84 ENCOUNTER FOR COUNSELING FOR TRAVEL: ICD-10-CM

## 2023-09-19 DIAGNOSIS — Z13.5 SCREENING FOR EYE CONDITION: ICD-10-CM

## 2023-09-19 DIAGNOSIS — I10 ESSENTIAL HYPERTENSION: ICD-10-CM

## 2023-09-19 PROCEDURE — 99396 PR PREVENTIVE VISIT,EST,40-64: ICD-10-PCS | Mod: S$GLB,,, | Performed by: FAMILY MEDICINE

## 2023-09-19 PROCEDURE — 3075F SYST BP GE 130 - 139MM HG: CPT | Mod: CPTII,S$GLB,, | Performed by: FAMILY MEDICINE

## 2023-09-19 PROCEDURE — 99214 OFFICE O/P EST MOD 30 MIN: CPT | Mod: 25,S$GLB,, | Performed by: FAMILY MEDICINE

## 2023-09-19 PROCEDURE — 99396 PREV VISIT EST AGE 40-64: CPT | Mod: S$GLB,,, | Performed by: FAMILY MEDICINE

## 2023-09-19 PROCEDURE — 3078F PR MOST RECENT DIASTOLIC BLOOD PRESSURE < 80 MM HG: ICD-10-PCS | Mod: CPTII,S$GLB,, | Performed by: FAMILY MEDICINE

## 2023-09-19 PROCEDURE — 3075F PR MOST RECENT SYSTOLIC BLOOD PRESS GE 130-139MM HG: ICD-10-PCS | Mod: CPTII,S$GLB,, | Performed by: FAMILY MEDICINE

## 2023-09-19 PROCEDURE — 99999 PR PBB SHADOW E&M-EST. PATIENT-LVL III: CPT | Mod: PBBFAC,,, | Performed by: FAMILY MEDICINE

## 2023-09-19 PROCEDURE — 3078F DIAST BP <80 MM HG: CPT | Mod: CPTII,S$GLB,, | Performed by: FAMILY MEDICINE

## 2023-09-19 PROCEDURE — 99999 PR PBB SHADOW E&M-EST. PATIENT-LVL III: ICD-10-PCS | Mod: PBBFAC,,, | Performed by: FAMILY MEDICINE

## 2023-09-19 PROCEDURE — 3044F HG A1C LEVEL LT 7.0%: CPT | Mod: CPTII,S$GLB,, | Performed by: FAMILY MEDICINE

## 2023-09-19 PROCEDURE — 3044F PR MOST RECENT HEMOGLOBIN A1C LEVEL <7.0%: ICD-10-PCS | Mod: CPTII,S$GLB,, | Performed by: FAMILY MEDICINE

## 2023-09-19 PROCEDURE — 99214 PR OFFICE/OUTPT VISIT, EST, LEVL IV, 30-39 MIN: ICD-10-PCS | Mod: 25,S$GLB,, | Performed by: FAMILY MEDICINE

## 2023-09-19 RX ORDER — AMLODIPINE BESYLATE 5 MG/1
5 TABLET ORAL DAILY
Qty: 90 TABLET | Refills: 2 | Status: SHIPPED | OUTPATIENT
Start: 2023-09-19 | End: 2024-09-18

## 2023-09-19 RX ORDER — PANTOPRAZOLE SODIUM 40 MG/1
40 TABLET, DELAYED RELEASE ORAL DAILY
Qty: 90 TABLET | Refills: 3 | Status: SHIPPED | OUTPATIENT
Start: 2023-09-19

## 2023-09-19 RX ORDER — HYDROCHLOROTHIAZIDE 25 MG/1
25 TABLET ORAL DAILY
Qty: 90 TABLET | Refills: 2 | Status: SHIPPED | OUTPATIENT
Start: 2023-09-19 | End: 2024-09-18

## 2023-09-19 NOTE — PROGRESS NOTES
[facility]  Progress Note    SUBJECTIVE:     Chief Complaint   Patient presents with    Annual Exam       HPI   Bambi Carrera  is a 63 y.o. female here for physical exam. Also here for follow-up of her chronic conditions which will be addressed below. Patient has no other new complaints/problems at this time.      Review of patient's allergies indicates:  No Known Allergies    Past Medical History:   Diagnosis Date    Borderline diabetic     Depression     Eye injury     fb os     Hypertension     Menopause     Nuclear sclerosis of both eyes 7/1/2019     Past Surgical History:   Procedure Laterality Date    COLONOSCOPY N/A 7/15/2021    Procedure: COLONOSCOPY;  Surgeon: Geetha Walton MD;  Location: [facility];  Service: Endoscopy;  Laterality: N/A;  Fully Vaccinated 3/25/21  see media tab 6/1/21, prep instr portal, and mailed -ml    LASIK Bilateral 07/15/2016     Family History   Problem Relation Age of Onset    Cancer Mother     No Known Problems Father     No Known Problems Sister     No Known Problems Brother     No Known Problems Maternal Aunt     No Known Problems Maternal Uncle     No Known Problems Paternal Aunt     No Known Problems Paternal Uncle     No Known Problems Maternal Grandmother     No Known Problems Maternal Grandfather     No Known Problems Paternal Grandmother     No Known Problems Paternal Grandfather     Amblyopia Neg Hx     Blindness Neg Hx     Cataracts Neg Hx     Diabetes Neg Hx     Glaucoma Neg Hx     Hypertension Neg Hx     Macular degeneration Neg Hx     Retinal detachment Neg Hx     Strabismus Neg Hx     Stroke Neg Hx     Thyroid disease Neg Hx      Social History     Tobacco Use    Smoking status: Never    Smokeless tobacco: Never   Substance Use Topics    Alcohol use: No     Alcohol/week: 0.0 standard drinks of alcohol    Drug use: No        Review of Systems   Constitutional:  Negative for chills, diaphoresis and fever.   HENT:  Negative for congestion, ear pain and  sore throat.    Eyes:  Negative for photophobia and discharge.   Respiratory:  Negative for cough, shortness of breath and wheezing.    Cardiovascular:  Negative for chest pain and palpitations.   Gastrointestinal:  Negative for abdominal pain, constipation, diarrhea, nausea and vomiting.   Genitourinary:  Negative for dysuria and hematuria.   Musculoskeletal:  Negative for back pain and myalgias.   Skin:  Negative for itching and rash.   Neurological:  Negative for dizziness, sensory change, focal weakness, weakness and headaches.   All other systems reviewed and are negative.    OBJECTIVE:     Vitals:    09/19/23 1322   BP: 134/78   Pulse: 77   Temp: 98 °F (36.7 °C)     There is no height or weight on file to calculate BMI.    Physical Exam  Constitutional:       General: She is not in acute distress.     Appearance: She is not diaphoretic.   HENT:      Head: Normocephalic and atraumatic.      Right Ear: Tympanic membrane and ear canal normal. No hemotympanum. Tympanic membrane is not perforated, erythematous or bulging.      Left Ear: Tympanic membrane and ear canal normal. No hemotympanum. Tympanic membrane is not perforated, erythematous or bulging.   Eyes:      Conjunctiva/sclera: Conjunctivae normal.      Pupils: Pupils are equal, round, and reactive to light.   Neck:      Thyroid: No thyromegaly.   Cardiovascular:      Rate and Rhythm: Normal rate and regular rhythm.      Heart sounds: Normal heart sounds. No murmur heard.     No friction rub. No gallop.   Pulmonary:      Effort: Pulmonary effort is normal. No respiratory distress.      Breath sounds: Normal breath sounds. No wheezing or rales.   Abdominal:      General: Bowel sounds are normal. There is no distension.      Palpations: Abdomen is soft.      Tenderness: There is no abdominal tenderness. There is no guarding or rebound.   Musculoskeletal:         General: No tenderness. Normal range of motion.   Skin:     General: Skin is warm.      Findings:  No erythema or rash.   Neurological:      Mental Status: She is alert and oriented to person, place, and time.         Old records were reviewed. Labs and/or images were independently reviewed.    ASSESSMENT     1. Routine physical examination    2. Essential hypertension    3. Gastroesophageal reflux disease, unspecified whether esophagitis present    4. Screening for eye condition    5. Encounter for counseling for travel        PLAN:     Routine physical examination  -     CBC Auto Differential; Future  -     Comprehensive Metabolic Panel; Future  -     Hemoglobin A1C; Future  -     Lipid Panel; Future  -     TSH; Future  -     T4, Free; Future  -     We briefly discussed diet, exercise, and routine preventive exams. All questions and comments addressed.    Screening for eye condition  -     Ambulatory referral/consult to Optometry; Future; Expected date: 09/26/2023    Encounter for counseling for travel  -     Ambulatory referral/consult to Travel Clinic; Future; Expected date: 09/26/2023      RTC DILMA Whitmore MD  09/19/2023 1:29 PM    Additional Evaluation & Management issues:    In addition to today's Annual Physical, patient has other medical issues that need to be addressed, as well as their associated prescription management that is separate from today's physical, as documented separately below.     HPI  Pt here for follow-up of her chronic conditions. Doing well on current regimen. Blood pressure in good range with amlodipine and hctz.     Review of Systems   Constitutional:  Negative for chills, diaphoresis and fever.   HENT:  Negative for congestion, ear pain and sore throat.    Eyes:  Negative for photophobia and discharge.   Respiratory:  Negative for cough, shortness of breath and wheezing.    Cardiovascular:  Negative for chest pain and palpitations.   Gastrointestinal:  Negative for abdominal pain, constipation, diarrhea, nausea and vomiting.   Genitourinary:  Negative for dysuria and  hematuria.   Musculoskeletal:  Negative for back pain and myalgias.   Skin:  Negative for itching and rash.   Neurological:  Negative for dizziness, sensory change, focal weakness, weakness and headaches.   All other systems reviewed and are negative.    Physical Exam  Constitutional:       General: She is not in acute distress.     Appearance: She is not diaphoretic.   HENT:      Head: Normocephalic and atraumatic.      Right Ear: Tympanic membrane and ear canal normal. No hemotympanum. Tympanic membrane is not perforated, erythematous or bulging.      Left Ear: Tympanic membrane and ear canal normal. No hemotympanum. Tympanic membrane is not perforated, erythematous or bulging.   Eyes:      Conjunctiva/sclera: Conjunctivae normal.      Pupils: Pupils are equal, round, and reactive to light.   Neck:      Thyroid: No thyromegaly.   Cardiovascular:      Rate and Rhythm: Normal rate and regular rhythm.      Heart sounds: Normal heart sounds. No murmur heard.     No friction rub. No gallop.   Pulmonary:      Effort: Pulmonary effort is normal. No respiratory distress.      Breath sounds: Normal breath sounds. No wheezing or rales.   Abdominal:      General: Bowel sounds are normal. There is no distension.      Palpations: Abdomen is soft.      Tenderness: There is no abdominal tenderness. There is no guarding or rebound.   Musculoskeletal:         General: No tenderness. Normal range of motion.   Skin:     General: Skin is warm.      Findings: No erythema or rash.   Neurological:      Mental Status: She is alert and oriented to person, place, and time.     Essential hypertension  -     amLODIPine (NORVASC) 5 MG tablet; Take 1 tablet (5 mg total) by mouth once daily.  Dispense: 90 tablet; Refill: 2  -     hydroCHLOROthiazide (HYDRODIURIL) 25 MG tablet; Take 1 tablet (25 mg total) by mouth once daily.  Dispense: 90 tablet; Refill: 2  -     Stable. Continue current regimen.    Gastroesophageal reflux disease, unspecified  whether esophagitis present  -     pantoprazole (PROTONIX) 40 MG tablet; Take 1 tablet (40 mg total) by mouth once daily.  Dispense: 90 tablet; Refill: 3  -     Stable. Continue current regimen.    RTC PRN

## 2023-10-03 ENCOUNTER — TELEPHONE (OUTPATIENT)
Dept: BARIATRICS | Facility: CLINIC | Age: 63
End: 2023-10-03
Payer: COMMERCIAL

## 2023-10-03 NOTE — TELEPHONE ENCOUNTER
Called and spoke with the pt. Informed her of Dr. Phoenix's information she provided.  Offered the option for a surgical consult.  The pt denied the offer to see surgery.  She was upset that she was not informed sooner.  She requested if she could retry a medication that caused dry mouth.  I requested assistance from Ana Ahn MA who informed the pt that we had to follow Dr. Phoenix's advice.  She attempted to comfort the pt. Appt cancelled.

## 2023-10-03 NOTE — TELEPHONE ENCOUNTER
----- Message from Niurka Phoenix MD sent at 10/2/2023  4:53 PM CDT -----  Pleas let pt know that I have reviewed her chart, and that we had already tried a number of meds in the past. She had SE or they didn't work well. There are newer meds wont be covered and are >$1000 a month, plus all on back order right now.   She could consider surgery.     Thank you,  Dr. Phoenix

## 2023-10-09 ENCOUNTER — TELEPHONE (OUTPATIENT)
Dept: FAMILY MEDICINE | Facility: CLINIC | Age: 63
End: 2023-10-09
Payer: COMMERCIAL

## 2023-10-16 ENCOUNTER — CLINICAL SUPPORT (OUTPATIENT)
Dept: INFECTIOUS DISEASES | Facility: CLINIC | Age: 63
End: 2023-10-16
Payer: COMMERCIAL

## 2023-10-16 ENCOUNTER — OFFICE VISIT (OUTPATIENT)
Dept: INFECTIOUS DISEASES | Facility: CLINIC | Age: 63
End: 2023-10-16
Payer: COMMERCIAL

## 2023-10-16 VITALS
TEMPERATURE: 98 F | WEIGHT: 293 LBS | HEIGHT: 65 IN | HEART RATE: 90 BPM | BODY MASS INDEX: 48.82 KG/M2 | SYSTOLIC BLOOD PRESSURE: 144 MMHG | DIASTOLIC BLOOD PRESSURE: 83 MMHG

## 2023-10-16 DIAGNOSIS — Z92.29 HEPATITIS A AND HEPATITIS B VACCINE ADMINISTERED: ICD-10-CM

## 2023-10-16 DIAGNOSIS — Z71.84 TRAVEL ADVICE ENCOUNTER: Primary | ICD-10-CM

## 2023-10-16 DIAGNOSIS — N18.31 STAGE 3A CHRONIC KIDNEY DISEASE: Primary | ICD-10-CM

## 2023-10-16 PROBLEM — Z79.890 HORMONE REPLACEMENT THERAPY (HRT): Status: RESOLVED | Noted: 2019-01-08 | Resolved: 2023-10-16

## 2023-10-16 PROCEDURE — 90471 YELLOW FEVER VACCINE SQ: ICD-10-PCS | Mod: S$GLB,,, | Performed by: STUDENT IN AN ORGANIZED HEALTH CARE EDUCATION/TRAINING PROGRAM

## 2023-10-16 PROCEDURE — 90691 TYPHOID VACCINE IM: CPT | Mod: S$GLB,,, | Performed by: STUDENT IN AN ORGANIZED HEALTH CARE EDUCATION/TRAINING PROGRAM

## 2023-10-16 PROCEDURE — 1159F MED LIST DOCD IN RCRD: CPT | Mod: CPTII,S$GLB,, | Performed by: STUDENT IN AN ORGANIZED HEALTH CARE EDUCATION/TRAINING PROGRAM

## 2023-10-16 PROCEDURE — 3008F BODY MASS INDEX DOCD: CPT | Mod: CPTII,S$GLB,, | Performed by: STUDENT IN AN ORGANIZED HEALTH CARE EDUCATION/TRAINING PROGRAM

## 2023-10-16 PROCEDURE — 99404 PREV MED CNSL INDIV APPRX 60: CPT | Mod: S$GLB,,, | Performed by: STUDENT IN AN ORGANIZED HEALTH CARE EDUCATION/TRAINING PROGRAM

## 2023-10-16 PROCEDURE — 99999 PR PBB SHADOW E&M-EST. PATIENT-LVL I: CPT | Mod: PBBFAC,,,

## 2023-10-16 PROCEDURE — 90691 TYPHOID VICPS VACCINE IM: ICD-10-PCS | Mod: S$GLB,,, | Performed by: STUDENT IN AN ORGANIZED HEALTH CARE EDUCATION/TRAINING PROGRAM

## 2023-10-16 PROCEDURE — 3079F PR MOST RECENT DIASTOLIC BLOOD PRESSURE 80-89 MM HG: ICD-10-PCS | Mod: CPTII,S$GLB,, | Performed by: STUDENT IN AN ORGANIZED HEALTH CARE EDUCATION/TRAINING PROGRAM

## 2023-10-16 PROCEDURE — 3077F PR MOST RECENT SYSTOLIC BLOOD PRESSURE >= 140 MM HG: ICD-10-PCS | Mod: CPTII,S$GLB,, | Performed by: STUDENT IN AN ORGANIZED HEALTH CARE EDUCATION/TRAINING PROGRAM

## 2023-10-16 PROCEDURE — 90471 IMMUNIZATION ADMIN: CPT | Mod: S$GLB,,, | Performed by: STUDENT IN AN ORGANIZED HEALTH CARE EDUCATION/TRAINING PROGRAM

## 2023-10-16 PROCEDURE — 3008F PR BODY MASS INDEX (BMI) DOCUMENTED: ICD-10-PCS | Mod: CPTII,S$GLB,, | Performed by: STUDENT IN AN ORGANIZED HEALTH CARE EDUCATION/TRAINING PROGRAM

## 2023-10-16 PROCEDURE — 90717 YELLOW FEVER VACCINE SUBQ: CPT | Mod: S$GLB,,, | Performed by: STUDENT IN AN ORGANIZED HEALTH CARE EDUCATION/TRAINING PROGRAM

## 2023-10-16 PROCEDURE — 90472 TYPHOID VICPS VACCINE IM: ICD-10-PCS | Mod: S$GLB,,, | Performed by: STUDENT IN AN ORGANIZED HEALTH CARE EDUCATION/TRAINING PROGRAM

## 2023-10-16 PROCEDURE — 1159F PR MEDICATION LIST DOCUMENTED IN MEDICAL RECORD: ICD-10-PCS | Mod: CPTII,S$GLB,, | Performed by: STUDENT IN AN ORGANIZED HEALTH CARE EDUCATION/TRAINING PROGRAM

## 2023-10-16 PROCEDURE — 90717 YELLOW FEVER VACCINE SQ: ICD-10-PCS | Mod: S$GLB,,, | Performed by: STUDENT IN AN ORGANIZED HEALTH CARE EDUCATION/TRAINING PROGRAM

## 2023-10-16 PROCEDURE — 99999 PR PBB SHADOW E&M-EST. PATIENT-LVL IV: CPT | Mod: PBBFAC,,, | Performed by: STUDENT IN AN ORGANIZED HEALTH CARE EDUCATION/TRAINING PROGRAM

## 2023-10-16 PROCEDURE — 3079F DIAST BP 80-89 MM HG: CPT | Mod: CPTII,S$GLB,, | Performed by: STUDENT IN AN ORGANIZED HEALTH CARE EDUCATION/TRAINING PROGRAM

## 2023-10-16 PROCEDURE — 99999 PR PBB SHADOW E&M-EST. PATIENT-LVL IV: ICD-10-PCS | Mod: PBBFAC,,, | Performed by: STUDENT IN AN ORGANIZED HEALTH CARE EDUCATION/TRAINING PROGRAM

## 2023-10-16 PROCEDURE — 99404 PR PREVENT COUNSEL,INDIV,60 MIN: ICD-10-PCS | Mod: S$GLB,,, | Performed by: STUDENT IN AN ORGANIZED HEALTH CARE EDUCATION/TRAINING PROGRAM

## 2023-10-16 PROCEDURE — 90472 IMMUNIZATION ADMIN EACH ADD: CPT | Mod: S$GLB,,, | Performed by: STUDENT IN AN ORGANIZED HEALTH CARE EDUCATION/TRAINING PROGRAM

## 2023-10-16 PROCEDURE — 99999 PR PBB SHADOW E&M-EST. PATIENT-LVL I: ICD-10-PCS | Mod: PBBFAC,,,

## 2023-10-16 PROCEDURE — 3044F HG A1C LEVEL LT 7.0%: CPT | Mod: CPTII,S$GLB,, | Performed by: STUDENT IN AN ORGANIZED HEALTH CARE EDUCATION/TRAINING PROGRAM

## 2023-10-16 PROCEDURE — 3044F PR MOST RECENT HEMOGLOBIN A1C LEVEL <7.0%: ICD-10-PCS | Mod: CPTII,S$GLB,, | Performed by: STUDENT IN AN ORGANIZED HEALTH CARE EDUCATION/TRAINING PROGRAM

## 2023-10-16 PROCEDURE — 3077F SYST BP >= 140 MM HG: CPT | Mod: CPTII,S$GLB,, | Performed by: STUDENT IN AN ORGANIZED HEALTH CARE EDUCATION/TRAINING PROGRAM

## 2023-10-16 RX ORDER — AZITHROMYCIN 500 MG/1
1000 TABLET, FILM COATED ORAL ONCE
Qty: 2 TABLET | Refills: 0 | Status: SHIPPED | OUTPATIENT
Start: 2023-10-16 | End: 2023-10-16

## 2023-10-16 RX ORDER — ATOVAQUONE AND PROGUANIL HYDROCHLORIDE 250; 100 MG/1; MG/1
1 TABLET, FILM COATED ORAL DAILY
Qty: 25 TABLET | Refills: 0 | Status: SHIPPED | OUTPATIENT
Start: 2023-10-16 | End: 2023-11-10

## 2023-10-16 NOTE — PROGRESS NOTES
Subjective:     Patient ID: Bambi Carrera is a 63 y.o. female    Chief Complaint: travel to [place]    HPI: Bambi Carrera is a 63 year old woman with pre-diabetes and hypertension who is here for travel counseling. She will be in [place] from 1/31/2024 - 2/15/2024. 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 She will mostly been in a city, will be staying in a hotel where she will eat all of her meals. Her hotel has A/C. She does not plan to have sex while on this trip.     She received all of her childhood vaccines. She is unsure if she has been vaccinated for hepatitis A or B.       Immunization History   Administered Date(s) Administered    COVID-19, MRNA, LN-S, PF (Pfizer) (Purple Cap) 03/04/2021, 03/25/2021, 12/23/2021    Influenza 11/03/2011    Influenza - Quadrivalent - MDCK - PF 10/13/2020, 11/18/2021, 09/06/2023    Influenza - Quadrivalent - PF *Preferred* (6 months and older) 11/03/2011, 10/10/2017, 10/31/2018, 10/22/2019, 12/20/2022    Tdap 06/08/2016    Typhoid - ViCPs 10/16/2023    Yellow Fever 10/16/2023    Zoster Recombinant 01/07/2022, 03/07/2022        Review of Systems   All other systems reviewed and are negative.       Past Medical History:   Diagnosis Date    Borderline diabetic     Depression     Eye injury     fb os     Hypertension     Menopause     Nuclear sclerosis of both eyes 7/1/2019     Past Surgical History:   Procedure Laterality Date    COLONOSCOPY N/A 7/15/2021    Procedure: COLONOSCOPY;  Surgeon: Geetha Walton MD;  Location: [facility];  Service: Endoscopy;  Laterality: N/A;  Fully Vaccinated 3/25/21  see media tab 6/1/21, prep instr portal, and mailed -ml    LASIK Bilateral 07/15/2016     Family History   Problem Relation Age of Onset    Cancer Mother     No Known Problems Father     No Known Problems Sister     No Known Problems Brother     No Known Problems Maternal Aunt     No Known Problems Maternal Uncle     No Known Problems Paternal Aunt      "No Known Problems Paternal Uncle     No Known Problems Maternal Grandmother     No Known Problems Maternal Grandfather     No Known Problems Paternal Grandmother     No Known Problems Paternal Grandfather     Amblyopia Neg Hx     Blindness Neg Hx     Cataracts Neg Hx     Diabetes Neg Hx     Glaucoma Neg Hx     Hypertension Neg Hx     Macular degeneration Neg Hx     Retinal detachment Neg Hx     Strabismus Neg Hx     Stroke Neg Hx     Thyroid disease Neg Hx      Social History     Tobacco Use    Smoking status: Never    Smokeless tobacco: Never   Substance Use Topics    Alcohol use: No     Alcohol/week: 0.0 standard drinks of alcohol    Drug use: No       Objective:     Physical Exam  Vitals reviewed.   Constitutional:       Appearance: Normal appearance.   HENT:      Head: Normocephalic and atraumatic.      Nose: Nose normal.      Mouth/Throat:      Mouth: Mucous membranes are moist.      Pharynx: Oropharynx is clear.   Pulmonary:      Effort: Pulmonary effort is normal.      Breath sounds: Normal breath sounds.   Musculoskeletal:         General: Normal range of motion.   Neurological:      General: No focal deficit present.      Mental Status: She is alert and oriented to person, place, and time.   Psychiatric:         Mood and Affect: Mood normal.         Data:    All data, including recent labs, radiology, and pathology, has been independently reviewed.    Labs:    No results for input(s): "WBC", "HGB", "HCT", "NA", "K", "CL", "BUN", "CREATININE", "CRCL", "AST", "ALT", "ALKPHOS", "BILITOT", "CRP", "INR", "UIX80GZHZ", "UFM7UBRPOYW", "[id number]" in the last 2160 hours.    Invalid input(s): "GRAN%", "PLATELET"     Radiology:    No results found in the last 24 hours.     Assessment:    1. Travel advice encounter: the following items were discussed   The Patient was provided with an extensive travel guidance packet which provides travel information specific to the patients itinerary as well as food and water " safety.      The patient was counseled on:  - Dietary precautions.  - Personal protective measures to prevent insect-borne diseases (e.g., malaria, dengue)  - Precautions to prevent exposure to rabies and seek treatment for possible exposures  - Precautions against sun exposure  - Personal and travel safety     The patient was encouraged to contact us about any problems that may develop after immunization and possible side effects were reviewed.     The patient was instructed to purchase Imodium over the counter to take in case diarrhea (without blood or fever) develops. An antibiotic was ordered for treatment if severe or bloody diarrhea develops and the patient was instructed on use and possible side effects.      The patient was also instructed to purchase insect repellent containing DEET  and apply according to repellent label instructions.  If indicated by the patients itinerary an anti-malarial agent was prescribed for malaria prophylaxis and possible side effects were reviewed.      The patient was instructed to contact us if problems develop after travel.    Covid-19: her last vaccine was in 2021.   - she was counseled to get an updated vaccine prior to departure    Yellow fever: she has never travelled to a yellow fever endemic area and has never received a yellow fever vaccine.   - vaccine ordered in clinic today, will provide yellow card    Hepatitis A: she is unsure if she is vaccinated  - will order serology and vaccinate if not    Hepatitis B: she is unsure if she is vaccinated  - will order serology and vaccinate if not    Typhoid: she was counseled on the risk of fecal oral infections and foodborne infections  - IM vaccine ordered in clinic today    Influenza: she is up to date on her influenza vaccine, administered 2023    MMR: she is up to date on her childhood vaccines    Polio  - IM vaccine ordered in clinic today    Rabies: she was counseled on the risk of rabies acquisition through wild animal  bites and that she should present to care if she experiences one.     Meningococcal meningitis: she will be traveling during high risk time for meningococcal infection.   - MenACWY ordered     TDAP, pneumococcal and varicella: she is up to date on tdap, last given 2016. Not due for pneumococcal until age 65. Up to date on shingles.     Malaria prophylaxis: she was counseled on mosquito avoidance and the risk for malaria infection.   - she was provided atovaquone-proguanil with a supply to start 2 days prior to arrival, to take the entire trip and to continue for 7 days after departure     Traveler's diarrhea: she was counseled to obtain OTC loperamide to take for diarrhea.  - she was provided with a 1 gram azithromycin to take for severe diarrhea     Other mosquito born infections:  - she was counseled on mosquito avoidance with long pants/sleeves and the use of DEET    Follow up in 2 days for vaccines/serologies     The total time for evaluation and management services performed on 10/16/23 was greater than 140 minutes.     Zena Pepper MD  Infectious Disease

## 2023-10-16 NOTE — PROGRESS NOTES
Patient received Typhoid IM to the right deltoid.  And also yellow fever sub Q to the right arm w/yellow card documentation.  Tolerated well and left in NAD

## 2023-10-19 ENCOUNTER — CLINICAL SUPPORT (OUTPATIENT)
Dept: INFECTIOUS DISEASES | Facility: CLINIC | Age: 63
End: 2023-10-19
Payer: COMMERCIAL

## 2023-10-19 DIAGNOSIS — N18.31 STAGE 3A CHRONIC KIDNEY DISEASE: Primary | ICD-10-CM

## 2023-10-19 PROCEDURE — 90734 MENACWYD/MENACWYCRM VACC IM: CPT | Mod: S$GLB,,, | Performed by: STUDENT IN AN ORGANIZED HEALTH CARE EDUCATION/TRAINING PROGRAM

## 2023-10-19 PROCEDURE — 90739 HEPB VACC 2/4 DOSE ADULT IM: CPT | Mod: S$GLB,,, | Performed by: STUDENT IN AN ORGANIZED HEALTH CARE EDUCATION/TRAINING PROGRAM

## 2023-10-19 PROCEDURE — 90734 MENINGOCOCCAL CONJUGATE VACCINE 4-VALENT IM (MENVEO) 1 VIAL AGES 10 YEARS-55 YEARS: ICD-10-PCS | Mod: S$GLB,,, | Performed by: STUDENT IN AN ORGANIZED HEALTH CARE EDUCATION/TRAINING PROGRAM

## 2023-10-19 PROCEDURE — 90471 POLIOVIRUS VACCINE IPV SQ/IM: ICD-10-PCS | Mod: S$GLB,,, | Performed by: STUDENT IN AN ORGANIZED HEALTH CARE EDUCATION/TRAINING PROGRAM

## 2023-10-19 PROCEDURE — 90713 POLIOVIRUS VACCINE IPV SQ/IM: ICD-10-PCS | Mod: S$GLB,,, | Performed by: STUDENT IN AN ORGANIZED HEALTH CARE EDUCATION/TRAINING PROGRAM

## 2023-10-19 PROCEDURE — 90739 HEPATITIS B (RECOMBINANT) ADJUVANTED, 2 DOSE: ICD-10-PCS | Mod: S$GLB,,, | Performed by: STUDENT IN AN ORGANIZED HEALTH CARE EDUCATION/TRAINING PROGRAM

## 2023-10-19 PROCEDURE — 99999 PR PBB SHADOW E&M-EST. PATIENT-LVL I: CPT | Mod: PBBFAC,,,

## 2023-10-19 PROCEDURE — 90713 POLIOVIRUS IPV SC/IM: CPT | Mod: S$GLB,,, | Performed by: STUDENT IN AN ORGANIZED HEALTH CARE EDUCATION/TRAINING PROGRAM

## 2023-10-19 PROCEDURE — 90472 IMMUNIZATION ADMIN EACH ADD: CPT | Mod: S$GLB,,, | Performed by: STUDENT IN AN ORGANIZED HEALTH CARE EDUCATION/TRAINING PROGRAM

## 2023-10-19 PROCEDURE — 99999 PR PBB SHADOW E&M-EST. PATIENT-LVL I: ICD-10-PCS | Mod: PBBFAC,,,

## 2023-10-19 PROCEDURE — 90632 HEPA VACCINE ADULT IM: CPT | Mod: S$GLB,,, | Performed by: STUDENT IN AN ORGANIZED HEALTH CARE EDUCATION/TRAINING PROGRAM

## 2023-10-19 PROCEDURE — 90472 HEPATITIS A VACCINE ADULT IM: ICD-10-PCS | Mod: S$GLB,,, | Performed by: STUDENT IN AN ORGANIZED HEALTH CARE EDUCATION/TRAINING PROGRAM

## 2023-10-19 PROCEDURE — 90471 IMMUNIZATION ADMIN: CPT | Mod: S$GLB,,, | Performed by: STUDENT IN AN ORGANIZED HEALTH CARE EDUCATION/TRAINING PROGRAM

## 2023-10-19 PROCEDURE — 90632 HEPATITIS A VACCINE ADULT IM: ICD-10-PCS | Mod: S$GLB,,, | Performed by: STUDENT IN AN ORGANIZED HEALTH CARE EDUCATION/TRAINING PROGRAM

## 2023-10-19 NOTE — PROGRESS NOTES
Patient received 2 vaccines IM to the right deltoid, Heplisav B anterior, and Menveo posterior.  And also 2 vaccines IM to the left deltoid, Polio posterior, and Hep A anterior.  Tolerated well and left in NAD

## 2023-12-05 DIAGNOSIS — E55.9 VITAMIN D DEFICIENCY: ICD-10-CM

## 2023-12-05 RX ORDER — ERGOCALCIFEROL 1.25 MG/1
CAPSULE ORAL
Qty: 12 CAPSULE | Refills: 3 | Status: SHIPPED | OUTPATIENT
Start: 2023-12-05

## 2023-12-05 NOTE — TELEPHONE ENCOUNTER
Refill Routing Note   Medication(s) are not appropriate for processing by [facility] for the following reason(s):        Outside of protocol    [facility] action(s):  Route               Appointments  past 12m or future 3m with PCP    Date Provider   Last Visit   9/19/2023 Wong Whitmore MD   Next Visit   Visit date not found Wong Whitmore MD   ED visits in past 90 days: 0        Note composed:2:51 PM 12/05/2023

## 2023-12-13 ENCOUNTER — LAB VISIT (OUTPATIENT)
Dept: LAB | Facility: HOSPITAL | Age: 63
End: 2023-12-13
Attending: FAMILY MEDICINE
Payer: COMMERCIAL

## 2023-12-13 DIAGNOSIS — Z00.00 ROUTINE PHYSICAL EXAMINATION: ICD-10-CM

## 2023-12-13 DIAGNOSIS — Z71.84 TRAVEL ADVICE ENCOUNTER: ICD-10-CM

## 2023-12-13 LAB
ALBUMIN SERPL BCP-MCNC: 3.5 G/DL (ref 3.5–5.2)
ALP SERPL-CCNC: 120 U/L (ref 55–135)
ALT SERPL W/O P-5'-P-CCNC: 12 U/L (ref 10–44)
ANION GAP SERPL CALC-SCNC: 12 MMOL/L (ref 8–16)
AST SERPL-CCNC: 13 U/L (ref 10–40)
BASOPHILS # BLD AUTO: 0.05 K/UL (ref 0–0.2)
BASOPHILS NFR BLD: 0.8 % (ref 0–1.9)
BILIRUB SERPL-MCNC: 0.3 MG/DL (ref 0.1–1)
BUN SERPL-MCNC: 16 MG/DL (ref 8–23)
CALCIUM SERPL-MCNC: 9.1 MG/DL (ref 8.7–10.5)
CHLORIDE SERPL-SCNC: 105 MMOL/L (ref 95–110)
CHOLEST SERPL-MCNC: 175 MG/DL (ref 120–199)
CHOLEST/HDLC SERPL: 2.8 {RATIO} (ref 2–5)
CO2 SERPL-SCNC: 24 MMOL/L (ref 23–29)
CREAT SERPL-MCNC: 0.9 MG/DL (ref 0.5–1.4)
DIFFERENTIAL METHOD: ABNORMAL
EOSINOPHIL # BLD AUTO: 0.1 K/UL (ref 0–0.5)
EOSINOPHIL NFR BLD: 2.1 % (ref 0–8)
ERYTHROCYTE [DISTWIDTH] IN BLOOD BY AUTOMATED COUNT: 15.9 % (ref 11.5–14.5)
EST. GFR  (NO RACE VARIABLE): >60 ML/MIN/1.73 M^2
ESTIMATED AVG GLUCOSE: 128 MG/DL (ref 68–131)
GLUCOSE SERPL-MCNC: 109 MG/DL (ref 70–110)
HBA1C MFR BLD: 6.1 % (ref 4–5.6)
HBV CORE AB SERPL QL IA: NORMAL
HCT VFR BLD AUTO: 37.7 % (ref 37–48.5)
HDLC SERPL-MCNC: 63 MG/DL (ref 40–75)
HDLC SERPL: 36 % (ref 20–50)
HGB BLD-MCNC: 12.1 G/DL (ref 12–16)
IMM GRANULOCYTES # BLD AUTO: 0.02 K/UL (ref 0–0.04)
IMM GRANULOCYTES NFR BLD AUTO: 0.3 % (ref 0–0.5)
LDLC SERPL CALC-MCNC: 92.4 MG/DL (ref 63–159)
LYMPHOCYTES # BLD AUTO: 2.9 K/UL (ref 1–4.8)
LYMPHOCYTES NFR BLD: 46.9 % (ref 18–48)
MCH RBC QN AUTO: 27.9 PG (ref 27–31)
MCHC RBC AUTO-ENTMCNC: 32.1 G/DL (ref 32–36)
MCV RBC AUTO: 87 FL (ref 82–98)
MONOCYTES # BLD AUTO: 0.4 K/UL (ref 0.3–1)
MONOCYTES NFR BLD: 7 % (ref 4–15)
NEUTROPHILS # BLD AUTO: 2.7 K/UL (ref 1.8–7.7)
NEUTROPHILS NFR BLD: 42.9 % (ref 38–73)
NONHDLC SERPL-MCNC: 112 MG/DL
NRBC BLD-RTO: 0 /100 WBC
PLATELET # BLD AUTO: 314 K/UL (ref 150–450)
PMV BLD AUTO: 10.8 FL (ref 9.2–12.9)
POTASSIUM SERPL-SCNC: 3.2 MMOL/L (ref 3.5–5.1)
PROT SERPL-MCNC: 8 G/DL (ref 6–8.4)
RBC # BLD AUTO: 4.34 M/UL (ref 4–5.4)
SODIUM SERPL-SCNC: 141 MMOL/L (ref 136–145)
T4 FREE SERPL-MCNC: 0.91 NG/DL (ref 0.71–1.51)
TRIGL SERPL-MCNC: 98 MG/DL (ref 30–150)
TSH SERPL DL<=0.005 MIU/L-ACNC: 2.64 UIU/ML (ref 0.4–4)
WBC # BLD AUTO: 6.18 K/UL (ref 3.9–12.7)

## 2023-12-13 PROCEDURE — 83036 HEMOGLOBIN GLYCOSYLATED A1C: CPT | Performed by: FAMILY MEDICINE

## 2023-12-13 PROCEDURE — 85025 COMPLETE CBC W/AUTO DIFF WBC: CPT | Performed by: FAMILY MEDICINE

## 2023-12-13 PROCEDURE — 36415 COLL VENOUS BLD VENIPUNCTURE: CPT | Mod: PO | Performed by: STUDENT IN AN ORGANIZED HEALTH CARE EDUCATION/TRAINING PROGRAM

## 2023-12-13 PROCEDURE — 84439 ASSAY OF FREE THYROXINE: CPT | Performed by: FAMILY MEDICINE

## 2023-12-13 PROCEDURE — 80053 COMPREHEN METABOLIC PANEL: CPT | Performed by: FAMILY MEDICINE

## 2023-12-13 PROCEDURE — 80061 LIPID PANEL: CPT | Performed by: FAMILY MEDICINE

## 2023-12-13 PROCEDURE — 86704 HEP B CORE ANTIBODY TOTAL: CPT | Performed by: STUDENT IN AN ORGANIZED HEALTH CARE EDUCATION/TRAINING PROGRAM

## 2023-12-13 PROCEDURE — 84443 ASSAY THYROID STIM HORMONE: CPT | Performed by: FAMILY MEDICINE

## 2023-12-26 DIAGNOSIS — J30.9 ALLERGIC RHINITIS, UNSPECIFIED SEASONALITY, UNSPECIFIED TRIGGER: ICD-10-CM

## 2023-12-26 RX ORDER — LORATADINE 10 MG/1
TABLET ORAL
Qty: 90 TABLET | Refills: 2 | Status: SHIPPED | OUTPATIENT
Start: 2023-12-26

## 2023-12-26 NOTE — TELEPHONE ENCOUNTER
Refill Decision Note   Bambi Debi  is requesting a refill authorization.  Brief Assessment and Rationale for Refill:  Approve     Medication Therapy Plan:         Comments:     Note composed:9:19 AM 12/26/2023

## 2023-12-26 NOTE — TELEPHONE ENCOUNTER
No care due was identified.  [facility] Embedded Care Due Messages. Reference number: [id number].   12/26/2023 8:15:14 AM CST

## 2024-01-09 NOTE — TELEPHONE ENCOUNTER
----- Message from Nina Stewart sent at 7/21/2017  3:17 PM CDT -----  Contact: self  Nikki States that she needs a call returned by a nurse in reference to her medication having her mouth dry  . Please call Nikki @ [phone]  . Thanks :)   Detail Level: Zone Information: This plan will allow you to select a body location and will not render the procedure on the note output. It will note the location you select in the morphology.

## 2024-01-11 DIAGNOSIS — M62.830 MUSCLE SPASM OF BACK: ICD-10-CM

## 2024-01-11 RX ORDER — NAPROXEN 500 MG/1
500 TABLET ORAL 2 TIMES DAILY PRN
Qty: 60 TABLET | Refills: 0 | Status: SHIPPED | OUTPATIENT
Start: 2024-01-11

## 2024-01-11 NOTE — TELEPHONE ENCOUNTER
No care due was identified.  [facility] Embedded Care Due Messages. Reference number: [id number].   1/11/2024 4:07:32 PM CST

## 2024-01-25 ENCOUNTER — OFFICE VISIT (OUTPATIENT)
Dept: OPTOMETRY | Facility: CLINIC | Age: 64
End: 2024-01-25
Payer: COMMERCIAL

## 2024-01-25 DIAGNOSIS — H25.13 NUCLEAR SCLEROSIS OF BOTH EYES: Primary | ICD-10-CM

## 2024-01-25 DIAGNOSIS — H04.123 DRY EYE SYNDROME, BILATERAL: ICD-10-CM

## 2024-01-25 DIAGNOSIS — Z13.5 SCREENING FOR EYE CONDITION: ICD-10-CM

## 2024-01-25 DIAGNOSIS — H52.7 REFRACTIVE ERROR: ICD-10-CM

## 2024-01-25 PROCEDURE — 99999 PR PBB SHADOW E&M-EST. PATIENT-LVL III: CPT | Mod: PBBFAC,,, | Performed by: OPTOMETRIST

## 2024-01-25 PROCEDURE — 92014 COMPRE OPH EXAM EST PT 1/>: CPT | Mod: S$GLB,,, | Performed by: OPTOMETRIST

## 2024-01-25 PROCEDURE — 99213 OFFICE O/P EST LOW 20 MIN: CPT | Mod: PBBFAC,PO | Performed by: OPTOMETRIST

## 2024-01-25 PROCEDURE — 92015 DETERMINE REFRACTIVE STATE: CPT | Mod: S$GLB,,, | Performed by: OPTOMETRIST

## 2024-01-25 NOTE — PROGRESS NOTES
Subjective:       Patient ID: Bambi Carrera is a 63 y.o. female      Chief Complaint   Patient presents with    Concerns About Ocular Health    Dry Eye     History of Present Illness  Dls: 8/23/22 Dr. Angel     62 y/o female presents today for ocular health check.  Pt c/o blurry vision at near ou. Pt wears bifocal glasses.     No tearing  + ou  itching  + ou burning  No pain  +  ha's  + ou off/on floaters  No flashes    Eye meds  Refresh plus OU QD     Pohx:   S/p lasik Bilateral 2016     Fohx:  None           Assessment/Plan:     1. Nuclear sclerosis of both eyes  No diabetic retinopathy. Discussed with pt the effects of diabetes on vision, importance of good blood sugar control, compliance with meds, and follow up care with PCP. Return in 1 year for dilated eye exam, sooner PRN.    2. Dry eye syndrome, bilateral  Recommend Refresh/Systane BID - QID OU PRN.     3. Screening for eye condition    - Ambulatory referral/consult to Optometry    4. Refractive error  Educated patient on refractive error and discussed lens options. Dispensed updated spectacle Rx. Educated about adaptation period to new specs.    Eyeglass Final Rx       Eyeglass Final Rx         Sphere Cylinder Axis Add    Right [place] Sphere  +2.50    Left -1.00 +0.75 090 +2.50      Expiration Date: 1/25/2025                      Follow up in about 1 year (around 1/25/2025) for Cataract check.

## 2024-02-15 ENCOUNTER — TELEPHONE (OUTPATIENT)
Dept: FAMILY MEDICINE | Facility: CLINIC | Age: 64
End: 2024-02-15
Payer: COMMERCIAL

## 2024-02-15 NOTE — TELEPHONE ENCOUNTER
----- Message from Devin Lang MA sent at 2/15/2024  9:02 AM CST -----  Type: Patient Call Back    Who called: Self    What is the request in detail: pt. States her right groin is causing her leg to go out on her and she's asking to be scheduled with her provider please ..     Can the clinic reply by [facility]?No    Would the patient rather a call back or a response via [facility]? Yes, call     Best call back number: [phone]

## 2024-02-16 ENCOUNTER — OFFICE VISIT (OUTPATIENT)
Dept: URGENT CARE | Facility: CLINIC | Age: 64
End: 2024-02-16
Payer: COMMERCIAL

## 2024-02-16 ENCOUNTER — TELEPHONE (OUTPATIENT)
Dept: FAMILY MEDICINE | Facility: CLINIC | Age: 64
End: 2024-02-16
Payer: COMMERCIAL

## 2024-02-16 VITALS
WEIGHT: 293 LBS | HEIGHT: 65 IN | HEART RATE: 85 BPM | BODY MASS INDEX: 48.82 KG/M2 | RESPIRATION RATE: 20 BRPM | SYSTOLIC BLOOD PRESSURE: 134 MMHG | OXYGEN SATURATION: 96 % | DIASTOLIC BLOOD PRESSURE: 86 MMHG | TEMPERATURE: 98 F

## 2024-02-16 DIAGNOSIS — R10.31 GROIN PAIN, RIGHT: Primary | ICD-10-CM

## 2024-02-16 PROCEDURE — 96372 THER/PROPH/DIAG INJ SC/IM: CPT | Mod: S$GLB,,, | Performed by: NURSE PRACTITIONER

## 2024-02-16 PROCEDURE — 99213 OFFICE O/P EST LOW 20 MIN: CPT | Mod: 25,S$GLB,, | Performed by: NURSE PRACTITIONER

## 2024-02-16 RX ORDER — KETOROLAC TROMETHAMINE 30 MG/ML
30 INJECTION, SOLUTION INTRAMUSCULAR; INTRAVENOUS
Status: COMPLETED | OUTPATIENT
Start: 2024-02-16 | End: 2024-02-16

## 2024-02-16 RX ORDER — METHOCARBAMOL 500 MG/1
500 TABLET, FILM COATED ORAL NIGHTLY PRN
Qty: 30 TABLET | Refills: 0 | Status: SHIPPED | OUTPATIENT
Start: 2024-02-16 | End: 2024-03-25 | Stop reason: SDUPTHER

## 2024-02-16 RX ORDER — NAPROXEN 500 MG/1
500 TABLET ORAL 2 TIMES DAILY WITH MEALS
Qty: 14 TABLET | Refills: 0 | Status: SHIPPED | OUTPATIENT
Start: 2024-02-16 | End: 2024-02-23

## 2024-02-16 RX ADMIN — KETOROLAC TROMETHAMINE 30 MG: 30 INJECTION, SOLUTION INTRAMUSCULAR; INTRAVENOUS at 03:02

## 2024-02-16 NOTE — TELEPHONE ENCOUNTER
"Spoke to patient to inform her that her provider doesn't have anything sooner & if she feels as if she can't wait for her future appointment she will have to go to the Urgent Care or Emergency Room for further medical assistance. Patient states "she will try to wait".  "

## 2024-02-16 NOTE — TELEPHONE ENCOUNTER
----- Message from Devin Lang MA sent at 2/16/2024  7:23 AM CST -----  Type: Patient Call Back    Who called: Self    What is the request in detail: pt. Is asking for a sooner appt. Due to the pain & worries that it may be a blood clot that she's dealing with .. She would like a call .. States she will be seen with anyone .     Can the clinic reply by [facility]?No    Would the patient rather a call back or a response via [facility]? Yes, call    Best call back number: [phone]

## 2024-02-16 NOTE — PATIENT INSTRUCTIONS
- Follow up with your PCP or specialty clinic as directed in the next 1-2 weeks if not improved or as needed.  You can call [phone] to schedule an appointment with the appropriate provider.    - Go to the ER or seek medical attention immediately if you develop new or worsening symptoms.    - You must understand that you have received an Urgent Care treatment only and that you may be released before all of your medical problems are known or treated.   - You, the patient, will arrange for follow up care as instructed.   - If your condition worsens or fails to improve we recommend that you receive another evaluation at the ER immediately or contact your PCP to discuss your concerns or return here.

## 2024-02-16 NOTE — PROGRESS NOTES
"Subjective:      Patient ID: Bambi Carrera is a 63 y.o. female.    Vitals:  height is 5' 5" (1.651 m) and weight is 139.3 kg (307 lb) (abnormal). Her oral temperature is 97.7 °F (36.5 °C). Her blood pressure is 134/86 and her pulse is 85. Her respiration is 20 and oxygen saturation is 96%.     Chief Complaint: Hip Pain    63-year-old female with medical history as listed below presents to clinic for evaluation of nontraumatic right hip/ groin pain for the last 3 weeks.  She does report recent travel to and from [place].  She denies any injury or fall.  She denies any calf pain.  She reports pain is worse with movement.  She has tried Tylenol with minimal relief.  She does report some initial right lower back pain, has been using pain patches.  She is ambulatory.  She is awake and alert, answers questions appropriately, no acute distress noted on today's visit.    Hip Pain   The incident occurred more than 1 week ago. Incident location: on vacation. There was no injury mechanism. The pain is present in the right hip. The quality of the pain is described as stabbing. The pain is at a severity of 10/10. The pain is severe. The pain has been Fluctuating since onset. She reports no foreign bodies present. The symptoms are aggravated by movement. She has tried acetaminophen for the symptoms. The treatment provided no relief.       Constitution: Negative for activity change, appetite change, chills, sweating, fatigue and fever.   Cardiovascular:  Negative for chest pain.   Respiratory:  Negative for shortness of breath.    Musculoskeletal:  Positive for pain and back pain. Negative for trauma.   Skin:  Negative for erythema.   Neurological:  Negative for dizziness.      Objective:     Physical Exam   Constitutional: She is oriented to person, place, and time. She appears well-developed.  Non-toxic appearance. She does not appear ill.   HENT:   Head: Normocephalic and atraumatic. Head is without abrasion, without " contusion and without laceration.   Ears:   Right Ear: External ear normal.   Left Ear: External ear normal.   Nose: Nose normal.   Mouth/Throat: Oropharynx is clear and moist and mucous membranes are normal.   Eyes: Conjunctivae, EOM and lids are normal.   Neck: Trachea normal and phonation normal.   Cardiovascular: Normal rate.   Pulmonary/Chest: Effort normal. No respiratory distress.   Abdominal: Normal appearance.   Musculoskeletal: Normal range of motion.         General: Normal range of motion.      Right hip: She exhibits no bony tenderness.      Right lower leg: She exhibits no tenderness and no swelling. No edema.      Left lower leg: She exhibits no tenderness and no swelling. No edema.        Legs:       Comments:   No TTP on exam, pain with movement to right groin.   Neurological: She is alert and oriented to person, place, and time.   Skin: Skin is warm, dry, intact and no rash. No abrasion, No burn, No bruising, No erythema and No ecchymosis   Psychiatric: Her speech is normal and behavior is normal. Mood, judgment and thought content normal.   Nursing note and vitals reviewed.      Assessment:     1. Groin pain, right        Plan:       Groin pain, right  -     ketorolac injection 30 mg  -     naproxen (NAPROSYN) 500 MG tablet; Take 1 tablet (500 mg total) by mouth 2 (two) times daily with meals. for 7 days  Dispense: 14 tablet; Refill: 0  -     methocarbamoL (ROBAXIN) 500 MG Tab; Take 1 tablet (500 mg total) by mouth nightly as needed (muscle spasms).  Dispense: 30 tablet; Refill: 0      -   Suspect musculoskeletal versus sciatica, less likely do I feel that this is a blood clot.  There is no calf pain.  No pain to palpation, only with movement.  Will give Toradol injection in clinic, followed by prescription anti-inflammatory with muscle relaxer at bedtime.  Patient has appointment with primary care next week.  ER precautions given.  Follow-up as directed.  Patient verbalized understanding and is  in agreement with plan.    Patient Instructions   - Follow up with your PCP or specialty clinic as directed in the next 1-2 weeks if not improved or as needed.  You can call [phone] to schedule an appointment with the appropriate provider.    - Go to the ER or seek medical attention immediately if you develop new or worsening symptoms.    - You must understand that you have received an Urgent Care treatment only and that you may be released before all of your medical problems are known or treated.   - You, the patient, will arrange for follow up care as instructed.   - If your condition worsens or fails to improve we recommend that you receive another evaluation at the ER immediately or contact your PCP to discuss your concerns or return here.

## 2024-02-20 ENCOUNTER — OFFICE VISIT (OUTPATIENT)
Dept: FAMILY MEDICINE | Facility: CLINIC | Age: 64
End: 2024-02-20
Payer: COMMERCIAL

## 2024-02-20 VITALS
DIASTOLIC BLOOD PRESSURE: 80 MMHG | TEMPERATURE: 98 F | OXYGEN SATURATION: 99 % | HEART RATE: 75 BPM | BODY MASS INDEX: 48.82 KG/M2 | SYSTOLIC BLOOD PRESSURE: 118 MMHG | WEIGHT: 293 LBS | HEIGHT: 65 IN

## 2024-02-20 DIAGNOSIS — I10 ESSENTIAL HYPERTENSION: ICD-10-CM

## 2024-02-20 DIAGNOSIS — T14.8XXA MUSCLE STRAIN: Primary | ICD-10-CM

## 2024-02-20 DIAGNOSIS — E66.01 CLASS 3 SEVERE OBESITY DUE TO EXCESS CALORIES WITH SERIOUS COMORBIDITY AND BODY MASS INDEX (BMI) OF 50.0 TO 59.9 IN ADULT: ICD-10-CM

## 2024-02-20 PROCEDURE — 3008F BODY MASS INDEX DOCD: CPT | Mod: CPTII,S$GLB,,

## 2024-02-20 PROCEDURE — 99214 OFFICE O/P EST MOD 30 MIN: CPT | Mod: S$GLB,,,

## 2024-02-20 PROCEDURE — 1159F MED LIST DOCD IN RCRD: CPT | Mod: CPTII,S$GLB,,

## 2024-02-20 PROCEDURE — 99999 PR PBB SHADOW E&M-EST. PATIENT-LVL V: CPT | Mod: PBBFAC,,,

## 2024-02-20 PROCEDURE — 3079F DIAST BP 80-89 MM HG: CPT | Mod: CPTII,S$GLB,,

## 2024-02-20 PROCEDURE — 3074F SYST BP LT 130 MM HG: CPT | Mod: CPTII,S$GLB,,

## 2024-02-20 RX ORDER — METHYLPREDNISOLONE 4 MG/1
TABLET ORAL
Qty: 1 EACH | Refills: 0 | Status: CANCELLED | OUTPATIENT
Start: 2024-02-20

## 2024-02-20 RX ORDER — DEXAMETHASONE SODIUM PHOSPHATE 4 MG/ML
4 INJECTION, SOLUTION INTRA-ARTICULAR; INTRALESIONAL; INTRAMUSCULAR; INTRAVENOUS; SOFT TISSUE
Status: CANCELLED | OUTPATIENT
Start: 2024-02-20 | End: 2024-02-20

## 2024-02-20 RX ORDER — METHYLPREDNISOLONE 4 MG/1
TABLET ORAL
Qty: 1 EACH | Refills: 0 | Status: SHIPPED | OUTPATIENT
Start: 2024-02-20 | End: 2024-04-23

## 2024-02-20 RX ORDER — TIRZEPATIDE 2.5 MG/.5ML
2.5 INJECTION, SOLUTION SUBCUTANEOUS
Qty: 4 PEN | Refills: 1 | Status: SHIPPED | OUTPATIENT
Start: 2024-02-20 | End: 2024-04-08 | Stop reason: SDUPTHER

## 2024-02-20 NOTE — PROGRESS NOTES
HPI     Chief Complaint:  Chief Complaint   Patient presents with    Groin Pain       Bambi Carrera is a 63 y.o. female with multiple medical diagnoses as listed in the medical history and problem list that presents for groin pain.  Pt is known to me with her last appointment 9/19/2023.      HPI  Pt presents for groin pain for the past week. Recently traveled to [place] for about 16 days and did a lot more walking than usual. Seen in urgent care on 2/16 and treated with Toradol, Naproxen and Robaxin with no relief.       Assessment & Plan     Problem List Items Addressed This Visit          Endocrine    Class 3 severe obesity due to excess calories with serious comorbidity and body mass index (BMI) of 50.0 to 59.9 in adult  Requesting prescription for medication to help with weight loss.  Previously on Mounjaro but insurance no longer covers it.  We will order zepbound for weight loss.    Relevant Medications    tirzepatide, weight loss, (ZEPBOUND) 2.5 mg/0.5 mL PnIj     Other Visit Diagnoses       Muscle strain    -  Primary  Right sided groin pain for the past week. Recently traveled to [place] for about 16 days and did a lot more walking than usual. Seen in urgent care on 2/16 and treated with Toradol, Naproxen and Robaxin with no relief. Will order Medrol dose pack.    Relevant Medications    methylPREDNISolone (MEDROL DOSEPACK) 4 mg tablet    Essential hypertension      BP in clinic today 118/80. The current medical regimen is effective;  continue present plan and medications.                  --------------------------------------------      Health Maintenance:  Health Maintenance         Date Due Completion Date    RSV Vaccine (Age 60+ and Pregnant patients) (1 - 1-dose 60+ series) Never done ---    COVID-19 Vaccine (4 - 2023-24 season) 09/01/2023 12/23/2021    Mammogram 05/02/2024 5/2/2023    Override on 6/10/2015: Done (normal)    Override on 6/10/2014: Done (normal)    HIV Screening 07/15/2026  "(Originally 4/1/1975) ---    Hemoglobin A1c (Prediabetes) 12/13/2024 12/13/2023    TETANUS VACCINE 06/08/2026 6/8/2016    Override on 4/1/2005: Done    Cervical Cancer Screening 04/18/2028 4/18/2023    Override on 6/3/2014: Done (normal)    Colorectal Cancer Screening 07/15/2028 7/15/2021    Lipid Panel 12/13/2028 12/13/2023            Health maintenance reviewed    Follow Up:  No follow-ups on file.    Exam     Review of Systems:  (as noted above)  Review of Systems   Constitutional:  Negative for fever.   HENT:  Negative for trouble swallowing.    Eyes:  Negative for visual disturbance.   Respiratory:  Negative for chest tightness and shortness of breath.    Cardiovascular:  Negative for chest pain.   Gastrointestinal:  Negative for blood in stool.   Musculoskeletal:  Positive for arthralgias and myalgias.       Physical Exam:   Physical Exam  Constitutional:       General: She is not in acute distress.     Appearance: She is obese. She is not ill-appearing or diaphoretic.   HENT:      Head: Normocephalic and atraumatic.   Cardiovascular:      Rate and Rhythm: Normal rate and regular rhythm.      Heart sounds: No murmur heard.     No friction rub. No gallop.   Pulmonary:      Effort: No respiratory distress.   Chest:      Chest wall: No tenderness.   Musculoskeletal:      Cervical back: No rigidity.      Right upper leg: Tenderness present.   Neurological:      Mental Status: She is alert and oriented to person, place, and time.       Vitals:    02/20/24 1534   BP: 118/80   Pulse: 75   Temp: 97.7 °F (36.5 °C)   TempSrc: Oral   SpO2: 99%   Weight: 136 kg (299 lb 11.5 oz)   Height: 5' 5" (1.651 m)      Body mass index is 49.88 kg/m².        History     Past Medical History:  Past Medical History:   Diagnosis Date    Borderline diabetic     Depression     Eye injury     fb os     Hypertension     Menopause     Nuclear sclerosis of both eyes 7/1/2019       Past Surgical History:  Past Surgical History:   Procedure " Laterality Date    COLONOSCOPY N/A 7/15/2021    Procedure: COLONOSCOPY;  Surgeon: Geetha Walton MD;  Location: [facility] ENDO;  Service: Endoscopy;  Laterality: N/A;  Fully Vaccinated 3/25/21  see media tab 6/1/21, prep instr portal, and mailed -ml    LASIK Bilateral 07/15/2016       Social History:  Social History     Socioeconomic History    Marital status: Single   Occupational History     Employer: [employer]   Tobacco Use    Smoking status: Never    Smokeless tobacco: Never   Substance and Sexual Activity    Alcohol use: No     Alcohol/week: 0.0 standard drinks of alcohol    Drug use: No    Sexual activity: Not Currently       Family History:  Family History   Problem Relation Age of Onset    Cancer Mother     No Known Problems Father     No Known Problems Sister     No Known Problems Brother     No Known Problems Maternal Aunt     No Known Problems Maternal Uncle     No Known Problems Paternal Aunt     No Known Problems Paternal Uncle     No Known Problems Maternal Grandmother     No Known Problems Maternal Grandfather     No Known Problems Paternal Grandmother     No Known Problems Paternal Grandfather     Amblyopia Neg Hx     Blindness Neg Hx     Cataracts Neg Hx     Diabetes Neg Hx     Glaucoma Neg Hx     Hypertension Neg Hx     Macular degeneration Neg Hx     Retinal detachment Neg Hx     Strabismus Neg Hx     Stroke Neg Hx     Thyroid disease Neg Hx        Allergies and Medications: (updated and reviewed)  Review of patient's allergies indicates:  No Known Allergies  Current Outpatient Medications   Medication Sig Dispense Refill    amLODIPine (NORVASC) 5 MG tablet Take 1 tablet (5 mg total) by mouth once daily. 90 tablet 2    ergocalciferol (ERGOCALCIFEROL) 50,000 unit Cap Take 1 capsule by mouth once a week 12 capsule 3    famotidine (PEPCID) 40 MG tablet Take 1 tablet (40 mg total) by mouth every evening. 30 tablet 11    hydroCHLOROthiazide (HYDRODIURIL) 25 MG tablet Take 1 tablet (25 mg total)  by mouth once daily. 90 tablet 2    loratadine (CLARITIN) 10 mg tablet Take 1 tablet by mouth once daily 90 tablet 2    methocarbamoL (ROBAXIN) 500 MG Tab Take 1 tablet (500 mg total) by mouth nightly as needed (muscle spasms). 30 tablet 0    naproxen (NAPROSYN) 500 MG tablet Take 1 tablet by mouth twice daily as needed 60 tablet 0    naproxen (NAPROSYN) 500 MG tablet Take 1 tablet (500 mg total) by mouth 2 (two) times daily with meals. for 7 days 14 tablet 0    pantoprazole (PROTONIX) 40 MG tablet Take 1 tablet (40 mg total) by mouth once daily. 90 tablet 3    tiZANidine (ZANAFLEX) 4 MG tablet TAKE 1 TABLET BY MOUTH EVERY 8 HOURS AS NEEDED 30 tablet 2    azelastine (ASTELIN) 137 mcg (0.1 %) nasal spray 1 spray (137 mcg total) by Nasal route 2 (two) times daily. 30 mL 3    benzonatate (TESSALON) 200 MG capsule Take 1 capsule (200 mg total) by mouth 3 (three) times daily as needed for Cough. (Patient not taking: Reported on 2/16/2024) 30 capsule 0    diclofenac sodium (VOLTAREN) 1 % Gel Apply 2 g topically once daily. (Patient not taking: Reported on 2/16/2024) 100 g 3    meclizine (ANTIVERT) 25 mg tablet Take 1 tablet (25 mg total) by mouth 3 (three) times daily as needed for Dizziness (vertigo). 15 tablet 0    methylPREDNISolone (MEDROL DOSEPACK) 4 mg tablet use as directed 1 each 0    multivitamin (THERAGRAN) per tablet Take 1 tablet by mouth once daily.      tirzepatide, weight loss, (ZEPBOUND) 2.5 mg/0.5 mL PnIj Inject 2.5 mg into the skin every 7 days. 4 Pen 1     No current facility-administered medications for this visit.       Patient Care Team:  Wong Whitmore MD as PCP - General (Family Medicine)  Daniella Ovalle MA (Inactive) as Care Coordinator         - The patient is given an After Visit Summary that lists all medications with directions, allergies, education, orders placed during this encounter and follow-up instructions.      - I have reviewed the patient's medical information including past  medical, family, and social history sections including the medications and allergies.      - We discussed the patient's current medications.     This note was created by combination of typed  and MModal dictation.  Transcription errors may be present.  If there are any questions, please contact me.       Marleny Clinton NP

## 2024-02-20 NOTE — PATIENT INSTRUCTIONS
Medical Fitness--[phone]  Imaging, Xray, CT, MRI, Ultrasound---[phone]  Bariatrics---[phone]  Breast Surgery---[phone]  Case Management---[phone]  Colonoscopy---[phone]  DME---[phone]  Infectious Disease---[phone]  Interventional Radiology---[phone]  Medical Records---[phone]  [facility]---[phone]  Optometry/Ophthalmology---[phone]  [facility]---[phone]  Physical Therapy---[phone]  Psychiatry---[phone] or [phone]  Plastic Surgery---[phone]  Recovery--[phone] option 2, or [phone].  Sleep Study---[phone]  Smoking Cessation---[phone]  Wound Care---[phone]  Referral Desk---[phone]

## 2024-03-03 ENCOUNTER — OFFICE VISIT (OUTPATIENT)
Dept: URGENT CARE | Facility: CLINIC | Age: 64
End: 2024-03-03
Payer: COMMERCIAL

## 2024-03-03 VITALS
RESPIRATION RATE: 16 BRPM | HEART RATE: 85 BPM | TEMPERATURE: 98 F | BODY MASS INDEX: 48.82 KG/M2 | SYSTOLIC BLOOD PRESSURE: 130 MMHG | WEIGHT: 293 LBS | DIASTOLIC BLOOD PRESSURE: 85 MMHG | HEIGHT: 65 IN | OXYGEN SATURATION: 97 %

## 2024-03-03 DIAGNOSIS — J34.89 SINUS PRESSURE: ICD-10-CM

## 2024-03-03 DIAGNOSIS — R06.2 WHEEZING: ICD-10-CM

## 2024-03-03 DIAGNOSIS — J40 BRONCHITIS: Primary | ICD-10-CM

## 2024-03-03 DIAGNOSIS — R05.9 COUGH, UNSPECIFIED TYPE: ICD-10-CM

## 2024-03-03 DIAGNOSIS — J40 BRONCHITIS: ICD-10-CM

## 2024-03-03 LAB
CTP QC/QA: YES
CTP QC/QA: YES
POC MOLECULAR INFLUENZA A AGN: NEGATIVE
POC MOLECULAR INFLUENZA B AGN: NEGATIVE
SARS-COV-2 AG RESP QL IA.RAPID: NEGATIVE

## 2024-03-03 PROCEDURE — 87502 INFLUENZA DNA AMP PROBE: CPT | Mod: QW,S$GLB,, | Performed by: NURSE PRACTITIONER

## 2024-03-03 PROCEDURE — 94640 AIRWAY INHALATION TREATMENT: CPT | Mod: S$GLB,,, | Performed by: NURSE PRACTITIONER

## 2024-03-03 PROCEDURE — 87811 SARS-COV-2 COVID19 W/OPTIC: CPT | Mod: QW,S$GLB,, | Performed by: NURSE PRACTITIONER

## 2024-03-03 PROCEDURE — 99214 OFFICE O/P EST MOD 30 MIN: CPT | Mod: 25,S$GLB,, | Performed by: NURSE PRACTITIONER

## 2024-03-03 RX ORDER — PROMETHAZINE HYDROCHLORIDE AND DEXTROMETHORPHAN HYDROBROMIDE 6.25; 15 MG/5ML; MG/5ML
5 SYRUP ORAL EVERY 4 HOURS PRN
Qty: 240 ML | Refills: 0 | Status: SHIPPED | OUTPATIENT
Start: 2024-03-03 | End: 2024-03-13

## 2024-03-03 RX ORDER — IPRATROPIUM BROMIDE 0.5 MG/2.5ML
0.5 SOLUTION RESPIRATORY (INHALATION)
Status: COMPLETED | OUTPATIENT
Start: 2024-03-03 | End: 2024-03-03

## 2024-03-03 RX ORDER — BENZONATATE 100 MG/1
100 CAPSULE ORAL 3 TIMES DAILY PRN
Qty: 30 CAPSULE | Refills: 0 | Status: SHIPPED | OUTPATIENT
Start: 2024-03-03 | End: 2024-03-13

## 2024-03-03 RX ORDER — PREDNISONE 20 MG/1
40 TABLET ORAL DAILY
Qty: 6 TABLET | Refills: 0 | Status: SHIPPED | OUTPATIENT
Start: 2024-03-03 | End: 2024-03-03 | Stop reason: SDUPTHER

## 2024-03-03 RX ORDER — LEVOCETIRIZINE DIHYDROCHLORIDE 5 MG/1
5 TABLET, FILM COATED ORAL NIGHTLY
Qty: 90 TABLET | Refills: 0 | Status: SHIPPED | OUTPATIENT
Start: 2024-03-03

## 2024-03-03 RX ORDER — LEVOCETIRIZINE DIHYDROCHLORIDE 5 MG/1
5 TABLET, FILM COATED ORAL NIGHTLY
Qty: 30 TABLET | Refills: 0 | Status: SHIPPED | OUTPATIENT
Start: 2024-03-03 | End: 2024-03-03 | Stop reason: SDUPTHER

## 2024-03-03 RX ORDER — BENZONATATE 100 MG/1
100 CAPSULE ORAL 3 TIMES DAILY PRN
Qty: 30 CAPSULE | Refills: 0 | Status: SHIPPED | OUTPATIENT
Start: 2024-03-03 | End: 2024-03-03 | Stop reason: SDUPTHER

## 2024-03-03 RX ORDER — ALBUTEROL SULFATE 90 UG/1
2 AEROSOL, METERED RESPIRATORY (INHALATION) EVERY 6 HOURS PRN
Qty: 18 G | Refills: 0 | Status: SHIPPED | OUTPATIENT
Start: 2024-03-03 | End: 2025-03-03

## 2024-03-03 RX ORDER — PREDNISONE 20 MG/1
40 TABLET ORAL DAILY
Qty: 6 TABLET | Refills: 0 | Status: SHIPPED | OUTPATIENT
Start: 2024-03-03 | End: 2024-03-06

## 2024-03-03 RX ORDER — PROMETHAZINE HYDROCHLORIDE AND DEXTROMETHORPHAN HYDROBROMIDE 6.25; 15 MG/5ML; MG/5ML
5 SYRUP ORAL EVERY 4 HOURS PRN
Qty: 240 ML | Refills: 0 | Status: SHIPPED | OUTPATIENT
Start: 2024-03-03 | End: 2024-03-03 | Stop reason: SDUPTHER

## 2024-03-03 RX ORDER — ALBUTEROL SULFATE 0.83 MG/ML
2.5 SOLUTION RESPIRATORY (INHALATION)
Status: COMPLETED | OUTPATIENT
Start: 2024-03-03 | End: 2024-03-03

## 2024-03-03 RX ORDER — ALBUTEROL SULFATE 90 UG/1
2 AEROSOL, METERED RESPIRATORY (INHALATION) EVERY 6 HOURS PRN
Qty: 18 G | Refills: 0 | Status: SHIPPED | OUTPATIENT
Start: 2024-03-03 | End: 2024-03-03 | Stop reason: SDUPTHER

## 2024-03-03 RX ORDER — FLUTICASONE PROPIONATE 50 MCG
2 SPRAY, SUSPENSION (ML) NASAL DAILY
Qty: 11.1 ML | Refills: 0 | Status: SHIPPED | OUTPATIENT
Start: 2024-03-03 | End: 2024-03-03 | Stop reason: SDUPTHER

## 2024-03-03 RX ORDER — FLUTICASONE PROPIONATE 50 MCG
2 SPRAY, SUSPENSION (ML) NASAL DAILY
Qty: 11.1 ML | Refills: 0 | Status: SHIPPED | OUTPATIENT
Start: 2024-03-03 | End: 2024-04-02

## 2024-03-03 RX ORDER — LEVOCETIRIZINE DIHYDROCHLORIDE 5 MG/1
5 TABLET, FILM COATED ORAL NIGHTLY
Qty: 30 TABLET | Refills: 0 | Status: SHIPPED | OUTPATIENT
Start: 2024-03-03 | End: 2024-03-03

## 2024-03-03 RX ADMIN — IPRATROPIUM BROMIDE 0.5 MG: 0.5 SOLUTION RESPIRATORY (INHALATION) at 01:03

## 2024-03-03 RX ADMIN — ALBUTEROL SULFATE 2.5 MG: 0.83 SOLUTION RESPIRATORY (INHALATION) at 01:03

## 2024-03-03 NOTE — PROGRESS NOTES
"Subjective:      Patient ID: Bambi Carrera is a 63 y.o. female.    Vitals:  height is 5' 5" (1.651 m) and weight is 136 kg (299 lb 13.2 oz). Her oral temperature is 98.1 °F (36.7 °C). Her blood pressure is 130/85 and her pulse is 85. Her respiration is 16 and oxygen saturation is 97%.     Chief Complaint: Cough    62yo female pt reports dry cough and coughing fits, sinus and chest congestion, sinus pressure/pain, loss of appetite, fatigue, and sore throat, worsening over the past 7 days.  Reports some improvement with Mucinex.  Denies fever, but reports chills.  Denies n/v/d, denies abd pain.  Denies chest pain, but reports wheezing and SOB.  Denies HX of asthma or COPD, but reports SOB with exertion ever since returning from [place] via air travel last month, which has worsened with current symptoms.  Denies known sick contacts.  Reports receiving both COVID and flu vaccinations this season.    Cough  This is a new problem. The current episode started in the past 7 days. The problem has been gradually worsening. The problem occurs constantly. The cough is Productive of sputum. Associated symptoms include chills, nasal congestion, postnasal drip, a sore throat, shortness of breath and wheezing. Pertinent negatives include no chest pain, ear pain, fever or headaches. Associated symptoms comments: Chest congestion, sinus pressure, loss of appetite, fatigue. Nothing aggravates the symptoms. Treatments tried: mucinex. The treatment provided mild relief.       Constitution: Positive for chills and fatigue. Negative for fever.   HENT:  Positive for congestion, postnasal drip and sore throat. Negative for ear pain and trouble swallowing.    Cardiovascular:  Negative for chest pain.   Respiratory:  Positive for cough, shortness of breath and wheezing. Negative for chest tightness, sputum production, COPD and asthma.    Gastrointestinal:  Negative for abdominal pain, nausea, vomiting and diarrhea.   Allergic/Immunologic: " Negative for asthma.   Neurological:  Negative for headaches.      Objective:     Physical Exam   Constitutional: She is oriented to person, place, and time. She appears well-developed. She is cooperative.  Non-toxic appearance. She appears ill. No distress.   HENT:   Head: Normocephalic and atraumatic.   Ears:   Right Ear: Hearing, external ear and ear canal normal. Tympanic membrane is bulging. Tympanic membrane is not erythematous and not retracted. A middle ear effusion (clear fluid) is present.   Left Ear: Hearing, external ear and ear canal normal. Tympanic membrane is bulging. Tympanic membrane is not erythematous and not retracted. A middle ear effusion (clear fluid) is present.   Nose: Mucosal edema (erythema and swelling to BL turbinates) and rhinorrhea (clear to BL nares) present. No purulent discharge or nasal deformity. No epistaxis. Right sinus exhibits maxillary sinus tenderness (mild) and frontal sinus tenderness. Left sinus exhibits maxillary sinus tenderness (mild) and frontal sinus tenderness.   Mouth/Throat: Uvula is midline and mucous membranes are normal. No trismus in the jaw. Normal dentition. No uvula swelling. Oropharyngeal exudate (clear postnasal drip) and posterior oropharyngeal erythema present. No posterior oropharyngeal edema. Tonsils are 1+ on the right. Tonsils are 1+ on the left. No tonsillar exudate.   Eyes: Conjunctivae and lids are normal. No scleral icterus.   Neck: Trachea normal and phonation normal. Neck supple. No edema present. No erythema present. No neck rigidity present.   Cardiovascular: Normal rate, regular rhythm, normal heart sounds and normal pulses.   Pulmonary/Chest: Effort normal. No accessory muscle usage or stridor. No tachypnea. No respiratory distress. She has no decreased breath sounds. She has wheezes (expiratory only) in the left upper field, the left middle field and the left lower field. She has no rhonchi. She has no rales.   Dry cough with  bronchospasm elicited multiple times during exam.         Comments: Dry cough with bronchospasm elicited multiple times during exam.    Abdominal: Normal appearance.   Musculoskeletal: Normal range of motion.         General: No deformity. Normal range of motion.   Lymphadenopathy:        Head (right side): Submandibular (TTP, no swelling) adenopathy present.        Head (left side): Submandibular (TTP, no swelling) adenopathy present.     She has no cervical adenopathy.   Neurological: She is alert and oriented to person, place, and time. She exhibits normal muscle tone. Coordination normal.   Skin: Skin is warm, dry, intact, not diaphoretic and not pale.   Psychiatric: Her speech is normal and behavior is normal. Judgment and thought content normal.   Nursing note and vitals reviewed.    Results for orders placed or performed in visit on 03/03/24   SARS Coronavirus 2 Antigen, POCT Manual Read   Result Value Ref Range    SARS Coronavirus 2 Antigen Negative Negative     Acceptable Yes    POCT Influenza A/B MOLECULAR   Result Value Ref Range    POC Molecular Influenza A Ag Negative Negative, Not Reported    POC Molecular Influenza B Ag Negative Negative, Not Reported     Acceptable Yes          Assessment:     1. Bronchitis    2. Cough, unspecified type    3. Wheezing    4. Sinus pressure        Plan:     After Duo-Neb TX:  pt reports that she is feeling somewhat better, better able to take a deep breath.  Wheezing much improved, now only very quiet expiratory to LLL.      Ordered chest x-ray, requested for pt to return tomorrow to obtain, as there is no RT in clinic today.  Will call with results.    Provided education on prescribed medications.  Provided education on return/ER precautions.  Recommended follow-up with PCP if symptoms do not improve with treatment.  Pt verbalized understanding and agreed to plan.      Bronchitis  -     Discontinue: levocetirizine (XYZAL) 5 MG tablet;  Take 1 tablet (5 mg total) by mouth every evening.  Dispense: 30 tablet; Refill: 0  -     Discontinue: fluticasone propionate (FLONASE) 50 mcg/actuation nasal spray; 2 sprays (100 mcg total) by Each Nostril route once daily.  Dispense: 11.1 mL; Refill: 0  -     Discontinue: benzonatate (TESSALON) 100 MG capsule; Take 1 capsule (100 mg total) by mouth 3 (three) times daily as needed for Cough.  Dispense: 30 capsule; Refill: 0  -     Discontinue: promethazine-dextromethorphan (PROMETHAZINE-DM) 6.25-15 mg/5 mL Syrp; Take 5 mLs by mouth every 4 (four) hours as needed (cough).  Dispense: 240 mL; Refill: 0  -     Discontinue: albuterol (VENTOLIN HFA) 90 mcg/actuation inhaler; Inhale 2 puffs into the lungs every 6 (six) hours as needed for Wheezing. Rescue  Dispense: 18 g; Refill: 0  -     Discontinue: predniSONE (DELTASONE) 20 MG tablet; Take 2 tablets (40 mg total) by mouth once daily. for 3 days  Dispense: 6 tablet; Refill: 0  -     albuterol (VENTOLIN HFA) 90 mcg/actuation inhaler; Inhale 2 puffs into the lungs every 6 (six) hours as needed for Wheezing. Rescue  Dispense: 18 g; Refill: 0  -     benzonatate (TESSALON) 100 MG capsule; Take 1 capsule (100 mg total) by mouth 3 (three) times daily as needed for Cough.  Dispense: 30 capsule; Refill: 0  -     fluticasone propionate (FLONASE) 50 mcg/actuation nasal spray; 2 sprays (100 mcg total) by Each Nostril route once daily.  Dispense: 11.1 mL; Refill: 0  -     predniSONE (DELTASONE) 20 MG tablet; Take 2 tablets (40 mg total) by mouth once daily. for 3 days  Dispense: 6 tablet; Refill: 0  -     promethazine-dextromethorphan (PROMETHAZINE-DM) 6.25-15 mg/5 mL Syrp; Take 5 mLs by mouth every 4 (four) hours as needed (cough).  Dispense: 240 mL; Refill: 0  -     levocetirizine (XYZAL) 5 MG tablet; Take 1 tablet (5 mg total) by mouth every evening.  Dispense: 30 tablet; Refill: 0    Cough, unspecified type  -     SARS Coronavirus 2 Antigen, POCT Manual Read  -     POCT  "Influenza A/B MOLECULAR    Wheezing  -     albuterol nebulizer solution 2.5 mg  -     ipratropium 0.02 % nebulizer solution 0.5 mg  -     XR CHEST PA AND LATERAL; Future; Expected date: 03/04/2024    Sinus pressure      Patient Instructions   Please return to clinic on 3/4 for your chest x-ray.  The providers will call you with your results and may adjust your medications based on your results.    -----    If your condition worsens or fails to improve, we recommend that you receive another evaluation at the ER immediately contact your PCP to discuss your concerns, or return here.  You must understand that you've received an urgent care treatment only, and that you may be released before all your medical problems are known or treated.  You, the patient, will arrange for follow-up care as instructed.     If we discussed that I think your illness is viral, it will not respond to antibiotics and will last 10-14 days.  If we discussed "wait and see" antibiotics, and if over the next few days the symptoms worsen, start the antibiotics I have given you.     If you are female and on birth control pills and do take the antibiotics, use additional methods to prevent pregnancy while on the antibiotics and for one cycle after.     Flonase (fluticasone) is a nasal spray which is available over the counter and may help with your symptoms.  Zyrtec D, Claritin D, or Allegra D can also help with symptoms of congestion and drainage.  If you have hypertension, avoid using the "D" which is the decongestant formula.    If you just have drainage, you can take plain Zyrtec, Claritin, or Allegra.  If you just have a congested feeling, you can take pseudoephedrine (unless you have high blood pressure), which you have to sign for behind the counter.  Do not buy phenylephrine OTC, as it is not effective.    Rest and fluids are also important.  Tylenol or ibuprofen can also be used as directed for pain, unless you have an allergy to them or " medical condition (such as stomach ulcers, kidney or liver disease, or use blood thinners, etc.) for which you should not be taking these type of medications.     If you are flying in the next few days, Afrin nose drops for the airplane flight upon take off and landing may help.  Other than at those times, refrain from using Afrin.     If you were prescribed a narcotic or sedating cough medicine, do not drive or operate heavy machinery while taking these medications.                Additional MDM:     Heart Failure Score:   COPD = No

## 2024-03-03 NOTE — PATIENT INSTRUCTIONS
"Please return to clinic on 3/4 for your chest x-ray.  The providers will call you with your results and may adjust your medications based on your results.    -----    If your condition worsens or fails to improve, we recommend that you receive another evaluation at the ER immediately contact your PCP to discuss your concerns, or return here.  You must understand that you've received an urgent care treatment only, and that you may be released before all your medical problems are known or treated.  You, the patient, will arrange for follow-up care as instructed.     If we discussed that I think your illness is viral, it will not respond to antibiotics and will last 10-14 days.  If we discussed "wait and see" antibiotics, and if over the next few days the symptoms worsen, start the antibiotics I have given you.     If you are female and on birth control pills and do take the antibiotics, use additional methods to prevent pregnancy while on the antibiotics and for one cycle after.     Flonase (fluticasone) is a nasal spray which is available over the counter and may help with your symptoms.  Zyrtec D, Claritin D, or Allegra D can also help with symptoms of congestion and drainage.  If you have hypertension, avoid using the "D" which is the decongestant formula.    If you just have drainage, you can take plain Zyrtec, Claritin, or Allegra.  If you just have a congested feeling, you can take pseudoephedrine (unless you have high blood pressure), which you have to sign for behind the counter.  Do not buy phenylephrine OTC, as it is not effective.    Rest and fluids are also important.  Tylenol or ibuprofen can also be used as directed for pain, unless you have an allergy to them or medical condition (such as stomach ulcers, kidney or liver disease, or use blood thinners, etc.) for which you should not be taking these type of medications.     If you are flying in the next few days, Afrin nose drops for the airplane flight " upon take off and landing may help.  Other than at those times, refrain from using Afrin.     If you were prescribed a narcotic or sedating cough medicine, do not drive or operate heavy machinery while taking these medications.

## 2024-03-25 ENCOUNTER — OFFICE VISIT (OUTPATIENT)
Dept: URGENT CARE | Facility: CLINIC | Age: 64
End: 2024-03-25
Payer: COMMERCIAL

## 2024-03-25 VITALS
BODY MASS INDEX: 48.82 KG/M2 | HEIGHT: 65 IN | DIASTOLIC BLOOD PRESSURE: 89 MMHG | HEART RATE: 80 BPM | RESPIRATION RATE: 16 BRPM | SYSTOLIC BLOOD PRESSURE: 147 MMHG | TEMPERATURE: 97 F | WEIGHT: 293 LBS

## 2024-03-25 DIAGNOSIS — M54.41 ACUTE RIGHT-SIDED LOW BACK PAIN WITH RIGHT-SIDED SCIATICA: Primary | ICD-10-CM

## 2024-03-25 DIAGNOSIS — R10.31 RIGHT GROIN PAIN: ICD-10-CM

## 2024-03-25 PROCEDURE — 73502 X-RAY EXAM HIP UNI 2-3 VIEWS: CPT | Mod: RT,S$GLB,, | Performed by: RADIOLOGY

## 2024-03-25 PROCEDURE — 99214 OFFICE O/P EST MOD 30 MIN: CPT | Mod: 25,S$GLB,, | Performed by: NURSE PRACTITIONER

## 2024-03-25 PROCEDURE — 96372 THER/PROPH/DIAG INJ SC/IM: CPT | Mod: S$GLB,,, | Performed by: NURSE PRACTITIONER

## 2024-03-25 PROCEDURE — 72100 X-RAY EXAM L-S SPINE 2/3 VWS: CPT | Mod: S$GLB,,, | Performed by: RADIOLOGY

## 2024-03-25 RX ORDER — KETOROLAC TROMETHAMINE 30 MG/ML
30 INJECTION, SOLUTION INTRAMUSCULAR; INTRAVENOUS
Status: COMPLETED | OUTPATIENT
Start: 2024-03-25 | End: 2024-03-25

## 2024-03-25 RX ORDER — CYCLOBENZAPRINE HCL 5 MG
5-10 TABLET ORAL 3 TIMES DAILY PRN
Qty: 30 TABLET | Refills: 0 | Status: SHIPPED | OUTPATIENT
Start: 2024-03-25 | End: 2024-03-25

## 2024-03-25 RX ORDER — METHOCARBAMOL 500 MG/1
500 TABLET, FILM COATED ORAL NIGHTLY PRN
Qty: 30 TABLET | Refills: 0 | Status: SHIPPED | OUTPATIENT
Start: 2024-03-25 | End: 2024-06-11

## 2024-03-25 RX ORDER — MELOXICAM 15 MG/1
15 TABLET ORAL DAILY
Qty: 14 TABLET | Refills: 0 | Status: SHIPPED | OUTPATIENT
Start: 2024-03-25 | End: 2024-04-08

## 2024-03-25 RX ORDER — TRAMADOL HYDROCHLORIDE 50 MG/1
50 TABLET ORAL NIGHTLY
Qty: 10 TABLET | Refills: 0 | Status: SHIPPED | OUTPATIENT
Start: 2024-03-25 | End: 2024-04-04

## 2024-03-25 RX ADMIN — KETOROLAC TROMETHAMINE 30 MG: 30 INJECTION, SOLUTION INTRAMUSCULAR; INTRAVENOUS at 01:03

## 2024-03-25 NOTE — PATIENT INSTRUCTIONS
- Follow up with your PCP or specialty clinic as directed in the next 1-2 weeks if not improved or as needed.  You can call [phone] to schedule an appointment with the appropriate provider.    - Go to the ER or seek medical attention immediately if you develop new or worsening symptoms.    - You must understand that you have received an Urgent Care treatment only and that you may be released before all of your medical problems are known or treated.   - You, the patient, will arrange for follow up care as instructed.   - If your condition worsens or fails to improve we recommend that you receive another evaluation at the ER immediately or contact your PCP to discuss your concerns or return here.

## 2024-04-05 ENCOUNTER — TELEPHONE (OUTPATIENT)
Dept: FAMILY MEDICINE | Facility: CLINIC | Age: 64
End: 2024-04-05
Payer: COMMERCIAL

## 2024-04-05 NOTE — TELEPHONE ENCOUNTER
Spoke to patient and she stated since she change insurance  she would like to change from Ozempic to Mounjaro instead since the insurance will cover the RX. Patient is asking can it be set to the [facility] pharmacy.

## 2024-04-05 NOTE — TELEPHONE ENCOUNTER
----- Message from Beverly Klein sent at 4/5/2024  4:04 PM CDT -----  Regarding: [phone]  Type: Patient Call Back    Who called:self     What is the request in detail: she states the ozempic is $500 with her insurance that insurance cover Mounjaro and will like to know if she can get on the Mounjaro instead     Can the clinic reply by [facility]?no     Would the patient rather a call back or a response via [facility]? Call back     Best call back number:[phone]      Additional Information:

## 2024-04-08 ENCOUNTER — PATIENT MESSAGE (OUTPATIENT)
Dept: FAMILY MEDICINE | Facility: CLINIC | Age: 64
End: 2024-04-08
Payer: COMMERCIAL

## 2024-04-08 ENCOUNTER — TELEPHONE (OUTPATIENT)
Dept: FAMILY MEDICINE | Facility: CLINIC | Age: 64
End: 2024-04-08
Payer: COMMERCIAL

## 2024-04-08 DIAGNOSIS — E66.01 CLASS 3 SEVERE OBESITY DUE TO EXCESS CALORIES WITH SERIOUS COMORBIDITY AND BODY MASS INDEX (BMI) OF 50.0 TO 59.9 IN ADULT: ICD-10-CM

## 2024-04-08 RX ORDER — TIRZEPATIDE 2.5 MG/.5ML
2.5 INJECTION, SOLUTION SUBCUTANEOUS
Qty: 4 PEN | Refills: 1 | Status: SHIPPED | OUTPATIENT
Start: 2024-04-08 | End: 2024-04-10 | Stop reason: ALTCHOICE

## 2024-04-08 NOTE — TELEPHONE ENCOUNTER
----- Message from Man Mooney sent at 4/8/2024 12:22 PM CDT -----  Regarding: Self .[phone]  Type: Patient Call Back     What is the request in detail: Pt called and stated that she is at the pharmacy and staff sent the same medication instead of the monjaro that she wants due to her insurance covering the cost now.     Can the clinic reply by [facility]? No     Would the patient rather a call back or a response via [facility]? Call back    Best call back number: .[phone]      Additional Information:    Thank you.

## 2024-04-08 NOTE — TELEPHONE ENCOUNTER
Spoke with patient she states that her insurance company is covering monjauro now and is asking for a prescription.

## 2024-04-08 NOTE — TELEPHONE ENCOUNTER
----- Message from Man Mooney sent at 4/8/2024 10:34 AM CDT -----  Regarding: Self .[phone]  Type: Patient Call Back     What is the request in detail: Pt stated she would like to be prescribed monjaro due to her insurance covering it now     Can the clinic reply by [facility]? No     Would the patient rather a call back or a response via [facility]? Call back    Best call back number: .[phone]      Additional Information:    Thank you.

## 2024-04-10 ENCOUNTER — PATIENT MESSAGE (OUTPATIENT)
Dept: FAMILY MEDICINE | Facility: CLINIC | Age: 64
End: 2024-04-10
Payer: COMMERCIAL

## 2024-04-10 ENCOUNTER — TELEPHONE (OUTPATIENT)
Dept: FAMILY MEDICINE | Facility: CLINIC | Age: 64
End: 2024-04-10
Payer: COMMERCIAL

## 2024-04-10 DIAGNOSIS — E66.01 CLASS 3 SEVERE OBESITY DUE TO EXCESS CALORIES WITH SERIOUS COMORBIDITY AND BODY MASS INDEX (BMI) OF 50.0 TO 59.9 IN ADULT: Primary | ICD-10-CM

## 2024-04-10 DIAGNOSIS — E66.01 CLASS 3 SEVERE OBESITY DUE TO EXCESS CALORIES WITH SERIOUS COMORBIDITY AND BODY MASS INDEX (BMI) OF 50.0 TO 59.9 IN ADULT: ICD-10-CM

## 2024-04-10 RX ORDER — TIRZEPATIDE 2.5 MG/.5ML
2.5 INJECTION, SOLUTION SUBCUTANEOUS
COMMUNITY
End: 2024-04-10 | Stop reason: SDUPTHER

## 2024-04-10 RX ORDER — TIRZEPATIDE 2.5 MG/.5ML
2.5 INJECTION, SOLUTION SUBCUTANEOUS
Qty: 4 PEN | Refills: 2 | Status: SHIPPED | OUTPATIENT
Start: 2024-04-10 | End: 2024-04-10

## 2024-04-10 RX ORDER — TIRZEPATIDE 2.5 MG/.5ML
2.5 INJECTION, SOLUTION SUBCUTANEOUS
Qty: 4 PEN | Refills: 1 | Status: SHIPPED | OUTPATIENT
Start: 2024-04-10 | End: 2024-04-12

## 2024-04-10 NOTE — TELEPHONE ENCOUNTER
Spoke with patient informed that prior authorization was done ,patient yelling pharmacy informed that prescription cost would be 500.00 dollars. Patient informed that prior authorizations questions have to be answered according to patient diagnoses. Patient informed at this time most questions ask if patient has diabetes, and patient does have that diagnosis. Patient verbalized understanding at this time.

## 2024-04-10 NOTE — TELEPHONE ENCOUNTER
Patient would like new prescription sent to pharmacy in this encounter, medication will be paid for out of pocket. Due to insurance denying prescription prior authorization. Please be advised NP Oz , thank you.

## 2024-04-10 NOTE — TELEPHONE ENCOUNTER
Care Due:                  Date            Visit Type   Department     Provider  --------------------------------------------------------------------------------                                EP [facility]                              PRIMARY      MED/ INTERNAL  Last Visit: 09-      CARE ([facility])   MED/ PEDS      CARROLL GUERRA  Next Visit: None Scheduled  None         None Found                                                            Last  Test          Frequency    Reason                     Performed    Due Date  --------------------------------------------------------------------------------    Vitamin D...  12 months..  ergocalciferol...........  Not Found    Overdue    [employer] Embedded Care Due Messages. Reference number: [id number].   4/10/2024 12:01:52 PM CDT

## 2024-04-10 NOTE — TELEPHONE ENCOUNTER
Call placed to patient informed to follow up in 3 months after starting medication, per NP Oz. Patient verbalized understanding at this time, patient also informed that new prescription was sent to [facility].

## 2024-04-10 NOTE — TELEPHONE ENCOUNTER
----- Message from Ursula Estrada sent at 4/10/2024 11:38 AM CDT -----  Regarding: self    Type: Patient Call Back     Who called:self     What is the request in detail:pt is stating she has requested mounjuro 3 times since her ins isnt covering the previously prescribed medication. Pt is requesting I send to pcp and care support team     Can the clinic reply by [facility]? No     Would the patient rather a call back or a response via [facility]? Call back     Best call back number: [phone]       Additional Information:   [id number] - [place] - [address] [id number] [facility]  [place] [address]  Phone: [phone] Fax: [phone]           Thank you.

## 2024-04-11 ENCOUNTER — TELEPHONE (OUTPATIENT)
Dept: FAMILY MEDICINE | Facility: CLINIC | Age: 64
End: 2024-04-11
Payer: COMMERCIAL

## 2024-04-11 DIAGNOSIS — E66.01 CLASS 3 SEVERE OBESITY DUE TO EXCESS CALORIES WITH SERIOUS COMORBIDITY AND BODY MASS INDEX (BMI) OF 50.0 TO 59.9 IN ADULT: ICD-10-CM

## 2024-04-11 NOTE — TELEPHONE ENCOUNTER
----- Message from Tito Proctor sent at 4/11/2024  3:52 PM CDT -----  Regarding: pharmacy  Type: Patient Call Back    Who called:pharmacy    What is the request in detail:calling to get instructions on tirzepatide, weight loss, (ZEPBOUND) 2.5 mg/0.5 mL PnIj   Need authorization to compound this medication     Can the clinic reply by [facility]?no    Would the patient rather a call back or a response via [facility]? callback    Best call back number:[phone]    Additional Information:

## 2024-04-12 ENCOUNTER — TELEPHONE (OUTPATIENT)
Dept: FAMILY MEDICINE | Facility: CLINIC | Age: 64
End: 2024-04-12
Payer: COMMERCIAL

## 2024-04-12 RX ORDER — TIRZEPATIDE 2.5 MG/.5ML
2.5 INJECTION, SOLUTION SUBCUTANEOUS
Qty: 4 PEN | Refills: 1 | Status: SHIPPED | OUTPATIENT
Start: 2024-04-12

## 2024-04-12 NOTE — TELEPHONE ENCOUNTER
"----- Message from Verito Montaño sent at 4/12/2024  2:14 PM CDT -----  Regarding: self  Type:  Patient Returning Call         Who Called: self          Who Left Message for Patient:         Does the patient know what this is regarding?: prescription:needs to say "OK for compound"          Would the patient rather a call back or a response via [facility]? Call back          Best Call Back Number:[phone]      "

## 2024-04-12 NOTE — TELEPHONE ENCOUNTER
Spoke with Ana with pharmacy she stated she does see Ok fo compound in notes. Pt notified everything was clarified.

## 2024-04-15 ENCOUNTER — TELEPHONE (OUTPATIENT)
Dept: ORTHOPEDICS | Facility: CLINIC | Age: 64
End: 2024-04-15
Payer: COMMERCIAL

## 2024-04-23 ENCOUNTER — OFFICE VISIT (OUTPATIENT)
Dept: FAMILY MEDICINE | Facility: CLINIC | Age: 64
End: 2024-04-23
Payer: COMMERCIAL

## 2024-04-23 VITALS
HEIGHT: 65 IN | HEART RATE: 85 BPM | BODY MASS INDEX: 48.58 KG/M2 | DIASTOLIC BLOOD PRESSURE: 88 MMHG | WEIGHT: 291.56 LBS | TEMPERATURE: 98 F | OXYGEN SATURATION: 97 % | SYSTOLIC BLOOD PRESSURE: 136 MMHG

## 2024-04-23 DIAGNOSIS — M54.31 RIGHT SIDED SCIATICA: Primary | ICD-10-CM

## 2024-04-23 PROBLEM — Z71.84 TRAVEL ADVICE ENCOUNTER: Status: RESOLVED | Noted: 2023-10-16 | Resolved: 2024-04-23

## 2024-04-23 PROCEDURE — 3075F SYST BP GE 130 - 139MM HG: CPT | Mod: CPTII,S$GLB,, | Performed by: FAMILY MEDICINE

## 2024-04-23 PROCEDURE — 3079F DIAST BP 80-89 MM HG: CPT | Mod: CPTII,S$GLB,, | Performed by: FAMILY MEDICINE

## 2024-04-23 PROCEDURE — 99999 PR PBB SHADOW E&M-EST. PATIENT-LVL IV: CPT | Mod: PBBFAC,,, | Performed by: FAMILY MEDICINE

## 2024-04-23 PROCEDURE — 96372 THER/PROPH/DIAG INJ SC/IM: CPT | Mod: S$GLB,,, | Performed by: FAMILY MEDICINE

## 2024-04-23 PROCEDURE — 3008F BODY MASS INDEX DOCD: CPT | Mod: CPTII,S$GLB,, | Performed by: FAMILY MEDICINE

## 2024-04-23 PROCEDURE — 99214 OFFICE O/P EST MOD 30 MIN: CPT | Mod: 25,S$GLB,, | Performed by: FAMILY MEDICINE

## 2024-04-23 PROCEDURE — 1159F MED LIST DOCD IN RCRD: CPT | Mod: CPTII,S$GLB,, | Performed by: FAMILY MEDICINE

## 2024-04-23 RX ORDER — KETOROLAC TROMETHAMINE 30 MG/ML
60 INJECTION, SOLUTION INTRAMUSCULAR; INTRAVENOUS
Status: COMPLETED | OUTPATIENT
Start: 2024-04-23 | End: 2024-04-23

## 2024-04-23 RX ORDER — TRAMADOL HYDROCHLORIDE 50 MG/1
50 TABLET ORAL EVERY 12 HOURS PRN
Qty: 14 TABLET | Refills: 0 | Status: SHIPPED | OUTPATIENT
Start: 2024-04-23 | End: 2024-05-21

## 2024-04-23 RX ADMIN — KETOROLAC TROMETHAMINE 60 MG: 30 INJECTION, SOLUTION INTRAMUSCULAR; INTRAVENOUS at 02:04

## 2024-04-23 NOTE — PROGRESS NOTES
[facility]  Progress Note    SUBJECTIVE:     Chief Complaint   Patient presents with    Sciatica       HPI   Bambi Carrera  is a 64 y.o. female here for c/o R leg pain which radiates down to foot. Rates pain as moderate-severe. No falls/trauma. Causes her to limp at times. Has been going on for weeks. Tried multiple different muscle relaxers and nsaids without relief.     Review of patient's allergies indicates:  No Known Allergies    Past Medical History:   Diagnosis Date    Borderline diabetic     Depression     Eye injury     fb os     Hypertension     Menopause     Nuclear sclerosis of both eyes 7/1/2019     Past Surgical History:   Procedure Laterality Date    COLONOSCOPY N/A 7/15/2021    Procedure: COLONOSCOPY;  Surgeon: Geetha Walton MD;  Location: [facility];  Service: Endoscopy;  Laterality: N/A;  Fully Vaccinated 3/25/21  see media tab 6/1/21, prep instr portal, and mailed -ml    LASIK Bilateral 07/15/2016     Family History   Problem Relation Name Age of Onset    Cancer Mother      No Known Problems Father      No Known Problems Sister      No Known Problems Brother      No Known Problems Maternal Aunt      No Known Problems Maternal Uncle      No Known Problems Paternal Aunt      No Known Problems Paternal Uncle      No Known Problems Maternal Grandmother      No Known Problems Maternal Grandfather      No Known Problems Paternal Grandmother      No Known Problems Paternal Grandfather      Amblyopia Neg Hx      Blindness Neg Hx      Cataracts Neg Hx      Diabetes Neg Hx      Glaucoma Neg Hx      Hypertension Neg Hx      Macular degeneration Neg Hx      Retinal detachment Neg Hx      Strabismus Neg Hx      Stroke Neg Hx      Thyroid disease Neg Hx       Social History     Tobacco Use    Smoking status: Never    Smokeless tobacco: Never   Substance Use Topics    Alcohol use: No     Alcohol/week: 0.0 standard drinks of alcohol    Drug use: No        Review of Systems   Constitutional:   Negative for chills, fever and malaise/fatigue.   Respiratory: Negative.     Cardiovascular: Negative.    Gastrointestinal: Negative.    Genitourinary: Negative.  Negative for dysuria, flank pain, frequency, hematuria and urgency.   Musculoskeletal:  Positive for back pain (lower buttock pain (right)). Negative for falls and joint pain.   Neurological:  Negative for dizziness and weakness.        + sciatica     OBJECTIVE:     Vitals:    04/23/24 1343   BP: 136/88   Pulse: 85   Temp: 97.7 °F (36.5 °C)     Body mass index is 48.52 kg/m².    Physical Exam  Constitutional:       General: She is in acute distress (mild).      Appearance: She is not diaphoretic.   HENT:      Head: Normocephalic and atraumatic.   Musculoskeletal:         General: Tenderness (to palpation of right lower buttocks, with increased pain on internal rotation of hip) present. Normal range of motion.   Skin:     General: Skin is warm.      Findings: No erythema or rash.   Neurological:      Mental Status: She is alert and oriented to person, place, and time.         Old records were reviewed. Labs and/or images were independently reviewed.    ASSESSMENT     1. Right sided sciatica        PLAN:     Right sided sciatica  -     traMADoL (ULTRAM) 50 mg tablet; Take 1 tablet (50 mg total) by mouth every 12 (twelve) hours as needed for Pain.  Dispense: 14 tablet; Refill: 0  -     ketorolac injection 60 mg  -     Patient counseled on good posture and stretching exercises. Continue to place ice packs on affected areas 3-4 times daily. Take medications as prescribed. Instructed patient to call MD if symptoms persist or worsen.  -     PT/OT to eval and treat.      RTC PRZAIN Whitmore MD  04/23/2024 2:13 PM

## 2024-04-30 ENCOUNTER — TELEPHONE (OUTPATIENT)
Dept: FAMILY MEDICINE | Facility: CLINIC | Age: 64
End: 2024-04-30
Payer: COMMERCIAL

## 2024-04-30 DIAGNOSIS — Z12.31 BREAST CANCER SCREENING BY MAMMOGRAM: Primary | ICD-10-CM

## 2024-04-30 NOTE — TELEPHONE ENCOUNTER
----- Message from Chanell Palafox sent at 4/30/2024  1:05 PM CDT -----  Type:  Mammogram    Caller is requesting to schedule their annual mammogram appointment.  Order is not listed in EPIC.  Please enter order and contact patient to schedule.  Name of Caller: self     Where would they like the mammogram performed? [facility]    Would the patient rather a call back or a response via [facility]? call    Best Call Back Number: .[phone]

## 2024-05-06 ENCOUNTER — HOSPITAL ENCOUNTER (OUTPATIENT)
Dept: RADIOLOGY | Facility: HOSPITAL | Age: 64
Discharge: HOME | End: 2024-05-06
Attending: FAMILY MEDICINE
Payer: COMMERCIAL

## 2024-05-06 DIAGNOSIS — Z12.31 ENCOUNTER FOR SCREENING MAMMOGRAM FOR BREAST CANCER: ICD-10-CM

## 2024-05-06 DIAGNOSIS — Z12.31 BREAST CANCER SCREENING BY MAMMOGRAM: ICD-10-CM

## 2024-05-06 PROCEDURE — 77067 SCR MAMMO BI INCL CAD: CPT | Mod: 26,,, | Performed by: RADIOLOGY

## 2024-05-06 PROCEDURE — 77067 SCR MAMMO BI INCL CAD: CPT | Mod: TC,PO

## 2024-05-06 PROCEDURE — 77063 BREAST TOMOSYNTHESIS BI: CPT | Mod: 26,,, | Performed by: RADIOLOGY

## 2024-05-13 ENCOUNTER — CLINICAL SUPPORT (OUTPATIENT)
Dept: REHABILITATION | Facility: HOSPITAL | Age: 64
End: 2024-05-13
Attending: PODIATRIST
Payer: COMMERCIAL

## 2024-05-13 ENCOUNTER — TELEPHONE (OUTPATIENT)
Dept: FAMILY MEDICINE | Facility: CLINIC | Age: 64
End: 2024-05-13
Payer: COMMERCIAL

## 2024-05-13 DIAGNOSIS — M54.31 RIGHT SIDED SCIATICA: ICD-10-CM

## 2024-05-13 DIAGNOSIS — R26.89 GAIT, ANTALGIC: ICD-10-CM

## 2024-05-13 DIAGNOSIS — M54.41 ACUTE RIGHT-SIDED LOW BACK PAIN WITH RIGHT-SIDED SCIATICA: Primary | ICD-10-CM

## 2024-05-13 DIAGNOSIS — R29.3 POSTURE IMBALANCE: ICD-10-CM

## 2024-05-13 DIAGNOSIS — M77.40 METATARSALGIA, UNSPECIFIED LATERALITY: ICD-10-CM

## 2024-05-13 DIAGNOSIS — R29.898 WEAKNESS OF RIGHT LOWER EXTREMITY: ICD-10-CM

## 2024-05-13 DIAGNOSIS — M54.31 RIGHT SIDED SCIATICA: Primary | ICD-10-CM

## 2024-05-13 PROCEDURE — 97161 PT EVAL LOW COMPLEX 20 MIN: CPT | Mod: PN

## 2024-05-13 PROCEDURE — 97112 NEUROMUSCULAR REEDUCATION: CPT | Mod: PN

## 2024-05-13 NOTE — TELEPHONE ENCOUNTER
Received request from patient that Physical Therapy needs a referral sent over for Right sided Sciatica pain. Spoke with Anya in Outpatient physical Therapy department requesting for referral to be put in Epic. Provider will be notified.

## 2024-05-13 NOTE — TELEPHONE ENCOUNTER
----- Message from Tito Hitesh sent at 5/13/2024 12:44 PM CDT -----  Regarding: self  Type: Patient Call Back    Who called:self    What is the request in detail:pt is at [facility] and the doctor is needing a referral for her lower back sent over     Can the clinic reply by [facility]?no    Would the patient rather a call back or a response via [facility]? callback    Best call back number:[phone]    Additional Information:

## 2024-05-14 NOTE — PLAN OF CARE
[facility]  Physical Therapy Initial Evaluation  Date: 5/13/2024   Name: Bambi Carrera  Clinic Number: [id number]    Therapy Diagnosis:   Encounter Diagnoses   Name Primary?    Metatarsalgia, unspecified laterality     Acute right-sided low back pain with right-sided sciatica Yes    Posture imbalance     Gait, antalgic     Weakness of right lower extremity      Physician: Wong Whitmore MD     Physician Orders: PT Eval and Treat   Medical Diagnosis from Referral: Right sided sciatica   Evaluation Date: 5/13/2024  Authorization Period Expiration: 5-13-25  Plan of Care Expiration: 8-13-24  Visit # / Visits authorized: 1/ 1   Progress Note Due: 6-13-24  FOTO: 1/ 1    Precautions: Standard and HTN     Time In: 12:00 pm  Time Out: 12:50 pm  Total Appointment Time (timed & untimed codes): 50 minutes    Subjective   Date of onset: 5 Months   History of current condition - Nikki reports: She went to [facility]. She stayed 16 days. She states she start to feel R sciatica pain after. She thinks she walked a lot there. She feels sharp pain. Pt states R LE pain is waking up at night. Pt denies any numbness and tingling.  She states sittign down on stool R feet get numb    ERICA: Walking  Any Bowel and Bladder movement issues:   Any falls:  No   Any dizziness: No   Any Headache:  No   Any injection in lower back: steroid or epidural:  No   Pain radiates:  R LE   Pain constant or intermitting:Constant     Pain:  Current 5/10, worst 10/10, best 5/10   Location: right lower back pain radiated towards R LE   Description: Sharp  Aggravating Factors: Sitting, Standing, Laying, Walking, Extension, Flexing, Lifting, and Getting out of bed/chair and stairs   Easing Factors: tremadol and muscles relaxers     Prior Therapy: no  Social History:  lives with their family  Occupation: home health   Prior Level of Function: Independent   Current Level of Function: independent     Pts goals: Eliminate the lower back  pain     Imaging, bone scan films:   FINDINGS:  The alignment demonstrates a subtle grade 1 anterolisthesis of L3 relative to L4 and L4 relative to L5.     The vertebral body heights are well maintained.     Mild disc space narrowing L3-4.     Small anterior and lateral marginal osteophytes noted at L2 through L5..     Mild sclerosis and osseous hypertrophy of the facet joints at L3-4 and L4-5.     No fracture, no osseous lesions.     The sacroiliac joints appear symmetrical on the AP view.     The soft tissues appear normal.     Impression:     Spondylosis of the lumbar spine, no definite acute process seen.       Medical History:   Past Medical History:   Diagnosis Date    Borderline diabetic     Depression     Eye injury     fb os     Hypertension     Menopause     Nuclear sclerosis of both eyes 7/1/2019       Surgical History:   Bambi Carrera  has a past surgical history that includes LASIK (Bilateral, 07/15/2016) and Colonoscopy (N/A, 7/15/2021).    Medications:   Bambi has a current medication list which includes the following prescription(s): albuterol, amlodipine, azelastine, benzonatate, diclofenac sodium, ergocalciferol, famotidine, hydrochlorothiazide, levocetirizine, loratadine, meclizine, methocarbamol, multivitamin, naproxen, pantoprazole, zepbound, tizanidine, and tramadol.    Allergies:   Review of patient's allergies indicates:  No Known Allergies       Objective       Observation:     Posture Alignment: slouched posture;    Sensation: intact to light touch    DTR:: intact to light touch    GAIT DEVIATIONS: Bambi displays antalgic gait    Lumbar Range of Motion:    %   Flexion WFL with R lower back pain     Extension WFL with R lower back pain     Left Side Bending WFL with R lower back pain   Right Side Bending WFL with R lower back pain   Left rotation   WFL with R lower back pain   Right Rotation   WFL with R lower back pain    *= pain    Passive hip ROM in degrees:     Left Right    IR WFL WFL with lower back pain   ER WFL WFL R lower back     Lower Extremity Strength    Right LE  Left LE    Hip flexion: 4-/5 Hip flexion: 4+/5   Knee extension: 4-/5 Knee extension: 4+/5   Knee flexion: 4-/5 Knee flexion: 4+/5   Hip IR: 4-/5 Hip IR: 4+/5   Hip ER: 4-/5 Hip ER: 4+/5   Hip extension:  NP Hip extension: NP   Hip abduction: 4-/5 Hip abduction: 4+/5   Hip adduction: 4-/5 Hip adduction 4+/5   Ankle dorsiflexion: 4+/5 Ankle dorsiflexion: 4+/5   Ankle plantarflexion: 4+/5 Ankle plantarflexion: 4+/5     Special Tests:  -RAYMUNDO: unable to perform due to pain  -Scour: negative  -Repeated Flexion: positive  -Repeated Ext:positive  -Bridge Test: positive          Neuro Dynamic Testing:    Sciatic nerve:      SLR: negative     Neural Tension:     Slump test: negative      Palpation: Increase R lower back pain       PT Evaluation Completed? Yes  Discussed Plan of Care with patient: Yes                   TREATMENT   Treatment Time In: 12:40 pm  Treatment Time Out: 12:50 pm  Total Treatment time separate from Evaluation: 10 minutes    Nikki participated in neuromuscular re-education activities to improve: Posture and muscles motor control  for 10 minutes. The following activities were included:  Supine sciatica nerve glides  Supine hip flexors stretch  Posterior pelvic tilt       Home Exercises and Patient Education Provided    Education provided:   - Perform HEP 2 times per day     Written Home Exercises Provided: Patient instructed to cont prior HEP.  Exercises were reviewed and Nikki was able to demonstrate them prior to the end of the session.  Nikki demonstrated good  understanding of the education provided.     See EMR under Patient Instructions for exercises provided 5/13/2024.    Assessment   Bambi is a 64 y.o. female referred to outpatient Physical Therapy with a medical diagnosis of Right sided sciatica . Pt presents to therapy with acute lower back pain. Pt demonstrated negative results test for R  SLR and slump tests today, but she has pain radiating towards R LE. Pt also demonstrated increase of R groin pain during assessment. Pt demonstrated increase lumbar lordosis in seated and standing posture. Pt ambulated with antalgic gait pattern. Pt  has WFL lumbar range of motion, but she has pain during motion. R LE demonstrated weakness compared to L side. During palpation, increase R lower back pain. Pain was somewhat limiting factor during eval. Pt has sign and symptoms of R sciatica nerve pathology. Pt will benefit from skilled PT services to decrease pain to return to PLOF.     Pt prognosis is Good.   Pt will benefit from skilled outpatient Physical Therapy to address the deficits stated above and in the chart below, provide pt/family education, and to maximize pt's level of independence.     Plan of care discussed with patient: Yes  Pt's spiritual, cultural and educational needs considered and patient is agreeable to the plan of care and goals as stated below:     Anticipated Barriers for therapy: Scheduling issues     Medical Necessity is demonstrated by the following  History  Co-morbidities and personal factors that may impact the plan of care Co-morbidities:   HTN    Personal Factors:   no deficits     low   Examination  Body Structures and Functions, activity limitations and participation restrictions that may impact the plan of care Body Regions:   back    Body Systems:    strength  gait  transfers  transitions  motor control  motor learning    Participation Restrictions:   Work    Activity limitations:   Learning and applying knowledge  no deficits    General Tasks and Commands  no deficits    Communication  no deficits    Mobility  walking  moving around using equipment (WC)  using transportation (bus, train, plane, car)  driving (bike, car, motorcycle)    Self care  no deficits    Domestic Life  shopping  cooking  doing house work (cleaning house, washing dishes,  laundry)    Interactions/Relationships  no deficits    Life Areas  no deficits    Community and Social Life  community life         Complexity: low  See FOTO outcome assessment   Clinical Presentation stable and uncomplicated low   Decision Making/ Complexity Score: low     GOALS: Short Term Goals:  4 weeks  1. Report decreased in pain at worse less than  <   / =  5  /10  to increase tolerance for functional activities.On going  2. Increased R LE  MMT 1/2  to increase tolerance for ADL and work activities.On going  3. Pt to tolerate HEP to improve strength and independence with ADL's.On going      Long Term Goals: 8 weeks  1. Report decreased in pain at worse less than  <   / =  2  /10  to increase tolerance for functional mobility.  On going  2. Increased R LE  MMT 1 grade to increase tolerance for ADL and work activities.On going  3. Pt will report at 80% or greater  limitation on FOTO Lumbar spine survey  to demonstrate decrease in disability and improvement in back pain.On going  4. Pt to be Independent with HEP to improve ROM and independence with ADL's. On going  5. Pt to demonstrate negative Bridge Test in order to show improved core strength for lumbar stabilization. On going  6. Pt reports able to sleep at least 6 to 7 hours per night without waking up due to lower back pain.   7. Pt repots improvement of 80% since initial eval to return to PLOF.       Plan   Plan of care Certification: 5/13/2024 to 8-13-24.    Outpatient Physical Therapy 2 times weekly for 12 weeks to include the following interventions: Cervical/Lumbar Traction, Gait Training, Manual Therapy, Moist Heat/ Ice, Neuromuscular Re-ed, Patient Education, Self Care, Therapeutic Activities, and Therapeutic Exercise. Dry needdionisio Frost, PT      I CERTIFY THE NEED FOR THESE SERVICES FURNISHED UNDER THIS PLAN OF TREATMENT AND WHILE UNDER MY CARE   Physician's comments:     Physician's Signature:  ___________________________________________________

## 2024-05-15 NOTE — TELEPHONE ENCOUNTER
Spoke to patient to let her be aware that MD sent over her Physical Therapy referral to address issues with her Sciatica

## 2024-05-20 DIAGNOSIS — M54.31 RIGHT SIDED SCIATICA: ICD-10-CM

## 2024-05-20 NOTE — TELEPHONE ENCOUNTER
No care due was identified.  [facility] Embedded Care Due Messages. Reference number: [id number].   5/20/2024 12:31:25 PM CDT

## 2024-05-21 RX ORDER — TRAMADOL HYDROCHLORIDE 50 MG/1
50 TABLET ORAL
Qty: 14 TABLET | Refills: 0 | Status: SHIPPED | OUTPATIENT
Start: 2024-05-21

## 2024-05-24 ENCOUNTER — CLINICAL SUPPORT (OUTPATIENT)
Dept: REHABILITATION | Facility: HOSPITAL | Age: 64
End: 2024-05-24
Payer: COMMERCIAL

## 2024-05-24 DIAGNOSIS — R26.89 GAIT, ANTALGIC: ICD-10-CM

## 2024-05-24 DIAGNOSIS — R29.898 WEAKNESS OF RIGHT LOWER EXTREMITY: ICD-10-CM

## 2024-05-24 DIAGNOSIS — M54.41 ACUTE RIGHT-SIDED LOW BACK PAIN WITH RIGHT-SIDED SCIATICA: Primary | ICD-10-CM

## 2024-05-24 DIAGNOSIS — R29.3 POSTURE IMBALANCE: ICD-10-CM

## 2024-05-24 PROCEDURE — 97110 THERAPEUTIC EXERCISES: CPT | Mod: PN,CQ

## 2024-05-24 NOTE — PROGRESS NOTES
"[facility]   Physical Therapy Treatment Note     Name: Bambi Carrera  Clinic Number: [id number]    Therapy Diagnosis:   Encounter Diagnoses   Name Primary?    Acute right-sided low back pain with right-sided sciatica Yes    Posture imbalance     Gait, antalgic     Weakness of right lower extremity      Physician: Rose Lynch DPM    Visit Date: 5/24/2024    Physician Orders: PT Eval and Treat   Medical Diagnosis from Referral: Right sided sciatica   Evaluation Date: 5/13/2024  Authorization Period Expiration: 5-13-25  Plan of Care Expiration: 8-13-24  Visit # / Visits authorized: 1/+ eval 10   Progress Note Due: 6-13-24  FOTO: 1/ 1     Precautions: Standard and HTN      Visit #: 1 of 10  PTA Visit #: 1  Time In: 11:00am  Time Out: 11:45am  Total Billable Time: 45 minutes    Subjective     Pt reports: she feel more pain on the inner thigh/groin area on the right. She can deal with the back, but it's more in the groin area that bothers her. Patient states she has sciatica nerve pain in the lower back. She has been doing her exercises from previous visit.   She was compliant with home exercise program.  Response to previous treatment: first treatment day   Functional change: ongoing back pain     Pain: 5/10 to low back, 10/10 to right inner thighs   Location: lower back and right inner thighs     Objective     Nikki received therapeutic exercises to develop strength, endurance, ROM, and flexibility for 45 minutes including:    Supine lateral trunk rotation, 2x10  Supine single knee to chest, 3 sets 15 sec holds bilateral   Supine sciatic nerve glide with ankle pumps, x 10 bilateral   Bent Knee Fall Out bilateral 2x10 GTB  Straight Leg Raise on RLE, x10 (AAROM by therapist)   Posterior pelvic tilts, x30   Supine hip flexors stretch on right side only, 3x30" holds   Standing heel raises, 2x10  Standing hip abduction bilateral, 2x10   Nu-steppers for 8 minutes Lv 1     *Printed home " exercise program and review with patient       Home Exercises Provided and Patient Education Provided     Written Home Exercises Provided: yes.  Exercises were reviewed and Nikki was able to demonstrate them prior to the end of the session.  Nikki demonstrated good  understanding of the education provided.     Education provided:   - home exercise program     Assessment   First treatment day since the evaluation for right sided back pain. Focused on gentle lumbar spinal mobility and legs strengthening to improve patient's symptoms. Patient does presents with increase right inner groin pain than low back this visit. In SLR, patient required AAROM by therapist to complete this exercise due to significant weakness and pain. Printed home exercise program and review with patient. Overall, patient responded fairly in today's session, will progress as tolerated.     Nikki Is progressing well towards her goals.   Pt prognosis is Good.     Pt will continue to benefit from skilled outpatient physical therapy to address the deficits listed in the problem list box on initial evaluation, provide pt/family education and to maximize pt's level of independence in the home and community environment.     Pt's spiritual, cultural and educational needs considered and pt agreeable to plan of care and goals.     Anticipated barriers to physical therapy: Scheduling issues      Goals:   Short Term Goals:  4 weeks  1. Report decreased in pain at worse less than  <   / =  5  /10  to increase tolerance for functional activities.On going  2. Increased R LE  MMT 1/2  to increase tolerance for ADL and work activities.On going  3. Pt to tolerate HEP to improve strength and independence with ADL's.On going     Long Term Goals: 8 weeks  1. Report decreased in pain at worse less than  <   / =  2  /10  to increase tolerance for functional mobility.  On going  2. Increased R LE  MMT 1 grade to increase tolerance for ADL and work activities.On going  3.  Pt will report at 80% or greater  limitation on FOTO Lumbar spine survey  to demonstrate decrease in disability and improvement in back pain.On going  4. Pt to be Independent with HEP to improve ROM and independence with ADL's. On going  5. Pt to demonstrate negative Bridge Test in order to show improved core strength for lumbar stabilization. On going  6. Pt reports able to sleep at least 6 to 7 hours per night without waking up due to lower back pain.   7. Pt repots improvement of 80% since initial eval to return to PLOF.     Plan   Plan of care Certification: 5/13/2024 to 8-13-24.     Outpatient Physical Therapy 2 times weekly for 12 weeks to include the following interventions: Cervical/Lumbar Traction, Gait Training, Manual Therapy, Moist Heat/ Ice, Neuromuscular Re-ed, Patient Education, Self Care, Therapeutic Activities, and Therapeutic Exercise. Dry needling.    Continue with plan of care     Brandy Najera, PTA   5/24/2024

## 2024-05-31 ENCOUNTER — CLINICAL SUPPORT (OUTPATIENT)
Dept: REHABILITATION | Facility: HOSPITAL | Age: 64
End: 2024-05-31
Payer: COMMERCIAL

## 2024-05-31 DIAGNOSIS — M54.41 ACUTE RIGHT-SIDED LOW BACK PAIN WITH RIGHT-SIDED SCIATICA: Primary | ICD-10-CM

## 2024-05-31 DIAGNOSIS — R26.89 GAIT, ANTALGIC: ICD-10-CM

## 2024-05-31 DIAGNOSIS — R29.898 WEAKNESS OF RIGHT LOWER EXTREMITY: ICD-10-CM

## 2024-05-31 DIAGNOSIS — R29.3 POSTURE IMBALANCE: ICD-10-CM

## 2024-05-31 PROCEDURE — 97140 MANUAL THERAPY 1/> REGIONS: CPT | Mod: PN

## 2024-05-31 PROCEDURE — 97112 NEUROMUSCULAR REEDUCATION: CPT | Mod: PN

## 2024-05-31 PROCEDURE — 97110 THERAPEUTIC EXERCISES: CPT | Mod: PN

## 2024-05-31 NOTE — PROGRESS NOTES
"[facility]   Physical Therapy Treatment Note     Name: Bambi Carrera  Clinic Number: [id number]    Therapy Diagnosis:   Encounter Diagnoses   Name Primary?    Acute right-sided low back pain with right-sided sciatica Yes    Posture imbalance     Gait, antalgic     Weakness of right lower extremity      Physician: Rose Lynch DPM    Visit Date: 5/31/2024    Physician Orders: PT Eval and Treat   Medical Diagnosis from Referral: Right sided sciatica   Evaluation Date: 5/13/2024  Authorization Period Expiration: 5-13-25  Plan of Care Expiration: 8-13-24  Visit # / Visits authorized: 1/+ eval 10   Progress Note Due: 6-13-24  FOTO: 1/ 1     Precautions: Standard and HTN      Visit #: 1 of 10  PTA Visit #: 1  Time In: 1100  Time Out: 1200  Total Billable Time: 55 minutes    Subjective     Pt reports: she feel more pain on the inner thigh/groin area on the right. She can deal with the back, but it's more in the groin area that bothers her. Patient states she has sciatica nerve pain in the lower back. She has been doing her exercises from previous visit.   She was compliant with home exercise program.  Response to previous treatment: first treatment day   Functional change: ongoing back pain     Pain: 5/10 to low back, 10/10 to right inner thighs   Location: lower back and right inner thighs     Objective       Patient received neuromuscular re-education activities to improve: Coordination, Kinesthetic, Sense, Proprioception, and Posture for 35 minutes. The following activities were included:     Supine lateral trunk rotation, 2x10  Supine single knee to chest, 3 sets 15 sec holds bilateral   Supine sciatic nerve glide with ankle pumps, x 10 bilateral   Bent Knee Fall Out bilateral 2x10 GTB  Straight Leg Raise on RLE, x10 (AAROM by therapist)   Posterior pelvic tilts, x30   TRA recruitment 2 x 10  Supine hip flexors stretch on right side only, 3x30" holds     Nikki received therapeutic " exercises to develop strength, endurance, ROM, and flexibility for 15 minutes including:  NU Step for 8 minutes Lv 1   Standing heel raises, 2x10  Standing hip abduction bilateral, 2x10     Manual long arc hip distraction x 8 min    Home Exercises Provided and Patient Education Provided     Written Home Exercises Provided: yes.  Exercises were reviewed and Nikki was able to demonstrate them prior to the end of the session.  Nikki demonstrated good  understanding of the education provided.     Education provided:   - home exercise program     Assessment     Pt presents ambulating with guarded gait, decreased lumbopelvic mobility, decreased stance on right LE.  Requires Mod cueing with pelvic mobility and abdominal response with supine therex.  No c/o increased discomfort with prescribed activities.  Good response to initiation of lumbar stabilization therex.       Nikki Is progressing well towards her goals.   Pt prognosis is Good.     Pt will continue to benefit from skilled outpatient physical therapy to address the deficits listed in the problem list box on initial evaluation, provide pt/family education and to maximize pt's level of independence in the home and community environment.     Pt's spiritual, cultural and educational needs considered and pt agreeable to plan of care and goals.     Anticipated barriers to physical therapy: Scheduling issues      Goals:   Short Term Goals:  4 weeks  1. Report decreased in pain at worse less than  <   / =  5  /10  to increase tolerance for functional activities.On going  2. Increased R LE  MMT 1/2  to increase tolerance for ADL and work activities.On going  3. Pt to tolerate HEP to improve strength and independence with ADL's.On going     Long Term Goals: 8 weeks  1. Report decreased in pain at worse less than  <   / =  2  /10  to increase tolerance for functional mobility.  On going  2. Increased R LE  MMT 1 grade to increase tolerance for ADL and work activities.On  going  3. Pt will report at 80% or greater  limitation on FOTO Lumbar spine survey  to demonstrate decrease in disability and improvement in back pain.On going  4. Pt to be Independent with HEP to improve ROM and independence with ADL's. On going  5. Pt to demonstrate negative Bridge Test in order to show improved core strength for lumbar stabilization. On going  6. Pt reports able to sleep at least 6 to 7 hours per night without waking up due to lower back pain.   7. Pt repots improvement of 80% since initial eval to return to PLOF.     Plan     Continue with established  PT Plan of Care towards patient goals.    Rell Mcconnell, PT   5/31/2024

## 2024-05-31 NOTE — PROGRESS NOTES
"[facility]   Physical Therapy Treatment Note     Name: Bambi Carrera  Clinic Number: [id number]    Therapy Diagnosis:   No diagnosis found.    Physician: Rose Lynch DPM    Visit Date: 5/31/2024    Physician Orders: PT Eval and Treat   Medical Diagnosis from Referral: Right sided sciatica   Evaluation Date: 5/13/2024  Authorization Period Expiration: 5-13-25  Plan of Care Expiration: 8-13-24  Visit # / Visits authorized: 1/+ eval 10   Progress Note Due: 6-13-24  FOTO: 1/ 1     Precautions: Standard and HTN      Visit #: 1 of 10  PTA Visit #: 1  Time In: 11:00am  Time Out: 11:45am  Total Billable Time: 45 minutes    Subjective     Pt reports: she feel more pain on the inner thigh/groin area on the right. She can deal with the back, but it's more in the groin area that bothers her. Patient states she has sciatica nerve pain in the lower back. She has been doing her exercises from previous visit.   She was compliant with home exercise program.  Response to previous treatment: first treatment day   Functional change: ongoing back pain     Pain: 5/10 to low back, 10/10 to right inner thighs   Location: lower back and right inner thighs     Objective     Nikki received therapeutic exercises to develop strength, endurance, ROM, and flexibility for 45 minutes including:    Supine lateral trunk rotation, 2x10  Supine single knee to chest, 3 sets 15 sec holds bilateral   Supine sciatic nerve glide with ankle pumps, x 10 bilateral   Bent Knee Fall Out bilateral 2x10 GTB  Straight Leg Raise on RLE, x10 (AAROM by therapist)   Posterior pelvic tilts, x30   Supine hip flexors stretch on right side only, 3x30" holds   Standing heel raises, 2x10  Standing hip abduction bilateral, 2x10   Nu-steppers for 8 minutes Lv 1     *Printed home exercise program and review with patient       Home Exercises Provided and Patient Education Provided     Written Home Exercises Provided: yes.  Exercises were " reviewed and Nikki was able to demonstrate them prior to the end of the session.  Nikki demonstrated good  understanding of the education provided.     Education provided:   - home exercise program     Assessment   First treatment day since the evaluation for right sided back pain. Focused on gentle lumbar spinal mobility and legs strengthening to improve patient's symptoms. Patient does presents with increase right inner groin pain than low back this visit. In SLR, patient required AAROM by therapist to complete this exercise due to significant weakness and pain. Printed home exercise program and review with patient. Overall, patient responded fairly in today's session, will progress as tolerated.     Nikki Is progressing well towards her goals.   Pt prognosis is Good.     Pt will continue to benefit from skilled outpatient physical therapy to address the deficits listed in the problem list box on initial evaluation, provide pt/family education and to maximize pt's level of independence in the home and community environment.     Pt's spiritual, cultural and educational needs considered and pt agreeable to plan of care and goals.     Anticipated barriers to physical therapy: Scheduling issues      Goals:   Short Term Goals:  4 weeks  1. Report decreased in pain at worse less than  <   / =  5  /10  to increase tolerance for functional activities.On going  2. Increased R LE  MMT 1/2  to increase tolerance for ADL and work activities.On going  3. Pt to tolerate HEP to improve strength and independence with ADL's.On going     Long Term Goals: 8 weeks  1. Report decreased in pain at worse less than  <   / =  2  /10  to increase tolerance for functional mobility.  On going  2. Increased R LE  MMT 1 grade to increase tolerance for ADL and work activities.On going  3. Pt will report at 80% or greater  limitation on FOTO Lumbar spine survey  to demonstrate decrease in disability and improvement in back pain.On going  4. Pt to  be Independent with HEP to improve ROM and independence with ADL's. On going  5. Pt to demonstrate negative Bridge Test in order to show improved core strength for lumbar stabilization. On going  6. Pt reports able to sleep at least 6 to 7 hours per night without waking up due to lower back pain.   7. Pt repots improvement of 80% since initial eval to return to PLOF.     Plan   Plan of care Certification: 5/13/2024 to 8-13-24.     Outpatient Physical Therapy 2 times weekly for 12 weeks to include the following interventions: Cervical/Lumbar Traction, Gait Training, Manual Therapy, Moist Heat/ Ice, Neuromuscular Re-ed, Patient Education, Self Care, Therapeutic Activities, and Therapeutic Exercise. Dry needling.    Continue with plan of care     James Frost, PT   5/31/2024

## 2024-06-03 ENCOUNTER — TELEPHONE (OUTPATIENT)
Dept: FAMILY MEDICINE | Facility: CLINIC | Age: 64
End: 2024-06-03
Payer: COMMERCIAL

## 2024-06-03 ENCOUNTER — CLINICAL SUPPORT (OUTPATIENT)
Dept: REHABILITATION | Facility: HOSPITAL | Age: 64
End: 2024-06-03
Payer: COMMERCIAL

## 2024-06-03 DIAGNOSIS — R29.898 WEAKNESS OF RIGHT LOWER EXTREMITY: ICD-10-CM

## 2024-06-03 DIAGNOSIS — R29.3 POSTURE IMBALANCE: ICD-10-CM

## 2024-06-03 DIAGNOSIS — R26.89 GAIT, ANTALGIC: ICD-10-CM

## 2024-06-03 DIAGNOSIS — M54.41 ACUTE RIGHT-SIDED LOW BACK PAIN WITH RIGHT-SIDED SCIATICA: Primary | ICD-10-CM

## 2024-06-03 PROCEDURE — 97112 NEUROMUSCULAR REEDUCATION: CPT | Mod: PN,CQ

## 2024-06-03 PROCEDURE — 97110 THERAPEUTIC EXERCISES: CPT | Mod: PN,CQ

## 2024-06-03 NOTE — PROGRESS NOTES
"[facility]   Physical Therapy Treatment Note     Name: Bambi Carrera  Clinic Number: [id number]    Therapy Diagnosis:   Encounter Diagnoses   Name Primary?    Acute right-sided low back pain with right-sided sciatica Yes    Posture imbalance     Gait, antalgic     Weakness of right lower extremity      Physician: Rose Lynch DPM    Visit Date: 6/3/2024    Physician Orders: PT Eval and Treat   Medical Diagnosis from Referral: Right sided sciatica   Evaluation Date: 5/13/2024  Authorization Period Expiration: 5-13-25  Plan of Care Expiration: 8-13-24  Visit # / Visits authorized: 3/+ eval 10   Progress Note Due: 6-13-24  FOTO: 1/ 1     Precautions: Standard and HTN      Visit #: 3 of 10  PTA Visit #: 1  Time In: 1400  Time Out: 1440  Total Billable Time: 40 minutes    Subjective     Pt reports: she no longer has the right groin pain which is happy about. Patient states she does do her exercises at home.   She was compliant with home exercise program.  Response to previous treatment: no concerns   Functional change: ongoing back pain     Pain: 5/10 to low back, 0/10 to right inner thighs   Location: lower back and right inner thighs     Objective     Patient received neuromuscular re-education activities to improve: Coordination, Kinesthetic, Sense, Proprioception, and Posture for 25 minutes. The following activities were included:     Supine lateral trunk rotation, 2x10  Supine single knee to chest, 3 sets 15 sec holds bilateral (use a towel under knee to hold)   Supine sciatic nerve glide with ankle pumps, x 10 bilateral   Bent Knee Fall Out bilateral 2x10 GTB  Straight Leg Raise on RLE, x10 (AAROM by therapist)   Posterior pelvic tilts, x30 (needs tactile cues and demonstration for proper techniques)   TRA recruitment 2 x 10 (cues to draw belly button down the mat)   Supine hip flexors stretch on right side only, 3x30" holds     Nikki received therapeutic exercises to develop " strength, endurance, ROM, and flexibility for 15 minutes including:    NU Step for 8 minutes Lv 1 (patient prefer to perform this last towards end of session)   Standing heel raises at the head of mat, 2x10  Standing hip abduction bilateral at the head of mat, 2x10         Home Exercises Provided and Patient Education Provided     Written Home Exercises Provided: yes.  Exercises were reviewed and Nikki was able to demonstrate them prior to the end of the session.  Nikki demonstrated good  understanding of the education provided.     Education provided:   - home exercise program     Assessment   Patient arrives to therapy clinic reporting that she no longer has right groin pain and her back is getter better. The groin pain may be increase a little in SLR exercise, however, it is manageable. Required tactile cues for proper exercise execution in posterior pelvic tilts which this exercise is somewhat difficult for patient. Otherwise, her symptoms overall appears to be healing since the care of therapy.     Nikki Is progressing well towards her goals.   Pt prognosis is Good.     Pt will continue to benefit from skilled outpatient physical therapy to address the deficits listed in the problem list box on initial evaluation, provide pt/family education and to maximize pt's level of independence in the home and community environment.     Pt's spiritual, cultural and educational needs considered and pt agreeable to plan of care and goals.     Anticipated barriers to physical therapy: Scheduling issues      Goals:   Short Term Goals:  4 weeks  1. Report decreased in pain at worse less than  <   / =  5  /10  to increase tolerance for functional activities.On going  2. Increased R LE  MMT 1/2  to increase tolerance for ADL and work activities.On going  3. Pt to tolerate HEP to improve strength and independence with ADL's.On going     Long Term Goals: 8 weeks  1. Report decreased in pain at worse less than  <   / =  2  /10  to  increase tolerance for functional mobility.  On going  2. Increased R LE  MMT 1 grade to increase tolerance for ADL and work activities.On going  3. Pt will report at 80% or greater  limitation on FOTO Lumbar spine survey  to demonstrate decrease in disability and improvement in back pain.On going  4. Pt to be Independent with HEP to improve ROM and independence with ADL's. On going  5. Pt to demonstrate negative Bridge Test in order to show improved core strength for lumbar stabilization. On going  6. Pt reports able to sleep at least 6 to 7 hours per night without waking up due to lower back pain.   7. Pt repots improvement of 80% since initial eval to return to PLOF.     Plan     Continue with established  PT Plan of Care towards patient goals.      Brandy Najera, PTA   6/3/2024

## 2024-06-03 NOTE — TELEPHONE ENCOUNTER
Attempted to contact patient. Left a voice mail to call clinic back. Patient needs an appointment.

## 2024-06-03 NOTE — TELEPHONE ENCOUNTER
----- Message from Darlene Florentino sent at 6/3/2024 10:14 AM CDT -----  Type: Patient Call Back    Who called:Self    What is the request in detail:Can you fill out a paper for handicap     Can the clinic reply by [facility]?No    Would the patient rather a call back or a response via [facility]? Call    Best call back number:[phone] (home)       Additional Information:

## 2024-06-09 NOTE — PROGRESS NOTES
"[facility]   Physical Therapy Treatment Note     Name: Bambi Carrera  Clinic Number: [id number]    Therapy Diagnosis:   Encounter Diagnoses   Name Primary?    Acute right-sided low back pain with right-sided sciatica Yes    Posture imbalance     Gait, antalgic     Weakness of right lower extremity        Physician: Rose Lynch DPM    Visit Date: 6/10/2024    Physician Orders: PT Eval and Treat   Medical Diagnosis from Referral: Right sided sciatica   Evaluation Date: 5/13/2024  Authorization Period Expiration: 5-13-25  Plan of Care Expiration: 8-13-24  Visit # / Visits authorized: 4/+ eval 10   Progress Note Due: 6-13-24  FOTO: 1/ 1     Precautions: Standard and HTN      Visit #: 3 of 10  PTA Visit #: 2/5  Time In: 11:00 am  Time Out: 12:00 pm  Total Billable Time: 40 minutes    Subjective     Pt reports: She has a little bit of groin pain but no pain going down the leg, and she's still doing her exercises at home.     She was compliant with home exercise program.  Response to previous treatment: no concerns   Functional change: ongoing back pain     Pain: 5/10 to low back, 0/10 to right inner thighs   Location: lower back and right inner thighs     Objective     Patient received neuromuscular re-education activities to improve: Coordination, Kinesthetic, Sense, Proprioception, and Posture for 30 minutes. The following activities were included:     Supine lateral trunk rotation, 2x10  Supine single knee to chest, 3 sets 15 sec holds bilateral (use a towel under knee to hold)   Supine sciatic nerve glide with ankle pumps, 2x +15 bilateral   Bent Knee Fall Out bilateral +3x10 RTB  Straight Leg Raise on RLE, x10 (AAROM by therapist)   Posterior pelvic tilts, x30 (needs tactile cues and demonstration for proper techniques)   TRA recruitment +3 x 10 (cues to draw belly button down the mat)   Supine hip flexors stretch on right side only, 3x30" holds     Nikki received therapeutic " exercises to develop strength, endurance, ROM, and flexibility for 24 minutes including:    NU Step for +10 minutes Lv 1 (patient prefer to perform this last towards end of session)   Standing heel raises at the head of mat, +3x10  Standing hip abduction bilateral at the head of mat, +3x10         Home Exercises Provided and Patient Education Provided     Written Home Exercises Provided: yes.  Exercises were reviewed and Nikki was able to demonstrate them prior to the end of the session.  Nikki demonstrated good  understanding of the education provided.     Education provided:   - home exercise program     Assessment   Nikki presented to PT today reporting minimal amounts of right groin pain, but without RLE radicular symptoms.  Continued to focus on improved lumbopelvic stability and mobility, with Nikki requiring decreased tactile cueing to perform PPT with proper form and mm activation.  However, she continues to require moderate cueing to perform proper TrA activation and control, only progressing number of sets performed for additional reinforcement of proper form. She was challenged with AA supine SLR, reporting discomfort to groin.  However, she was able to complete remainder of the session without reports of increased LBP or radicular symptoms.      Nikki Is progressing well towards her goals.   Pt prognosis is Good.     Pt will continue to benefit from skilled outpatient physical therapy to address the deficits listed in the problem list box on initial evaluation, provide pt/family education and to maximize pt's level of independence in the home and community environment.     Pt's spiritual, cultural and educational needs considered and pt agreeable to plan of care and goals.     Anticipated barriers to physical therapy: Scheduling issues      Goals:   Short Term Goals:  4 weeks  1. Report decreased in pain at worse less than  <   / =  5  /10  to increase tolerance for functional activities.On going  2.  Increased R LE  MMT 1/2  to increase tolerance for ADL and work activities.On going  3. Pt to tolerate HEP to improve strength and independence with ADL's.On going     Long Term Goals: 8 weeks  1. Report decreased in pain at worse less than  <   / =  2  /10  to increase tolerance for functional mobility.  On going  2. Increased R LE  MMT 1 grade to increase tolerance for ADL and work activities.On going  3. Pt will report at 80% or greater  limitation on FOTO Lumbar spine survey  to demonstrate decrease in disability and improvement in back pain.On going  4. Pt to be Independent with HEP to improve ROM and independence with ADL's. On going  5. Pt to demonstrate negative Bridge Test in order to show improved core strength for lumbar stabilization. On going  6. Pt reports able to sleep at least 6 to 7 hours per night without waking up due to lower back pain.   7. Pt repots improvement of 80% since initial eval to return to PLOF.     Plan     Continue with established  PT Plan of Care towards patient goals.      Dottie Vasquez, PTA   6/10/2024

## 2024-06-10 ENCOUNTER — CLINICAL SUPPORT (OUTPATIENT)
Dept: REHABILITATION | Facility: HOSPITAL | Age: 64
End: 2024-06-10
Payer: COMMERCIAL

## 2024-06-10 DIAGNOSIS — R29.898 WEAKNESS OF RIGHT LOWER EXTREMITY: ICD-10-CM

## 2024-06-10 DIAGNOSIS — R26.89 GAIT, ANTALGIC: ICD-10-CM

## 2024-06-10 DIAGNOSIS — R29.3 POSTURE IMBALANCE: ICD-10-CM

## 2024-06-10 DIAGNOSIS — M54.41 ACUTE RIGHT-SIDED LOW BACK PAIN WITH RIGHT-SIDED SCIATICA: Primary | ICD-10-CM

## 2024-06-10 PROCEDURE — 97110 THERAPEUTIC EXERCISES: CPT | Mod: PN,CQ

## 2024-06-10 PROCEDURE — 97112 NEUROMUSCULAR REEDUCATION: CPT | Mod: PN,CQ

## 2024-06-11 ENCOUNTER — OFFICE VISIT (OUTPATIENT)
Dept: FAMILY MEDICINE | Facility: CLINIC | Age: 64
End: 2024-06-11
Payer: COMMERCIAL

## 2024-06-11 VITALS
SYSTOLIC BLOOD PRESSURE: 135 MMHG | BODY MASS INDEX: 47.33 KG/M2 | HEART RATE: 99 BPM | OXYGEN SATURATION: 95 % | DIASTOLIC BLOOD PRESSURE: 62 MMHG | TEMPERATURE: 98 F | WEIGHT: 284.38 LBS

## 2024-06-11 DIAGNOSIS — E66.01 MORBID OBESITY: Chronic | ICD-10-CM

## 2024-06-11 DIAGNOSIS — R53.83 FATIGUE, UNSPECIFIED TYPE: Primary | ICD-10-CM

## 2024-06-11 DIAGNOSIS — R73.03 PREDIABETES: ICD-10-CM

## 2024-06-11 DIAGNOSIS — M54.16 CHRONIC RADICULAR LUMBAR PAIN: ICD-10-CM

## 2024-06-11 DIAGNOSIS — N18.31 STAGE 3A CHRONIC KIDNEY DISEASE: ICD-10-CM

## 2024-06-11 DIAGNOSIS — M54.31 RIGHT SIDED SCIATICA: ICD-10-CM

## 2024-06-11 DIAGNOSIS — I10 ESSENTIAL HYPERTENSION: ICD-10-CM

## 2024-06-11 DIAGNOSIS — I10 PRIMARY HYPERTENSION: Chronic | ICD-10-CM

## 2024-06-11 DIAGNOSIS — G47.8 AWAKENS FROM SLEEP AT NIGHT: ICD-10-CM

## 2024-06-11 DIAGNOSIS — K21.9 GASTROESOPHAGEAL REFLUX DISEASE, UNSPECIFIED WHETHER ESOPHAGITIS PRESENT: ICD-10-CM

## 2024-06-11 DIAGNOSIS — G89.29 CHRONIC RADICULAR LUMBAR PAIN: ICD-10-CM

## 2024-06-11 PROCEDURE — 99214 OFFICE O/P EST MOD 30 MIN: CPT | Mod: S$GLB,,, | Performed by: NURSE PRACTITIONER

## 2024-06-11 PROCEDURE — 1159F MED LIST DOCD IN RCRD: CPT | Mod: CPTII,S$GLB,, | Performed by: NURSE PRACTITIONER

## 2024-06-11 PROCEDURE — 3008F BODY MASS INDEX DOCD: CPT | Mod: CPTII,S$GLB,, | Performed by: NURSE PRACTITIONER

## 2024-06-11 PROCEDURE — 3075F SYST BP GE 130 - 139MM HG: CPT | Mod: CPTII,S$GLB,, | Performed by: NURSE PRACTITIONER

## 2024-06-11 PROCEDURE — 3078F DIAST BP <80 MM HG: CPT | Mod: CPTII,S$GLB,, | Performed by: NURSE PRACTITIONER

## 2024-06-11 PROCEDURE — 99999 PR PBB SHADOW E&M-EST. PATIENT-LVL V: CPT | Mod: PBBFAC,,, | Performed by: NURSE PRACTITIONER

## 2024-06-11 RX ORDER — GABAPENTIN 100 MG/1
200 CAPSULE ORAL NIGHTLY PRN
Qty: 60 CAPSULE | Refills: 0 | Status: SHIPPED | OUTPATIENT
Start: 2024-06-11

## 2024-06-11 NOTE — PROGRESS NOTES
[place]     Bambi Carrera is a 64 y.o. female with multiple medical diagnoses as listed in the medical history and problem list that presents for   Chief Complaint   Patient presents with    Generalized Body Aches    Fatigue       Fatigue  This is a chronic problem. The current episode started more than 1 year ago. The problem has been unchanged. Associated symptoms include fatigue. Pertinent negatives include no chest pain, fever or numbness. She has tried rest for the symptoms. The treatment provided mild relief.   Back Pain  This is a chronic problem. The current episode started more than 1 year ago. The problem occurs constantly. The problem has been gradually worsening since onset. The pain is present in the lumbar spine. The quality of the pain is described as aching, stabbing and shooting. The pain radiates to the right thigh. The pain is at a severity of 7/10. The pain is Worse during the night. Pertinent negatives include no bladder incontinence, bowel incontinence, chest pain, fever, leg pain, numbness, paresis, paresthesias, pelvic pain, perianal numbness or tingling. She has tried analgesics and NSAIDs (Tramadol) for the symptoms. The treatment provided mild relief.     Fatigue:  Reports fatigue ever since returning from [place] in February.   She was seen by infectious disease prior to going on trip.   Pt is unable to identify other potential causes of fatigue.      Sciatica:  Chronic condition. Gradually worsening. She has taken Tramadol, robaxin, naproxen, and tylenol with no improvement. She is participating in physical therapy.      Assessment & Plan     Problem List Items Addressed This Visit          Cardiac/Vascular    Primary hypertension (Chronic)    BP Readings from Last 3 Encounters:   06/11/24 135/62   04/23/24 136/88   03/25/24 (!) 147/89       -continue current medication regimen  -DASH diet, regular cardiovascular exercises, portion control  -weight loss  -f/u with BP logs in 2 weeks        Relevant Orders    CBC Auto Differential    Comprehensive Metabolic Panel    Lipid Panel    Hemoglobin A1C    TSH    T4, Free       Renal/    Stage 3a chronic kidney disease    Continue sodium restriction, and avoidance of nephrotoxic agents.      Relevant Orders    CBC Auto Differential    Comprehensive Metabolic Panel    Lipid Panel    Hemoglobin A1C    TSH    T4, Free       Endocrine    Morbid obesity (Chronic)    We discussed weight issues and safe, effective ways of losing pounds, includin) diet:  low carbohydrate, low fat diet, stay away from fast food, fried and processed food, use whole grain, lot of fruits and vegetables, use healthy fat such as avocado, nuts and olive oil in reasonable quantity, stay away from sodas. Regular meals with lean proteins.  2) physical activity: ideally 150 min a week, with cardiovascular and resistance activity.  Patient was encouraged to set realistic attainable goals for weight loss, and we will follow up periodically.    Discussed Mediterranean Diet recommendations (Adopted from Anshul et al, [facility], 2018.)  - Eat primarily plant-based foods, such as fruits and vegetables, whole grains, legumes (beans) and nuts  - Limit refined carbohydrates (white pasta, bread, rice).  - Replace butter with healthy fats such as olive oil.  - Use herbs and spices instead of salt to flavor foods.  - Limit red meat and processed meats to no more than a few times a month.  - Avoid sugary sodas, bakery goods, and sweets.  - Eat fish and poultry at least twice a week.      Prediabetes    Patient does have a Hx of prediabtes, which we discussed increased risk for diabetes in the future, therefore, I recommend dietary modification such as lowering carbohydrates in diet (pasta, rice, bread, potatoes, crackers, cookies, candy, soda, etc.) to decrease the risk of progression to diabetes.          Relevant Orders    CBC Auto Differential    Comprehensive Metabolic Panel    Lipid Panel     Hemoglobin A1C    TSH    T4, Free     Other Visit Diagnoses       Fatigue, unspecified type    -  Primary    Refer to sleep medicine to assess for OSEI    Relevant Orders    Ambulatory referral/consult to Infectious Disease    CBC Auto Differential    Comprehensive Metabolic Panel    Lipid Panel    Hemoglobin A1C    TSH    T4, Free    Ambulatory referral/consult to Sleep Disorders    Essential hypertension        As above    Relevant Orders    CBC Auto Differential    Comprehensive Metabolic Panel    Lipid Panel    Hemoglobin A1C    TSH    T4, Free    Gastroesophageal reflux disease, unspecified whether esophagitis present        -stable, discussed with patient about avoiding potential dietary triggers  -avoid spicy/greasy/sour/acidic foods, as well as tea/coffee/chocolate if possible  -Tylenol as needed for pain, avoid NSAIDs  -Keep food diary      Relevant Orders    CBC Auto Differential    Comprehensive Metabolic Panel    Lipid Panel    Hemoglobin A1C    TSH    T4, Free    Awakens from sleep at night        Refer to sleep medicine to assess for OSEI    Relevant Orders    Ambulatory referral/consult to Sleep Disorders    Right sided sciatica        Refer to healthy back   Start gabapentin qhs prn    Relevant Medications    gabapentin (NEURONTIN) 100 MG capsule    Other Relevant Orders    Ambulatory referral/consult to [facility]    Chronic radicular lumbar pain        Ne red flag symptoms today  [facility]y [facility]  Gabapentin  DMV form completed.    Relevant Orders    Ambulatory referral/consult to [facility]              --------------------------------------------      Health Maintenance:  Health Maintenance         Date Due Completion Date    Sign Pain Contract Never done ---    Complete Opioid Risk Tool Never done ---    RSV Vaccine (Age 60+ and Pregnant patients) (1 - 1-dose 60+ series) Never done ---    COVID-19 Vaccine (4 - 2023-24 season) 09/01/2023 12/23/2021    HIV Screening 07/15/2026  (Originally 4/1/1975) ---    Hemoglobin A1c (Prediabetes) 12/13/2024 12/13/2023    Mammogram 05/06/2025 5/6/2024    Override on 6/10/2015: Done (normal)    Override on 6/10/2014: Done (normal)    TETANUS VACCINE 06/08/2026 6/8/2016    Override on 4/1/2005: Done    Cervical Cancer Screening 04/18/2028 4/18/2023    Override on 6/3/2014: Done (normal)    Colorectal Cancer Screening 07/15/2028 7/15/2021    Lipid Panel 12/13/2028 12/13/2023            Advised patient on the importance of completing overdue health maintenance items    Follow Up:  Follow up in about 3 months (around 9/11/2024).    Exam     Review of Systems:  (as noted above)  Review of Systems   Constitutional:  Positive for fatigue. Negative for fever.   HENT:  Negative for trouble swallowing.    Eyes:  Negative for visual disturbance.   Respiratory:  Negative for chest tightness and shortness of breath.    Cardiovascular:  Negative for chest pain.   Gastrointestinal:  Negative for blood in stool and bowel incontinence.   Genitourinary:  Negative for bladder incontinence and pelvic pain.   Musculoskeletal:  Positive for back pain.   Neurological:  Negative for tingling, numbness and paresthesias.       Physical Exam:   Physical Exam  Constitutional:       General: She is not in acute distress.     Appearance: She is not ill-appearing or diaphoretic.   HENT:      Head: Normocephalic and atraumatic.   Cardiovascular:      Rate and Rhythm: Normal rate and regular rhythm.   Pulmonary:      Effort: Pulmonary effort is normal. No respiratory distress.   Chest:      Chest wall: No tenderness.   Neurological:      General: No focal deficit present.      Mental Status: She is alert and oriented to person, place, and time.       Vitals:    06/11/24 1551 06/11/24 1633   BP: (!) 140/60 135/62   BP Location: Left arm    Patient Position: Sitting    BP Method: Large (Manual)    Pulse: 99    Temp: 98.2 °F (36.8 °C)    TempSrc: Oral    SpO2: 95%    Weight: 129 kg (284  lb 6.3 oz)       Body mass index is 47.33 kg/m².        History     Past Medical History:  Past Medical History:   Diagnosis Date    Borderline diabetic     Depression     Eye injury     fb os     Hypertension     Menopause     Nuclear sclerosis of both eyes 7/1/2019       Past Surgical History:  Past Surgical History:   Procedure Laterality Date    COLONOSCOPY N/A 7/15/2021    Procedure: COLONOSCOPY;  Surgeon: Geetha Walton MD;  Location: [facility];  Service: Endoscopy;  Laterality: N/A;  Fully Vaccinated 3/25/21  see media tab 6/1/21, prep instr portal, and mailed -ml    LASIK Bilateral 07/15/2016       Social History:  Social History     Socioeconomic History    Marital status: Single   Occupational History     Employer: [employer]   Tobacco Use    Smoking status: Never    Smokeless tobacco: Never   Substance and Sexual Activity    Alcohol use: No     Alcohol/week: 0.0 standard drinks of alcohol    Drug use: No    Sexual activity: Not Currently       Family History:  Family History   Problem Relation Name Age of Onset    Cancer Mother      No Known Problems Father      No Known Problems Sister      No Known Problems Brother      No Known Problems Maternal Aunt      No Known Problems Maternal Uncle      No Known Problems Paternal Aunt      No Known Problems Paternal Uncle      No Known Problems Maternal Grandmother      No Known Problems Maternal Grandfather      No Known Problems Paternal Grandmother      No Known Problems Paternal Grandfather      Amblyopia Neg Hx      Blindness Neg Hx      Cataracts Neg Hx      Diabetes Neg Hx      Glaucoma Neg Hx      Hypertension Neg Hx      Macular degeneration Neg Hx      Retinal detachment Neg Hx      Strabismus Neg Hx      Stroke Neg Hx      Thyroid disease Neg Hx         Allergies and Medications: (updated and reviewed)  Review of patient's allergies indicates:  No Known Allergies  Current Outpatient Medications   Medication Sig Dispense Refill    albuterol  (VENTOLIN HFA) 90 mcg/actuation inhaler Inhale 2 puffs into the lungs every 6 (six) hours as needed for Wheezing. Rescue 18 g 0    amLODIPine (NORVASC) 5 MG tablet Take 1 tablet (5 mg total) by mouth once daily. 90 tablet 2    ergocalciferol (ERGOCALCIFEROL) 50,000 unit Cap Take 1 capsule by mouth once a week 12 capsule 3    famotidine (PEPCID) 40 MG tablet Take 1 tablet (40 mg total) by mouth every evening. 30 tablet 11    hydroCHLOROthiazide (HYDRODIURIL) 25 MG tablet Take 1 tablet (25 mg total) by mouth once daily. 90 tablet 2    levocetirizine (XYZAL) 5 MG tablet TAKE 1 TABLET(5 MG) BY MOUTH EVERY EVENING 90 tablet 0    multivitamin (THERAGRAN) per tablet Take 1 tablet by mouth once daily.      naproxen (NAPROSYN) 500 MG tablet Take 1 tablet by mouth twice daily as needed 60 tablet 0    pantoprazole (PROTONIX) 40 MG tablet Take 1 tablet (40 mg total) by mouth once daily. 90 tablet 3    tirzepatide, weight loss, (ZEPBOUND) 2.5 mg/0.5 mL PnIj Inject 2.5 mg into the skin every 7 days. 4 Pen 1    traMADoL (ULTRAM) 50 mg tablet Take 1 tablet (50 mg total) by mouth every 24 hours as needed for Pain. 14 tablet 0    azelastine (ASTELIN) 137 mcg (0.1 %) nasal spray 1 spray (137 mcg total) by Nasal route 2 (two) times daily. 30 mL 3    benzonatate (TESSALON) 200 MG capsule Take 1 capsule (200 mg total) by mouth 3 (three) times daily as needed for Cough. (Patient not taking: Reported on 2/16/2024) 30 capsule 0    diclofenac sodium (VOLTAREN) 1 % Gel Apply 2 g topically once daily. (Patient not taking: Reported on 2/16/2024) 100 g 3    gabapentin (NEURONTIN) 100 MG capsule Take 2 capsules (200 mg total) by mouth nightly as needed (neuropathy). 60 capsule 0    loratadine (CLARITIN) 10 mg tablet Take 1 tablet by mouth once daily (Patient not taking: Reported on 4/23/2024) 90 tablet 2    meclizine (ANTIVERT) 25 mg tablet Take 1 tablet (25 mg total) by mouth 3 (three) times daily as needed for Dizziness (vertigo). 15 tablet 0      No current facility-administered medications for this visit.       Patient Care Team:  Wong Whitmore MD as PCP - General (Family Medicine)  Daniella Ovalle MA (Inactive) as Care Coordinator         - The patient is given an After Visit Summary that lists all medications with directions, allergies, education, orders placed during this encounter and follow-up instructions.      - I have reviewed the patient's medical information including past medical, family, and social history sections including the medications and allergies.      - We discussed the patient's current medications.     This note was created by combination of typed  and MModal dictation.  Transcription errors may be present.  If there are any questions, please contact me.       Mo Martinez NP

## 2024-06-11 NOTE — PATIENT INSTRUCTIONS
"Medical Fitness--[phone]  Imaging, Xray, CT, MRI, Ultrasound---[phone]  Bariatrics---[phone]  Breast Surgery---[phone]  Case Management---[phone]  Colonoscopy---[phone]  DME---[phone]  Infectious Disease---[phone]  Interventional Radiology---[phone]  Medical Records---[phone]  [facility]---[phone]  Optometry/Ophthalmology---[phone]  [facility]---[phone]  Physical Therapy---[phone]  Psychiatry---[phone] or [phone]  Plastic Surgery---[phone]  Recovery--[phone] option 2, or [phone].  Sleep Study---[phone]  Smoking Cessation---[phone]  Wound Care---[phone]  Referral Desk---[phone]      Patient Education       Weight Loss Diet   About this topic   There are many "trendy" weight loss diets that are popular today. Many of these diets can end up being more harmful than helpful. The healthiest way to lose weight is to burn more calories than you eat.  A weight loss diet should help you have a healthy view of eating. It is NOT healthy to stop eating to try and lose weight. A good diet plan will help you cut down your food intake and make healthy choices.  A healthy weight loss goal is 1 to 2 pounds (0.5 to 1 kg) per week. Reducing calories in your diet, burning calories through exercise, or both can help you lose weight. Combining a healthy diet with regular physical activity can help you get the best results.  To cut calories in your diet you can:  Switch from whole milk to 1% or skim milk.  Switch from regular cheese to low-fat or fat-free cheese.  Use healthier condiment choices:  Fat-free or low-fat sour cream or salad dressings  Spray butter  Diet syrups or jellies over regular  Try frozen yogurt as a dessert rather than eating ice cream.  Skip the chips. Snack on carrots, vegetables, or fruit. If chips are a favorite of yours, try the baked style and watch portion size.  Eat grilled, roasted, boiled, broiled, " or baked meats. Avoid deep-frying. Choose skinless poultry, lean red meat, lean cuts of pork, and fish for good protein sources.  Try flavored no-calorie sparks. Do not drink soda and juices that have many calories.  Choose fruit instead of sweets.  General   Eating smaller meals more often may be helpful. This will keep you from overeating at your next meal. Also, eating meals slowly helps you feel full faster.  If eating 3 meals is a part of your lifestyle, choose more lean proteins and higher fiber foods to fill you up at each meal.  Do not skip meals. Most often if you skip a meal, you eat too much at the next meal.  Eat smaller portions. Use a smaller plate or bowl for meals, and when you are eating out, eat half and take the rest home.  Plan ahead. Plan your meals and grocery list before going to the store. Planning will keep you from getting meals from restaurants.  Do not go to the grocery store hungry. You are more likely to buy snacks that are not good for you.  Portion out snacks. When you are having a snack, instead of grabbing the whole bag, portion a small amount out to give yourself a stopping point.  Drink water before and after your meals to help fill you up without the calories.  When eating starchy foods, choose whole-grain products. These have a lot of fiber which will make you feel full. Fiber also helps lower cholesterol and helps with bowel function.  If you need a helpful start, ask your doctor to send you to a dietitian for weight loss help.         What will the results be?   Losing excess weight will make your whole body healthier. You will have more energy for your daily activities and lower your risk for health problems.  What lifestyle changes are needed?   Stay active. Eating healthy is not always enough to lose weight. Burning calories by exercising is a big part of weight loss.  What foods are good to eat?   The key is to watch your portion sizes. It is best to choose foods that are  lower in fat and calories.  Choose lean meats:  Boneless, skinless chicken breast  Pork loin  90% lean beef  Lean turkey meat  Fresh fish (not fried)  Choose low-fat dairy products:  1% or skim milk  Spray butter or margarine  Low-fat or fat-free cheese  Frozen yogurt or low-calorie ice cream  Choose fresh fruits, vegetables, beans and lentils, and whole wheat products more often.  Choose water to drink more often. Drink diet or no-calorie beverages when you want something other than water. Aim to get your calories from the foods you eat.  Choose smart snacks:  Fruits  Vegetables  Low-fat or nonfat yogurt  Low-fat or no-fat cheese, such as cottage cheese  Unsalted nuts  Hard-boiled egg  Hummus  Guacamole  Natural peanut butter  Popcorn with no butter ? use pepper, garlic, or another spice to taste  Whole grain crackers  What foods should be limited or avoided?   Limit high-fat, high-sodium, and high-calorie foods like:  Fried foods  Processed meats  Whole-fat dairy products  Candy, cookies, chips, pastries  Sausage, martini, any full-fat meats  Soda, juice  Beer, wine, and mixed drinks (alcohol)  Will there be any other care needed?   What do I do first before trying to lose weight?  Talk to your doctor and dietitian to see if you need to lose weight. Work with them to set your weight loss goals.  If you have a chronic illness, such as high blood sugar or high blood pressure, ask a doctor or dietitian what diet and exercise is right for you.  Ask your doctor about how much you are able to exercise and what type of exercise is good for you.  Helpful tips   Keep a food journal to help keep you on track.  Join a support group.  Tips for burning calories:  If your workplace is near your house, choose to walk or bike to work instead of driving.  Take 20-minute walks each day. Walk around during your lunch break. You will not only burn calories, but will raise your energy for the rest of the day.  Take the stairs over the  elevator.  Join a gym or exercise class with a friend.  Try to exercise 30 minutes a day for overall health. Three 10-minute sessions work too. Aim for 60 to 90 minutes a day to lose weight.  Drink lots of water before, during, and after exercise.  Where can I learn more?   Academy of Nutrition and Dietetics  https://www.eatright.org/health/weight-loss/your-health-and-your-weight/back-to-basics-for-healthy-weight-loss   Centers for Disease Control and Prevention  https://www.cdc.gov/healthyweight/healthy_eating/index.html   Familydoctor.org  https://familydoctor.org/nutrition-weight-loss-need-know-fad-diets/   NHS  https://www.nhs.uk/live-well/healthy-weight/12-tips-to-help-you-lose-weight/?tabname=you-and-your-weight   Last Reviewed Date   2021-06-24  Consumer Information Use and Disclaimer   This information is not specific medical advice and does not replace information you receive from your health care provider. This is only a brief summary of general information. It does NOT include all information about conditions, illnesses, injuries, tests, procedures, treatments, therapies, discharge instructions or life-style choices that may apply to you. You must talk with your health care provider for complete information about your health and treatment options. This information should not be used to decide whether or not to accept your health care providers advice, instructions or recommendations. Only your health care provider has the knowledge and training to provide advice that is right for you.  Copyright   Copyright © 2021 [employer], [employer]. and its affiliates and/or licensors. All rights reserved.  Patient Education       Obesity Discharge Instructions, Adult   About this topic   Obesity is a health problem where your weight is higher than it should be. Doctors use a method called body mass index or BMI to measure whether you are at a healthy weight for your height. The doctor uses your weight and your height to  determine your BMI. A high BMI can be an indicator of high body fat. Obesity is different from being overweight. Being overweight means your BMI is 25 or higher. Being obese means your BMI is 30 or higher. If your BMI is 40 or higher, you are considered severely or morbidly obese. Being obese may lead to many health problems. It may make it hard for you to breathe and move easily. It may raise your risk for illnesses like high blood sugar and heart disease.  What care is needed at home?   Ask your doctor what you need to do when you go home. Make sure you understand everything the doctor says. This way you will know what you need to do.  Change your diet. Lower the calories in your diet. Ask your dietitian to help you set an eating plan that is right for you.  Get enough exercise. Talk to your doctor about the right amount of exercise for you.  Do not smoke.  Limit the amount of beer, wine, and mixed drinks (alcohol) you drink.  Keep a record of your weight. Write down what you eat and drink each day. This may help you be more aware of the choices you are making.  What drugs may be needed?   The doctor may order drugs to:  Treat health problems that may cause weight gain  Lower your appetite  Help you lose weight  Will physical activity be limited?   Talk to your doctor about the right amount of exercise for you. This is especially important if you have heart problems, other illnesses, or if you recently had surgery.  Start slowly by doing more everyday activities like yard work or household chores.  Choose activities that you like to do.  What changes to diet are needed?   Whole grains are a good source of carbohydrates and fiber. Try to eat 3 to 5 servings of whole grain, high fiber foods each day. These are things like whole grain bread, bran cereals, brown rice, and whole wheat pasta.  Fruits and vegetables are good sources of fiber, vitamins, and minerals. Try to pick many kinds and colors. This will help you  get different nutrients in your diet. Choose fresh, frozen, or canned fruits and vegetables. Buy plain, frozen fruits without added sugar. Buy plain, frozen vegetables without added salt and fat. Buy canned fruit in 100% juice or water. Avoid canned fruit in syrups. Buy canned vegetables with no salt added.  Milk is a good source of protein and some vitamins and minerals. Choose low-fat (1%) or fat-free milk. Eat nonfat or low-fat cheeses, ice creams, yogurt, and other dairy products.  Meats and beans are good sources of protein and iron. Eat more low-fat or lean meats like chicken without the skin, turkey without the skin, and fish. Eggs and peanut butter are good sources of protein as well. Dried peas, beans, and lentils are also good and contain fiber. Fatty fish, like salmon, tuna, mackerel, herring, and trout, are good to eat and have healthy omega-3 fats.  Good fats can give you long-term energy. These are found in fish, nuts, seeds, and avocados. Try using olive oil, safflower oil, and low-sodium, low-fat salad dressing as a topping on foods. Use canola, olive, or peanut oil for cooking. Other healthy oils include corn, sunflower, and soybean oils.  Limit sweets such as candy and sugary drinks. Try to drink water instead. Drink diet or no calorie beverages when you want something other than water.  Cut back on solid fats, like butter, lard, and coconut oil.  Limit fatty foods such as desserts, fried foods, and chips.  Trans fats should be avoided. Most trans fats are found in processed foods, commercially baked goods, and fried foods and are very unhealthy. Saturated fat, which is different from trans fat, should be watched and limited if portions are too large. Saturated fat is found mainly in animal sources, such as meat and dairy products. Saturated fat is also found in coconut and palm oils.  Limit processed meats and most processed foods.  Limit eating out. If you choose to visit a restaurant, ask for  the nutritional facts. You may also be able to find nutritional facts online. Then, you can make a plan and choose healthy items. Watch the portion size. Have large portions split and take part home for some other meal.  What problems could happen?   Low mood or self-esteem  Anxiety  Muscle pain, joint pain, or arthritis  Sleep apnea  Diabetes  High blood pressure  High cholesterol  Heart, lung, or breathing problems  Kidney or liver problems  Cancer  Gallstones  Increased sweating  Reduced fertility  Pregnancy complications  Gastroesophageal reflux disease  When do I need to call the doctor?   Signs of high or low blood sugar  Thoughts of hurting yourself  Teach Back: Helping You Understand   The Teach Back Method helps you understand the information we are giving you. After you talk with the staff, tell them in your own words what you learned. This helps to make sure the staff has described each thing clearly. It also helps to explain things that may have been confusing. Before going home, make sure you can do these:  I can tell you about my condition.  I can tell you what changes I need to make with my diet or drugs.  I can tell you what I will do if I have signs of a heart attack or stroke.  Where can I learn more?   Centers for Disease Control and Prevention  http://www.cdc.gov/obesity/adult/defining.html   NHS  http://www.nhs.uk/Conditions/Obesity/Pages/obesityprevention.aspx   Last Reviewed Date   2021-06-23  Consumer Information Use and Disclaimer   This information is not specific medical advice and does not replace information you receive from your health care provider. This is only a brief summary of general information. It does NOT include all information about conditions, illnesses, injuries, tests, procedures, treatments, therapies, discharge instructions or life-style choices that may apply to you. You must talk with your health care provider for complete information about your health and treatment  options. This information should not be used to decide whether or not to accept your health care providers advice, instructions or recommendations. Only your health care provider has the knowledge and training to provide advice that is right for you.  Copyright   Copyright © 2021 UpToDate, Inc. and its affiliates and/or licensors. All rights reserved.

## 2024-06-14 ENCOUNTER — LAB VISIT (OUTPATIENT)
Dept: LAB | Facility: HOSPITAL | Age: 64
End: 2024-06-14
Payer: COMMERCIAL

## 2024-06-14 DIAGNOSIS — N18.31 STAGE 3A CHRONIC KIDNEY DISEASE: ICD-10-CM

## 2024-06-14 DIAGNOSIS — R53.83 FATIGUE, UNSPECIFIED TYPE: ICD-10-CM

## 2024-06-14 DIAGNOSIS — I10 PRIMARY HYPERTENSION: Chronic | ICD-10-CM

## 2024-06-14 DIAGNOSIS — R73.03 PREDIABETES: ICD-10-CM

## 2024-06-14 DIAGNOSIS — I10 ESSENTIAL HYPERTENSION: ICD-10-CM

## 2024-06-14 DIAGNOSIS — K21.9 GASTROESOPHAGEAL REFLUX DISEASE, UNSPECIFIED WHETHER ESOPHAGITIS PRESENT: ICD-10-CM

## 2024-06-14 LAB
ALBUMIN SERPL BCP-MCNC: 3.5 G/DL (ref 3.5–5.2)
ALP SERPL-CCNC: 120 U/L (ref 55–135)
ALT SERPL W/O P-5'-P-CCNC: 21 U/L (ref 10–44)
ANION GAP SERPL CALC-SCNC: 13 MMOL/L (ref 8–16)
AST SERPL-CCNC: 17 U/L (ref 10–40)
BASOPHILS # BLD AUTO: 0.04 K/UL (ref 0–0.2)
BASOPHILS NFR BLD: 0.6 % (ref 0–1.9)
BILIRUB SERPL-MCNC: 0.2 MG/DL (ref 0.1–1)
BUN SERPL-MCNC: 18 MG/DL (ref 8–23)
CALCIUM SERPL-MCNC: 9.8 MG/DL (ref 8.7–10.5)
CHLORIDE SERPL-SCNC: 106 MMOL/L (ref 95–110)
CHOLEST SERPL-MCNC: 180 MG/DL (ref 120–199)
CHOLEST/HDLC SERPL: 3 {RATIO} (ref 2–5)
CO2 SERPL-SCNC: 22 MMOL/L (ref 23–29)
CREAT SERPL-MCNC: 1 MG/DL (ref 0.5–1.4)
DIFFERENTIAL METHOD BLD: ABNORMAL
EOSINOPHIL # BLD AUTO: 0.1 K/UL (ref 0–0.5)
EOSINOPHIL NFR BLD: 1.8 % (ref 0–8)
ERYTHROCYTE [DISTWIDTH] IN BLOOD BY AUTOMATED COUNT: 15.9 % (ref 11.5–14.5)
EST. GFR  (NO RACE VARIABLE): >60 ML/MIN/1.73 M^2
ESTIMATED AVG GLUCOSE: 117 MG/DL (ref 68–131)
GLUCOSE SERPL-MCNC: 103 MG/DL (ref 70–110)
HBA1C MFR BLD: 5.7 % (ref 4–5.6)
HCT VFR BLD AUTO: 39.6 % (ref 37–48.5)
HDLC SERPL-MCNC: 61 MG/DL (ref 40–75)
HDLC SERPL: 33.9 % (ref 20–50)
HGB BLD-MCNC: 12.5 G/DL (ref 12–16)
IMM GRANULOCYTES # BLD AUTO: 0.02 K/UL (ref 0–0.04)
IMM GRANULOCYTES NFR BLD AUTO: 0.3 % (ref 0–0.5)
LDLC SERPL CALC-MCNC: 95.2 MG/DL (ref 63–159)
LYMPHOCYTES # BLD AUTO: 3.2 K/UL (ref 1–4.8)
LYMPHOCYTES NFR BLD: 45.5 % (ref 18–48)
MCH RBC QN AUTO: 28.6 PG (ref 27–31)
MCHC RBC AUTO-ENTMCNC: 31.6 G/DL (ref 32–36)
MCV RBC AUTO: 91 FL (ref 82–98)
MONOCYTES # BLD AUTO: 0.7 K/UL (ref 0.3–1)
MONOCYTES NFR BLD: 10.3 % (ref 4–15)
NEUTROPHILS # BLD AUTO: 3 K/UL (ref 1.8–7.7)
NEUTROPHILS NFR BLD: 41.5 % (ref 38–73)
NONHDLC SERPL-MCNC: 119 MG/DL
NRBC BLD-RTO: 0 /100 WBC
PLATELET # BLD AUTO: 310 K/UL (ref 150–450)
PMV BLD AUTO: 11.1 FL (ref 9.2–12.9)
POTASSIUM SERPL-SCNC: 3.2 MMOL/L (ref 3.5–5.1)
PROT SERPL-MCNC: 7.8 G/DL (ref 6–8.4)
RBC # BLD AUTO: 4.37 M/UL (ref 4–5.4)
SODIUM SERPL-SCNC: 141 MMOL/L (ref 136–145)
T4 FREE SERPL-MCNC: 1 NG/DL (ref 0.71–1.51)
TRIGL SERPL-MCNC: 119 MG/DL (ref 30–150)
TSH SERPL DL<=0.005 MIU/L-ACNC: 1.74 UIU/ML (ref 0.4–4)
WBC # BLD AUTO: 7.12 K/UL (ref 3.9–12.7)

## 2024-06-14 PROCEDURE — 84439 ASSAY OF FREE THYROXINE: CPT | Performed by: NURSE PRACTITIONER

## 2024-06-14 PROCEDURE — 36415 COLL VENOUS BLD VENIPUNCTURE: CPT | Mod: PO | Performed by: NURSE PRACTITIONER

## 2024-06-14 PROCEDURE — 85025 COMPLETE CBC W/AUTO DIFF WBC: CPT | Performed by: NURSE PRACTITIONER

## 2024-06-14 PROCEDURE — 80053 COMPREHEN METABOLIC PANEL: CPT | Performed by: NURSE PRACTITIONER

## 2024-06-14 PROCEDURE — 80061 LIPID PANEL: CPT | Performed by: NURSE PRACTITIONER

## 2024-06-14 PROCEDURE — 83036 HEMOGLOBIN GLYCOSYLATED A1C: CPT | Performed by: NURSE PRACTITIONER

## 2024-06-14 PROCEDURE — 84443 ASSAY THYROID STIM HORMONE: CPT | Performed by: NURSE PRACTITIONER

## 2024-06-17 ENCOUNTER — TELEPHONE (OUTPATIENT)
Dept: FAMILY MEDICINE | Facility: CLINIC | Age: 64
End: 2024-06-17
Payer: COMMERCIAL

## 2024-06-17 NOTE — TELEPHONE ENCOUNTER
Called pt and discussed lab results, all questions answered.     Pt will begin taking a daily multivitamin which contains potassium.

## 2024-06-18 ENCOUNTER — CLINICAL SUPPORT (OUTPATIENT)
Dept: REHABILITATION | Facility: HOSPITAL | Age: 64
End: 2024-06-18
Payer: COMMERCIAL

## 2024-06-18 DIAGNOSIS — R29.898 WEAKNESS OF RIGHT LOWER EXTREMITY: ICD-10-CM

## 2024-06-18 DIAGNOSIS — R29.3 POSTURE IMBALANCE: ICD-10-CM

## 2024-06-18 DIAGNOSIS — M54.41 ACUTE RIGHT-SIDED LOW BACK PAIN WITH RIGHT-SIDED SCIATICA: Primary | ICD-10-CM

## 2024-06-18 DIAGNOSIS — R26.89 GAIT, ANTALGIC: ICD-10-CM

## 2024-06-18 PROCEDURE — 97112 NEUROMUSCULAR REEDUCATION: CPT | Mod: PN

## 2024-06-18 PROCEDURE — 97110 THERAPEUTIC EXERCISES: CPT | Mod: PN

## 2024-06-18 NOTE — PROGRESS NOTES
"[facility]   Physical Therapy Treatment Note     Name: Bambi Carrera  Clinic Number: [id number]    Therapy Diagnosis:   Encounter Diagnoses   Name Primary?    Acute right-sided low back pain with right-sided sciatica Yes    Posture imbalance     Gait, antalgic     Weakness of right lower extremity          Physician: Rose Lynch DPM    Visit Date: 6/18/2024    Physician Orders: PT Eval and Treat   Medical Diagnosis from Referral: Right sided sciatica   Evaluation Date: 5/13/2024  Authorization Period Expiration: 5-13-25  Plan of Care Expiration: 8-13-24  Visit # / Visits authorized: 6/+ eval 10   Progress Note Due: 7-18-24  FOTO: 1/ 1     Precautions: Standard and HTN      Visit #: 3 of 10  PTA Visit #: 2/5  Time In: 11:00 am  Time Out: 11:50  am  Total Billable Time: 50 minutes    Subjective     Pt reports: that she still have lower back pain, but she feel improvement since initial eval. Pt states that she will go to vacation next week.     She was compliant with home exercise program.  Response to previous treatment: no concerns   Functional change: ongoing back pain     Pain: 5/10 to low back, 0/10 to right inner thighs   Location: lower back and right inner thighs     Objective     Patient received neuromuscular re-education activities to improve: Coordination, Kinesthetic, Sense, Proprioception, and Posture for 30 minutes. The following activities were included:     Supine lateral trunk rotation, 2x10  Supine single knee to chest, 3 sets 15 sec holds bilateral (use a towel under knee to hold)   Supine sciatic nerve glide with ankle pumps, 2x +15 bilateral   Bent Knee Fall Out bilateral +3x10 RTB  Straight Leg Raise on RLE, x10 (AAROM by therapist)   Posterior pelvic tilts, x30 (needs tactile cues and demonstration for proper techniques)   TRA recruitment +3 x 10 (cues to draw belly button down the mat)   Supine hip flexors stretch on right side only, 3x30" holds     Nikki " received therapeutic exercises to develop strength, endurance, ROM, and flexibility for 23 minutes including:    NU Step for +10 minutes Lv 1 (patient prefer to perform this last towards end of session)   Seated lumbar flexion 3-ways 2 min  Seated trunk extension 2x10  Shuttle DL squat 1 red band 2x10  Standing heel raises at the head of mat, +3x10  Standing hip abduction bilateral at the head of mat, +3x10         Home Exercises Provided and Patient Education Provided     Written Home Exercises Provided: yes.  Exercises were reviewed and Nikki was able to demonstrate them prior to the end of the session.  Nikki demonstrated good  understanding of the education provided.     Education provided:   - home exercise program     Assessment   Nikki presented to PT today reporting lower back pain. Pt was re-assessed today. Pt still have lower back pain, but lower back pain has been improving during functional mobility. Pt has been ambulation with less antalgic gait pattern. Pt still have decrease lumbar mobility and stability. Pt has met 1/3 STGs. Overall, pt has been improving int therapy. Plan to cont therapy to improve functional mobility and decrease pain.    Nikki Is progressing well towards her goals.   Pt prognosis is Good.     Pt will continue to benefit from skilled outpatient physical therapy to address the deficits listed in the problem list box on initial evaluation, provide pt/family education and to maximize pt's level of independence in the home and community environment.     Pt's spiritual, cultural and educational needs considered and pt agreeable to plan of care and goals.     Anticipated barriers to physical therapy: Scheduling issues      Goals:   Short Term Goals:  4 weeks  1. Report decreased in pain at worse less than  <   / =  5  /10  to increase tolerance for functional activities. Goal not met 6-18-24  2. Increased R LE  MMT 1/2  to increase tolerance for ADL and work activities. Goal not assessed  today.   3. Pt to tolerate HEP to improve strength and independence with ADL's. Goal met 6-18-24     Long Term Goals: 8 weeks  1. Report decreased in pain at worse less than  <   / =  2  /10  to increase tolerance for functional mobility.  On going  2. Increased R LE  MMT 1 grade to increase tolerance for ADL and work activities.On going  3. Pt will report at 80% or greater  limitation on FOTO Lumbar spine survey  to demonstrate decrease in disability and improvement in back pain.On going  4. Pt to be Independent with HEP to improve ROM and independence with ADL's. On going  5. Pt to demonstrate negative Bridge Test in order to show improved core strength for lumbar stabilization. On going  6. Pt reports able to sleep at least 6 to 7 hours per night without waking up due to lower back pain.   7. Pt repots improvement of 80% since initial eval to return to PLOF.     Plan     Continue with established  PT Plan of Care towards patient goals.      James Frost, PT   6/18/2024

## 2024-06-24 ENCOUNTER — TELEPHONE (OUTPATIENT)
Dept: FAMILY MEDICINE | Facility: CLINIC | Age: 64
End: 2024-06-24
Payer: COMMERCIAL

## 2024-06-24 NOTE — TELEPHONE ENCOUNTER
----- Message from Darlene Florentino sent at 6/24/2024  1:01 PM CDT -----  Type: Patient Call Back    Who called:self    What is the request in detail:in regards to getting an mri after therapy     Can the clinic reply by [facility]?no    Would the patient rather a call back or a response via [facility]? call    Best call back number:[phone] (home)       Additional Information:

## 2024-06-26 ENCOUNTER — TELEPHONE (OUTPATIENT)
Dept: FAMILY MEDICINE | Facility: CLINIC | Age: 64
End: 2024-06-26
Payer: COMMERCIAL

## 2024-06-26 NOTE — TELEPHONE ENCOUNTER
----- Message from Catia Pineda, RN sent at 6/26/2024  3:00 PM CDT -----  Regarding: FW: patient call back  Good afternoon,     I believe this msg came to our staff inbox by mistake. She is not a patient of ours, nor do I see a referral to Interventional Pain Management.     Thanks!  Catia  ----- Message -----  From: Vinita Tejeda  Sent: 6/26/2024   1:20 PM CDT  To: #  Subject: patient call back                                Type: Patient Call Back    Who called: Self     What is the request in detail: asked for a call back to help her get scheduled. when she first received a call she was still in PT      Can the clinic reply by [facility]? No     Would the patient rather a call back or a response via [facility]? Call     Best call back number: .[phone]

## 2024-07-01 ENCOUNTER — OFFICE VISIT (OUTPATIENT)
Dept: PAIN MEDICINE | Facility: CLINIC | Age: 64
End: 2024-07-01
Payer: COMMERCIAL

## 2024-07-01 VITALS
OXYGEN SATURATION: 100 % | SYSTOLIC BLOOD PRESSURE: 140 MMHG | RESPIRATION RATE: 18 BRPM | DIASTOLIC BLOOD PRESSURE: 66 MMHG | HEART RATE: 78 BPM

## 2024-07-01 DIAGNOSIS — M54.31 RIGHT SIDED SCIATICA: ICD-10-CM

## 2024-07-01 DIAGNOSIS — M54.16 CHRONIC RADICULAR LUMBAR PAIN: ICD-10-CM

## 2024-07-01 DIAGNOSIS — G89.29 CHRONIC RADICULAR LUMBAR PAIN: ICD-10-CM

## 2024-07-01 DIAGNOSIS — M47.816 LUMBAR SPONDYLOSIS: ICD-10-CM

## 2024-07-01 DIAGNOSIS — M25.551 RIGHT HIP PAIN: ICD-10-CM

## 2024-07-01 DIAGNOSIS — M16.11 ARTHRITIS OF RIGHT HIP: ICD-10-CM

## 2024-07-01 DIAGNOSIS — M51.36 DDD (DEGENERATIVE DISC DISEASE), LUMBAR: Primary | ICD-10-CM

## 2024-07-01 DIAGNOSIS — M54.16 LUMBAR RADICULOPATHY: ICD-10-CM

## 2024-07-01 PROCEDURE — 3078F DIAST BP <80 MM HG: CPT | Mod: CPTII,S$GLB,, | Performed by: PAIN MEDICINE

## 2024-07-01 PROCEDURE — 1160F RVW MEDS BY RX/DR IN RCRD: CPT | Mod: CPTII,S$GLB,, | Performed by: PAIN MEDICINE

## 2024-07-01 PROCEDURE — 3077F SYST BP >= 140 MM HG: CPT | Mod: CPTII,S$GLB,, | Performed by: PAIN MEDICINE

## 2024-07-01 PROCEDURE — 3044F HG A1C LEVEL LT 7.0%: CPT | Mod: CPTII,S$GLB,, | Performed by: PAIN MEDICINE

## 2024-07-01 PROCEDURE — 1159F MED LIST DOCD IN RCRD: CPT | Mod: CPTII,S$GLB,, | Performed by: PAIN MEDICINE

## 2024-07-01 PROCEDURE — 99204 OFFICE O/P NEW MOD 45 MIN: CPT | Mod: S$GLB,,, | Performed by: PAIN MEDICINE

## 2024-07-01 PROCEDURE — 99999 PR PBB SHADOW E&M-EST. PATIENT-LVL IV: CPT | Mod: PBBFAC,,, | Performed by: PAIN MEDICINE

## 2024-07-01 RX ORDER — GABAPENTIN 300 MG/1
600 CAPSULE ORAL 3 TIMES DAILY
Qty: 180 CAPSULE | Refills: 5 | Status: SHIPPED | OUTPATIENT
Start: 2024-07-01 | End: 2024-12-28

## 2024-07-01 NOTE — H&P (VIEW-ONLY)
Subjective:     Patient ID: Bambi Carrera is a 64 y.o. female    Chief Complaint: Low-back Pain (Right sided sharp radiating to the groin area)      Referred by: Mo Martinez NP      HPI:    Initial Encounter (7/1/24):  Bambi Carrera is a 64 y.o. female who presents today with chronic right-sided low back/hip pain.  Pain started in 2/20/24.  The worst of the pain is located in the right anterior inguinal region.  Pain will radiate to the right anterior thigh.  She also has right-sided low back and buttock pain that will radiate along the posterior thigh and posterior leg to her ankle.  She denies any associated numbness, tingling, weakness, bowel bladder dysfunction.  Pain is worse with standing and walking.   This pain is described in detail below.    Physical Therapy:  Yes.  Not effective.  Exacerbated pain.    Non-pharmacologic Treatment:  Rest helps         TENS?  No    Pain Medications:         Currently taking:  Gabapentin, Voltaren gel    Has tried in the past:  NSAIDs, Tylenol    Has not tried:  Opioids, Muscle relaxants, TCAs, SNRIs    Blood thinners:  None    Interventional Therapies:  None    Relevant Surgeries:  None    Affecting sleep?  Yes    Affecting daily activities? yes    Depressive symptoms? No          SI/HI? No    Work status: Employed    Pain Scores:    Best:       7/10  Worst:     10/10  Usually:   8/10  Today:    7/10    Pain Disability Index  Family/Home Responsibilities:: 7  Recreation:: 7  Social Activity:: 5  Occupation:: 5  Self Care:: 7  Life-Support Activities:: 7    Review of Systems   Constitutional:  Negative for activity change, appetite change, chills, fatigue, fever and unexpected weight change.   HENT:  Negative for hearing loss.    Eyes:  Negative for visual disturbance.   Respiratory:  Negative for chest tightness and shortness of breath.    Cardiovascular:  Negative for chest pain.   Gastrointestinal:  Negative for abdominal pain, constipation, diarrhea,  nausea and vomiting.   Genitourinary:  Negative for difficulty urinating.   Musculoskeletal:  Positive for arthralgias, back pain, gait problem and myalgias. Negative for neck pain.   Skin:  Negative for rash.   Neurological:  Negative for dizziness, weakness, light-headedness, numbness and headaches.   Psychiatric/Behavioral:  Positive for sleep disturbance. Negative for hallucinations and suicidal ideas. The patient is not nervous/anxious.        Past Medical History:   Diagnosis Date    Borderline diabetic     Depression     Eye injury     fb os     Hypertension     Menopause     Nuclear sclerosis of both eyes 7/1/2019       Past Surgical History:   Procedure Laterality Date    COLONOSCOPY N/A 7/15/2021    Procedure: COLONOSCOPY;  Surgeon: Geetha Walton MD;  Location: [facility];  Service: Endoscopy;  Laterality: N/A;  Fully Vaccinated 3/25/21  see media tab 6/1/21, prep instr portal, and mailed -ml    LASIK Bilateral 07/15/2016       Social History     Socioeconomic History    Marital status: Single   Occupational History     Employer: [employer]   Tobacco Use    Smoking status: Never    Smokeless tobacco: Never   Substance and Sexual Activity    Alcohol use: No     Alcohol/week: 0.0 standard drinks of alcohol    Drug use: No    Sexual activity: Not Currently       Review of patient's allergies indicates:  No Known Allergies    Current Outpatient Medications on File Prior to Visit   Medication Sig Dispense Refill    albuterol (VENTOLIN HFA) 90 mcg/actuation inhaler Inhale 2 puffs into the lungs every 6 (six) hours as needed for Wheezing. Rescue 18 g 0    amLODIPine (NORVASC) 5 MG tablet Take 1 tablet (5 mg total) by mouth once daily. 90 tablet 2    benzonatate (TESSALON) 200 MG capsule Take 1 capsule (200 mg total) by mouth 3 (three) times daily as needed for Cough. 30 capsule 0    diclofenac sodium (VOLTAREN) 1 % Gel Apply 2 g topically once daily. 100 g 3    ergocalciferol (ERGOCALCIFEROL) 50,000  unit Cap Take 1 capsule by mouth once a week 12 capsule 3    famotidine (PEPCID) 40 MG tablet Take 1 tablet (40 mg total) by mouth every evening. 30 tablet 11    hydroCHLOROthiazide (HYDRODIURIL) 25 MG tablet Take 1 tablet (25 mg total) by mouth once daily. 90 tablet 2    levocetirizine (XYZAL) 5 MG tablet TAKE 1 TABLET(5 MG) BY MOUTH EVERY EVENING 90 tablet 0    loratadine (CLARITIN) 10 mg tablet Take 1 tablet by mouth once daily 90 tablet 2    multivitamin (THERAGRAN) per tablet Take 1 tablet by mouth once daily.      pantoprazole (PROTONIX) 40 MG tablet Take 1 tablet (40 mg total) by mouth once daily. 90 tablet 3    tirzepatide, weight loss, (ZEPBOUND) 2.5 mg/0.5 mL PnIj Inject 2.5 mg into the skin every 7 days. 4 Pen 1    azelastine (ASTELIN) 137 mcg (0.1 %) nasal spray 1 spray (137 mcg total) by Nasal route 2 (two) times daily. 30 mL 3    meclizine (ANTIVERT) 25 mg tablet Take 1 tablet (25 mg total) by mouth 3 (three) times daily as needed for Dizziness (vertigo). 15 tablet 0     No current facility-administered medications on file prior to visit.       Objective:      BP (!) 140/66 (BP Location: Right arm, Patient Position: Sitting, BP Method: Medium (Automatic))   Pulse 78   Resp 18   SpO2 100%     Exam:  GEN:  Well developed, well nourished.  No acute distress.  Normal pain behavior.  HEENT:  No trauma.  Mucous membranes moist.  Nares patent bilaterally.  PSYCH: Normal affect. Thought content appropriate.  CHEST:  Breathing symmetric.  No audible wheezing.  ABD: Soft, non-distended.  SKIN:  Warm, pink, dry.  No rash on exposed areas.    EXT:  No cyanosis, clubbing, or edema.  No color change or changes in nail or hair growth.  NEURO/MUSCULOSKELETAL:  Fully alert, oriented, and appropriate. Speech normal norris. No cranial nerve deficits.   Gait:  Antalgic.  No trendelenburg sign bilaterally.   Motor Strength:  5/5 motor strength throughout lower extremities.   Sensory:  No sensory deficit in the lower  extremities.   Reflexes:  2+ and symmetric patellar DTRs.  Unable to elicit bilateral Achilles DTRs.  No clonus or spasticity.  L-Spine:  Limited ROM with pain on extension more than flexion.  Positive pain with axial/facet loading bilaterally.  Negative SLR bilaterally.    SI Joint/Hip:  Limited internal rotation of right hip with full reproduction of right inguinal pain.  Positive TTP over midline lower lumbar spine and right lower lumbar paraspinals      Imaging:    Narrative & Impression    EXAMINATION:  XR LUMBAR SPINE AP AND LATERAL     CLINICAL HISTORY:  Lumbago with sciatica, right side     TECHNIQUE:  AP, lateral and spot images were performed of the lumbar spine.     COMPARISON:  02/23/2021     FINDINGS:  The alignment demonstrates a subtle grade 1 anterolisthesis of L3 relative to L4 and L4 relative to L5.     The vertebral body heights are well maintained.     Mild disc space narrowing L3-4.     Small anterior and lateral marginal osteophytes noted at L2 through L5..     Mild sclerosis and osseous hypertrophy of the facet joints at L3-4 and L4-5.     No fracture, no osseous lesions.     The sacroiliac joints appear symmetrical on the AP view.     The soft tissues appear normal.     Impression:     Spondylosis of the lumbar spine, no definite acute process seen.        Electronically signed by:Elinor Sanders MD  Date:                                            03/25/2024  Time:                                           13:59       Narrative & Impression    EXAMINATION:  XR HIP WITH PELVIS WHEN PERFORMED, 2 OR 3  VIEWS RIGHT     CLINICAL HISTORY:  Right lower quadrant pain     TECHNIQUE:  AP view of the pelvis and frog leg lateral view of the right hip were performed.     COMPARISON:  None     FINDINGS:  Moderate right hip joint space narrowing.  Normal right femoral head contour.  Minimal osteophyte at the bilateral acetabular margin.     No acute fracture, no osseous lesions.     The bilateral  "sacroiliac joints appear normal.     The soft tissues appear normal.     Impression:     No acute fracture identified        Electronically signed by:Elinor Sanders MD  Date:                                            03/25/2024  Time:                                           13:58       Assessment:       Encounter Diagnoses   Name Primary?    Right sided sciatica     Chronic radicular lumbar pain     DDD (degenerative disc disease), lumbar Yes    Lumbar spondylosis     Lumbar radiculopathy     Right hip pain     Arthritis of right hip      Plan:       Bambi Shannon" was seen today for low-back pain.    Diagnoses and all orders for this visit:    DDD (degenerative disc disease), lumbar  -     gabapentin (NEURONTIN) 300 MG capsule; Take 2 capsules (600 mg total) by mouth 3 (three) times daily.    Right sided sciatica  -     Ambulatory referral/consult to Pain Clinic    Chronic radicular lumbar pain  -     Ambulatory referral/consult to Pain Clinic    Lumbar spondylosis  -     gabapentin (NEURONTIN) 300 MG capsule; Take 2 capsules (600 mg total) by mouth 3 (three) times daily.    Lumbar radiculopathy  -     gabapentin (NEURONTIN) 300 MG capsule; Take 2 capsules (600 mg total) by mouth 3 (three) times daily.    Right hip pain    Arthritis of right hip        Bambi Carrera is a 64 y.o. female with chronic right-sided low back and lower extremity pain.  Also with chronic right hip.  I suspect is multifactorial.  Likely has lower lumbar pathology causing both axial pain related to lower lumbar facets as well as possible right lower extremity radicular pain.  Also having more distinct right intra-articular hip pathology/pain.  Does have degenerative changes of the right hip on x-rays.  Physical examination consistent with pain related to the right hip joint.    Pertinent imaging studies reviewed by me. Imaging results were discussed with patient.  Start gabapentin 300 mg q.h.s..  Gradually increase to 600 mg " t.i.d. as tolerated/needed.  Patient was given instructions on how to do this.  Schedule for right hip intra-articular steroid injection under fluoroscopy.    Return to clinic after injection to discuss results.  May consider lumbar MRI if pain persists or worsens.  May consider referral to Orthopedic surgery if hip pain persists.            This note was created by combination of typed  and M-Modal dictation. Transcription and phonetic errors may be present.  If there are any questions, please contact me.

## 2024-07-01 NOTE — PROGRESS NOTES
Subjective:     Patient ID: Bambi Carrera is a 64 y.o. female    Chief Complaint: Low-back Pain (Right sided sharp radiating to the groin area)      Referred by: Mo Martinez NP      HPI:    Initial Encounter (7/1/24):  Bambi Carrera is a 64 y.o. female who presents today with chronic right-sided low back/hip pain.  Pain started in 2/20/24.  The worst of the pain is located in the right anterior inguinal region.  Pain will radiate to the right anterior thigh.  She also has right-sided low back and buttock pain that will radiate along the posterior thigh and posterior leg to her ankle.  She denies any associated numbness, tingling, weakness, bowel bladder dysfunction.  Pain is worse with standing and walking.   This pain is described in detail below.    Physical Therapy:  Yes.  Not effective.  Exacerbated pain.    Non-pharmacologic Treatment:  Rest helps         TENS?  No    Pain Medications:         Currently taking:  Gabapentin, Voltaren gel    Has tried in the past:  NSAIDs, Tylenol    Has not tried:  Opioids, Muscle relaxants, TCAs, SNRIs    Blood thinners:  None    Interventional Therapies:  None    Relevant Surgeries:  None    Affecting sleep?  Yes    Affecting daily activities? yes    Depressive symptoms? No          SI/HI? No    Work status: Employed    Pain Scores:    Best:       7/10  Worst:     10/10  Usually:   8/10  Today:    7/10    Pain Disability Index  Family/Home Responsibilities:: 7  Recreation:: 7  Social Activity:: 5  Occupation:: 5  Self Care:: 7  Life-Support Activities:: 7    Review of Systems   Constitutional:  Negative for activity change, appetite change, chills, fatigue, fever and unexpected weight change.   HENT:  Negative for hearing loss.    Eyes:  Negative for visual disturbance.   Respiratory:  Negative for chest tightness and shortness of breath.    Cardiovascular:  Negative for chest pain.   Gastrointestinal:  Negative for abdominal pain, constipation, diarrhea,  nausea and vomiting.   Genitourinary:  Negative for difficulty urinating.   Musculoskeletal:  Positive for arthralgias, back pain, gait problem and myalgias. Negative for neck pain.   Skin:  Negative for rash.   Neurological:  Negative for dizziness, weakness, light-headedness, numbness and headaches.   Psychiatric/Behavioral:  Positive for sleep disturbance. Negative for hallucinations and suicidal ideas. The patient is not nervous/anxious.        Past Medical History:   Diagnosis Date    Borderline diabetic     Depression     Eye injury     fb os     Hypertension     Menopause     Nuclear sclerosis of both eyes 7/1/2019       Past Surgical History:   Procedure Laterality Date    COLONOSCOPY N/A 7/15/2021    Procedure: COLONOSCOPY;  Surgeon: Geetha Walton MD;  Location: [facility];  Service: Endoscopy;  Laterality: N/A;  Fully Vaccinated 3/25/21  see media tab 6/1/21, prep instr portal, and mailed -ml    LASIK Bilateral 07/15/2016       Social History     Socioeconomic History    Marital status: Single   Occupational History     Employer: [employer]   Tobacco Use    Smoking status: Never    Smokeless tobacco: Never   Substance and Sexual Activity    Alcohol use: No     Alcohol/week: 0.0 standard drinks of alcohol    Drug use: No    Sexual activity: Not Currently       Review of patient's allergies indicates:  No Known Allergies    Current Outpatient Medications on File Prior to Visit   Medication Sig Dispense Refill    albuterol (VENTOLIN HFA) 90 mcg/actuation inhaler Inhale 2 puffs into the lungs every 6 (six) hours as needed for Wheezing. Rescue 18 g 0    amLODIPine (NORVASC) 5 MG tablet Take 1 tablet (5 mg total) by mouth once daily. 90 tablet 2    benzonatate (TESSALON) 200 MG capsule Take 1 capsule (200 mg total) by mouth 3 (three) times daily as needed for Cough. 30 capsule 0    diclofenac sodium (VOLTAREN) 1 % Gel Apply 2 g topically once daily. 100 g 3    ergocalciferol (ERGOCALCIFEROL) 50,000  unit Cap Take 1 capsule by mouth once a week 12 capsule 3    famotidine (PEPCID) 40 MG tablet Take 1 tablet (40 mg total) by mouth every evening. 30 tablet 11    hydroCHLOROthiazide (HYDRODIURIL) 25 MG tablet Take 1 tablet (25 mg total) by mouth once daily. 90 tablet 2    levocetirizine (XYZAL) 5 MG tablet TAKE 1 TABLET(5 MG) BY MOUTH EVERY EVENING 90 tablet 0    loratadine (CLARITIN) 10 mg tablet Take 1 tablet by mouth once daily 90 tablet 2    multivitamin (THERAGRAN) per tablet Take 1 tablet by mouth once daily.      pantoprazole (PROTONIX) 40 MG tablet Take 1 tablet (40 mg total) by mouth once daily. 90 tablet 3    tirzepatide, weight loss, (ZEPBOUND) 2.5 mg/0.5 mL PnIj Inject 2.5 mg into the skin every 7 days. 4 Pen 1    azelastine (ASTELIN) 137 mcg (0.1 %) nasal spray 1 spray (137 mcg total) by Nasal route 2 (two) times daily. 30 mL 3    meclizine (ANTIVERT) 25 mg tablet Take 1 tablet (25 mg total) by mouth 3 (three) times daily as needed for Dizziness (vertigo). 15 tablet 0     No current facility-administered medications on file prior to visit.       Objective:      BP (!) 140/66 (BP Location: Right arm, Patient Position: Sitting, BP Method: Medium (Automatic))   Pulse 78   Resp 18   SpO2 100%     Exam:  GEN:  Well developed, well nourished.  No acute distress.  Normal pain behavior.  HEENT:  No trauma.  Mucous membranes moist.  Nares patent bilaterally.  PSYCH: Normal affect. Thought content appropriate.  CHEST:  Breathing symmetric.  No audible wheezing.  ABD: Soft, non-distended.  SKIN:  Warm, pink, dry.  No rash on exposed areas.    EXT:  No cyanosis, clubbing, or edema.  No color change or changes in nail or hair growth.  NEURO/MUSCULOSKELETAL:  Fully alert, oriented, and appropriate. Speech normal norris. No cranial nerve deficits.   Gait:  Antalgic.  No trendelenburg sign bilaterally.   Motor Strength:  5/5 motor strength throughout lower extremities.   Sensory:  No sensory deficit in the lower  extremities.   Reflexes:  2+ and symmetric patellar DTRs.  Unable to elicit bilateral Achilles DTRs.  No clonus or spasticity.  L-Spine:  Limited ROM with pain on extension more than flexion.  Positive pain with axial/facet loading bilaterally.  Negative SLR bilaterally.    SI Joint/Hip:  Limited internal rotation of right hip with full reproduction of right inguinal pain.  Positive TTP over midline lower lumbar spine and right lower lumbar paraspinals      Imaging:    Narrative & Impression    EXAMINATION:  XR LUMBAR SPINE AP AND LATERAL     CLINICAL HISTORY:  Lumbago with sciatica, right side     TECHNIQUE:  AP, lateral and spot images were performed of the lumbar spine.     COMPARISON:  02/23/2021     FINDINGS:  The alignment demonstrates a subtle grade 1 anterolisthesis of L3 relative to L4 and L4 relative to L5.     The vertebral body heights are well maintained.     Mild disc space narrowing L3-4.     Small anterior and lateral marginal osteophytes noted at L2 through L5..     Mild sclerosis and osseous hypertrophy of the facet joints at L3-4 and L4-5.     No fracture, no osseous lesions.     The sacroiliac joints appear symmetrical on the AP view.     The soft tissues appear normal.     Impression:     Spondylosis of the lumbar spine, no definite acute process seen.        Electronically signed by:Elinor Sanders MD  Date:                                            03/25/2024  Time:                                           13:59       Narrative & Impression    EXAMINATION:  XR HIP WITH PELVIS WHEN PERFORMED, 2 OR 3  VIEWS RIGHT     CLINICAL HISTORY:  Right lower quadrant pain     TECHNIQUE:  AP view of the pelvis and frog leg lateral view of the right hip were performed.     COMPARISON:  None     FINDINGS:  Moderate right hip joint space narrowing.  Normal right femoral head contour.  Minimal osteophyte at the bilateral acetabular margin.     No acute fracture, no osseous lesions.     The bilateral  "sacroiliac joints appear normal.     The soft tissues appear normal.     Impression:     No acute fracture identified        Electronically signed by:Elinor Sanders MD  Date:                                            03/25/2024  Time:                                           13:58       Assessment:       Encounter Diagnoses   Name Primary?    Right sided sciatica     Chronic radicular lumbar pain     DDD (degenerative disc disease), lumbar Yes    Lumbar spondylosis     Lumbar radiculopathy     Right hip pain     Arthritis of right hip      Plan:       Bambi Shannon" was seen today for low-back pain.    Diagnoses and all orders for this visit:    DDD (degenerative disc disease), lumbar  -     gabapentin (NEURONTIN) 300 MG capsule; Take 2 capsules (600 mg total) by mouth 3 (three) times daily.    Right sided sciatica  -     Ambulatory referral/consult to Pain Clinic    Chronic radicular lumbar pain  -     Ambulatory referral/consult to Pain Clinic    Lumbar spondylosis  -     gabapentin (NEURONTIN) 300 MG capsule; Take 2 capsules (600 mg total) by mouth 3 (three) times daily.    Lumbar radiculopathy  -     gabapentin (NEURONTIN) 300 MG capsule; Take 2 capsules (600 mg total) by mouth 3 (three) times daily.    Right hip pain    Arthritis of right hip        Bambi Carrera is a 64 y.o. female with chronic right-sided low back and lower extremity pain.  Also with chronic right hip.  I suspect is multifactorial.  Likely has lower lumbar pathology causing both axial pain related to lower lumbar facets as well as possible right lower extremity radicular pain.  Also having more distinct right intra-articular hip pathology/pain.  Does have degenerative changes of the right hip on x-rays.  Physical examination consistent with pain related to the right hip joint.    Pertinent imaging studies reviewed by me. Imaging results were discussed with patient.  Start gabapentin 300 mg q.h.s..  Gradually increase to 600 mg " t.i.d. as tolerated/needed.  Patient was given instructions on how to do this.  Schedule for right hip intra-articular steroid injection under fluoroscopy.    Return to clinic after injection to discuss results.  May consider lumbar MRI if pain persists or worsens.  May consider referral to Orthopedic surgery if hip pain persists.            This note was created by combination of typed  and M-Modal dictation. Transcription and phonetic errors may be present.  If there are any questions, please contact me.

## 2024-07-02 PROBLEM — M16.11 ARTHRITIS OF RIGHT HIP: Status: ACTIVE | Noted: 2024-07-02

## 2024-07-02 PROBLEM — M25.551 RIGHT HIP PAIN: Status: ACTIVE | Noted: 2024-07-02

## 2024-07-03 DIAGNOSIS — I10 ESSENTIAL HYPERTENSION: ICD-10-CM

## 2024-07-03 NOTE — TELEPHONE ENCOUNTER
No care due was identified.  [facility] Embedded Care Due Messages. Reference number: [id number].   7/03/2024 1:22:22 PM CDT

## 2024-07-03 NOTE — TELEPHONE ENCOUNTER
Refill Routing Note   Medication(s) are not appropriate for processing by [facility] for the following reason(s):        Required labs abnormal  Required vitals abnormal    [facility] action(s):  Defer               Appointments  past 12m or future 3m with PCP    Date Provider   Last Visit   4/23/2024 Wong Whitmore MD   Next Visit   7/19/2024 Wong Whitmore MD   ED visits in past 90 days: 0        Note composed:5:19 PM 07/03/2024

## 2024-07-05 ENCOUNTER — TELEPHONE (OUTPATIENT)
Dept: PAIN MEDICINE | Facility: CLINIC | Age: 64
End: 2024-07-05
Payer: COMMERCIAL

## 2024-07-05 RX ORDER — HYDROCHLOROTHIAZIDE 25 MG/1
25 TABLET ORAL
Qty: 90 TABLET | Refills: 3 | Status: SHIPPED | OUTPATIENT
Start: 2024-07-05

## 2024-07-05 RX ORDER — AMLODIPINE BESYLATE 5 MG/1
5 TABLET ORAL
Qty: 90 TABLET | Refills: 3 | Status: SHIPPED | OUTPATIENT
Start: 2024-07-05

## 2024-07-05 NOTE — TELEPHONE ENCOUNTER
Spoke with pt stating Dr. James plan is to get the pt scheduled for an injection prior to ordering an MRI and may consider the MRI if pain worsens or is persistent. I stated once our scheduling nurse returns to the clinic next week she will reach out about scheduling. Pt verbalized understanding.        ----- Message from Yamilka Mariano sent at 7/5/2024  9:19 AM CDT -----  Regarding: Patient call back  .Type: Patient Call Back    Who called:self     What is the request in detail:caller states she was supposed to be scheduled for a MRI, would like a call back     Can the clinic reply by [facility]?no     Would the patient rather a call back or a response via [facility]? Call     Best call back number:.[phone]      Additional Information:

## 2024-07-08 ENCOUNTER — TELEPHONE (OUTPATIENT)
Dept: PAIN MEDICINE | Facility: CLINIC | Age: 64
End: 2024-07-08
Payer: COMMERCIAL

## 2024-07-08 DIAGNOSIS — M25.551 RIGHT HIP PAIN: Primary | ICD-10-CM

## 2024-07-08 NOTE — TELEPHONE ENCOUNTER
Ms. Jordan would like to schedule the right hip inj on 7/17/24- provider updated.     Reviewed pre-procedure instructions with Mrs. Jordan, as well as provided arrival time of 11:00a for 7/17/24.  All questions answered- verbalized understanding.

## 2024-07-16 ENCOUNTER — PATIENT MESSAGE (OUTPATIENT)
Dept: PAIN MEDICINE | Facility: CLINIC | Age: 64
End: 2024-07-16
Payer: COMMERCIAL

## 2024-07-16 NOTE — DISCHARGE INSTRUCTIONS

## 2024-07-17 ENCOUNTER — HOSPITAL ENCOUNTER (OUTPATIENT)
Facility: HOSPITAL | Age: 64
Discharge: HOME | End: 2024-07-17
Attending: PAIN MEDICINE | Admitting: PAIN MEDICINE
Payer: COMMERCIAL

## 2024-07-17 VITALS
HEART RATE: 75 BPM | DIASTOLIC BLOOD PRESSURE: 72 MMHG | RESPIRATION RATE: 18 BRPM | SYSTOLIC BLOOD PRESSURE: 131 MMHG | OXYGEN SATURATION: 100 % | TEMPERATURE: 98 F

## 2024-07-17 DIAGNOSIS — M25.551 RIGHT HIP PAIN: ICD-10-CM

## 2024-07-17 DIAGNOSIS — M16.11 ARTHRITIS OF RIGHT HIP: Primary | ICD-10-CM

## 2024-07-17 PROCEDURE — 25000003 PHARM REV CODE 250: Performed by: PAIN MEDICINE

## 2024-07-17 PROCEDURE — 77002 NEEDLE LOCALIZATION BY XRAY: CPT | Mod: 26,,, | Performed by: PAIN MEDICINE

## 2024-07-17 PROCEDURE — 25500020 PHARM REV CODE 255: Performed by: PAIN MEDICINE

## 2024-07-17 PROCEDURE — 20610 DRAIN/INJ JOINT/BURSA W/O US: CPT | Mod: RT | Performed by: PAIN MEDICINE

## 2024-07-17 PROCEDURE — 63600175 PHARM REV CODE 636 W HCPCS: Performed by: PAIN MEDICINE

## 2024-07-17 PROCEDURE — 20610 DRAIN/INJ JOINT/BURSA W/O US: CPT | Mod: RT,,, | Performed by: PAIN MEDICINE

## 2024-07-17 RX ORDER — BUPIVACAINE HYDROCHLORIDE 2.5 MG/ML
10 INJECTION, SOLUTION EPIDURAL; INFILTRATION; INTRACAUDAL ONCE
Status: COMPLETED | OUTPATIENT
Start: 2024-07-17 | End: 2024-07-17

## 2024-07-17 RX ORDER — TRIAMCINOLONE ACETONIDE 40 MG/ML
40 INJECTION, SUSPENSION INTRA-ARTICULAR; INTRAMUSCULAR ONCE
Status: COMPLETED | OUTPATIENT
Start: 2024-07-17 | End: 2024-07-17

## 2024-07-17 RX ORDER — ALPRAZOLAM 0.5 MG/1
1 TABLET, ORALLY DISINTEGRATING ORAL ONCE AS NEEDED
Status: DISCONTINUED | OUTPATIENT
Start: 2024-07-17 | End: 2024-07-17 | Stop reason: HOSPADM

## 2024-07-17 RX ORDER — TRIAMCINOLONE ACETONIDE 40 MG/ML
INJECTION, SUSPENSION INTRA-ARTICULAR; INTRAMUSCULAR
Status: DISCONTINUED
Start: 2024-07-17 | End: 2024-07-17 | Stop reason: HOSPADM

## 2024-07-17 RX ORDER — LIDOCAINE HYDROCHLORIDE 10 MG/ML
20 INJECTION INFILTRATION; PERINEURAL ONCE
Status: COMPLETED | OUTPATIENT
Start: 2024-07-17 | End: 2024-07-17

## 2024-07-17 RX ORDER — LIDOCAINE HYDROCHLORIDE 10 MG/ML
INJECTION INFILTRATION; PERINEURAL
Status: DISCONTINUED
Start: 2024-07-17 | End: 2024-07-17 | Stop reason: HOSPADM

## 2024-07-17 RX ORDER — BUPIVACAINE HYDROCHLORIDE 2.5 MG/ML
INJECTION, SOLUTION EPIDURAL; INFILTRATION; INTRACAUDAL
Status: DISCONTINUED
Start: 2024-07-17 | End: 2024-07-17 | Stop reason: HOSPADM

## 2024-07-17 NOTE — OP NOTE
Hip Injection/Arthrogram  ANTERIOR APPROACH  Time-out taken to identify patient and procedure side prior to starting the procedure.   I attest that I have reviewed the patient's home medications prior to the procedure and no contraindication have been identified. I  re-evaluated the patient after the patient was positioned for the procedure in the procedure room immediately before the procedural time-out. The vital signs are current and represent the current state of the patient which has not significantly changed since the preprocedure assessment.                                                                                                                             Date of Service: 07/17/2024    PCP: Wong Whitmore MD          Referring Physician:                                                                                             PROCEDURE:  Right hip joint injection under fluoroscopy.      REASON FOR PROCEDURE: Right hip pain [M25.551]       PHYSICIAN: Italo James MD    ASSISTANTS: None                                                                                              ANESTHESIA:  Xylocaine 1% 3ml per site.                                                                                                              MEDICATIONS INJECTED:  Kenalog 20mg, bupivacaine 0.25% 2 mL (per side), and sterile saline 2.5ml (per side)                                                                                                                                     ESTIMATED BLOOD LOSS:  None.                                                                                                                              COMPLICATIONS:  None.     SEDATION: None                                                                                                                                    TECHNIQUE:  With the patient lying in the supine position the the femoral neck identified under fluoroscopy.  The  area was prepped and draped in the usual sterile fashion.  Local anesthetic was used, given by raising a wheal and going down to the hub of the 27-gauge needle.  A 4.75 inch 25-gauge needle was introduced under fluoroscopy until the tip reached the lateral aspect of the femoral neck.  When the tip of the needle was thought   to be in appropriate position, contrast was injected to confirm proper placement.  Medication was then injected slowly.  The patient tolerated the procedure well.                                                                                                                     PAIN BEFORE THE PROCEDURE: 7/10.                                                                                                                         PAIN AFTER THE PROCEDURE:  0/10.                                                                                                                          The patient was monitored for 20-30 minutes after the procedure and was given post procedure and discharge instructions to follow at home.  The patient is to follow-up with me in two weeks by calling.   The patient was discharged in a stable condtion.

## 2024-07-17 NOTE — DISCHARGE SUMMARY
[facility] - Pain Management  Discharge Note  Short Stay    Procedure(s) (LRB):  Right Hip Steroid Injection (Right)      OUTCOME: Patient tolerated treatment/procedure well without complication and is now ready for discharge.    DISPOSITION: Home or Self Care    FINAL DIAGNOSIS:  <principal problem not specified>    FOLLOWUP: In clinic    DISCHARGE INSTRUCTIONS:  No discharge procedures on file.       Discharge Diagnosis:Right hip pain [M25.551]  Condition on Discharge: Stable.  Diet on Discharge: Same as before.  Activity: as per instruction sheet.  Discharge to: Home with a responsible adult.  Follow up: as per Discharge instructions

## 2024-07-19 ENCOUNTER — OFFICE VISIT (OUTPATIENT)
Dept: FAMILY MEDICINE | Facility: CLINIC | Age: 64
End: 2024-07-19
Payer: COMMERCIAL

## 2024-07-19 ENCOUNTER — LAB VISIT (OUTPATIENT)
Dept: LAB | Facility: HOSPITAL | Age: 64
End: 2024-07-19
Payer: COMMERCIAL

## 2024-07-19 VITALS
BODY MASS INDEX: 46.96 KG/M2 | DIASTOLIC BLOOD PRESSURE: 79 MMHG | WEIGHT: 281.88 LBS | SYSTOLIC BLOOD PRESSURE: 130 MMHG | HEIGHT: 65 IN

## 2024-07-19 DIAGNOSIS — E66.01 MORBID OBESITY: Chronic | ICD-10-CM

## 2024-07-19 DIAGNOSIS — E66.01 CLASS 3 SEVERE OBESITY DUE TO EXCESS CALORIES WITH SERIOUS COMORBIDITY AND BODY MASS INDEX (BMI) OF 50.0 TO 59.9 IN ADULT: ICD-10-CM

## 2024-07-19 DIAGNOSIS — I10 PRIMARY HYPERTENSION: ICD-10-CM

## 2024-07-19 DIAGNOSIS — I10 ESSENTIAL HYPERTENSION: Primary | ICD-10-CM

## 2024-07-19 DIAGNOSIS — E87.6 HYPOKALEMIA: ICD-10-CM

## 2024-07-19 PROBLEM — M16.11 ARTHRITIS OF RIGHT HIP: Status: RESOLVED | Noted: 2024-07-02 | Resolved: 2024-07-19

## 2024-07-19 LAB
ALBUMIN SERPL BCP-MCNC: 3.7 G/DL (ref 3.5–5.2)
ALP SERPL-CCNC: 122 U/L (ref 55–135)
ALT SERPL W/O P-5'-P-CCNC: 24 U/L (ref 10–44)
ANION GAP SERPL CALC-SCNC: 10 MMOL/L (ref 8–16)
AST SERPL-CCNC: 16 U/L (ref 10–40)
BILIRUB SERPL-MCNC: 0.2 MG/DL (ref 0.1–1)
BUN SERPL-MCNC: 22 MG/DL (ref 8–23)
CALCIUM SERPL-MCNC: 9.7 MG/DL (ref 8.7–10.5)
CHLORIDE SERPL-SCNC: 107 MMOL/L (ref 95–110)
CO2 SERPL-SCNC: 26 MMOL/L (ref 23–29)
CREAT SERPL-MCNC: 1 MG/DL (ref 0.5–1.4)
EST. GFR  (NO RACE VARIABLE): >60 ML/MIN/1.73 M^2
GLUCOSE SERPL-MCNC: 93 MG/DL (ref 70–110)
POTASSIUM SERPL-SCNC: 4 MMOL/L (ref 3.5–5.1)
PROT SERPL-MCNC: 8.3 G/DL (ref 6–8.4)
SODIUM SERPL-SCNC: 143 MMOL/L (ref 136–145)

## 2024-07-19 PROCEDURE — 1159F MED LIST DOCD IN RCRD: CPT | Mod: CPTII,S$GLB,, | Performed by: FAMILY MEDICINE

## 2024-07-19 PROCEDURE — 3078F DIAST BP <80 MM HG: CPT | Mod: CPTII,S$GLB,, | Performed by: FAMILY MEDICINE

## 2024-07-19 PROCEDURE — 36415 COLL VENOUS BLD VENIPUNCTURE: CPT | Mod: PO | Performed by: FAMILY MEDICINE

## 2024-07-19 PROCEDURE — 80053 COMPREHEN METABOLIC PANEL: CPT | Performed by: FAMILY MEDICINE

## 2024-07-19 PROCEDURE — 3008F BODY MASS INDEX DOCD: CPT | Mod: CPTII,S$GLB,, | Performed by: FAMILY MEDICINE

## 2024-07-19 PROCEDURE — 99214 OFFICE O/P EST MOD 30 MIN: CPT | Mod: S$GLB,,, | Performed by: FAMILY MEDICINE

## 2024-07-19 PROCEDURE — 3075F SYST BP GE 130 - 139MM HG: CPT | Mod: CPTII,S$GLB,, | Performed by: FAMILY MEDICINE

## 2024-07-19 PROCEDURE — 3044F HG A1C LEVEL LT 7.0%: CPT | Mod: CPTII,S$GLB,, | Performed by: FAMILY MEDICINE

## 2024-07-19 PROCEDURE — 99999 PR PBB SHADOW E&M-EST. PATIENT-LVL III: CPT | Mod: PBBFAC,,, | Performed by: FAMILY MEDICINE

## 2024-07-19 RX ORDER — AMLODIPINE BESYLATE 5 MG/1
5 TABLET ORAL DAILY
Qty: 90 TABLET | Refills: 3 | Status: SHIPPED | OUTPATIENT
Start: 2024-07-19

## 2024-07-19 RX ORDER — TIRZEPATIDE 2.5 MG/.5ML
2.5 INJECTION, SOLUTION SUBCUTANEOUS
Qty: 4 PEN | Refills: 3 | Status: SHIPPED | OUTPATIENT
Start: 2024-07-19

## 2024-07-19 RX ORDER — HYDROCHLOROTHIAZIDE 25 MG/1
25 TABLET ORAL DAILY
Qty: 90 TABLET | Refills: 3 | Status: SHIPPED | OUTPATIENT
Start: 2024-07-19

## 2024-07-19 NOTE — PROGRESS NOTES
[facility]  Progress Note    SUBJECTIVE:     Chief Complaint   Patient presents with    morbid obesity       HPI   Bambi Carrera  is a 64 y.o. female here for follow-up of her weight management. Doing well, lost over 25 lbs this year so far. Patient has no other new complaints/problems at this time.      Review of patient's allergies indicates:  No Known Allergies    Past Medical History:   Diagnosis Date    Borderline diabetic     Depression     Eye injury     fb os     Hypertension     Menopause     Nuclear sclerosis of both eyes 7/1/2019     Past Surgical History:   Procedure Laterality Date    COLONOSCOPY N/A 7/15/2021    Procedure: COLONOSCOPY;  Surgeon: Geetha Walton MD;  Location: [facility] ENDO;  Service: Endoscopy;  Laterality: N/A;  Fully Vaccinated 3/25/21  see media tab 6/1/21, prep instr portal, and mailed -ml    INJECTION OF JOINT Right 7/17/2024    Procedure: Right Hip Steroid Injection;  Surgeon: Italo James Jr., MD;  Location: [facility] PAIN MANAGEMENT;  Service: Pain Management;  Laterality: Right;  @1100  No ATC or DM    LASIK Bilateral 07/15/2016     Family History   Problem Relation Name Age of Onset    Cancer Mother      No Known Problems Father      No Known Problems Sister      No Known Problems Brother      No Known Problems Maternal Aunt      No Known Problems Maternal Uncle      No Known Problems Paternal Aunt      No Known Problems Paternal Uncle      No Known Problems Maternal Grandmother      No Known Problems Maternal Grandfather      No Known Problems Paternal Grandmother      No Known Problems Paternal Grandfather      Amblyopia Neg Hx      Blindness Neg Hx      Cataracts Neg Hx      Diabetes Neg Hx      Glaucoma Neg Hx      Hypertension Neg Hx      Macular degeneration Neg Hx      Retinal detachment Neg Hx      Strabismus Neg Hx      Stroke Neg Hx      Thyroid disease Neg Hx       Social History     Tobacco Use    Smoking status: Never    Smokeless tobacco: Never    Substance Use Topics    Alcohol use: No     Alcohol/week: 0.0 standard drinks of alcohol    Drug use: No        Review of Systems   Constitutional:  Negative for chills and fever.   HENT: Negative.     Respiratory: Negative.  Negative for shortness of breath.    Cardiovascular: Negative.  Negative for chest pain.   Gastrointestinal: Negative.  Negative for abdominal pain, nausea and vomiting.   Genitourinary: Negative.    Neurological:  Negative for headaches.   All other systems reviewed and are negative.    OBJECTIVE:     Vitals:    07/19/24 1307   BP: 130/79     Body mass index is 46.9 kg/m².    Physical Exam  Constitutional:       General: She is not in acute distress.     Appearance: She is not diaphoretic.   HENT:      Head: Normocephalic and atraumatic.      Nose: Nose normal.   Eyes:      Conjunctiva/sclera: Conjunctivae normal.   Cardiovascular:      Rate and Rhythm: Normal rate and regular rhythm.      Heart sounds: Normal heart sounds. No murmur heard.     No friction rub. No gallop.   Pulmonary:      Effort: Pulmonary effort is normal. No respiratory distress.      Breath sounds: Normal breath sounds. No wheezing or rales.   Abdominal:      Palpations: Abdomen is soft.   Skin:     General: Skin is warm.   Neurological:      Mental Status: She is alert and oriented to person, place, and time.         Old records were reviewed. Labs and/or images were independently reviewed.    ASSESSMENT     1. Essential hypertension    2. Primary hypertension    3. Morbid obesity    4. Class 3 severe obesity due to excess calories with serious comorbidity and body mass index (BMI) of 50.0 to 59.9 in adult    5. Hypokalemia        PLAN:     Essential hypertension  -     amLODIPine (NORVASC) 5 MG tablet; Take 1 tablet (5 mg total) by mouth once daily.  Dispense: 90 tablet; Refill: 3  -     hydroCHLOROthiazide (HYDRODIURIL) 25 MG tablet; Take 1 tablet (25 mg total) by mouth once daily.  Dispense: 90 tablet; Refill: 3  -      Educated patient to take medications as prescribed, and to record bp logs daily to bring along to next visit. Instructed patient to decrease salt intake. Also told patient to call if any new signs or symptoms develop.    Morbid obesity   -     Counseled patient about healthy diet, exercise habits, and to increase physical activity.    Class 3 severe obesity due to excess calories with serious comorbidity and body mass index (BMI) of 50.0 to 59.9 in adult  -     tirzepatide, weight loss, (ZEPBOUND) 2.5 mg/0.5 mL PnIj; Inject 2.5 mg into the skin every 7 days.  Dispense: 4 Pen; Refill: 3  -     no change at this time. Consider going up next time in 2 months.    Hypokalemia  -     Comprehensive Metabolic Panel; Future      RTC PRN    Wong Whitmore MD  07/19/2024 1:21 PM

## 2024-08-08 ENCOUNTER — OFFICE VISIT (OUTPATIENT)
Dept: PAIN MEDICINE | Facility: CLINIC | Age: 64
End: 2024-08-08
Payer: COMMERCIAL

## 2024-08-08 ENCOUNTER — DOCUMENTATION ONLY (OUTPATIENT)
Dept: PAIN MEDICINE | Facility: CLINIC | Age: 64
End: 2024-08-08

## 2024-08-08 VITALS — DIASTOLIC BLOOD PRESSURE: 74 MMHG | HEART RATE: 85 BPM | OXYGEN SATURATION: 96 % | SYSTOLIC BLOOD PRESSURE: 122 MMHG

## 2024-08-08 DIAGNOSIS — M25.551 RIGHT HIP PAIN: ICD-10-CM

## 2024-08-08 DIAGNOSIS — M51.36 DDD (DEGENERATIVE DISC DISEASE), LUMBAR: ICD-10-CM

## 2024-08-08 DIAGNOSIS — M47.816 LUMBAR SPONDYLOSIS: ICD-10-CM

## 2024-08-08 DIAGNOSIS — M16.11 ARTHRITIS OF RIGHT HIP: Primary | ICD-10-CM

## 2024-08-08 PROCEDURE — 99999 PR PBB SHADOW E&M-EST. PATIENT-LVL IV: CPT | Mod: PBBFAC,,,

## 2024-08-23 ENCOUNTER — OFFICE VISIT (OUTPATIENT)
Dept: URGENT CARE | Facility: CLINIC | Age: 64
End: 2024-08-23
Payer: COMMERCIAL

## 2024-08-23 VITALS
HEART RATE: 78 BPM | DIASTOLIC BLOOD PRESSURE: 79 MMHG | TEMPERATURE: 98 F | BODY MASS INDEX: 46.53 KG/M2 | WEIGHT: 279.31 LBS | OXYGEN SATURATION: 98 % | RESPIRATION RATE: 16 BRPM | SYSTOLIC BLOOD PRESSURE: 120 MMHG | HEIGHT: 65 IN

## 2024-08-23 DIAGNOSIS — R10.9 RIGHT FLANK PAIN: Primary | ICD-10-CM

## 2024-08-23 DIAGNOSIS — R31.9 HEMATURIA, UNSPECIFIED TYPE: ICD-10-CM

## 2024-08-23 LAB
BILIRUBIN, UA POC OHS: NEGATIVE
BLOOD, UA POC OHS: ABNORMAL
CLARITY, UA POC OHS: CLEAR
COLOR, UA POC OHS: YELLOW
GLUCOSE, UA POC OHS: NEGATIVE
KETONES, UA POC OHS: NEGATIVE
LEUKOCYTES, UA POC OHS: NEGATIVE
NITRITE, UA POC OHS: NEGATIVE
PH, UA POC OHS: 6
PROTEIN, UA POC OHS: ABNORMAL
SPECIFIC GRAVITY, UA POC OHS: 1.02
UROBILINOGEN, UA POC OHS: 0.2

## 2024-08-23 RX ORDER — KETOROLAC TROMETHAMINE 30 MG/ML
30 INJECTION, SOLUTION INTRAMUSCULAR; INTRAVENOUS
Status: COMPLETED | OUTPATIENT
Start: 2024-08-23 | End: 2024-08-23

## 2024-08-23 RX ORDER — TAMSULOSIN HYDROCHLORIDE 0.4 MG/1
0.4 CAPSULE ORAL DAILY
Qty: 5 CAPSULE | Refills: 0 | Status: SHIPPED | OUTPATIENT
Start: 2024-08-23 | End: 2024-08-28

## 2024-08-23 RX ADMIN — KETOROLAC TROMETHAMINE 30 MG: 30 INJECTION, SOLUTION INTRAMUSCULAR; INTRAVENOUS at 08:08

## 2024-08-23 NOTE — PATIENT INSTRUCTIONS
You may want to take medicines like ibuprofen, naproxen, or acetaminophen to help with pain.  Avoid foods or drinks that make your pain worse. Based on the cause of your pain, it might help to avoid or limit:  Drinks that are fizzy or have caffeine  Fried, greasy, or fatty foods  Milk or cheese  Ice and heat may help you ease pain that seems to be caused by a muscle strain or bruise.  Place an ice pack or a bag of frozen vegetables wrapped in a towel over the painful part. Never put ice right on the skin. Do not leave the ice on more than 10 to 15 minutes at a time. Use for the first 24 to 48 hours after an injury.  Use heat after the first 24 to 48 hours, but not right away. Do not use heat with sharp pain or after an acute injury. Heat can make swelling worse. If your doctor tells you to use heat, put a heating pad on your painful part for no more than 20 minutes at a time. Never go to sleep with a heating pad on as this can cause burns.  Please return here or go to the Emergency Department for any concerns or worsening of condition.  Please follow up with your primary care doctor or specialist as needed.    If you  smoke, please stop smoking.   Discuss Zepbound and weight loss medication options with prescribing provider

## 2024-08-23 NOTE — PROGRESS NOTES
"Subjective:      Patient ID: Bambi Carrera is a 64 y.o. female.    Vitals:  height is 5' 5" (1.651 m) and weight is 126.7 kg (279 lb 5.2 oz). Her temperature is 98 °F (36.7 °C). Her blood pressure is 120/79 and her pulse is 78. Her respiration is 16 and oxygen saturation is 98%.     Chief Complaint: Flank Pain    Pt here for right flank pain onset 2 days ago. Pt sts when she lays down and rolls on her right side is when she feels the pain the most. Pt sts she currently sees therapy for sciatica on that side. Pt sts yesterday she started having a lot of gas and belching and then started having diarrhea non stop all day. Pt sts she was only able to eat watermelon all day. Pt denies any dysuria but sts a couple nights ago she did seem to be having to urinate more frequently. Pt has taken tylenol for relief.     64-year-old female presents to clinic with complaints of right flank pain, urinary frequency, excessive gas, belching and diarrhea onset 2 days ago treating with Tylenol.  New medication Zepbound for weight loss.        Flank Pain  This is a new problem. The current episode started in the past 7 days. The problem occurs intermittently. The problem has been gradually worsening since onset. The quality of the pain is described as stabbing. The pain is at a severity of 9/10. The pain is severe. The pain is Worse during the night. The symptoms are aggravated by lying down. Pertinent negatives include no abdominal pain, bladder incontinence, bowel incontinence, chest pain, dysuria, fever, headaches, leg pain, numbness, paresis, paresthesias, pelvic pain, perianal numbness, tingling, weakness, weight loss, genital pain, hematuria, urethral discharge or vaginal discharge. Treatments tried: Tylenol. The treatment provided no relief.       Constitution: Negative for fever.   Cardiovascular:  Negative for chest pain.   Gastrointestinal:  Positive for abdominal bloating, diarrhea and heartburn. Negative for abdominal " trauma, abdominal pain, history of abdominal surgery, nausea, vomiting, constipation, bright red blood in stool, dark colored stools, rectal bleeding, rectal pain, hemorrhoids and bowel incontinence.   Genitourinary:  Positive for frequency and flank pain. Negative for dysuria, urgency, urine decreased, bladder incontinence, bed wetting, hematuria and pelvic pain.   Neurological:  Negative for headaches and numbness.      Objective:     Physical Exam   Constitutional: She is oriented to person, place, and time. She appears well-developed.   HENT:   Head: Normocephalic and atraumatic.   Ears:   Right Ear: External ear normal.   Left Ear: External ear normal.   Nose: Nose normal. No nasal deformity. No epistaxis.   Mouth/Throat: Oropharynx is clear and moist and mucous membranes are normal.   Eyes: Lids are normal. Extraocular movement intact   Neck: Trachea normal and phonation normal. Neck supple.   Cardiovascular: Normal rate.   Pulmonary/Chest: Effort normal.   Abdominal: Normal appearance and bowel sounds are normal. She exhibits no distension. Soft. There is no abdominal tenderness. There is right CVA tenderness.   Genitourinary:    No vaginal discharge.     Neurological: She is alert and oriented to person, place, and time.   Skin: Skin is warm, dry and intact.   Psychiatric: Her speech is normal and behavior is normal.   Nursing note and vitals reviewed.    Assessment:     1. Right flank pain    2. Hematuria, unspecified type      Results for orders placed or performed in visit on 08/23/24   POCT Urinalysis(Instrument)   Result Value Ref Range    Color, POC UA Yellow Yellow, Straw, Colorless    Clarity, POC UA Clear Clear    Glucose, POC UA Negative Negative    Bilirubin, POC UA Negative Negative    Ketones, POC UA Negative Negative    Spec Grav POC UA 1.020 1.005 - 1.030    Blood, POC UA Small (A) Negative    pH, POC UA 6.0 5.0 - 8.0    Protein, POC UA Trace (A) Negative    Urobilinogen, POC UA 0.2 <=1.0     Nitrite, POC UA Negative Negative    WBC, POC UA Negative Negative      Plan:       Right flank pain  -     POCT Urinalysis(Instrument)  -     ketorolac injection 30 mg  -     tamsulosin (FLOMAX) 0.4 mg Cap; Take 1 capsule (0.4 mg total) by mouth once daily. for 5 days  Dispense: 5 capsule; Refill: 0    Hematuria, unspecified type  -     tamsulosin (FLOMAX) 0.4 mg Cap; Take 1 capsule (0.4 mg total) by mouth once daily. for 5 days  Dispense: 5 capsule; Refill: 0      Patient Instructions   You may want to take medicines like ibuprofen, naproxen, or acetaminophen to help with pain.  Avoid foods or drinks that make your pain worse. Based on the cause of your pain, it might help to avoid or limit:  Drinks that are fizzy or have caffeine  Fried, greasy, or fatty foods  Milk or cheese  Ice and heat may help you ease pain that seems to be caused by a muscle strain or bruise.  Place an ice pack or a bag of frozen vegetables wrapped in a towel over the painful part. Never put ice right on the skin. Do not leave the ice on more than 10 to 15 minutes at a time. Use for the first 24 to 48 hours after an injury.  Use heat after the first 24 to 48 hours, but not right away. Do not use heat with sharp pain or after an acute injury. Heat can make swelling worse. If your doctor tells you to use heat, put a heating pad on your painful part for no more than 20 minutes at a time. Never go to sleep with a heating pad on as this can cause burns.  Please return here or go to the Emergency Department for any concerns or worsening of condition.  Please follow up with your primary care doctor or specialist as needed.    If you  smoke, please stop smoking.   Discuss Zepbound and weight loss medication options with prescribing provider

## 2024-09-03 DIAGNOSIS — E66.01 CLASS 3 SEVERE OBESITY DUE TO EXCESS CALORIES WITH SERIOUS COMORBIDITY AND BODY MASS INDEX (BMI) OF 50.0 TO 59.9 IN ADULT: ICD-10-CM

## 2024-09-03 RX ORDER — TIRZEPATIDE 2.5 MG/.5ML
2.5 INJECTION, SOLUTION SUBCUTANEOUS
Qty: 4 PEN | Refills: 3 | Status: SHIPPED | OUTPATIENT
Start: 2024-09-03

## 2024-09-03 NOTE — TELEPHONE ENCOUNTER
Care Due:                  Date            Visit Type   Department     Provider  --------------------------------------------------------------------------------                                [facility]                              PRIMARY      MED/ INTERNAL  Last Visit: 07-      CARE ([facility])   MED/ PEDS      CARROLL GUERRA  Next Visit: None Scheduled  None         None Found                                                            Last  Test          Frequency    Reason                     Performed    Due Date  --------------------------------------------------------------------------------    Vitamin D...  12 months..  ergocalciferol...........  Not Found    Overdue    [employer] Embedded Care Due Messages. Reference number: [id number].   9/03/2024 3:46:29 PM CDT

## 2024-09-03 NOTE — TELEPHONE ENCOUNTER
----- Message from Leticia Smith sent at 9/3/2024 12:32 PM CDT -----  Regarding: tirzepatide, weight loss, (ZEPBOUND) 2.5 mg/0.5 mL PnIj  Type:  RX Refill Request    Who Called: pat    Refill or New Rx:refill    RX Name and Strength:tirzepatide, weight loss, (ZEPBOUND) 2.5 mg/0.5 mL PnIj    How is the patient currently taking it? (ex. 1XDay):    Is this a 30 day or 90 day RX:    Preferred Pharmacy with phone number:[place] [address]   Phone: [phone]  Fax: [phone]          Local or Mail Order:local    Ordering Provider:c    Would the patient rather a call back or a response via [facility]?      Best Call Back Number:[phone]    Additional Information: Patient is asking can dosage be upped to 5.0, because she is having cravings. Please call patient when refill request is received.

## 2024-09-05 ENCOUNTER — TELEPHONE (OUTPATIENT)
Dept: FAMILY MEDICINE | Facility: CLINIC | Age: 64
End: 2024-09-05
Payer: COMMERCIAL

## 2024-09-05 NOTE — TELEPHONE ENCOUNTER
Spoke with patient and she informed me that on her last conversation with provider that he may increase her dose of Monjura since she is doing well on her current dose. Patient said that she is due for refill and would like medication to be changed at this time.

## 2024-09-05 NOTE — TELEPHONE ENCOUNTER
----- Message from Ra Gomez sent at 9/5/2024  9:46 AM CDT -----   Name of Who is Calling:     What is the request in detail:  patient request call back in reference to medication  Mounjaro  / dosage change  Please contact to further discuss and advise      Can the clinic reply by [facility]:     What Number to Call Back if not in [facility]:   [phone]

## 2024-09-09 NOTE — TELEPHONE ENCOUNTER
Spoke with patient and informed her that she will need to do a in house weight check for dose increase. Patient schedule visit for 9/10/2024.

## 2024-09-10 ENCOUNTER — PATIENT OUTREACH (OUTPATIENT)
Dept: ADMINISTRATIVE | Facility: HOSPITAL | Age: 64
End: 2024-09-10
Payer: COMMERCIAL

## 2024-09-10 ENCOUNTER — CLINICAL SUPPORT (OUTPATIENT)
Dept: FAMILY MEDICINE | Facility: CLINIC | Age: 64
End: 2024-09-10
Payer: COMMERCIAL

## 2024-09-10 VITALS — BODY MASS INDEX: 47.33 KG/M2 | WEIGHT: 284.38 LBS

## 2024-09-10 DIAGNOSIS — E66.01 CLASS 3 SEVERE OBESITY DUE TO EXCESS CALORIES WITH SERIOUS COMORBIDITY AND BODY MASS INDEX (BMI) OF 50.0 TO 59.9 IN ADULT: Primary | ICD-10-CM

## 2024-09-10 NOTE — PROGRESS NOTES
Patient presented to clinic for a nurse visit weight check. Weight was 129.00 kg. Patient's weight documented.

## 2024-09-20 ENCOUNTER — OFFICE VISIT (OUTPATIENT)
Dept: PAIN MEDICINE | Facility: CLINIC | Age: 64
End: 2024-09-20
Payer: COMMERCIAL

## 2024-09-20 VITALS
DIASTOLIC BLOOD PRESSURE: 93 MMHG | RESPIRATION RATE: 18 BRPM | OXYGEN SATURATION: 100 % | HEART RATE: 91 BPM | SYSTOLIC BLOOD PRESSURE: 149 MMHG

## 2024-09-20 DIAGNOSIS — M54.16 LUMBAR RADICULOPATHY, CHRONIC: ICD-10-CM

## 2024-09-20 DIAGNOSIS — M54.16 LUMBAR RADICULOPATHY: ICD-10-CM

## 2024-09-20 DIAGNOSIS — M51.36 DDD (DEGENERATIVE DISC DISEASE), LUMBAR: ICD-10-CM

## 2024-09-20 DIAGNOSIS — M25.551 RIGHT HIP PAIN: Primary | ICD-10-CM

## 2024-09-20 DIAGNOSIS — M47.816 LUMBAR SPONDYLOSIS: ICD-10-CM

## 2024-09-20 PROCEDURE — 99999 PR PBB SHADOW E&M-EST. PATIENT-LVL V: CPT | Mod: PBBFAC,,, | Performed by: PAIN MEDICINE

## 2024-09-20 NOTE — PROGRESS NOTES
Subjective:     Patient ID: Bambi Carrera is a 64 y.o. female    Chief Complaint: Leg Pain (Right sided constant achy pain)      Referred by: No ref. provider found      HPI:    Interval History (9/20/24):  She returns today for follow up.  She reports that she has been having right anterior thigh and leg pain for the past 2-3 weeks.  No specific inciting events or injuries noted.  Does have some right-sided low back pain as well.  Denies any associated numbness or tingling.  Denies any focal weakness but does feel that right lower extremity will give out on her at times.  She denies having any significant inguinal pain.  Pain is constant and worsened with activity.  Significantly disrupts her sleep.      Interval History PA (08/08/2024):  Patient returns to clinic for follow up of chronic right-sided lower back/hip pain.  Patient is s/p right intra-articular hip steroid injection done on 07/17/2024 with 70-80% relief.  Patient notes overall significant improvement following the injection.  Notes that she was on vacation of the injection and was able to do more activities and walk with reduced pain.  She does continue to note similar symptoms as before but these are less severe and more intermittent.  Pain continues to be located primarily in her right hip into her anterior inguinal region.  Denies significant radiation of pain.  Denies any numbness, tingling, weakness, bowel or bladder dysfunction.  Notes she has increase gabapentin to 600 mg t.i.d..  Patient unsure if this has been beneficial.  States she has not noticed significant change in her pain since increasing the dosage.  Denies any adverse effects    Initial Encounter (7/1/24):  Bambi Carrera is a 64 y.o. female who presents today with chronic right-sided low back/hip pain.  Pain started in 2/20/24.  The worst of the pain is located in the right anterior inguinal region.  Pain will radiate to the right anterior thigh.  She also has right-sided  low back and buttock pain that will radiate along the posterior thigh and posterior leg to her ankle.  She denies any associated numbness, tingling, weakness, bowel bladder dysfunction.  Pain is worse with standing and walking.   This pain is described in detail below.    Physical Therapy:  Yes.  Not effective.     Non-pharmacologic Treatment:  Rest helps         TENS?  No    Pain Medications:         Currently taking:  Gabapentin, Voltaren gel, ibuprofen, Tylenol    Has tried in the past:  NSAIDs, Tylenol    Has not tried:  Opioids, Muscle relaxants, TCAs, SNRIs    Blood thinners:  None    Interventional Therapies:    07/17/2024 - right intra-articular hip steroid injection - 70-80% relief    Relevant Surgeries:  None    Affecting sleep?  Yes    Affecting daily activities? yes    Depressive symptoms? No          SI/HI? No    Work status: Employed    Pain Scores:    Best:       6/10  Worst:     10/10  Usually:   9/10  Today:    10/10    Pain Disability Index  Family/Home Responsibilities:: 0  Recreation:: 0  Social Activity:: 0  Occupation:: 10  Sexual Behavior:: 0  Self Care:: 8  Life-Support Activities:: 10  Pain Disability Index (PDI): 28    Review of Systems   Constitutional:  Negative for activity change, appetite change, chills, fatigue, fever and unexpected weight change.   HENT:  Negative for hearing loss.    Eyes:  Negative for visual disturbance.   Respiratory:  Negative for chest tightness and shortness of breath.    Cardiovascular:  Negative for chest pain.   Gastrointestinal:  Negative for abdominal pain, constipation, diarrhea, nausea and vomiting.   Genitourinary:  Negative for difficulty urinating.   Musculoskeletal:  Positive for arthralgias, back pain, gait problem and myalgias. Negative for neck pain.   Skin:  Negative for rash.   Neurological:  Positive for weakness. Negative for dizziness, light-headedness, numbness and headaches.   Psychiatric/Behavioral:  Positive for sleep disturbance.  Negative for hallucinations and suicidal ideas. The patient is not nervous/anxious.        Past Medical History:   Diagnosis Date    Borderline diabetic     Depression     Eye injury     fb os     Hypertension     Menopause     Nuclear sclerosis of both eyes 7/1/2019       Past Surgical History:   Procedure Laterality Date    COLONOSCOPY N/A 7/15/2021    Procedure: COLONOSCOPY;  Surgeon: Geetha Walton MD;  Location: [facility] ENDO;  Service: Endoscopy;  Laterality: N/A;  Fully Vaccinated 3/25/21  see media tab 6/1/21, prep instr portal, and mailed -ml    INJECTION OF JOINT Right 7/17/2024    Procedure: Right Hip Steroid Injection;  Surgeon: Italo James Jr., MD;  Location: [facility] PAIN MANAGEMENT;  Service: Pain Management;  Laterality: Right;  @1100  No ATC or DM    LASIK Bilateral 07/15/2016       Social History     Socioeconomic History    Marital status: Single   Occupational History     Employer: [employer]   Tobacco Use    Smoking status: Never    Smokeless tobacco: Never   Substance and Sexual Activity    Alcohol use: No     Alcohol/week: 0.0 standard drinks of alcohol    Drug use: No    Sexual activity: Not Currently       Review of patient's allergies indicates:  No Known Allergies    Current Outpatient Medications on File Prior to Visit   Medication Sig Dispense Refill    albuterol (VENTOLIN HFA) 90 mcg/actuation inhaler Inhale 2 puffs into the lungs every 6 (six) hours as needed for Wheezing. Rescue 18 g 0    amLODIPine (NORVASC) 5 MG tablet Take 1 tablet (5 mg total) by mouth once daily. 90 tablet 3    diclofenac sodium (VOLTAREN) 1 % Gel Apply 2 g topically once daily. 100 g 3    ergocalciferol (ERGOCALCIFEROL) 50,000 unit Cap Take 1 capsule by mouth once a week 12 capsule 3    famotidine (PEPCID) 40 MG tablet Take 1 tablet (40 mg total) by mouth every evening. 30 tablet 11    hydroCHLOROthiazide (HYDRODIURIL) 25 MG tablet Take 1 tablet (25 mg total) by mouth once daily. 90 tablet 3     levocetirizine (XYZAL) 5 MG tablet TAKE 1 TABLET(5 MG) BY MOUTH EVERY EVENING 90 tablet 0    loratadine (CLARITIN) 10 mg tablet Take 1 tablet by mouth once daily 90 tablet 3    multivitamin (THERAGRAN) per tablet Take 1 tablet by mouth once daily.      pantoprazole (PROTONIX) 40 MG tablet Take 1 tablet by mouth once daily 90 tablet 3    tirzepatide, weight loss, (ZEPBOUND) 2.5 mg/0.5 mL PnIj Inject 2.5 mg into the skin every 7 days. 4 Pen 3    [DISCONTINUED] gabapentin (NEURONTIN) 300 MG capsule Take 2 capsules (600 mg total) by mouth 3 (three) times daily. 180 capsule 5    azelastine (ASTELIN) 137 mcg (0.1 %) nasal spray 1 spray (137 mcg total) by Nasal route 2 (two) times daily. 30 mL 3    meclizine (ANTIVERT) 25 mg tablet Take 1 tablet (25 mg total) by mouth 3 (three) times daily as needed for Dizziness (vertigo). 15 tablet 0    tamsulosin (FLOMAX) 0.4 mg Cap Take 1 capsule (0.4 mg total) by mouth once daily. for 5 days 5 capsule 0     No current facility-administered medications on file prior to visit.       Objective:      BP (!) 149/93 (BP Location: Left arm, Patient Position: Sitting, BP Method: Medium (Automatic))   Pulse 91   Resp 18   SpO2 100%     Exam:  GEN:  Well developed, well nourished.  No acute distress.  Normal pain behavior.  HEENT:  No trauma.  Mucous membranes moist.  Nares patent bilaterally.  PSYCH: Normal affect. Thought content appropriate.  CHEST:  Breathing symmetric.  No audible wheezing.  ABD: Soft, non-distended.  SKIN:  Warm, pink, dry.  No rash on exposed areas.    EXT:  No cyanosis, clubbing, or edema.  No color change or changes in nail or hair growth.  NEURO/MUSCULOSKELETAL:  Fully alert, oriented, and appropriate. Speech normal norris. No cranial nerve deficits.   Gait:  Antalgic.  No trendelenburg sign bilaterally.   Motor Strength:  5/5 motor strength throughout lower extremities.   Sensory:  No sensory deficit in the lower extremities.   Reflexes:  Unable to elicit  bilateral patellar or Achilles DTRs.  No clonus or spasticity.  L-Spine:  Limited ROM with pain on flexion and extension.  Negative pain with axial/facet loading bilaterally.  Positive SLR on the right.    Positive TTP over midline lower lumbar spine and bilateral lower lumbar paraspinals right more than left      Imaging:    Narrative & Impression    EXAMINATION:  XR LUMBAR SPINE AP AND LATERAL     CLINICAL HISTORY:  Lumbago with sciatica, right side     TECHNIQUE:  AP, lateral and spot images were performed of the lumbar spine.     COMPARISON:  02/23/2021     FINDINGS:  The alignment demonstrates a subtle grade 1 anterolisthesis of L3 relative to L4 and L4 relative to L5.     The vertebral body heights are well maintained.     Mild disc space narrowing L3-4.     Small anterior and lateral marginal osteophytes noted at L2 through L5..     Mild sclerosis and osseous hypertrophy of the facet joints at L3-4 and L4-5.     No fracture, no osseous lesions.     The sacroiliac joints appear symmetrical on the AP view.     The soft tissues appear normal.     Impression:     Spondylosis of the lumbar spine, no definite acute process seen.        Electronically signed by:Elinor Sanders MD  Date:                                            03/25/2024  Time:                                           13:59       Narrative & Impression    EXAMINATION:  XR HIP WITH PELVIS WHEN PERFORMED, 2 OR 3  VIEWS RIGHT     CLINICAL HISTORY:  Right lower quadrant pain     TECHNIQUE:  AP view of the pelvis and frog leg lateral view of the right hip were performed.     COMPARISON:  None     FINDINGS:  Moderate right hip joint space narrowing.  Normal right femoral head contour.  Minimal osteophyte at the bilateral acetabular margin.     No acute fracture, no osseous lesions.     The bilateral sacroiliac joints appear normal.     The soft tissues appear normal.     Impression:     No acute fracture identified        Electronically signed  "by:Elinor Sanders MD  Date:                                            03/25/2024  Time:                                           13:58       Assessment:       Encounter Diagnoses   Name Primary?    Right hip pain Yes    DDD (degenerative disc disease), lumbar     Lumbar spondylosis     Lumbar radiculopathy     Lumbar radiculopathy, chronic          Plan:       Bambi "Nikki" was seen today for leg pain.    Diagnoses and all orders for this visit:    Right hip pain  -     X-Ray Hip 2 or 3 views Right with Pelvis when performed; Future    DDD (degenerative disc disease), lumbar  -     MRI Lumbar Spine Without Contrast; Future    Lumbar spondylosis  -     MRI Lumbar Spine Without Contrast; Future    Lumbar radiculopathy  -     MRI Lumbar Spine Without Contrast; Future    Lumbar radiculopathy, chronic  -     MRI Lumbar Spine Without Contrast; Future            Bambi Carrera is a 64 y.o. female with chronic right-sided low back and lower extremity pain.  Also with chronic right hip.  I suspect is multifactorial.  Likely has lower lumbar pathology causing both axial pain related to lower lumbar facets as well as possible right lower extremity radicular pain.  Also having more distinct right intra-articular hip pathology/pain.  Does have degenerative changes of the right hip on x-rays.  Physical examination consistent with pain related to the right hip joint.  Significant relief with right intra-articular hip steroid injection.    Pertinent imaging studies reviewed by me. Imaging results were discussed with patient.  Patient is s/p right intra-articular hip steroid injection with 80% relief.  Patient noting improvement ADLs.  Consider repeating in the future if appropriate.  Lumbar MRI to evaluate for radiculopathy.  Right hip x-rays.  Okay to wean gabapentin.  Patient was given instructions how to do this.  Can continue over-the-counter Tylenol and ibuprofen.  Patient was given instructions on how to take " these medications  Stressed the importance of maintaining regular home exercise program and being mindful of how they use their back throughout the day.  Patient expressed understanding and agreement.  Return to clinic after MRI to review results.    This note was created by combination of typed  and M-Modal dictation.  Transcription and phonetic errors may be present.  If there are any questions, please contact me.

## 2024-09-23 ENCOUNTER — HOSPITAL ENCOUNTER (OUTPATIENT)
Dept: RADIOLOGY | Facility: HOSPITAL | Age: 64
Discharge: HOME | End: 2024-09-23
Attending: PAIN MEDICINE
Payer: COMMERCIAL

## 2024-09-23 DIAGNOSIS — M47.816 LUMBAR SPONDYLOSIS: ICD-10-CM

## 2024-09-23 DIAGNOSIS — M54.16 LUMBAR RADICULOPATHY, CHRONIC: ICD-10-CM

## 2024-09-23 DIAGNOSIS — M54.16 LUMBAR RADICULOPATHY: ICD-10-CM

## 2024-09-23 DIAGNOSIS — M51.36 DDD (DEGENERATIVE DISC DISEASE), LUMBAR: ICD-10-CM

## 2024-09-23 PROCEDURE — 72148 MRI LUMBAR SPINE W/O DYE: CPT | Mod: TC

## 2024-09-23 PROCEDURE — 72148 MRI LUMBAR SPINE W/O DYE: CPT | Mod: 26,,, | Performed by: RADIOLOGY

## 2024-09-25 ENCOUNTER — OFFICE VISIT (OUTPATIENT)
Dept: PAIN MEDICINE | Facility: CLINIC | Age: 64
End: 2024-09-25
Payer: COMMERCIAL

## 2024-09-25 VITALS — OXYGEN SATURATION: 97 % | DIASTOLIC BLOOD PRESSURE: 80 MMHG | SYSTOLIC BLOOD PRESSURE: 135 MMHG | HEART RATE: 71 BPM

## 2024-09-25 DIAGNOSIS — M51.36 DDD (DEGENERATIVE DISC DISEASE), LUMBAR: Primary | ICD-10-CM

## 2024-09-25 DIAGNOSIS — M25.551 RIGHT HIP PAIN: ICD-10-CM

## 2024-09-25 DIAGNOSIS — M54.16 LUMBAR RADICULOPATHY: ICD-10-CM

## 2024-09-25 DIAGNOSIS — M47.816 LUMBAR SPONDYLOSIS: ICD-10-CM

## 2024-09-25 PROCEDURE — 1159F MED LIST DOCD IN RCRD: CPT | Mod: CPTII,S$GLB,,

## 2024-09-25 PROCEDURE — 3044F HG A1C LEVEL LT 7.0%: CPT | Mod: CPTII,S$GLB,,

## 2024-09-25 PROCEDURE — 99999 PR PBB SHADOW E&M-EST. PATIENT-LVL III: CPT | Mod: PBBFAC,,,

## 2024-09-25 PROCEDURE — 3075F SYST BP GE 130 - 139MM HG: CPT | Mod: CPTII,S$GLB,,

## 2024-09-25 PROCEDURE — 3079F DIAST BP 80-89 MM HG: CPT | Mod: CPTII,S$GLB,,

## 2024-09-25 PROCEDURE — 99214 OFFICE O/P EST MOD 30 MIN: CPT | Mod: S$GLB,,,

## 2024-09-25 PROCEDURE — 1160F RVW MEDS BY RX/DR IN RCRD: CPT | Mod: CPTII,S$GLB,,

## 2024-09-25 NOTE — PROGRESS NOTES
Subjective:     Patient ID: Bambi Carrera is a 64 y.o. female    Chief Complaint: Follow-up      Referred by: No ref. provider found      HPI:    Interval History PA (09/25/2024):  Patient returns to clinic for follow up of right-sided lower back and extremity pain and MRI review.  Patient denies significant changes in the quality or location of her pain since previous encounter.  She denies new or worsening symptoms.  Previously we did complete right intra-articular hip steroid injection in July with initial good relief.  Patient reports about 70% relief initially.  Notes continued benefit of her hip, no longer having significant inguinal pain at this time.  Primary concern today is worsening of pain located in her right lower lumbar paraspinal region radiating into her right anterior lateral thigh across the knee into distal leg.  Pain does not radiate into ankle/foot.  Denies associated numbness and tingling, weakness, bowel or bladder dysfunction.  Of note patient has recently started aquatic physical therapy for both her hip and lower back.    Interval History (9/20/24):  She returns today for follow up.  She reports that she has been having right anterior thigh and leg pain for the past 2-3 weeks.  No specific inciting events or injuries noted.  Does have some right-sided low back pain as well.  Denies any associated numbness or tingling.  Denies any focal weakness but does feel that right lower extremity will give out on her at times.  She denies having any significant inguinal pain.  Pain is constant and worsened with activity.  Significantly disrupts her sleep.      Interval History PA (08/08/2024):  Patient returns to clinic for follow up of chronic right-sided lower back/hip pain.  Patient is s/p right intra-articular hip steroid injection done on 07/17/2024 with 70-80% relief.  Patient notes overall significant improvement following the injection.  Notes that she was on vacation of the injection and  was able to do more activities and walk with reduced pain.  She does continue to note similar symptoms as before but these are less severe and more intermittent.  Pain continues to be located primarily in her right hip into her anterior inguinal region.  Denies significant radiation of pain.  Denies any numbness, tingling, weakness, bowel or bladder dysfunction.  Notes she has increase gabapentin to 600 mg t.i.d..  Patient unsure if this has been beneficial.  States she has not noticed significant change in her pain since increasing the dosage.  Denies any adverse effects    Initial Encounter (7/1/24):  Bambi Carrera is a 64 y.o. female who presents today with chronic right-sided low back/hip pain.  Pain started in 2/20/24.  The worst of the pain is located in the right anterior inguinal region.  Pain will radiate to the right anterior thigh.  She also has right-sided low back and buttock pain that will radiate along the posterior thigh and posterior leg to her ankle.  She denies any associated numbness, tingling, weakness, bowel bladder dysfunction.  Pain is worse with standing and walking.   This pain is described in detail below.    Physical Therapy:  Yes.  Not effective.     Non-pharmacologic Treatment:  Rest helps         TENS?  No    Pain Medications:         Currently taking:  Voltaren gel, ibuprofen, Tylenol    Has tried in the past:  NSAIDs, Tylenol, gabapentin    Has not tried:  Opioids, Muscle relaxants, TCAs, SNRIs    Blood thinners:  None    Interventional Therapies:    07/17/2024 - right intra-articular hip steroid injection - 70-80% relief    Relevant Surgeries:  None    Affecting sleep?  Yes    Affecting daily activities? yes    Depressive symptoms? No          SI/HI? No    Work status: Employed    Pain Scores:    Best:       7/10  Worst:     9/10  Usually:   7/10  Today:    7/10    Pain Disability Index  Family/Home Responsibilities:: 7  Recreation:: 7  Social Activity:: 7  Occupation::  7  Sexual Behavior:: 0  Self Care:: 7  Life-Support Activities:: 7  Pain Disability Index (PDI): 42    Review of Systems   Constitutional:  Negative for activity change, appetite change, chills, fatigue, fever and unexpected weight change.   HENT:  Negative for hearing loss.    Eyes:  Negative for visual disturbance.   Respiratory:  Negative for chest tightness and shortness of breath.    Cardiovascular:  Negative for chest pain.   Gastrointestinal:  Negative for abdominal pain, constipation, diarrhea, nausea and vomiting.   Genitourinary:  Negative for difficulty urinating.   Musculoskeletal:  Positive for arthralgias, back pain, gait problem and myalgias. Negative for neck pain.   Skin:  Negative for rash.   Neurological:  Positive for weakness. Negative for dizziness, light-headedness, numbness and headaches.   Psychiatric/Behavioral:  Positive for sleep disturbance. Negative for hallucinations and suicidal ideas. The patient is not nervous/anxious.        Past Medical History:   Diagnosis Date    Borderline diabetic     Depression     Eye injury     fb os     Hypertension     Menopause     Nuclear sclerosis of both eyes 7/1/2019       Past Surgical History:   Procedure Laterality Date    COLONOSCOPY N/A 7/15/2021    Procedure: COLONOSCOPY;  Surgeon: Geetha Walton MD;  Location: [facility] ENDO;  Service: Endoscopy;  Laterality: N/A;  Fully Vaccinated 3/25/21  see media tab 6/1/21, prep instr portal, and mailed -ml    INJECTION OF JOINT Right 7/17/2024    Procedure: Right Hip Steroid Injection;  Surgeon: Italo James Jr., MD;  Location: [facility] PAIN MANAGEMENT;  Service: Pain Management;  Laterality: Right;  @1100  No ATC or DM    LASIK Bilateral 07/15/2016       Social History     Socioeconomic History    Marital status: Single   Occupational History     Employer: [employer]   Tobacco Use    Smoking status: Never    Smokeless tobacco: Never   Substance and Sexual Activity    Alcohol use: No      Alcohol/week: 0.0 standard drinks of alcohol    Drug use: No    Sexual activity: Not Currently       Review of patient's allergies indicates:  No Known Allergies    Current Outpatient Medications on File Prior to Visit   Medication Sig Dispense Refill    albuterol (VENTOLIN HFA) 90 mcg/actuation inhaler Inhale 2 puffs into the lungs every 6 (six) hours as needed for Wheezing. Rescue 18 g 0    amLODIPine (NORVASC) 5 MG tablet Take 1 tablet (5 mg total) by mouth once daily. 90 tablet 3    diclofenac sodium (VOLTAREN) 1 % Gel Apply 2 g topically once daily. 100 g 3    ergocalciferol (ERGOCALCIFEROL) 50,000 unit Cap Take 1 capsule by mouth once a week 12 capsule 3    famotidine (PEPCID) 40 MG tablet Take 1 tablet (40 mg total) by mouth every evening. 30 tablet 11    hydroCHLOROthiazide (HYDRODIURIL) 25 MG tablet Take 1 tablet (25 mg total) by mouth once daily. 90 tablet 3    levocetirizine (XYZAL) 5 MG tablet TAKE 1 TABLET(5 MG) BY MOUTH EVERY EVENING 90 tablet 0    loratadine (CLARITIN) 10 mg tablet Take 1 tablet by mouth once daily 90 tablet 3    multivitamin (THERAGRAN) per tablet Take 1 tablet by mouth once daily.      pantoprazole (PROTONIX) 40 MG tablet Take 1 tablet by mouth once daily 90 tablet 3    tirzepatide, weight loss, (ZEPBOUND) 2.5 mg/0.5 mL PnIj Inject 2.5 mg into the skin every 7 days. 4 Pen 3    azelastine (ASTELIN) 137 mcg (0.1 %) nasal spray 1 spray (137 mcg total) by Nasal route 2 (two) times daily. 30 mL 3    meclizine (ANTIVERT) 25 mg tablet Take 1 tablet (25 mg total) by mouth 3 (three) times daily as needed for Dizziness (vertigo). 15 tablet 0    tamsulosin (FLOMAX) 0.4 mg Cap Take 1 capsule (0.4 mg total) by mouth once daily. for 5 days 5 capsule 0     No current facility-administered medications on file prior to visit.       Objective:      /80 (BP Location: Left arm, Patient Position: Sitting, BP Method: Medium (Automatic))   Pulse 71   SpO2 97%     Exam:  GEN:  Well developed, well  nourished.  No acute distress.  Normal pain behavior.  HEENT:  No trauma.  Mucous membranes moist.  Nares patent bilaterally.  PSYCH: Normal affect. Thought content appropriate.  CHEST:  Breathing symmetric.  No audible wheezing.  ABD: Soft, non-distended.  SKIN:  Warm, pink, dry.  No rash on exposed areas.    EXT:  No cyanosis, clubbing, or edema.  No color change or changes in nail or hair growth.  NEURO/MUSCULOSKELETAL:  Fully alert, oriented, and appropriate. Speech normal norris. No cranial nerve deficits.   Gait:  Antalgic.  No trendelenburg sign bilaterally.   Motor Strength:  5/5 motor strength throughout lower extremities.   L-Spine:  Limited ROM with pain on flexion and extension.  Negative pain with axial/facet loading bilaterally.  Positive SLR on the right.    Diffusely TTP over midline lower lumbar spine and bilateral lower lumbar paraspinals right more than left      Imaging:    Narrative & Impression    EXAMINATION:  XR LUMBAR SPINE AP AND LATERAL     CLINICAL HISTORY:  Lumbago with sciatica, right side     TECHNIQUE:  AP, lateral and spot images were performed of the lumbar spine.     COMPARISON:  02/23/2021     FINDINGS:  The alignment demonstrates a subtle grade 1 anterolisthesis of L3 relative to L4 and L4 relative to L5.     The vertebral body heights are well maintained.     Mild disc space narrowing L3-4.     Small anterior and lateral marginal osteophytes noted at L2 through L5..     Mild sclerosis and osseous hypertrophy of the facet joints at L3-4 and L4-5.     No fracture, no osseous lesions.     The sacroiliac joints appear symmetrical on the AP view.     The soft tissues appear normal.     Impression:     Spondylosis of the lumbar spine, no definite acute process seen.        Electronically signed by:Elinor Sanders MD  Date:                                            03/25/2024  Time:                                           13:59       Narrative & Impression    EXAMINATION:  XR  "HIP WITH PELVIS WHEN PERFORMED, 2 OR 3  VIEWS RIGHT     CLINICAL HISTORY:  Right lower quadrant pain     TECHNIQUE:  AP view of the pelvis and frog leg lateral view of the right hip were performed.     COMPARISON:  None     FINDINGS:  Moderate right hip joint space narrowing.  Normal right femoral head contour.  Minimal osteophyte at the bilateral acetabular margin.     No acute fracture, no osseous lesions.     The bilateral sacroiliac joints appear normal.     The soft tissues appear normal.     Impression:     No acute fracture identified        Electronically signed by:Elinor Sanders MD  Date:                                            03/25/2024  Time:                                           13:58       Assessment:       Encounter Diagnoses   Name Primary?    DDD (degenerative disc disease), lumbar Yes    Lumbar radiculopathy     Right hip pain     Lumbar spondylosis            Plan:       Bambi Shannon" was seen today for follow-up.    Diagnoses and all orders for this visit:    DDD (degenerative disc disease), lumbar    Lumbar radiculopathy    Right hip pain    Lumbar spondylosis          Bambi Carrera is a 64 y.o. female with chronic right-sided low back and lower extremity pain.  Also with chronic right hip.  I suspect is multifactorial.  Likely has lower lumbar pathology causing both axial pain related to lower lumbar facets as well as possible right lower extremity radicular pain.  Lumbar MRI with multilevel degenerative changes, most notable at L2-3 and L3-4 with at least moderate central canal stenosis.  Also with significant facet arthropathy at L2-3 through L4-5.  Also having more distinct right intra-articular hip pathology/pain.  Does have degenerative changes of the right hip on x-rays.  Physical examination consistent with pain related to the right hip joint.  Significant relief with right intra-articular hip steroid injection.    Pertinent imaging studies reviewed by me. Imaging " results were discussed with patient.  Schedule for right L4 transforaminal epidural steroid injection  Discussed with the patient that the exact etiology of her pain is somewhat unclear.  Suspect the majority of her pain is related to spinal stenosis at L2-3 and L3-4.  However symptoms may be multifactorial.  She does appear to have a degree of myofascial pain diffusely across her lumbar paraspinal region.  May consider trigger point injections in the future if needed.  Okay to continue over-the-counter Tylenol and ibuprofen.  Patient given instructions on how to take these medications.  May also consider trial of meloxicam in the future if needed.  Continue with aquatic physical therapy for both lower back and hip.  Stressed the importance of maintaining regular home exercise program and being mindful of how they use their back throughout the day.  Patient expressed understanding and agreement.  Return to clinic after procedure to discuss efficacy.         This note was created by combination of typed  and M-Modal dictation.  Transcription and phonetic errors may be present.  If there are any questions, please contact me.

## 2024-09-25 NOTE — H&P (VIEW-ONLY)
Subjective:     Patient ID: Bambi Carrera is a 64 y.o. female    Chief Complaint: Follow-up      Referred by: No ref. provider found      HPI:    Interval History PA (09/25/2024):  Patient returns to clinic for follow up of right-sided lower back and extremity pain and MRI review.  Patient denies significant changes in the quality or location of her pain since previous encounter.  She denies new or worsening symptoms.  Previously we did complete right intra-articular hip steroid injection in July with initial good relief.  Patient reports about 70% relief initially.  Notes continued benefit of her hip, no longer having significant inguinal pain at this time.  Primary concern today is worsening of pain located in her right lower lumbar paraspinal region radiating into her right anterior lateral thigh across the knee into distal leg.  Pain does not radiate into ankle/foot.  Denies associated numbness and tingling, weakness, bowel or bladder dysfunction.  Of note patient has recently started aquatic physical therapy for both her hip and lower back.    Interval History (9/20/24):  She returns today for follow up.  She reports that she has been having right anterior thigh and leg pain for the past 2-3 weeks.  No specific inciting events or injuries noted.  Does have some right-sided low back pain as well.  Denies any associated numbness or tingling.  Denies any focal weakness but does feel that right lower extremity will give out on her at times.  She denies having any significant inguinal pain.  Pain is constant and worsened with activity.  Significantly disrupts her sleep.      Interval History PA (08/08/2024):  Patient returns to clinic for follow up of chronic right-sided lower back/hip pain.  Patient is s/p right intra-articular hip steroid injection done on 07/17/2024 with 70-80% relief.  Patient notes overall significant improvement following the injection.  Notes that she was on vacation of the injection and  was able to do more activities and walk with reduced pain.  She does continue to note similar symptoms as before but these are less severe and more intermittent.  Pain continues to be located primarily in her right hip into her anterior inguinal region.  Denies significant radiation of pain.  Denies any numbness, tingling, weakness, bowel or bladder dysfunction.  Notes she has increase gabapentin to 600 mg t.i.d..  Patient unsure if this has been beneficial.  States she has not noticed significant change in her pain since increasing the dosage.  Denies any adverse effects    Initial Encounter (7/1/24):  Bambi Carrera is a 64 y.o. female who presents today with chronic right-sided low back/hip pain.  Pain started in 2/20/24.  The worst of the pain is located in the right anterior inguinal region.  Pain will radiate to the right anterior thigh.  She also has right-sided low back and buttock pain that will radiate along the posterior thigh and posterior leg to her ankle.  She denies any associated numbness, tingling, weakness, bowel bladder dysfunction.  Pain is worse with standing and walking.   This pain is described in detail below.    Physical Therapy:  Yes.  Not effective.     Non-pharmacologic Treatment:  Rest helps         TENS?  No    Pain Medications:         Currently taking:  Voltaren gel, ibuprofen, Tylenol    Has tried in the past:  NSAIDs, Tylenol, gabapentin    Has not tried:  Opioids, Muscle relaxants, TCAs, SNRIs    Blood thinners:  None    Interventional Therapies:    07/17/2024 - right intra-articular hip steroid injection - 70-80% relief    Relevant Surgeries:  None    Affecting sleep?  Yes    Affecting daily activities? yes    Depressive symptoms? No          SI/HI? No    Work status: Employed    Pain Scores:    Best:       7/10  Worst:     9/10  Usually:   7/10  Today:    7/10    Pain Disability Index  Family/Home Responsibilities:: 7  Recreation:: 7  Social Activity:: 7  Occupation::  7  Sexual Behavior:: 0  Self Care:: 7  Life-Support Activities:: 7  Pain Disability Index (PDI): 42    Review of Systems   Constitutional:  Negative for activity change, appetite change, chills, fatigue, fever and unexpected weight change.   HENT:  Negative for hearing loss.    Eyes:  Negative for visual disturbance.   Respiratory:  Negative for chest tightness and shortness of breath.    Cardiovascular:  Negative for chest pain.   Gastrointestinal:  Negative for abdominal pain, constipation, diarrhea, nausea and vomiting.   Genitourinary:  Negative for difficulty urinating.   Musculoskeletal:  Positive for arthralgias, back pain, gait problem and myalgias. Negative for neck pain.   Skin:  Negative for rash.   Neurological:  Positive for weakness. Negative for dizziness, light-headedness, numbness and headaches.   Psychiatric/Behavioral:  Positive for sleep disturbance. Negative for hallucinations and suicidal ideas. The patient is not nervous/anxious.        Past Medical History:   Diagnosis Date    Borderline diabetic     Depression     Eye injury     fb os     Hypertension     Menopause     Nuclear sclerosis of both eyes 7/1/2019       Past Surgical History:   Procedure Laterality Date    COLONOSCOPY N/A 7/15/2021    Procedure: COLONOSCOPY;  Surgeon: Geetha Walton MD;  Location: [facility] ENDO;  Service: Endoscopy;  Laterality: N/A;  Fully Vaccinated 3/25/21  see media tab 6/1/21, prep instr portal, and mailed -ml    INJECTION OF JOINT Right 7/17/2024    Procedure: Right Hip Steroid Injection;  Surgeon: Italo James Jr., MD;  Location: [facility] PAIN MANAGEMENT;  Service: Pain Management;  Laterality: Right;  @1100  No ATC or DM    LASIK Bilateral 07/15/2016       Social History     Socioeconomic History    Marital status: Single   Occupational History     Employer: [employer]   Tobacco Use    Smoking status: Never    Smokeless tobacco: Never   Substance and Sexual Activity    Alcohol use: No      Alcohol/week: 0.0 standard drinks of alcohol    Drug use: No    Sexual activity: Not Currently       Review of patient's allergies indicates:  No Known Allergies    Current Outpatient Medications on File Prior to Visit   Medication Sig Dispense Refill    albuterol (VENTOLIN HFA) 90 mcg/actuation inhaler Inhale 2 puffs into the lungs every 6 (six) hours as needed for Wheezing. Rescue 18 g 0    amLODIPine (NORVASC) 5 MG tablet Take 1 tablet (5 mg total) by mouth once daily. 90 tablet 3    diclofenac sodium (VOLTAREN) 1 % Gel Apply 2 g topically once daily. 100 g 3    ergocalciferol (ERGOCALCIFEROL) 50,000 unit Cap Take 1 capsule by mouth once a week 12 capsule 3    famotidine (PEPCID) 40 MG tablet Take 1 tablet (40 mg total) by mouth every evening. 30 tablet 11    hydroCHLOROthiazide (HYDRODIURIL) 25 MG tablet Take 1 tablet (25 mg total) by mouth once daily. 90 tablet 3    levocetirizine (XYZAL) 5 MG tablet TAKE 1 TABLET(5 MG) BY MOUTH EVERY EVENING 90 tablet 0    loratadine (CLARITIN) 10 mg tablet Take 1 tablet by mouth once daily 90 tablet 3    multivitamin (THERAGRAN) per tablet Take 1 tablet by mouth once daily.      pantoprazole (PROTONIX) 40 MG tablet Take 1 tablet by mouth once daily 90 tablet 3    tirzepatide, weight loss, (ZEPBOUND) 2.5 mg/0.5 mL PnIj Inject 2.5 mg into the skin every 7 days. 4 Pen 3    azelastine (ASTELIN) 137 mcg (0.1 %) nasal spray 1 spray (137 mcg total) by Nasal route 2 (two) times daily. 30 mL 3    meclizine (ANTIVERT) 25 mg tablet Take 1 tablet (25 mg total) by mouth 3 (three) times daily as needed for Dizziness (vertigo). 15 tablet 0    tamsulosin (FLOMAX) 0.4 mg Cap Take 1 capsule (0.4 mg total) by mouth once daily. for 5 days 5 capsule 0     No current facility-administered medications on file prior to visit.       Objective:      /80 (BP Location: Left arm, Patient Position: Sitting, BP Method: Medium (Automatic))   Pulse 71   SpO2 97%     Exam:  GEN:  Well developed, well  nourished.  No acute distress.  Normal pain behavior.  HEENT:  No trauma.  Mucous membranes moist.  Nares patent bilaterally.  PSYCH: Normal affect. Thought content appropriate.  CHEST:  Breathing symmetric.  No audible wheezing.  ABD: Soft, non-distended.  SKIN:  Warm, pink, dry.  No rash on exposed areas.    EXT:  No cyanosis, clubbing, or edema.  No color change or changes in nail or hair growth.  NEURO/MUSCULOSKELETAL:  Fully alert, oriented, and appropriate. Speech normal norris. No cranial nerve deficits.   Gait:  Antalgic.  No trendelenburg sign bilaterally.   Motor Strength:  5/5 motor strength throughout lower extremities.   L-Spine:  Limited ROM with pain on flexion and extension.  Negative pain with axial/facet loading bilaterally.  Positive SLR on the right.    Diffusely TTP over midline lower lumbar spine and bilateral lower lumbar paraspinals right more than left      Imaging:    Narrative & Impression    EXAMINATION:  XR LUMBAR SPINE AP AND LATERAL     CLINICAL HISTORY:  Lumbago with sciatica, right side     TECHNIQUE:  AP, lateral and spot images were performed of the lumbar spine.     COMPARISON:  02/23/2021     FINDINGS:  The alignment demonstrates a subtle grade 1 anterolisthesis of L3 relative to L4 and L4 relative to L5.     The vertebral body heights are well maintained.     Mild disc space narrowing L3-4.     Small anterior and lateral marginal osteophytes noted at L2 through L5..     Mild sclerosis and osseous hypertrophy of the facet joints at L3-4 and L4-5.     No fracture, no osseous lesions.     The sacroiliac joints appear symmetrical on the AP view.     The soft tissues appear normal.     Impression:     Spondylosis of the lumbar spine, no definite acute process seen.        Electronically signed by:Elinor Sanders MD  Date:                                            03/25/2024  Time:                                           13:59       Narrative & Impression    EXAMINATION:  XR  "HIP WITH PELVIS WHEN PERFORMED, 2 OR 3  VIEWS RIGHT     CLINICAL HISTORY:  Right lower quadrant pain     TECHNIQUE:  AP view of the pelvis and frog leg lateral view of the right hip were performed.     COMPARISON:  None     FINDINGS:  Moderate right hip joint space narrowing.  Normal right femoral head contour.  Minimal osteophyte at the bilateral acetabular margin.     No acute fracture, no osseous lesions.     The bilateral sacroiliac joints appear normal.     The soft tissues appear normal.     Impression:     No acute fracture identified        Electronically signed by:Elinor Sanders MD  Date:                                            03/25/2024  Time:                                           13:58       Assessment:       Encounter Diagnoses   Name Primary?    DDD (degenerative disc disease), lumbar Yes    Lumbar radiculopathy     Right hip pain     Lumbar spondylosis            Plan:       Bambi Shannon" was seen today for follow-up.    Diagnoses and all orders for this visit:    DDD (degenerative disc disease), lumbar    Lumbar radiculopathy    Right hip pain    Lumbar spondylosis          Bambi Carrera is a 64 y.o. female with chronic right-sided low back and lower extremity pain.  Also with chronic right hip.  I suspect is multifactorial.  Likely has lower lumbar pathology causing both axial pain related to lower lumbar facets as well as possible right lower extremity radicular pain.  Lumbar MRI with multilevel degenerative changes, most notable at L2-3 and L3-4 with at least moderate central canal stenosis.  Also with significant facet arthropathy at L2-3 through L4-5.  Also having more distinct right intra-articular hip pathology/pain.  Does have degenerative changes of the right hip on x-rays.  Physical examination consistent with pain related to the right hip joint.  Significant relief with right intra-articular hip steroid injection.    Pertinent imaging studies reviewed by me. Imaging " results were discussed with patient.  Schedule for right L4 transforaminal epidural steroid injection  Discussed with the patient that the exact etiology of her pain is somewhat unclear.  Suspect the majority of her pain is related to spinal stenosis at L2-3 and L3-4.  However symptoms may be multifactorial.  She does appear to have a degree of myofascial pain diffusely across her lumbar paraspinal region.  May consider trigger point injections in the future if needed.  Okay to continue over-the-counter Tylenol and ibuprofen.  Patient given instructions on how to take these medications.  May also consider trial of meloxicam in the future if needed.  Continue with aquatic physical therapy for both lower back and hip.  Stressed the importance of maintaining regular home exercise program and being mindful of how they use their back throughout the day.  Patient expressed understanding and agreement.  Return to clinic after procedure to discuss efficacy.         This note was created by combination of typed  and M-Modal dictation.  Transcription and phonetic errors may be present.  If there are any questions, please contact me.

## 2024-09-26 ENCOUNTER — TELEPHONE (OUTPATIENT)
Dept: PAIN MEDICINE | Facility: CLINIC | Age: 64
End: 2024-09-26
Payer: COMMERCIAL

## 2024-09-26 DIAGNOSIS — M51.36 DDD (DEGENERATIVE DISC DISEASE), LUMBAR: ICD-10-CM

## 2024-09-26 DIAGNOSIS — M54.16 LUMBAR RADICULOPATHY: Primary | ICD-10-CM

## 2024-09-26 NOTE — TELEPHONE ENCOUNTER
Ms. Jordan would like to schedule the right L4 TF DALLIN on 10/02/2024 as she has an upcoming trip. I explained this will be scheduled, but is pending insurance auth- provider updated.     Reviewed pre-procedure instructions with Ms. Jordan, as well as provided arrival time of 10:45a for 10/02/2024.  All questions answered- verbalized understanding.

## 2024-09-30 DIAGNOSIS — E55.9 VITAMIN D DEFICIENCY: ICD-10-CM

## 2024-09-30 NOTE — TELEPHONE ENCOUNTER
No care due was identified.  [facility] Embedded Care Due Messages. Reference number: [id number].   9/30/2024 12:28:37 PM CDT

## 2024-10-01 ENCOUNTER — PATIENT MESSAGE (OUTPATIENT)
Dept: PAIN MEDICINE | Facility: CLINIC | Age: 64
End: 2024-10-01
Payer: COMMERCIAL

## 2024-10-01 RX ORDER — ERGOCALCIFEROL 1.25 MG/1
CAPSULE ORAL
Qty: 12 CAPSULE | Refills: 2 | Status: SHIPPED | OUTPATIENT
Start: 2024-10-01

## 2024-10-01 NOTE — TELEPHONE ENCOUNTER
Refill Routing Note   Medication(s) are not appropriate for processing by [facility] for the following reason(s):        Outside of protocol    [facility] action(s):  Route               Appointments  past 12m or future 3m with PCP    Date Provider   Last Visit   7/19/2024 Wong Whitmore MD   Next Visit   Visit date not found Wong Whitmore MD   ED visits in past 90 days: 0        Note composed:8:56 AM 10/01/2024

## 2024-10-02 ENCOUNTER — HOSPITAL ENCOUNTER (OUTPATIENT)
Facility: HOSPITAL | Age: 64
Discharge: HOME | End: 2024-10-02
Attending: PAIN MEDICINE | Admitting: PAIN MEDICINE
Payer: COMMERCIAL

## 2024-10-02 VITALS
RESPIRATION RATE: 17 BRPM | SYSTOLIC BLOOD PRESSURE: 140 MMHG | OXYGEN SATURATION: 97 % | DIASTOLIC BLOOD PRESSURE: 61 MMHG | TEMPERATURE: 97 F | HEART RATE: 66 BPM

## 2024-10-02 DIAGNOSIS — M54.16 LUMBAR RADICULOPATHY: Primary | ICD-10-CM

## 2024-10-02 PROCEDURE — 63600175 PHARM REV CODE 636 W HCPCS: Performed by: PAIN MEDICINE

## 2024-10-02 PROCEDURE — 25500020 PHARM REV CODE 255: Performed by: PAIN MEDICINE

## 2024-10-02 PROCEDURE — 64483 NJX AA&/STRD TFRM EPI L/S 1: CPT | Mod: RT,,, | Performed by: PAIN MEDICINE

## 2024-10-02 PROCEDURE — 64483 NJX AA&/STRD TFRM EPI L/S 1: CPT | Mod: RT | Performed by: PAIN MEDICINE

## 2024-10-02 RX ORDER — LIDOCAINE HYDROCHLORIDE 10 MG/ML
1 INJECTION, SOLUTION EPIDURAL; INFILTRATION; INTRACAUDAL; PERINEURAL ONCE
Status: COMPLETED | OUTPATIENT
Start: 2024-10-02 | End: 2024-10-02

## 2024-10-02 RX ORDER — DEXAMETHASONE SODIUM PHOSPHATE 10 MG/ML
INJECTION INTRAMUSCULAR; INTRAVENOUS
Status: DISCONTINUED
Start: 2024-10-02 | End: 2024-10-02 | Stop reason: HOSPADM

## 2024-10-02 RX ORDER — LIDOCAINE HYDROCHLORIDE 10 MG/ML
20 INJECTION, SOLUTION INFILTRATION; PERINEURAL ONCE
Status: COMPLETED | OUTPATIENT
Start: 2024-10-02 | End: 2024-10-02

## 2024-10-02 RX ORDER — ALPRAZOLAM 0.5 MG/1
1 TABLET, ORALLY DISINTEGRATING ORAL ONCE AS NEEDED
Status: DISCONTINUED | OUTPATIENT
Start: 2024-10-02 | End: 2024-10-02 | Stop reason: HOSPADM

## 2024-10-02 RX ORDER — DEXAMETHASONE SODIUM PHOSPHATE 10 MG/ML
10 INJECTION INTRAMUSCULAR; INTRAVENOUS ONCE
Status: COMPLETED | OUTPATIENT
Start: 2024-10-02 | End: 2024-10-02

## 2024-10-02 RX ORDER — LIDOCAINE HYDROCHLORIDE 10 MG/ML
INJECTION, SOLUTION INFILTRATION; PERINEURAL
Status: DISCONTINUED
Start: 2024-10-02 | End: 2024-10-02 | Stop reason: HOSPADM

## 2024-10-02 RX ORDER — LIDOCAINE HYDROCHLORIDE 10 MG/ML
INJECTION, SOLUTION EPIDURAL; INFILTRATION; INTRACAUDAL; PERINEURAL
Status: DISCONTINUED
Start: 2024-10-02 | End: 2024-10-02 | Stop reason: HOSPADM

## 2024-10-02 NOTE — DISCHARGE SUMMARY
[facility] - Pain Management  Discharge Note  Short Stay    Procedure(s) (LRB):  Right L4 Transforaminal Epidural Steroid Injection (Right)      OUTCOME: Patient tolerated treatment/procedure well without complication and is now ready for discharge.    DISPOSITION: Home or Self Care    FINAL DIAGNOSIS:  <principal problem not specified>    FOLLOWUP: In clinic    DISCHARGE INSTRUCTIONS:  No discharge procedures on file.       Discharge Diagnosis:Lumbar radiculopathy [M54.16]  DDD (degenerative disc disease), lumbar [M51.36]  Condition on Discharge: Stable.  Diet on Discharge: Same as before.  Activity: as per instruction sheet.  Discharge to: Home with a responsible adult.  Follow up: as per Discharge instructions

## 2024-10-02 NOTE — OP NOTE
Lumbar transforaminal DALLIN    Time-out taken to identify patient and procedure side prior to starting the procedure.   I attest that I have reviewed the patient's home medications prior to the procedure and no contraindication have been identified. I  re-evaluated the patient after the patient was positioned for the procedure in the procedure room immediately before the procedural time-out. The vital signs are current and represent the current state of the patient which has not significantly changed since the preprocedure assessment.                              Date of Service: 10/02/2024    PCP: Wong Whitmore MD    Referring Physician:                                   PROCEDURE: Right L4 transforaminal epidural steroid injection under fluoroscopy    REASON FOR PROCEDURE: Lumbar Radiculopathy    PHYSICIAN: Italo James MD    ASSISTANTS:None    MEDICATIONS INJECTED:  Preservative-free dexamethasone 10mg, Xylocaine 1% MPF 3-5ml. 3ml of the mixture per level.     LOCAL ANESTHETIC INJECTED:  Xylocaine 1% 3ml per site.    SEDATION MEDICATIONS: None    ESTIMATED BLOOD LOSS:  None.    COMPLICATIONS:  None.    TECHNIQUE:   Laying in a prone position, the patient was prepped and draped in the usual sterile fashion using ChloraPrep and fenestrated drape.  The area to be injected was determined under fluoroscopic guidance.  Local anesthetic was given by raising a wheal and going down to the hub of a 27-gauge 1.25 inch needle.  The 5 inch 25-gauge spinal needle was introduced towards the transverse process of each above named nerve root level.  The needle was walked medially then hinged into the neural foramen.  Omnipaque was injected to confirm appropriate placement and that there was no vascular runoff.  The medication was then injected after applying negative pressure. The patient tolerated the procedure well.    PAIN BEFORE THE PROCEDURE: 10/10.    PAIN AFTER THE PROCEDURE: 0/10.    The patient was monitored after  the procedure.  Patient was given post procedure and discharge instructions to follow at home.  We will see the patient back in two weeks or the patient may call to inform of status. The patient was discharged in a stable condition.

## 2024-10-02 NOTE — DISCHARGE INSTRUCTIONS

## 2024-10-07 ENCOUNTER — TELEPHONE (OUTPATIENT)
Dept: FAMILY MEDICINE | Facility: CLINIC | Age: 64
End: 2024-10-07
Payer: COMMERCIAL

## 2024-10-07 NOTE — TELEPHONE ENCOUNTER
----- Message from Chanell sent at 10/7/2024 10:20 AM CDT -----  Type:  Needs Medical Advice/Symptom-based Call    Who Called: self     Symptoms (please be specific):  pain back due to an procedure      How long has patient had these symptoms:  3 days     Would the patient rather a call back or a response via [facility]? call    Best Call Back Number: .[phone] (home)

## 2024-10-07 NOTE — TELEPHONE ENCOUNTER
Called patient to get more information on what she is requesting and what does she need. Left a voicemail to have her call the office back

## 2024-10-14 DIAGNOSIS — E66.01 CLASS 3 SEVERE OBESITY DUE TO EXCESS CALORIES WITH SERIOUS COMORBIDITY AND BODY MASS INDEX (BMI) OF 50.0 TO 59.9 IN ADULT: ICD-10-CM

## 2024-10-14 DIAGNOSIS — M54.31 RIGHT SIDED SCIATICA: ICD-10-CM

## 2024-10-14 DIAGNOSIS — E66.813 CLASS 3 SEVERE OBESITY DUE TO EXCESS CALORIES WITH SERIOUS COMORBIDITY AND BODY MASS INDEX (BMI) OF 50.0 TO 59.9 IN ADULT: ICD-10-CM

## 2024-10-14 DIAGNOSIS — M62.830 MUSCLE SPASM OF BACK: ICD-10-CM

## 2024-10-14 NOTE — TELEPHONE ENCOUNTER
No care due was identified.  [facility] Embedded Care Due Messages. Reference number: [id number].   10/14/2024 5:01:29 PM CDT

## 2024-10-15 RX ORDER — TIZANIDINE 2 MG/1
2 TABLET ORAL EVERY 8 HOURS PRN
Qty: 30 TABLET | Refills: 0 | Status: SHIPPED | OUTPATIENT
Start: 2024-10-15

## 2024-10-15 RX ORDER — TIRZEPATIDE 2.5 MG/.5ML
2.5 INJECTION, SOLUTION SUBCUTANEOUS
Qty: 4 PEN | Refills: 3 | Status: SHIPPED | OUTPATIENT
Start: 2024-10-15

## 2024-10-15 RX ORDER — TRAMADOL HYDROCHLORIDE 50 MG/1
50 TABLET ORAL DAILY PRN
Qty: 14 TABLET | Refills: 0 | OUTPATIENT
Start: 2024-10-15

## 2024-10-15 RX ORDER — NAPROXEN 500 MG/1
TABLET ORAL
Qty: 60 TABLET | Refills: 0 | Status: SHIPPED | OUTPATIENT
Start: 2024-10-15

## 2024-10-15 NOTE — TELEPHONE ENCOUNTER
Called patient requesting increase for weight loss medication stated she spoke with the doctor and was told it can be increased after a few months. Also requesting refill on pain medications stated she has a epidural and a groin incision and still in pain. Informed her that I will give this information to the doctor and will move forward from there.

## 2024-10-15 NOTE — TELEPHONE ENCOUNTER
----- Message from Sami sent at 10/14/2024  4:55 PM CDT -----  Type: RX Refill Request     Who Called:   Pt  Have you contacted your pharmacy:     Refill     RX Name and Strength:   tirzepatide, weight loss, (ZEPBOUND) 2.5 mg/0.5 mL Adam  [facility] with phone number:     [facility] [address], [address]  [address], [address]  [facility] [address]  Phone: [phone] Fax: [phone]      Local or Mail Order:local     Would the patient rather a call back or a response via [facility]?     Best Call Back Number:   Telephone Information:  Mobile          [phone]      Additional Information:   Pt would like a stronger dosage. Pt would like a call back because she's been in pain as well. Please call to advise.  Thank you.

## 2024-10-16 ENCOUNTER — TELEPHONE (OUTPATIENT)
Dept: FAMILY MEDICINE | Facility: CLINIC | Age: 64
End: 2024-10-16
Payer: COMMERCIAL

## 2024-10-16 NOTE — TELEPHONE ENCOUNTER
----- Message from Man sent at 10/15/2024  1:20 PM CDT -----  Regarding: Self   Type: Patient Returning Call    Who Called: Self     Who Left Message for Patient: Yessy Sorto MA      Does the patient know what this is regarding?: yes she stated pharmacy called and told her nothing has been changed yet and she wants to know why    Would the patient rather a call back or a response via [facility]?  Call back     Best Call Back Number: .[phone]      Additional Information:  tirzepatide, weight loss, (ZEPBOUND) 2.5 mg/0.5 mL PnCristiano     Thank you.

## 2024-10-16 NOTE — TELEPHONE ENCOUNTER
Left voicemail for pt to return call back to clinic.     Apixaban/Eliquis - Compliance/Apixaban/Eliquis - Dietary Advice/Apixaban/Eliquis - Follow up monitoring/Apixaban/Eliquis - Potential for adverse drug reactions and interactions

## 2024-10-23 ENCOUNTER — TELEPHONE (OUTPATIENT)
Dept: FAMILY MEDICINE | Facility: CLINIC | Age: 64
End: 2024-10-23
Payer: COMMERCIAL

## 2024-10-23 NOTE — TELEPHONE ENCOUNTER
----- Message from Chanell sent at 10/23/2024  2:59 PM CDT -----  Type: Patient Call Back    Who called: self     What is the request in detail: pt stated that her medication  MG of tirzepatide, weight loss, (ZEPBOUND) needs to be changed. Please call    Can the clinic reply by [facility]? no    Would the patient rather a call back or a response via [facility]?  call    Best call back number: .[phone]      Additional Information:

## 2024-10-24 ENCOUNTER — OFFICE VISIT (OUTPATIENT)
Dept: FAMILY MEDICINE | Facility: CLINIC | Age: 64
End: 2024-10-24
Payer: COMMERCIAL

## 2024-10-24 VITALS
BODY MASS INDEX: 45.82 KG/M2 | HEIGHT: 65 IN | OXYGEN SATURATION: 98 % | SYSTOLIC BLOOD PRESSURE: 112 MMHG | HEART RATE: 76 BPM | TEMPERATURE: 98 F | DIASTOLIC BLOOD PRESSURE: 82 MMHG | WEIGHT: 275 LBS

## 2024-10-24 DIAGNOSIS — E66.813 CLASS 3 SEVERE OBESITY DUE TO EXCESS CALORIES WITH SERIOUS COMORBIDITY AND BODY MASS INDEX (BMI) OF 50.0 TO 59.9 IN ADULT: ICD-10-CM

## 2024-10-24 DIAGNOSIS — M54.16 LUMBAR RADICULOPATHY: Primary | ICD-10-CM

## 2024-10-24 DIAGNOSIS — E66.01 CLASS 3 SEVERE OBESITY DUE TO EXCESS CALORIES WITH SERIOUS COMORBIDITY AND BODY MASS INDEX (BMI) OF 50.0 TO 59.9 IN ADULT: ICD-10-CM

## 2024-10-24 DIAGNOSIS — M62.830 MUSCLE SPASM OF BACK: ICD-10-CM

## 2024-10-24 DIAGNOSIS — E66.01 MORBID OBESITY: Chronic | ICD-10-CM

## 2024-10-24 PROCEDURE — 99999 PR PBB SHADOW E&M-EST. PATIENT-LVL III: CPT | Mod: PBBFAC,,, | Performed by: FAMILY MEDICINE

## 2024-10-24 RX ORDER — TIRZEPATIDE 5 MG/.5ML
5 INJECTION, SOLUTION SUBCUTANEOUS
Qty: 4 PEN | Refills: 3 | Status: SHIPPED | OUTPATIENT
Start: 2024-10-24

## 2024-10-24 RX ORDER — TIZANIDINE 4 MG/1
4 TABLET ORAL EVERY 8 HOURS PRN
Qty: 30 TABLET | Refills: 2 | Status: SHIPPED | OUTPATIENT
Start: 2024-10-24

## 2024-10-24 RX ORDER — TRAMADOL HYDROCHLORIDE 50 MG/1
50 TABLET ORAL DAILY PRN
Qty: 30 TABLET | Refills: 2 | Status: SHIPPED | OUTPATIENT
Start: 2024-10-24

## 2024-10-24 NOTE — PROGRESS NOTES
[facility]  Progress Note    SUBJECTIVE:     Chief Complaint   Patient presents with    Weight Check       HPI   Bambi Carrera  is a 64 y.o. female here for f/u of her back pain and her weight management. Doing well on current regimen with weight but describes back pain as severe. Saw pain management, which she had epidural shots without help. Patient has no other new complaints/problems at this time.      Review of patient's allergies indicates:  No Known Allergies    Past Medical History:   Diagnosis Date    Borderline diabetic     Depression     Eye injury     fb os     Hypertension     Menopause     Nuclear sclerosis of both eyes 7/1/2019     Past Surgical History:   Procedure Laterality Date    COLONOSCOPY N/A 7/15/2021    Procedure: COLONOSCOPY;  Surgeon: Geetha Walton MD;  Location: [facility];  Service: Endoscopy;  Laterality: N/A;  Fully Vaccinated 3/25/21  see media tab 6/1/21, prep instr portal, and mailed -ml    INJECTION OF JOINT Right 7/17/2024    Procedure: Right Hip Steroid Injection;  Surgeon: Italo James Jr., MD;  Location: [facility];  Service: Pain Management;  Laterality: Right;  @1100  No ATC or DM    LASIK Bilateral 07/15/2016    TRANSFORAMINAL EPIDURAL INJECTION OF STEROID Right 10/2/2024    Procedure: Right L4 Transforaminal Epidural Steroid Injection;  Surgeon: Italo James Jr., MD;  Location: [facility];  Service: Pain Management;  Laterality: Right;  @1045  No ATC or DM  MD Sign.     Family History   Problem Relation Name Age of Onset    Cancer Mother      No Known Problems Father      No Known Problems Sister      No Known Problems Brother      No Known Problems Maternal Aunt      No Known Problems Maternal Uncle      No Known Problems Paternal Aunt      No Known Problems Paternal Uncle      No Known Problems Maternal Grandmother      No Known Problems Maternal Grandfather      No Known Problems Paternal Grandmother      No Known  Problems Paternal Grandfather      Amblyopia Neg Hx      Blindness Neg Hx      Cataracts Neg Hx      Diabetes Neg Hx      Glaucoma Neg Hx      Hypertension Neg Hx      Macular degeneration Neg Hx      Retinal detachment Neg Hx      Strabismus Neg Hx      Stroke Neg Hx      Thyroid disease Neg Hx       Social History     Tobacco Use    Smoking status: Never    Smokeless tobacco: Never   Substance Use Topics    Alcohol use: No     Alcohol/week: 0.0 standard drinks of alcohol    Drug use: No        Review of Systems   Constitutional:  Negative for chills and fever.   HENT: Negative.     Respiratory: Negative.  Negative for shortness of breath.    Cardiovascular: Negative.  Negative for chest pain.   Gastrointestinal: Negative.  Negative for abdominal pain, nausea and vomiting.   Genitourinary: Negative.    Musculoskeletal:  Positive for back pain. Negative for joint pain.   Neurological:  Negative for headaches.   All other systems reviewed and are negative.    OBJECTIVE:     Vitals:    10/24/24 0949   BP: 112/82   Pulse: 76   Temp: 97.8 °F (36.6 °C)     Body mass index is 45.77 kg/m².    Physical Exam  Constitutional:       General: She is not in acute distress.     Appearance: She is not diaphoretic.   HENT:      Head: Normocephalic and atraumatic.      Nose: Nose normal.   Eyes:      Conjunctiva/sclera: Conjunctivae normal.   Cardiovascular:      Rate and Rhythm: Normal rate and regular rhythm.      Heart sounds: Normal heart sounds. No murmur heard.     No friction rub. No gallop.   Pulmonary:      Effort: Pulmonary effort is normal. No respiratory distress.      Breath sounds: Normal breath sounds. No wheezing or rales.   Abdominal:      Palpations: Abdomen is soft.   Skin:     General: Skin is warm.   Neurological:      Mental Status: She is alert and oriented to person, place, and time.         Old records were reviewed. Labs and/or images were independently reviewed.    ASSESSMENT     1. Lumbar radiculopathy     2. Muscle spasm of back    3. Class 3 severe obesity due to excess calories with serious comorbidity and body mass index (BMI) of 50.0 to 59.9 in adult    4. Morbid obesity        PLAN:     Lumbar radiculopathy  -     Ambulatory referral/consult to Neurosurgery; Future; Expected date: 10/31/2024  -     Start traMADoL (ULTRAM) 50 mg tablet; Take 1 tablet (50 mg total) by mouth daily as needed for Pain.  Dispense: 30 tablet; Refill: 2  -     Stable. Continue current regimen. Unable to wean at this time but am monitoring closely for any drug toxicity. Checked .    Muscle spasm of back  -     tiZANidine (ZANAFLEX) 4 MG tablet; Take 1 tablet (4 mg total) by mouth every 8 (eight) hours as needed. FOR MUSCLE SPASM  Dispense: 30 tablet; Refill: 2  -     Patient counseled on good posture and stretching exercises. Continue to place ice packs on affected areas 3-4 times daily. Take medications as prescribed. Instructed patient to call MD if symptoms persist or worsen.    Class 3 severe obesity due to excess calories with serious comorbidity and body mass index (BMI) of 50.0 to 59.9 in adult/Morbid Obesity   -     Counseled patient about healthy diet, exercise habits, and to increase physical activity.        -     Increase tirzepatide, weight loss, (ZEPBOUND) 5 mg/0.5 mL PnIj; Inject 5 mg into the skin every 7 days.  Dispense: 4 Pen; Refill: 3      RTC DILMA Whitmore MD  10/24/2024 10:22 AM

## 2024-10-28 DIAGNOSIS — M54.16 LUMBAR RADICULOPATHY: Primary | ICD-10-CM

## 2024-10-29 ENCOUNTER — OFFICE VISIT (OUTPATIENT)
Dept: NEUROSURGERY | Facility: CLINIC | Age: 64
End: 2024-10-29
Payer: COMMERCIAL

## 2024-10-29 ENCOUNTER — HOSPITAL ENCOUNTER (OUTPATIENT)
Dept: RADIOLOGY | Facility: HOSPITAL | Age: 64
Discharge: HOME | End: 2024-10-29
Attending: PHYSICIAN ASSISTANT
Payer: COMMERCIAL

## 2024-10-29 VITALS
HEART RATE: 74 BPM | RESPIRATION RATE: 20 BRPM | DIASTOLIC BLOOD PRESSURE: 73 MMHG | BODY MASS INDEX: 45.82 KG/M2 | SYSTOLIC BLOOD PRESSURE: 122 MMHG | WEIGHT: 275.38 LBS

## 2024-10-29 DIAGNOSIS — M54.16 LUMBAR RADICULOPATHY: ICD-10-CM

## 2024-10-29 DIAGNOSIS — M54.50 ACUTE LOW BACK PAIN, UNSPECIFIED BACK PAIN LATERALITY, UNSPECIFIED WHETHER SCIATICA PRESENT: Primary | ICD-10-CM

## 2024-10-29 PROCEDURE — 1159F MED LIST DOCD IN RCRD: CPT | Mod: CPTII,S$GLB,, | Performed by: PHYSICIAN ASSISTANT

## 2024-10-29 PROCEDURE — 99204 OFFICE O/P NEW MOD 45 MIN: CPT | Mod: S$GLB,,, | Performed by: PHYSICIAN ASSISTANT

## 2024-10-29 PROCEDURE — 3008F BODY MASS INDEX DOCD: CPT | Mod: CPTII,S$GLB,, | Performed by: PHYSICIAN ASSISTANT

## 2024-10-29 PROCEDURE — 72114 X-RAY EXAM L-S SPINE BENDING: CPT | Mod: 26,,, | Performed by: INTERNAL MEDICINE

## 2024-10-29 PROCEDURE — 3074F SYST BP LT 130 MM HG: CPT | Mod: CPTII,S$GLB,, | Performed by: PHYSICIAN ASSISTANT

## 2024-10-29 PROCEDURE — 1160F RVW MEDS BY RX/DR IN RCRD: CPT | Mod: CPTII,S$GLB,, | Performed by: PHYSICIAN ASSISTANT

## 2024-10-29 PROCEDURE — 72114 X-RAY EXAM L-S SPINE BENDING: CPT | Mod: TC,FY

## 2024-10-29 PROCEDURE — 3044F HG A1C LEVEL LT 7.0%: CPT | Mod: CPTII,S$GLB,, | Performed by: PHYSICIAN ASSISTANT

## 2024-10-29 PROCEDURE — 3078F DIAST BP <80 MM HG: CPT | Mod: CPTII,S$GLB,, | Performed by: PHYSICIAN ASSISTANT

## 2024-10-29 RX ORDER — LIDOCAINE 50 MG/G
1 PATCH TOPICAL DAILY
Qty: 14 PATCH | Refills: 0 | Status: SHIPPED | OUTPATIENT
Start: 2024-10-29

## 2024-11-01 ENCOUNTER — CLINICAL SUPPORT (OUTPATIENT)
Dept: REHABILITATION | Facility: HOSPITAL | Age: 64
End: 2024-11-01
Payer: COMMERCIAL

## 2024-11-01 DIAGNOSIS — M54.16 LUMBAR RADICULOPATHY: Primary | Chronic | ICD-10-CM

## 2024-11-01 DIAGNOSIS — M54.50 ACUTE LOW BACK PAIN, UNSPECIFIED BACK PAIN LATERALITY, UNSPECIFIED WHETHER SCIATICA PRESENT: ICD-10-CM

## 2024-11-01 PROCEDURE — 97814 ACUP 1/> W/ESTIM EA ADDL 15: CPT | Mod: PN

## 2024-11-01 PROCEDURE — 97813 ACUP 1/> W/ESTIM 1ST 15 MIN: CPT | Mod: PN

## 2024-11-01 NOTE — PROGRESS NOTES
"  Acupuncture Evaluation Note     Name: Bambi Carrera  Clinic Number: [id number]    Traditional Chinese Medicine (TCM) Diagnosis: Qi Stagnation and Blood Stasis  Medical Diagnosis: Chronic Right sided lower back pain with right sided sciatica  Encounter Diagnoses   Name Primary?    Lumbar radiculopathy     Acute low back pain, unspecified back pain laterality, unspecified whether sciatica present         Evaluation Date: 11/1/2024    Visit #/Visits authorized: [unfilled] 1/12    Precautions: Standard    Subjective     Chief Concern: Right sided lower back pain for 10 years radiating down to right thigh    Cancer Related Symptoms: None    Medical necessity is demonstrated by the following IMPAIRMENTS: Medical Necessity: Decreased mobility limits day to day activities, social, and emergent situations and Decreased quality of life                Aggravating Factors:  movement, lying down, and prolonged sitting   Relieving Factors:  topical creams     Symptom Description:     Quality:  Grabbing  Severity:  10  Frequency:  continuously    Previous Treatments Tried:  Injection(s), Medication, Imaging, and Therapy     HEENT:  Nuclear sclerosis of both eyes     Chest:  WNL    Digestion:     Diet: in general, a "healthy" diet     Fluids: coffee 1 /day, is drinking plenty of fluids, none  Taste/Appetite: Good   Symptoms: none    Sleep: some times    Energy Levels:  7/10    Psychological Symptoms:  None    Other Symptoms: None    GYN Symptoms: None    Objective     Observation: Looks normal    Tongue:      Body:  normal   Color:  pink   Coating:  thin,  and patches,    Pulse:        deep       New Findings:  None    Treatment     Treatment Principles:  Regulate Qi and Blood, stop pain    Acupuncture points used:    Bilateral points:  Unilateral points:  Left:  Right: Nori on lower back +5 UB 54, GB 30, Nori on gluteal muscle + 5, Nori on hip + 2  EA:Right: UB 54, GB 30, Nori on gluteal muscle + 2  Auricular Treatment:  None "   Needles In: 14  Needles Out: 14  Needles W/ STIM placed: 1:20 PM  Needles W/ STIM removed: 2:10 PM      Other Traditional Chinese Medicine Modalities - None    Assessment     After treatment, patient felt good     Patient prognosis is Good.     Patient will continue to benefit from acupuncture treatment to address the deficits listed in the problem list box on initial evaluation, provide patient family education and to maximize pt's level of independence in the home and community environment.     Patient's spiritual, cultural and educational needs considered and pt agreeable to plan of care and goals.     Anticipated barriers to treatment: None    Plan     Recommend 1-2 /week for 12 sessions then re-assess.      Education:  Patient is aware of cumulative benefit of acupuncture

## 2024-11-18 DIAGNOSIS — M62.830 MUSCLE SPASM OF BACK: ICD-10-CM

## 2024-11-18 NOTE — TELEPHONE ENCOUNTER
Care Due:                  Date            Visit Type   Department     Provider  --------------------------------------------------------------------------------                                [facility]                              PRIMARY      MED/ INTERNAL  Last Visit: 10-      CARE ([facility])   MED/ PEDS      CARROLL GUERRA  Next Visit: None Scheduled  None         None Found                                                            Last  Test          Frequency    Reason                     Performed    Due Date  --------------------------------------------------------------------------------    HBA1C.......  6 months...  tirzepatide,.............  06- 12-    Vitamin D...  12 months..  ergocalciferol...........  Not Found    Overdue    [employer] Embedded Care Due Messages. Reference number: [id number].   11/18/2024 10:52:58 AM CST

## 2024-11-19 RX ORDER — NAPROXEN 500 MG/1
TABLET ORAL
Qty: 60 TABLET | Refills: 0 | Status: SHIPPED | OUTPATIENT
Start: 2024-11-19

## 2024-12-08 ENCOUNTER — OFFICE VISIT (OUTPATIENT)
Dept: URGENT CARE | Facility: CLINIC | Age: 64
End: 2024-12-08
Payer: COMMERCIAL

## 2024-12-08 VITALS
HEART RATE: 80 BPM | HEIGHT: 65 IN | RESPIRATION RATE: 16 BRPM | OXYGEN SATURATION: 95 % | TEMPERATURE: 98 F | BODY MASS INDEX: 45 KG/M2 | WEIGHT: 270.06 LBS | DIASTOLIC BLOOD PRESSURE: 82 MMHG | SYSTOLIC BLOOD PRESSURE: 116 MMHG

## 2024-12-08 DIAGNOSIS — S46.912A LEFT SHOULDER STRAIN, INITIAL ENCOUNTER: ICD-10-CM

## 2024-12-08 DIAGNOSIS — M62.838 MUSCLE SPASM: ICD-10-CM

## 2024-12-08 DIAGNOSIS — M54.41 ACUTE RIGHT-SIDED LOW BACK PAIN WITH RIGHT-SIDED SCIATICA: Primary | ICD-10-CM

## 2024-12-08 PROCEDURE — 99213 OFFICE O/P EST LOW 20 MIN: CPT | Mod: 25,S$GLB,, | Performed by: NURSE PRACTITIONER

## 2024-12-08 PROCEDURE — 96372 THER/PROPH/DIAG INJ SC/IM: CPT | Mod: S$GLB,,, | Performed by: NURSE PRACTITIONER

## 2024-12-08 RX ORDER — KETOROLAC TROMETHAMINE 30 MG/ML
30 INJECTION, SOLUTION INTRAMUSCULAR; INTRAVENOUS
Status: COMPLETED | OUTPATIENT
Start: 2024-12-08 | End: 2024-12-08

## 2024-12-08 RX ORDER — METHOCARBAMOL 500 MG/1
500 TABLET, FILM COATED ORAL 4 TIMES DAILY
Qty: 40 TABLET | Refills: 0 | Status: SHIPPED | OUTPATIENT
Start: 2024-12-08 | End: 2024-12-18

## 2024-12-08 RX ADMIN — KETOROLAC TROMETHAMINE 30 MG: 30 INJECTION, SOLUTION INTRAMUSCULAR; INTRAVENOUS at 11:12

## 2024-12-08 NOTE — PATIENT INSTRUCTIONS
Discharge instructions for Right lower back pain with Sciatica and Left shoulder strain     RICE - Rest, ice, compression and elevation to the affected joint or limb as needed.    Rest. Allow your injury to heal before you do slow movements.  Place an ice pack or a bag of frozen peas wrapped in a towel over the painful part. Never put ice right on the skin. Do not leave the ice on more than 10 to 15 minutes at a time. Ice after activity may help decrease pain and swelling. Never ice before stretching.  Prop your wrist/arm/knee/pain on pillows to help with swelling.  Use a splint/ brace if the doctor tells you to do this.  Please drink plenty of fluids.  Please get plenty of rest.      Pain Control  If not allergic, please take over the counter Tylenol (Acetaminophen) 500 mgs  take 2 by mouth every 6 hours and/or Motrin (Ibuprofen)800 mg every 4-6 hours as directed for control of pain and/or fever.    If you were prescribed a narcotic medication, do not drive or operate heavy equipment or machinery while taking these medications.    If you were not prescribed an anti-inflammatory medication, and if you do not have any history of stomach/intestinal ulcers, or kidney disease, or are not taking a blood thinner such as Coumadin, Plavix, Pradaxa, Eloquis, or Xaralta for example, it is OK to take over the counter Ibuprofen or Advil or Motrin or Aleve as directed.  Do not take these medications on an empty stomach.    Please remember that you have received care at an urgent care today. Urgent cares are not emergency rooms and are not equipped to handle life threatening emergencies and cannot rule in or out certain medical conditions and you may be released before all of your medical problems are known or treated. Please arrange follow up with your primary care physician or speciality clinic   (orthopedics) within 2-5 days if your signs and symptoms have not resolved or worsen. Patient can call our Referral Hotline at  [phone] to make an appointment.    Please return here or go to the Emergency Department for any concerns or worsening of condition.Patient was educated on signs/symptoms that would warrant emergent medical attention. Patient verbalized understanding.  More trouble getting up from a chair, going up and down stairs, or walking  Pain, swelling, warmth, numbness, tingling, or discoloration in the calf below the injured or sore knee  You are not feeling better in 2 or 3 days or you are feeling worse

## 2024-12-08 NOTE — PROGRESS NOTES
"Subjective:      Patient ID: Bambi Carrera is a 64 y.o. female.    Vitals:  height is 5' 5" (1.651 m) and weight is 122.5 kg (270 lb 1 oz). Her oral temperature is 97.6 °F (36.4 °C). Her blood pressure is 116/82 and her pulse is 80. Her respiration is 16 and oxygen saturation is 95%.     Chief Complaint: Leg Pain    Patient is here for right leg pain onset 2 days ago. Patient states history a sciatic that been going on for 10 months. Patient states left shoulder blade has been hurting for 3 months no trauma or injury. OTC medication tylenol and ibuprofen with mild relief.    Leg Pain   Incident onset: 10 months ago. The incident occurred at the gym. There was no injury mechanism. The pain is present in the right leg. The pain is at a severity of 10/10. The pain is severe. The pain has been Constant since onset. Pertinent negatives include no inability to bear weight, loss of motion, loss of sensation, muscle weakness, numbness or tingling. She reports no foreign bodies present. The symptoms are aggravated by movement and weight bearing. She has tried acetaminophen (ibuprofen) for the symptoms. The treatment provided no relief.   Shoulder Pain   The pain is present in the left shoulder. This is a new problem. Episode onset: 3 months ago. There has been no history of extremity trauma. The problem occurs intermittently. The quality of the pain is described as aching. The pain is at a severity of 10/10. The pain is severe. Pertinent negatives include no inability to bear weight, numbness or tingling. She has tried acetaminophen (ibuprofen) for the symptoms. The treatment provided mild relief. There is no history of diabetes, Injuries to Extremity or migraines.       Neurological:  Negative for numbness.      Objective:     Physical Exam   Constitutional: She is oriented to person, place, and time. She appears well-developed. She is cooperative.  Non-toxic appearance. She does not appear ill. No distress. " awake  HENT:   Head: Normocephalic and atraumatic.   Ears:   Right Ear: Hearing, tympanic membrane, external ear and ear canal normal. no impacted cerumen  Left Ear: Hearing, tympanic membrane, external ear and ear canal normal. no impacted cerumen  Nose: Nose normal. No mucosal edema, rhinorrhea, nasal deformity or congestion. No epistaxis. Right sinus exhibits no maxillary sinus tenderness and no frontal sinus tenderness. Left sinus exhibits no maxillary sinus tenderness and no frontal sinus tenderness.   Mouth/Throat: Uvula is midline, oropharynx is clear and moist and mucous membranes are normal. Mucous membranes are moist. No trismus in the jaw. Normal dentition. No uvula swelling. No oropharyngeal exudate, posterior oropharyngeal edema or posterior oropharyngeal erythema. Oropharynx is clear.   Eyes: Conjunctivae and lids are normal. Pupils are equal, round, and reactive to light. No scleral icterus. Extraocular movement intact   Neck: Trachea normal and phonation normal. Neck supple. No thyromegaly present. No edema present. No erythema present. No neck rigidity present.   Cardiovascular: Normal rate, regular rhythm, S1 normal, S2 normal, normal heart sounds and normal pulses.   Pulmonary/Chest: Effort normal and breath sounds normal. No respiratory distress. She has no decreased breath sounds. She has no wheezes. She has no rhonchi. She has no rales. She exhibits no tenderness.   Abdominal: Normal appearance and bowel sounds are normal. Soft. protuberant There is no left CVA tenderness and no right CVA tenderness.   Musculoskeletal:         General: No deformity.      Right shoulder: Normal.      Left shoulder: She exhibits tenderness (full range of motion stiffly, moves arms above head well, equal arm hand grasp and strength 5/5). She exhibits normal range of motion and normal strength.      Right hip: She exhibits decreased range of motion (Right hip lower back, moves stiffly) and decreased strength (with  leg lifts and extension).      Left hip: Normal.      Right lower leg: No edema.      Left lower leg: No edema.   Lymphadenopathy:     She has no cervical adenopathy.   Neurological: no focal deficit. She is alert and oriented to person, place, and time. She exhibits normal muscle tone. Coordination normal.   Skin: Skin is warm, dry, intact, not diaphoretic and not pale. Capillary refill takes less than 2 seconds.   Psychiatric: Her speech is normal and behavior is normal. Mood, judgment and thought content normal.   Nursing note and vitals (Awake and Alert, no acute distress, ambulates without use of walker or cane) reviewed.      Assessment:     1. Acute right-sided low back pain with right-sided sciatica    2. Left shoulder strain, initial encounter    3. Muscle spasm      Discussed with patient this is long term history right hip pain with sciatica and left shoulder strain.  Patient has just completed physical therapy and uses band exercises at home.  Patient takes tramadol at home.  Patient can continue with these medications at home.  Plan:       Acute right-sided low back pain with right-sided sciatica  -     ketorolac injection 30 mg  -     Ambulatory referral/consult to Orthopedics  -     Ambulatory referral/consult to Internal Medicine    Left shoulder strain, initial encounter  -     ketorolac injection 30 mg  -     Ambulatory referral/consult to Orthopedics  -     Ambulatory referral/consult to Internal Medicine    Muscle spasm  -     Ambulatory referral/consult to Orthopedics  -     Ambulatory referral/consult to Internal Medicine    Other orders  -     methocarbamoL (ROBAXIN) 500 MG Tab; Take 1 tablet (500 mg total) by mouth 4 (four) times daily. for 10 days  Dispense: 40 tablet; Refill: 0      Patient Instructions   Discharge instructions for Right lower back pain with Sciatica and Left shoulder strain     RICE - Rest, ice, compression and elevation to the affected joint or limb as needed.    Rest.  Allow your injury to heal before you do slow movements.  Place an ice pack or a bag of frozen peas wrapped in a towel over the painful part. Never put ice right on the skin. Do not leave the ice on more than 10 to 15 minutes at a time. Ice after activity may help decrease pain and swelling. Never ice before stretching.  Prop your wrist/arm/knee/pain on pillows to help with swelling.  Use a splint/ brace if the doctor tells you to do this.  Please drink plenty of fluids.  Please get plenty of rest.      Pain Control  If not allergic, please take over the counter Tylenol (Acetaminophen) 500 mgs  take 2 by mouth every 6 hours and/or Motrin (Ibuprofen)800 mg every 4-6 hours as directed for control of pain and/or fever.    If you were prescribed a narcotic medication, do not drive or operate heavy equipment or machinery while taking these medications.    If you were not prescribed an anti-inflammatory medication, and if you do not have any history of stomach/intestinal ulcers, or kidney disease, or are not taking a blood thinner such as Coumadin, Plavix, Pradaxa, Eloquis, or Xaralta for example, it is OK to take over the counter Ibuprofen or Advil or Motrin or Aleve as directed.  Do not take these medications on an empty stomach.    Please remember that you have received care at an urgent care today. Urgent cares are not emergency rooms and are not equipped to handle life threatening emergencies and cannot rule in or out certain medical conditions and you may be released before all of your medical problems are known or treated. Please arrange follow up with your primary care physician or speciality clinic   (orthopedics) within 2-5 days if your signs and symptoms have not resolved or worsen. Patient can call our Referral Hotline at [phone] to make an appointment.    Please return here or go to the Emergency Department for any concerns or worsening of condition.Patient was educated on signs/symptoms that would  warrant emergent medical attention. Patient verbalized understanding.  More trouble getting up from a chair, going up and down stairs, or walking  Pain, swelling, warmth, numbness, tingling, or discoloration in the calf below the injured or sore knee  You are not feeling better in 2 or 3 days or you are feeling worse

## 2024-12-19 ENCOUNTER — OFFICE VISIT (OUTPATIENT)
Dept: FAMILY MEDICINE | Facility: CLINIC | Age: 64
End: 2024-12-19
Payer: COMMERCIAL

## 2024-12-19 ENCOUNTER — TELEPHONE (OUTPATIENT)
Dept: FAMILY MEDICINE | Facility: CLINIC | Age: 64
End: 2024-12-19

## 2024-12-19 VITALS
TEMPERATURE: 98 F | HEIGHT: 65 IN | DIASTOLIC BLOOD PRESSURE: 72 MMHG | OXYGEN SATURATION: 98 % | BODY MASS INDEX: 45.52 KG/M2 | SYSTOLIC BLOOD PRESSURE: 118 MMHG | HEART RATE: 75 BPM | WEIGHT: 273.25 LBS

## 2024-12-19 DIAGNOSIS — I10 ESSENTIAL HYPERTENSION: ICD-10-CM

## 2024-12-19 DIAGNOSIS — M54.16 LUMBAR RADICULOPATHY: ICD-10-CM

## 2024-12-19 DIAGNOSIS — G89.29 CHRONIC BACK PAIN, UNSPECIFIED BACK LOCATION, UNSPECIFIED BACK PAIN LATERALITY: ICD-10-CM

## 2024-12-19 DIAGNOSIS — E66.01 MORBID OBESITY: Primary | Chronic | ICD-10-CM

## 2024-12-19 DIAGNOSIS — M54.9 CHRONIC BACK PAIN, UNSPECIFIED BACK LOCATION, UNSPECIFIED BACK PAIN LATERALITY: ICD-10-CM

## 2024-12-19 PROCEDURE — 99999 PR PBB SHADOW E&M-EST. PATIENT-LVL IV: CPT | Mod: PBBFAC,,, | Performed by: FAMILY MEDICINE

## 2024-12-19 RX ORDER — PREGABALIN 50 MG/1
50 CAPSULE ORAL 2 TIMES DAILY
Qty: 60 CAPSULE | Refills: 6 | Status: CANCELLED | OUTPATIENT
Start: 2024-12-19 | End: 2025-06-19

## 2024-12-19 RX ORDER — TIRZEPATIDE 7.5 MG/.5ML
7.5 INJECTION, SOLUTION SUBCUTANEOUS
Qty: 4 PEN | Refills: 3 | Status: SHIPPED | OUTPATIENT
Start: 2024-12-19

## 2024-12-19 RX ORDER — METHOCARBAMOL 500 MG/1
500 TABLET, FILM COATED ORAL 2 TIMES DAILY PRN
Qty: 60 TABLET | Refills: 1 | Status: SHIPPED | OUTPATIENT
Start: 2024-12-19

## 2024-12-19 RX ORDER — TRAMADOL HYDROCHLORIDE 50 MG/1
50 TABLET ORAL EVERY 12 HOURS PRN
Qty: 60 TABLET | Refills: 2 | Status: SHIPPED | OUTPATIENT
Start: 2024-12-19

## 2024-12-19 RX ORDER — PREGABALIN 50 MG/1
50 CAPSULE ORAL 2 TIMES DAILY
Qty: 60 CAPSULE | Refills: 5 | Status: SHIPPED | OUTPATIENT
Start: 2024-12-19 | End: 2025-06-19

## 2024-12-19 RX ORDER — PREGABALIN 50 MG/1
50 CAPSULE ORAL 2 TIMES DAILY
Qty: 60 CAPSULE | Refills: 6 | Status: SHIPPED | OUTPATIENT
Start: 2024-12-19 | End: 2024-12-19 | Stop reason: SDUPTHER

## 2024-12-19 NOTE — TELEPHONE ENCOUNTER
----- Message from Ursula sent at 12/19/2024  2:32 PM CST -----  Regarding: [facility]  Type:  Pharmacy Calling to Clarify an RX     Name of Caller:     [facility]     Prescription Name:pregabalin (LYRICA) 50 MG capsule     What do they need to clarify?the system only allows 5 refills of this medication and they [facility] a [id number] [place] - [address] [id number] [facility]  [place] [address]  Phone: [phone] Fax: [phone]             Can you be contacted via [facility]?call     Best Call Back Number: [phone]       Additional Information:

## 2024-12-19 NOTE — TELEPHONE ENCOUNTER
the system only allows 5 refills of this medication and they need a new prescription sent chantell heller

## 2024-12-19 NOTE — TELEPHONE ENCOUNTER
----- Message from Ursula sent at 12/19/2024  2:32 PM CST -----  Regarding: [facility]  Type:  Pharmacy Calling to Clarify an RX     Name of Caller:     [facility]     Prescription Name:pregabalin (LYRICA) 50 MG capsule     What do they need to clarify?the system only allows 5 refills of this medication and [facility] a [id number] [place] [facility] [address] [id number] [facility]  [place] [address]  Phone: [phone] Fax: [phone]             Can you be contacted via [facility]?call     Best Call Back Number: [phone]       Additional Information:

## 2024-12-19 NOTE — PROGRESS NOTES
[facility]  Progress Note    SUBJECTIVE:     Chief Complaint   Patient presents with    Back Pain    Leg Pain     Right side        HPI   Bambi Carrera  is a 64 y.o. female here for follow-up of her chronic back pain. Seen pain management, s/p DALLIN without much help. Also seen neurosurgery which reports non-surgical at this time.    Review of patient's allergies indicates:  No Known Allergies    Past Medical History:   Diagnosis Date    Borderline diabetic     Depression     Eye injury     fb os     Hypertension     Menopause     Nuclear sclerosis of both eyes 7/1/2019     Past Surgical History:   Procedure Laterality Date    COLONOSCOPY N/A 7/15/2021    Procedure: COLONOSCOPY;  Surgeon: Geetha Walton MD;  Location: [facility] ENDO;  Service: Endoscopy;  Laterality: N/A;  Fully Vaccinated 3/25/21  see media tab 6/1/21, prep instr portal, and mailed -ml    INJECTION OF JOINT Right 7/17/2024    Procedure: Right Hip Steroid Injection;  Surgeon: Italo James Jr., MD;  Location: [facility] PAIN MANAGEMENT;  Service: Pain Management;  Laterality: Right;  @1100  No ATC or DM    LASIK Bilateral 07/15/2016    TRANSFORAMINAL EPIDURAL INJECTION OF STEROID Right 10/2/2024    Procedure: Right L4 Transforaminal Epidural Steroid Injection;  Surgeon: Italo James Jr., MD;  Location: [facility] PAIN MANAGEMENT;  Service: Pain Management;  Laterality: Right;  @1045  No ATC or DM  MD Sign.     Family History   Problem Relation Name Age of Onset    Cancer Mother      No Known Problems Father      No Known Problems Sister      No Known Problems Brother      No Known Problems Maternal Aunt      No Known Problems Maternal Uncle      No Known Problems Paternal Aunt      No Known Problems Paternal Uncle      No Known Problems Maternal Grandmother      No Known Problems Maternal Grandfather      No Known Problems Paternal Grandmother      No Known Problems Paternal Grandfather      Amblyopia Neg Hx      Blindness Neg Hx       Cataracts Neg Hx      Diabetes Neg Hx      Glaucoma Neg Hx      Hypertension Neg Hx      Macular degeneration Neg Hx      Retinal detachment Neg Hx      Strabismus Neg Hx      Stroke Neg Hx      Thyroid disease Neg Hx       Social History     Tobacco Use    Smoking status: Never    Smokeless tobacco: Never   Substance Use Topics    Alcohol use: No     Alcohol/week: 0.0 standard drinks of alcohol    Drug use: No        Review of Systems   Constitutional:  Negative for chills and fever.   HENT: Negative.     Respiratory: Negative.  Negative for shortness of breath.    Cardiovascular: Negative.  Negative for chest pain.   Gastrointestinal: Negative.  Negative for abdominal pain, nausea and vomiting.   Genitourinary: Negative.    Musculoskeletal:  Positive for back pain.   Neurological:  Negative for headaches.   All other systems reviewed and are negative.    OBJECTIVE:     Vitals:    12/19/24 1257   BP: 118/72   Pulse: 75   Temp: 97.9 °F (36.6 °C)     Body mass index is 45.47 kg/m².    Physical Exam  Constitutional:       General: She is not in acute distress.     Appearance: Normal appearance. She is not diaphoretic.   HENT:      Head: Normocephalic and atraumatic.   Eyes:      Conjunctiva/sclera: Conjunctivae normal.   Pulmonary:      Effort: Pulmonary effort is normal.   Neurological:      Mental Status: She is alert and oriented to person, place, and time.         Old records were reviewed. Labs and/or images were independently reviewed.    ASSESSMENT     1. Morbid obesity    2. Lumbar radiculopathy    3. Chronic back pain, unspecified back location, unspecified back pain laterality    4. Essential hypertension        PLAN:     Morbid obesity  -     Increase tirzepatide, weight loss, (ZEPBOUND) 7.5 mg/0.5 mL PnIj; Inject 7.5 mg into the skin every 7 days.  Dispense: 4 Pen; Refill: 3  -     Counseled patient about healthy diet, exercise habits, and to increase physical activity.    Lumbar radiculopathy  -     Start  pregabalin (LYRICA) 50 MG capsule; Take 1 capsule (50 mg total) by mouth 2 (two) times daily.  Dispense: 60 capsule; Refill: 6  -     Increase traMADoL (ULTRAM) 50 mg tablet; Take 1 tablet (50 mg total) by mouth every 12 (twelve) hours as needed for Pain.  Dispense: 60 tablet; Refill: 2  -    I have discussed the common side effects of this medication with the patient and answered all of the questions they had at the time of this visit regarding this medication.  -     Stable. Continue current regimen. Unable to wean at this time but am monitoring closely for any drug toxicity. Checked .    Chronic back pain, unspecified back location, unspecified back pain laterality  -     methocarbamoL (ROBAXIN) 500 MG Tab; Take 1 tablet (500 mg total) by mouth 2 (two) times daily as needed.  Dispense: 60 tablet; Refill: 1  -     Stable. Continue current regimen.    Hypertension   -     Stable. Continue current regimen.    RTC PRZAIN Whitmore MD  12/19/2024 1:15 PM

## 2025-01-27 ENCOUNTER — OFFICE VISIT (OUTPATIENT)
Dept: URGENT CARE | Facility: CLINIC | Age: 65
End: 2025-01-27
Payer: COMMERCIAL

## 2025-01-27 VITALS
SYSTOLIC BLOOD PRESSURE: 130 MMHG | RESPIRATION RATE: 16 BRPM | HEART RATE: 88 BPM | BODY MASS INDEX: 44.11 KG/M2 | DIASTOLIC BLOOD PRESSURE: 83 MMHG | WEIGHT: 264.75 LBS | TEMPERATURE: 99 F | HEIGHT: 65 IN | OXYGEN SATURATION: 97 %

## 2025-01-27 DIAGNOSIS — M25.552 LEFT HIP PAIN: Primary | ICD-10-CM

## 2025-01-27 DIAGNOSIS — S79.912A INJURY OF LEFT HIP, INITIAL ENCOUNTER: ICD-10-CM

## 2025-01-27 PROCEDURE — 99213 OFFICE O/P EST LOW 20 MIN: CPT | Mod: 25,S$GLB,,

## 2025-01-27 PROCEDURE — 73502 X-RAY EXAM HIP UNI 2-3 VIEWS: CPT | Mod: LT,S$GLB,, | Performed by: STUDENT IN AN ORGANIZED HEALTH CARE EDUCATION/TRAINING PROGRAM

## 2025-01-27 PROCEDURE — 96372 THER/PROPH/DIAG INJ SC/IM: CPT | Mod: S$GLB,,,

## 2025-01-27 RX ORDER — DICLOFENAC SODIUM 10 MG/G
2 GEL TOPICAL 2 TIMES DAILY PRN
Qty: 100 G | Refills: 0 | Status: SHIPPED | OUTPATIENT
Start: 2025-01-27

## 2025-01-27 RX ORDER — KETOROLAC TROMETHAMINE 30 MG/ML
30 INJECTION, SOLUTION INTRAMUSCULAR; INTRAVENOUS ONCE
Status: COMPLETED | OUTPATIENT
Start: 2025-01-27 | End: 2025-01-27

## 2025-01-27 RX ADMIN — KETOROLAC TROMETHAMINE 30 MG: 30 INJECTION, SOLUTION INTRAMUSCULAR; INTRAVENOUS at 11:01

## 2025-01-27 NOTE — PATIENT INSTRUCTIONS
Apply voltaren as needed for pain. Please follow up with orthopedic specialist regardhing chronic pain/sciatica issues.    When do I need to call the doctor?   Your hip pain becomes severe and you are not able to put weight on the injured leg or move it without making the pain worse.  You cannot move your leg.  You have a fever of 100.4°F (38°C) or higher, swelling, or redness with your hip pain.  You become unable to control your bowels or bladder.  You have very bad pain that is not helped by pain medicine.  Your hip is swollen or bruised.  Your toes are numb or turn blue, grey, or pale.  Your leg or foot becomes much weaker than the other.  - Rest.    - Drink plenty of fluids.    - Acetaminophen (tylenol) or Ibuprofen (advil,motrin) as directed as needed for fever/pain. Avoid tylenol if you have a history of liver disease. Do not take ibuprofen if you have a history of GI bleeding, kidney disease, or if you take blood thinners.     - Follow up with your PCP or specialty clinic as directed in the next 1-2 weeks if not improved or as needed.  You can call [phone] to schedule an appointment with the appropriate provider.    - Go to the ER or seek medical attention immediately if you develop new or worsening symptoms.     - You must understand that you have received an Urgent Care treatment only and that you may be released before all of your medical problems are known or treated.   - You, the patient, will arrange for follow up care as instructed.   - If your condition worsens or fails to improve we recommend that you receive another evaluation at the ER immediately or contact your PCP to discuss your concerns or return here.

## 2025-01-27 NOTE — PROGRESS NOTES
"Subjective:      Patient ID: Bambi Carrera is a 64 y.o. female.    Vitals:  height is 5' 5" (1.651 m) and weight is 120.1 kg (264 lb 12.4 oz). Her oral temperature is 98.6 °F (37 °C). Her blood pressure is 130/83 and her pulse is 88. Her respiration is 16 and oxygen saturation is 97%.     Chief Complaint: Leg Pain    63 y/o female with hx of chronic sciatica presents with left hip pain/tenderness the last 2 days when she slipped and fell. She states she landed on her left hip. She is ambulatory with a single cane. She has hx of chronic pain, she states she takes tramadol as needed for pain and lyrica, she states pain meds are not helping.     Leg Pain   The incident occurred 2 days ago. The incident occurred at home. The injury mechanism was a fall. The pain is present in the left hip, left leg, left thigh, left knee, right hip, right thigh, right knee and right leg. The quality of the pain is described as aching. The pain is at a severity of 10/10. The pain is severe. The pain has been Constant since onset. Pertinent negatives include no inability to bear weight. The symptoms are aggravated by movement. She has tried acetaminophen and heat for the symptoms. The treatment provided no relief.       Constitution: Negative for activity change, appetite change and chills.   HENT:  Negative for ear pain, ear discharge and foreign body in ear.    Neck: Negative for neck pain, neck stiffness and painful lymph nodes.   Cardiovascular:  Negative for chest trauma and chest pain.   Eyes:  Negative for eye trauma, foreign body in eye and eye discharge.   Respiratory:  Negative for sleep apnea, chest tightness, cough and asthma.    Gastrointestinal:  Negative for abdominal trauma and abdominal pain.   Endocrine: hair loss and cold intolerance.   Genitourinary:  Negative for dysuria, frequency and urgency.   Musculoskeletal:  Positive for joint pain. Negative for pain, trauma, joint swelling, abnormal ROM of joint, arthritis " and gout.        Left hip pain   Skin:  Negative for color change, pale and rash.   Allergic/Immunologic: Negative for environmental allergies, seasonal allergies, food allergies, eczema and asthma.   Neurological:  Negative for dizziness, history of vertigo, light-headedness, disorientation and altered mental status.   Hematologic/Lymphatic: Negative for swollen lymph nodes, easy bruising/bleeding and trouble clotting. Does not bruise/bleed easily.   Psychiatric/Behavioral:  Negative for altered mental status, disorientation and confusion.       Objective:     Physical Exam   Constitutional: She is oriented to person, place, and time. She appears well-developed. She is cooperative. No distress.   HENT:   Head: Normocephalic and atraumatic.   Nose: Nose normal.   Mouth/Throat: Oropharynx is clear and moist and mucous membranes are normal.   Eyes: Conjunctivae and lids are normal.   Neck: Trachea normal and phonation normal. Neck supple.   Cardiovascular: Normal rate, regular rhythm, normal heart sounds and normal pulses.   Pulmonary/Chest: Effort normal and breath sounds normal.   Abdominal: Normal appearance and bowel sounds are normal. She exhibits no mass. Soft.   Musculoskeletal:         General: No deformity.   Neurological: She is alert and oriented to person, place, and time. She has normal strength and normal reflexes. No sensory deficit.   Skin: Skin is warm, dry, intact and not diaphoretic.   Psychiatric: Her speech is normal and behavior is normal. Judgment and thought content normal.   Nursing note and vitals reviewed.  X-Ray Hip 2 or 3 views Left with Pelvis when performed    Result Date: 1/27/2025  EXAMINATION: XR HIP WITH PELVIS WHEN PERFORMED 2 OR 3 VIEWS LEFT CLINICAL HISTORY: Unspecified injury of left hip, initial encounter TECHNIQUE: AP view of the pelvis and frog leg lateral view of the left hip were performed. COMPARISON: Radiograph 09/20/2024. FINDINGS: No acute fracture or dislocation.  No  aggressive osseous lesion.  Mild degenerative change.  No significant soft tissue abnormality.     As above. Electronically signed by: Vinicio Alvarez Date:    01/27/2025 Time:    14:43      Assessment:     1. Left hip pain    2. Injury of left hip, initial encounter        Plan:       Left hip pain  -     diclofenac sodium (VOLTAREN ARTHRITIS PAIN) 1 % Gel; Apply 2 g topically 2 (two) times daily as needed (pain).  Dispense: 100 g; Refill: 0  -     ketorolac injection 30 mg    Injury of left hip, initial encounter  -     X-Ray Hip 2 or 3 views Left with Pelvis when performed; Future; Expected date: 01/27/2025

## 2025-02-03 ENCOUNTER — TELEPHONE (OUTPATIENT)
Dept: FAMILY MEDICINE | Facility: CLINIC | Age: 65
End: 2025-02-03
Payer: COMMERCIAL

## 2025-02-03 NOTE — TELEPHONE ENCOUNTER
Dr. Whitmore out of office; it would be best for pt to be seen and to discuss pain medication options.

## 2025-02-03 NOTE — TELEPHONE ENCOUNTER
----- Message from Sami sent at 2/3/2025 11:24 AM CST -----  Who called:   Pt  What is the request in detail:   Pt would like to know if she can get a stronger dose of pregabalin (LYRICA) 50 MG capsule and traMADoL (ULTRAM) 50 mg tablet. Please call pt.  Can the clinic reply by [facility]? No     Would the patient rather a call back or a response via [facility]? Call back     Best call back number:   Telephone Information:  Mobile          [phone]    Additional Information:     Thank you.

## 2025-02-03 NOTE — TELEPHONE ENCOUNTER
Patient is requesting for a increase on tramadol and pregabalin stated she had a fall and she suffers with sciatica and now she is in more pain from the fall

## 2025-02-05 ENCOUNTER — OFFICE VISIT (OUTPATIENT)
Dept: FAMILY MEDICINE | Facility: CLINIC | Age: 65
End: 2025-02-05
Payer: COMMERCIAL

## 2025-02-05 VITALS
BODY MASS INDEX: 44.44 KG/M2 | TEMPERATURE: 98 F | HEART RATE: 86 BPM | SYSTOLIC BLOOD PRESSURE: 122 MMHG | DIASTOLIC BLOOD PRESSURE: 76 MMHG | HEIGHT: 65 IN | OXYGEN SATURATION: 99 % | WEIGHT: 266.75 LBS

## 2025-02-05 DIAGNOSIS — M62.830 MUSCLE SPASM OF BACK: ICD-10-CM

## 2025-02-05 DIAGNOSIS — E66.01 CLASS 3 SEVERE OBESITY DUE TO EXCESS CALORIES WITH SERIOUS COMORBIDITY AND BODY MASS INDEX (BMI) OF 50.0 TO 59.9 IN ADULT: Primary | ICD-10-CM

## 2025-02-05 DIAGNOSIS — M54.16 LUMBAR RADICULOPATHY: ICD-10-CM

## 2025-02-05 DIAGNOSIS — I10 ESSENTIAL HYPERTENSION: ICD-10-CM

## 2025-02-05 DIAGNOSIS — E66.813 CLASS 3 SEVERE OBESITY DUE TO EXCESS CALORIES WITH SERIOUS COMORBIDITY AND BODY MASS INDEX (BMI) OF 50.0 TO 59.9 IN ADULT: Primary | ICD-10-CM

## 2025-02-05 DIAGNOSIS — G89.29 CHRONIC BACK PAIN, UNSPECIFIED BACK LOCATION, UNSPECIFIED BACK PAIN LATERALITY: ICD-10-CM

## 2025-02-05 DIAGNOSIS — M54.9 CHRONIC BACK PAIN, UNSPECIFIED BACK LOCATION, UNSPECIFIED BACK PAIN LATERALITY: ICD-10-CM

## 2025-02-05 PROCEDURE — 3078F DIAST BP <80 MM HG: CPT | Mod: CPTII,S$GLB,, | Performed by: FAMILY MEDICINE

## 2025-02-05 PROCEDURE — 99215 OFFICE O/P EST HI 40 MIN: CPT | Mod: S$GLB,,, | Performed by: FAMILY MEDICINE

## 2025-02-05 PROCEDURE — 1159F MED LIST DOCD IN RCRD: CPT | Mod: CPTII,S$GLB,, | Performed by: FAMILY MEDICINE

## 2025-02-05 PROCEDURE — 3074F SYST BP LT 130 MM HG: CPT | Mod: CPTII,S$GLB,, | Performed by: FAMILY MEDICINE

## 2025-02-05 PROCEDURE — G2211 COMPLEX E/M VISIT ADD ON: HCPCS | Mod: S$GLB,,, | Performed by: FAMILY MEDICINE

## 2025-02-05 PROCEDURE — 3008F BODY MASS INDEX DOCD: CPT | Mod: CPTII,S$GLB,, | Performed by: FAMILY MEDICINE

## 2025-02-05 PROCEDURE — 99999 PR PBB SHADOW E&M-EST. PATIENT-LVL V: CPT | Mod: PBBFAC,,, | Performed by: FAMILY MEDICINE

## 2025-02-05 RX ORDER — TIRZEPATIDE 10 MG/.5ML
10 INJECTION, SOLUTION SUBCUTANEOUS
Qty: 4 PEN | Refills: 3 | Status: SHIPPED | OUTPATIENT
Start: 2025-02-05 | End: 2025-02-11 | Stop reason: SDUPTHER

## 2025-02-05 RX ORDER — METHOCARBAMOL 500 MG/1
500 TABLET, FILM COATED ORAL 2 TIMES DAILY PRN
Qty: 60 TABLET | Refills: 1 | Status: SHIPPED | OUTPATIENT
Start: 2025-02-05

## 2025-02-05 RX ORDER — NAPROXEN 500 MG/1
500 TABLET ORAL 2 TIMES DAILY PRN
Qty: 60 TABLET | Refills: 1 | Status: SHIPPED | OUTPATIENT
Start: 2025-02-05

## 2025-02-05 RX ORDER — TRAMADOL HYDROCHLORIDE 50 MG/1
50 TABLET ORAL EVERY 12 HOURS PRN
Qty: 60 TABLET | Refills: 2 | Status: SHIPPED | OUTPATIENT
Start: 2025-02-05

## 2025-02-05 NOTE — PROGRESS NOTES
[facility]  Progress Note    SUBJECTIVE:     Chief Complaint   Patient presents with    Leg Pain    Medication Refill       HPI   Bambi Carrera  is a 64 y.o. female here for c/o lower back pain and R leg pain. Rates pain as moderate. The tramadol helps some but not all the way. Pain may radiate down the R leg.     Review of patient's allergies indicates:  No Known Allergies    Past Medical History:   Diagnosis Date    Borderline diabetic     Depression     Eye injury     fb os     Hypertension     Menopause     Nuclear sclerosis of both eyes 7/1/2019     Past Surgical History:   Procedure Laterality Date    COLONOSCOPY N/A 7/15/2021    Procedure: COLONOSCOPY;  Surgeon: Geetha Walton MD;  Location: [facility] ENDO;  Service: Endoscopy;  Laterality: N/A;  Fully Vaccinated 3/25/21  see media tab 6/1/21, prep instr portal, and mailed -ml    INJECTION OF JOINT Right 7/17/2024    Procedure: Right Hip Steroid Injection;  Surgeon: Italo James Jr., MD;  Location: [facility] PAIN MANAGEMENT;  Service: Pain Management;  Laterality: Right;  @1100  No ATC or DM    LASIK Bilateral 07/15/2016    TRANSFORAMINAL EPIDURAL INJECTION OF STEROID Right 10/2/2024    Procedure: Right L4 Transforaminal Epidural Steroid Injection;  Surgeon: Italo James Jr., MD;  Location: [facility] PAIN MANAGEMENT;  Service: Pain Management;  Laterality: Right;  @1045  No ATC or DM  MD Sign.     Family History   Problem Relation Name Age of Onset    Cancer Mother      No Known Problems Father      No Known Problems Sister      No Known Problems Brother      No Known Problems Maternal Aunt      No Known Problems Maternal Uncle      No Known Problems Paternal Aunt      No Known Problems Paternal Uncle      No Known Problems Maternal Grandmother      No Known Problems Maternal Grandfather      No Known Problems Paternal Grandmother      No Known Problems Paternal Grandfather      Amblyopia Neg Hx      Blindness Neg Hx      Cataracts Neg Hx       Diabetes Neg Hx      Glaucoma Neg Hx      Hypertension Neg Hx      Macular degeneration Neg Hx      Retinal detachment Neg Hx      Strabismus Neg Hx      Stroke Neg Hx      Thyroid disease Neg Hx       Social History     Tobacco Use    Smoking status: Never    Smokeless tobacco: Never   Substance Use Topics    Alcohol use: No     Alcohol/week: 0.0 standard drinks of alcohol    Drug use: No        Review of Systems   Constitutional:  Negative for chills, fever and malaise/fatigue.   Respiratory: Negative.     Cardiovascular: Negative.    Gastrointestinal:  Negative for abdominal pain, constipation and diarrhea.   Musculoskeletal:  Positive for back pain. Negative for falls, myalgias and neck pain.   Neurological:  Negative for weakness and headaches.     OBJECTIVE:     Vitals:    02/05/25 1526   BP: 122/76   Pulse: 86   Temp: 98 °F (36.7 °C)     Body mass index is 44.39 kg/m².    Physical Exam  Constitutional:       General: She is in acute distress (mild).      Appearance: She is not diaphoretic.   HENT:      Head: Normocephalic and atraumatic.   Musculoskeletal:         General: Tenderness (to palpation of paraspinal muscles) present.      Cervical back: Normal range of motion and neck supple.   Skin:     General: Skin is warm.   Neurological:      Mental Status: She is alert and oriented to person, place, and time.      Cranial Nerves: No cranial nerve deficit.         Old records were reviewed. Labs and/or images were independently reviewed.    ASSESSMENT     1. Class 3 severe obesity due to excess calories with serious comorbidity and body mass index (BMI) of 50.0 to 59.9 in adult    2. Muscle spasm of back    3. Lumbar radiculopathy    4. Chronic back pain, unspecified back location, unspecified back pain laterality    5. Essential hypertension        PLAN:     Class 3 severe obesity due to excess calories with serious comorbidity and body mass index (BMI) of 50.0 to 59.9 in adult  -     Increase  tirzepatide, weight loss, (ZEPBOUND) 10 mg/0.5 mL PnIj; Inject 10 mg into the skin every 7 days.  Dispense: 4 Pen; Refill: 3  -     Counseled patient about healthy diet, exercise habits, and to increase physical activity.    Muscle spasm of back  -     naproxen (NAPROSYN) 500 MG tablet; Take 1 tablet (500 mg total) by mouth 2 (two) times daily as needed.  Dispense: 60 tablet; Refill: 1  -     Patient counseled on good posture and stretching exercises. Continue to place ice packs on affected areas 3-4 times daily. Take medications as prescribed. Instructed patient to call MD if symptoms persist or worsen.    Lumbar radiculopathy  -     traMADoL (ULTRAM) 50 mg tablet; Take 1 tablet (50 mg total) by mouth every 12 (twelve) hours as needed for Pain.  Dispense: 60 tablet; Refill: 2  -     Stable. Continue current regimen. Unable to wean at this time but am monitoring closely for any drug toxicity. Checked .    Chronic back pain, unspecified back location, unspecified back pain laterality  -     methocarbamoL (ROBAXIN) 500 MG Tab; Take 1 tablet (500 mg total) by mouth 2 (two) times daily as needed.  Dispense: 60 tablet; Refill: 1    Essential hypertension   -     Stable. Continue current regimen.        RTC PRZAIN Whitmore MD  02/05/2025 3:38 PM

## 2025-02-10 ENCOUNTER — TELEPHONE (OUTPATIENT)
Dept: FAMILY MEDICINE | Facility: CLINIC | Age: 65
End: 2025-02-10
Payer: COMMERCIAL

## 2025-02-10 NOTE — TELEPHONE ENCOUNTER
----- Message from Summer sent at 2/10/2025 10:07 AM CST -----  Regarding: tirzepatide, weight loss, (ZEPBOUND) 10 mg/0.5 mL PnI  Type:  Patient Returning Call        Name of who is calling:pt        What is request in detail:pt is requesting a call back in regards to medication, pt was prescribed the RX listed above but it was sent to [facility]. Pt states she never used that pharmacy before please assist.             Can clinic reply by [facility]:no        [facility]:[phone]

## 2025-02-10 NOTE — TELEPHONE ENCOUNTER
Spoke with patient stated she was having shoulder pain she has a lump that is now very painful and didn't think about it at her appointment Friday. She is requesting a xray

## 2025-02-11 DIAGNOSIS — E66.813 CLASS 3 SEVERE OBESITY DUE TO EXCESS CALORIES WITH SERIOUS COMORBIDITY AND BODY MASS INDEX (BMI) OF 50.0 TO 59.9 IN ADULT: ICD-10-CM

## 2025-02-11 DIAGNOSIS — E66.01 CLASS 3 SEVERE OBESITY DUE TO EXCESS CALORIES WITH SERIOUS COMORBIDITY AND BODY MASS INDEX (BMI) OF 50.0 TO 59.9 IN ADULT: ICD-10-CM

## 2025-02-11 RX ORDER — TIRZEPATIDE 10 MG/.5ML
10 INJECTION, SOLUTION SUBCUTANEOUS
Qty: 4 PEN | Refills: 3 | Status: SHIPPED | OUTPATIENT
Start: 2025-02-11

## 2025-02-11 NOTE — TELEPHONE ENCOUNTER
Care Due:                  Date            Visit Type   Department     Provider  --------------------------------------------------------------------------------                                [facility]                              PRIMARY      MED/ INTERNAL  Last Visit: 02-      CARE ([facility])   MED/ PEDS      CARROLL GUERRA                              [facility]                              PRIMARY      MED/ INTERNAL  Next Visit: 02-      CARE ([facility])   MED/ PEDS      CARROLL GUERRA                                                            Last  Test          Frequency    Reason                     Performed    Due Date  --------------------------------------------------------------------------------    HBA1C.......  6 months...  tirzepatide,.............  06- 12-    Vitamin D...  12 months..  ergocalciferol...........  Not Found    Overdue    [employer] Embedded Care Due Messages. Reference number: [id number].   2/11/2025 2:57:43 PM CST

## 2025-02-11 NOTE — TELEPHONE ENCOUNTER
----- Message from Swetha sent at 2/11/2025  2:11 PM CST -----  Regarding: SOPHIE RAMON [[id number]]  Type : Patient Call        Who Called :patient         Does the patient know what this is regarding?:ZEPBOUND) 10 mg/0.5 mL patient called and stated that rx was sent  to [facility] pharmacy,But her preferred pharmacy is [facility] and wants to know if  her ins.will cover medication/was able to cover it due to this.        Would the patient rather a call back or a response via [facility]?callback        Best Call Back Number:[phone]          Additional Information:

## 2025-02-12 ENCOUNTER — OFFICE VISIT (OUTPATIENT)
Dept: FAMILY MEDICINE | Facility: CLINIC | Age: 65
End: 2025-02-12
Payer: COMMERCIAL

## 2025-02-12 VITALS
SYSTOLIC BLOOD PRESSURE: 128 MMHG | HEIGHT: 65 IN | HEART RATE: 79 BPM | BODY MASS INDEX: 44.63 KG/M2 | WEIGHT: 267.88 LBS | TEMPERATURE: 98 F | DIASTOLIC BLOOD PRESSURE: 82 MMHG | OXYGEN SATURATION: 94 %

## 2025-02-12 DIAGNOSIS — I10 ESSENTIAL HYPERTENSION: ICD-10-CM

## 2025-02-12 DIAGNOSIS — Z23 PNEUMOCOCCAL VACCINATION ADMINISTERED AT CURRENT VISIT: ICD-10-CM

## 2025-02-12 DIAGNOSIS — D17.9 LIPOMA, UNSPECIFIED SITE: Primary | ICD-10-CM

## 2025-02-12 DIAGNOSIS — M54.12 CERVICAL RADICULOPATHY: ICD-10-CM

## 2025-02-12 PROCEDURE — 3074F SYST BP LT 130 MM HG: CPT | Mod: CPTII,S$GLB,, | Performed by: FAMILY MEDICINE

## 2025-02-12 PROCEDURE — 99999 PR PBB SHADOW E&M-EST. PATIENT-LVL V: CPT | Mod: PBBFAC,,, | Performed by: FAMILY MEDICINE

## 2025-02-12 PROCEDURE — 1159F MED LIST DOCD IN RCRD: CPT | Mod: CPTII,S$GLB,, | Performed by: FAMILY MEDICINE

## 2025-02-12 PROCEDURE — G2211 COMPLEX E/M VISIT ADD ON: HCPCS | Mod: S$GLB,,, | Performed by: FAMILY MEDICINE

## 2025-02-12 PROCEDURE — 3008F BODY MASS INDEX DOCD: CPT | Mod: CPTII,S$GLB,, | Performed by: FAMILY MEDICINE

## 2025-02-12 PROCEDURE — 3079F DIAST BP 80-89 MM HG: CPT | Mod: CPTII,S$GLB,, | Performed by: FAMILY MEDICINE

## 2025-02-12 PROCEDURE — G0009 ADMIN PNEUMOCOCCAL VACCINE: HCPCS | Mod: S$GLB,,, | Performed by: FAMILY MEDICINE

## 2025-02-12 PROCEDURE — 99214 OFFICE O/P EST MOD 30 MIN: CPT | Mod: S$GLB,,, | Performed by: FAMILY MEDICINE

## 2025-02-12 PROCEDURE — 90677 PCV20 VACCINE IM: CPT | Mod: S$GLB,,, | Performed by: FAMILY MEDICINE

## 2025-02-12 NOTE — PROGRESS NOTES
[facility]  Progress Note    SUBJECTIVE:     Chief Complaint   Patient presents with    Shoulder Pain     Knot left        HPI   Bambi Carrera  is a 64 y.o. female here for c/o a bump on the back of her L upper back. It is starting to bother her and hurt. No falls/trauma, and no abscess. Also has been having some neck pain with some hand tingling.  Patient has no other new complaints/problems at this time.      Review of patient's allergies indicates:  No Known Allergies    Past Medical History:   Diagnosis Date    Borderline diabetic     Depression     Eye injury     fb os     Hypertension     Menopause     Nuclear sclerosis of both eyes 7/1/2019     Past Surgical History:   Procedure Laterality Date    COLONOSCOPY N/A 7/15/2021    Procedure: COLONOSCOPY;  Surgeon: Geetha Walton MD;  Location: [facility] ENDO;  Service: Endoscopy;  Laterality: N/A;  Fully Vaccinated 3/25/21  see media tab 6/1/21, prep instr portal, and mailed -ml    INJECTION OF JOINT Right 7/17/2024    Procedure: Right Hip Steroid Injection;  Surgeon: Italo James Jr., MD;  Location: [facility] PAIN MANAGEMENT;  Service: Pain Management;  Laterality: Right;  @1100  No ATC or DM    LASIK Bilateral 07/15/2016    TRANSFORAMINAL EPIDURAL INJECTION OF STEROID Right 10/2/2024    Procedure: Right L4 Transforaminal Epidural Steroid Injection;  Surgeon: Italo James Jr., MD;  Location: [facility] PAIN MANAGEMENT;  Service: Pain Management;  Laterality: Right;  @1045  No ATC or DM  MD Sign.     Family History   Problem Relation Name Age of Onset    Cancer Mother      No Known Problems Father      No Known Problems Sister      No Known Problems Brother      No Known Problems Maternal Aunt      No Known Problems Maternal Uncle      No Known Problems Paternal Aunt      No Known Problems Paternal Uncle      No Known Problems Maternal Grandmother      No Known Problems Maternal Grandfather      No Known Problems Paternal Grandmother      No Known  Problems Paternal Grandfather      Amblyopia Neg Hx      Blindness Neg Hx      Cataracts Neg Hx      Diabetes Neg Hx      Glaucoma Neg Hx      Hypertension Neg Hx      Macular degeneration Neg Hx      Retinal detachment Neg Hx      Strabismus Neg Hx      Stroke Neg Hx      Thyroid disease Neg Hx       Social History     Tobacco Use    Smoking status: Never    Smokeless tobacco: Never   Substance Use Topics    Alcohol use: No     Alcohol/week: 0.0 standard drinks of alcohol    Drug use: No        Review of Systems   Constitutional:  Negative for chills and fever.   HENT: Negative.     Respiratory: Negative.  Negative for shortness of breath.    Cardiovascular: Negative.  Negative for chest pain.   Gastrointestinal: Negative.  Negative for abdominal pain, nausea and vomiting.   Genitourinary: Negative.    Musculoskeletal:  Positive for neck pain (w hand tingling).   Neurological:  Negative for headaches.   All other systems reviewed and are negative.    OBJECTIVE:     Vitals:    02/12/25 1334   BP: 128/82   Pulse: 79   Temp: 98.3 °F (36.8 °C)     Body mass index is 44.57 kg/m².    Physical Exam  Constitutional:       General: She is not in acute distress.     Appearance: Normal appearance. She is not diaphoretic.   HENT:      Head: Normocephalic and atraumatic.   Eyes:      Conjunctiva/sclera: Conjunctivae normal.   Pulmonary:      Effort: Pulmonary effort is normal.   Skin:     Comments: + lipoma on back. No abscess   Neurological:      Mental Status: She is alert and oriented to person, place, and time.         Old records were reviewed. Labs and/or images were independently reviewed.    ASSESSMENT     1. Lipoma, unspecified site    2. Essential hypertension    3. Cervical radiculopathy    4. Pneumococcal vaccination administered at current visit        PLAN:     Lipoma, unspecified site  -     Ambulatory referral/consult to General Surgery; Future; Expected date: 02/19/2025  -     refer for removal.    Essential  hypertension   -     Stable. Continue current regimen.    Cervical radiculopathy  -     Ambulatory referral/consult to Physical/Occupational Therapy; Future; Expected date: 02/19/2025  -     Patient counseled on good posture and stretching exercises. Continue to place ice packs on affected areas 3-4 times daily. Take medications as prescribed. Instructed patient to call MD if symptoms persist or worsen.    Pneumococcal vaccination administered at current visit  -     pneumoc 20-mera conj-dip cr(PF) (PREVNAR-20 (PF)) injection Syrg 0.5 mL        RTC PRN    Wong Whitmore MD  02/12/2025 1:49 PM

## 2025-02-17 ENCOUNTER — ANESTHESIA EVENT (OUTPATIENT)
Dept: SURGERY | Facility: HOSPITAL | Age: 65
End: 2025-02-17
Payer: MEDICARE

## 2025-02-17 ENCOUNTER — TELEPHONE (OUTPATIENT)
Dept: PREADMISSION TESTING | Facility: HOSPITAL | Age: 65
End: 2025-02-17
Payer: COMMERCIAL

## 2025-02-17 ENCOUNTER — OFFICE VISIT (OUTPATIENT)
Dept: SURGERY | Facility: CLINIC | Age: 65
End: 2025-02-17
Payer: COMMERCIAL

## 2025-02-17 VITALS
HEIGHT: 65 IN | HEART RATE: 115 BPM | DIASTOLIC BLOOD PRESSURE: 83 MMHG | SYSTOLIC BLOOD PRESSURE: 124 MMHG | BODY MASS INDEX: 44.63 KG/M2 | WEIGHT: 267.88 LBS

## 2025-02-17 DIAGNOSIS — D17.9 LIPOMA, UNSPECIFIED SITE: Primary | ICD-10-CM

## 2025-02-17 RX ORDER — SODIUM CHLORIDE 9 MG/ML
INJECTION, SOLUTION INTRAVENOUS CONTINUOUS
OUTPATIENT
Start: 2025-02-17

## 2025-02-17 NOTE — H&P
History & Physical    Subjective     History of Present Illness:  Patient is a 64 y.o. female presents with swelling and pain left side of her back. Present for years, getting worse recently. She denies any drainage. No hx of imaging or procedure to the area. She reports the area 'comes and goes' and is painful. She cannot pinpoint a specific area but rather points to the left mid back where there is a vague swelling.  Will plan for an ultrasound and if it shows any form of discrete mass even a vague lipoma, we will plan for surgery.  Also has a small skin cyst in the area which she requests removal.  No f/c/n/v/sob/cp not on blood thinners.    Chief Complaint   Patient presents with    Consult     Lipoma       Review of patient's allergies indicates:  No Known Allergies    Current Medications[1]    Past Medical History:   Diagnosis Date    Borderline diabetic     Depression     Eye injury     fb os     Hypertension     Menopause     Nuclear sclerosis of both eyes 7/1/2019     Past Surgical History:   Procedure Laterality Date    COLONOSCOPY N/A 7/15/2021    Procedure: COLONOSCOPY;  Surgeon: Geetha Walton MD;  Location: [facility] ENDO;  Service: Endoscopy;  Laterality: N/A;  Fully Vaccinated 3/25/21  see media tab 6/1/21, prep instr portal, and mailed -ml    INJECTION OF JOINT Right 7/17/2024    Procedure: Right Hip Steroid Injection;  Surgeon: Italo James Jr., MD;  Location: [facility] PAIN MANAGEMENT;  Service: Pain Management;  Laterality: Right;  @1100  No ATC or DM    LASIK Bilateral 07/15/2016    TRANSFORAMINAL EPIDURAL INJECTION OF STEROID Right 10/2/2024    Procedure: Right L4 Transforaminal Epidural Steroid Injection;  Surgeon: Italo James Jr., MD;  Location: [facility] PAIN MANAGEMENT;  Service: Pain Management;  Laterality: Right;  @1045  No ATC or DM  MD Sign.     Family History   Problem Relation Name Age of Onset    Cancer Mother      No Known Problems Father      No Known Problems Sister      No  "Known Problems Brother      No Known Problems Maternal Aunt      No Known Problems Maternal Uncle      No Known Problems Paternal Aunt      No Known Problems Paternal Uncle      No Known Problems Maternal Grandmother      No Known Problems Maternal Grandfather      No Known Problems Paternal Grandmother      No Known Problems Paternal Grandfather      Amblyopia Neg Hx      Blindness Neg Hx      Cataracts Neg Hx      Diabetes Neg Hx      Glaucoma Neg Hx      Hypertension Neg Hx      Macular degeneration Neg Hx      Retinal detachment Neg Hx      Strabismus Neg Hx      Stroke Neg Hx      Thyroid disease Neg Hx       Social History[2]     Review of Systems:  Review of Systems   Constitutional:  Negative for chills and fever.   HENT: Negative.     Eyes: Negative.    Respiratory:  Negative for cough, chest tightness and shortness of breath.    Cardiovascular: Negative.    Gastrointestinal:  Negative for abdominal pain, blood in stool, constipation, diarrhea, nausea and vomiting.   Endocrine: Negative for cold intolerance and heat intolerance.   Genitourinary: Negative.    Musculoskeletal:  Positive for back pain.   Skin: Negative.  Negative for rash.   Neurological:  Negative for dizziness, syncope and light-headedness.   Psychiatric/Behavioral:  Negative for agitation, confusion and hallucinations.           Objective     Vital Signs (Most Recent)  Pulse: (!) 115 (02/17/25 0951)  BP: 124/83 (02/17/25 0951)  5' 5" (1.651 m)  121.5 kg (267 lb 13.7 oz)     Physical Exam:  Physical Exam  Constitutional:       General: She is not in acute distress.     Appearance: She is well-developed. She is not diaphoretic.   HENT:      Head: Normocephalic and atraumatic.   Eyes:      Conjunctiva/sclera: Conjunctivae normal.      Pupils: Pupils are equal, round, and reactive to light.   Cardiovascular:      Rate and Rhythm: Normal rate and regular rhythm.      Pulses: Normal pulses.      Heart sounds: Normal heart sounds.   Pulmonary:     "  Effort: Pulmonary effort is normal. No respiratory distress.      Breath sounds: Normal breath sounds. No stridor. No wheezing.   Abdominal:      General: Bowel sounds are normal. There is no distension.      Palpations: Abdomen is soft.      Tenderness: There is no abdominal tenderness.   Musculoskeletal:         General: Normal range of motion.      Cervical back: Normal range of motion and neck supple.   Skin:     General: Skin is warm and dry.      Findings: No rash.             Comments: Left mid back questionable mass vs fat distribution. No firm mass palpated, possibly a lipoma unclear size.   Neurological:      Mental Status: She is alert and oriented to person, place, and time.      Cranial Nerves: No cranial nerve deficit.   Psychiatric:         Behavior: Behavior normal.              Assessment and Plan   64 yr old obese black lady w swelling to left mid back, possibly a vague soft tissue mass    PLAN:    Ultrasound to eval for mass  If + for a mass, will plan for excision of soft tissue mass and removal of nearby skin cyst in OR on 3/3/25  Risks and side effects discussed with the patient. Alterative therapies discussed. Patient agreeable to procedure and has signed consent which was discussed in detail.                [1]   Current Outpatient Medications   Medication Sig Dispense Refill    albuterol (VENTOLIN HFA) 90 mcg/actuation inhaler Inhale 2 puffs into the lungs every 6 (six) hours as needed for Wheezing. Rescue 18 g 0    amLODIPine (NORVASC) 5 MG tablet Take 1 tablet (5 mg total) by mouth once daily. 90 tablet 3    diclofenac sodium (VOLTAREN) 1 % Gel Apply 2 g topically once daily. 100 g 3    ergocalciferol (ERGOCALCIFEROL) 50,000 unit Cap Take 1 capsule by mouth once a week 12 capsule 2    famotidine (PEPCID) 40 MG tablet Take 1 tablet (40 mg total) by mouth every evening. 30 tablet 11    hydroCHLOROthiazide (HYDRODIURIL) 25 MG tablet Take 1 tablet (25 mg total) by mouth once daily. 90 tablet  3    levocetirizine (XYZAL) 5 MG tablet TAKE 1 TABLET(5 MG) BY MOUTH EVERY EVENING 90 tablet 0    LIDOcaine (LIDODERM) 5 % Place 1 patch onto the skin once daily. Remove & Discard patch within 12 hours or as directed by MD 14 patch 0    loratadine (CLARITIN) 10 mg tablet Take 1 tablet by mouth once daily 90 tablet 3    methocarbamoL (ROBAXIN) 500 MG Tab Take 1 tablet (500 mg total) by mouth 2 (two) times daily as needed. 60 tablet 1    multivitamin (THERAGRAN) per tablet Take 1 tablet by mouth once daily.      naproxen (NAPROSYN) 500 MG tablet Take 1 tablet (500 mg total) by mouth 2 (two) times daily as needed. 60 tablet 1    pantoprazole (PROTONIX) 40 MG tablet Take 1 tablet by mouth once daily 90 tablet 3    pregabalin (LYRICA) 50 MG capsule Take 1 capsule (50 mg total) by mouth 2 (two) times daily. 60 capsule 5    tirzepatide, weight loss, (ZEPBOUND) 10 mg/0.5 mL PnIj Inject 10 mg into the skin every 7 days. 4 Pen 3    traMADoL (ULTRAM) 50 mg tablet Take 1 tablet (50 mg total) by mouth every 12 (twelve) hours as needed for Pain. 60 tablet 2    azelastine (ASTELIN) 137 mcg (0.1 %) nasal spray 1 spray (137 mcg total) by Nasal route 2 (two) times daily. 30 mL 3    meclizine (ANTIVERT) 25 mg tablet Take 1 tablet (25 mg total) by mouth 3 (three) times daily as needed for Dizziness (vertigo). 15 tablet 0    tamsulosin (FLOMAX) 0.4 mg Cap Take 1 capsule (0.4 mg total) by mouth once daily. for 5 days 5 capsule 0     No current facility-administered medications for this visit.   [2]   Social History  Tobacco Use    Smoking status: Never    Smokeless tobacco: Never   Substance Use Topics    Alcohol use: No     Alcohol/week: 0.0 standard drinks of alcohol    Drug use: No

## 2025-02-18 ENCOUNTER — TELEPHONE (OUTPATIENT)
Dept: PREADMISSION TESTING | Facility: HOSPITAL | Age: 65
End: 2025-02-18
Payer: COMMERCIAL

## 2025-02-19 ENCOUNTER — TELEPHONE (OUTPATIENT)
Dept: PREADMISSION TESTING | Facility: HOSPITAL | Age: 65
End: 2025-02-19
Payer: COMMERCIAL

## 2025-02-22 ENCOUNTER — HOSPITAL ENCOUNTER (OUTPATIENT)
Dept: RADIOLOGY | Facility: HOSPITAL | Age: 65
Discharge: HOME | End: 2025-02-22
Attending: SURGERY
Payer: COMMERCIAL

## 2025-02-22 PROCEDURE — 76604 US EXAM CHEST: CPT | Mod: 26,,, | Performed by: RADIOLOGY

## 2025-02-22 PROCEDURE — 76604 US EXAM CHEST: CPT | Mod: TC

## 2025-02-24 ENCOUNTER — HOSPITAL ENCOUNTER (OUTPATIENT)
Dept: PREADMISSION TESTING | Facility: HOSPITAL | Age: 65
Discharge: HOME | End: 2025-02-24
Attending: SURGERY
Payer: COMMERCIAL

## 2025-02-24 ENCOUNTER — HOSPITAL ENCOUNTER (OUTPATIENT)
Dept: RADIOLOGY | Facility: HOSPITAL | Age: 65
Discharge: HOME | End: 2025-02-24
Attending: SURGERY
Payer: COMMERCIAL

## 2025-02-24 VITALS
HEART RATE: 84 BPM | DIASTOLIC BLOOD PRESSURE: 87 MMHG | BODY MASS INDEX: 44.34 KG/M2 | OXYGEN SATURATION: 97 % | WEIGHT: 266.13 LBS | HEIGHT: 65 IN | RESPIRATION RATE: 16 BRPM | SYSTOLIC BLOOD PRESSURE: 132 MMHG | TEMPERATURE: 98 F

## 2025-02-24 DIAGNOSIS — Z01.818 PREOP TESTING: Primary | ICD-10-CM

## 2025-02-24 DIAGNOSIS — D17.9 LIPOMA, UNSPECIFIED SITE: ICD-10-CM

## 2025-02-24 LAB
ANION GAP SERPL CALC-SCNC: 13 MMOL/L (ref 8–16)
BASOPHILS # BLD AUTO: 0.03 K/UL (ref 0–0.2)
BASOPHILS NFR BLD: 0.5 % (ref 0–1.9)
BUN SERPL-MCNC: 16 MG/DL (ref 8–23)
CALCIUM SERPL-MCNC: 9.3 MG/DL (ref 8.7–10.5)
CHLORIDE SERPL-SCNC: 104 MMOL/L (ref 95–110)
CO2 SERPL-SCNC: 23 MMOL/L (ref 23–29)
CREAT SERPL-MCNC: 1 MG/DL (ref 0.5–1.4)
DIFFERENTIAL METHOD BLD: ABNORMAL
EOSINOPHIL # BLD AUTO: 0 K/UL (ref 0–0.5)
EOSINOPHIL NFR BLD: 0.6 % (ref 0–8)
ERYTHROCYTE [DISTWIDTH] IN BLOOD BY AUTOMATED COUNT: 15 % (ref 11.5–14.5)
EST. GFR  (NO RACE VARIABLE): >60 ML/MIN/1.73 M^2
GLUCOSE SERPL-MCNC: 77 MG/DL (ref 70–110)
HCT VFR BLD AUTO: 38.8 % (ref 37–48.5)
HGB BLD-MCNC: 12.4 G/DL (ref 12–16)
IMM GRANULOCYTES # BLD AUTO: 0.02 K/UL (ref 0–0.04)
IMM GRANULOCYTES NFR BLD AUTO: 0.3 % (ref 0–0.5)
LYMPHOCYTES # BLD AUTO: 2.9 K/UL (ref 1–4.8)
LYMPHOCYTES NFR BLD: 44.8 % (ref 18–48)
MCH RBC QN AUTO: 28.5 PG (ref 27–31)
MCHC RBC AUTO-ENTMCNC: 32 G/DL (ref 32–36)
MCV RBC AUTO: 89 FL (ref 82–98)
MONOCYTES # BLD AUTO: 0.7 K/UL (ref 0.3–1)
MONOCYTES NFR BLD: 10.6 % (ref 4–15)
NEUTROPHILS # BLD AUTO: 2.8 K/UL (ref 1.8–7.7)
NEUTROPHILS NFR BLD: 43.2 % (ref 38–73)
NRBC BLD-RTO: 0 /100 WBC
OHS QRS DURATION: 100 MS
OHS QTC CALCULATION: 451 MS
PLATELET # BLD AUTO: 291 K/UL (ref 150–450)
PMV BLD AUTO: 10.4 FL (ref 9.2–12.9)
POTASSIUM SERPL-SCNC: 3.3 MMOL/L (ref 3.5–5.1)
RBC # BLD AUTO: 4.35 M/UL (ref 4–5.4)
SODIUM SERPL-SCNC: 140 MMOL/L (ref 136–145)
WBC # BLD AUTO: 6.52 K/UL (ref 3.9–12.7)

## 2025-02-24 PROCEDURE — 80048 BASIC METABOLIC PNL TOTAL CA: CPT | Performed by: SURGERY

## 2025-02-24 PROCEDURE — 36415 COLL VENOUS BLD VENIPUNCTURE: CPT | Performed by: SURGERY

## 2025-02-24 PROCEDURE — 71046 X-RAY EXAM CHEST 2 VIEWS: CPT | Mod: TC,FY

## 2025-02-24 PROCEDURE — 71046 X-RAY EXAM CHEST 2 VIEWS: CPT | Mod: 26,,, | Performed by: RADIOLOGY

## 2025-02-24 PROCEDURE — 85025 COMPLETE CBC W/AUTO DIFF WBC: CPT | Performed by: SURGERY

## 2025-02-24 NOTE — DISCHARGE INSTRUCTIONS
YOUR PROCEDURE WILL BE AT [facility] at [address] [place] [address]                 Enter through the Main Entrance facing [address].                 Report to the Same Day Surgery Registration Desk in the hallway.(Just beside the Same Day Surgery Unit)      Your procedure  is scheduled for ____3/3/2025______.    Call [phone] between 2pm and 5pm on _3/2/2025______to find out your arrival time for the day of surgery.    You may have two visitors.  No children under 12 years old.     You will be going to the Same Day Surgery Unit on the 2nd floor of the hospital.    Important instructions:  Do not eat anything after midnight.  You may have plain water, non carbonated.  You may also have Gatorade or Powerade after midnight.    Stop all fluids 2 hours before your surgery.    It is okay to brush your teeth.  Do not have gum, candy or mints.    SEE MEDICATION SHEET.   TAKE MEDICATIONS AS DIRECTED.      Do not take any diabetic medication on the morning of surgery unless instructed to do so by your doctor or pre op nurse.      All GLP-1 weekly diabetic/weight loss medications must not be taken for one week before your surgery, or your surgery could be canceled.      STOP taking for 7 days before surgery:    Aspirin           Voltaren (Diclofenac)  Ibuprofen  (Advil, Motrin)                Indomethocin  Mobic (meloxicam, celebrex)      Etodolac   Aleve (naproxen)          Toradol (ketoralac)  Fish oil, Krill oil and Vitamin E  Headache Powders (BC Powder, Goody's Powder, Stanback)                           You may take Tylenol if needed which is not a blood thinner.    Please shower the night before and the morning of your surgery.      Follow any Prep Instructions given by your surgeon.    Use Chlorhexidine soap as instructed by your pre op nurse.   Please place clean linens on your bed the night before surgery. Please wear fresh clean clothing after each shower.    No  shaving of procedural area at least 4-5 days before surgery due to increased risk of skin irritation and/or possible infection.    Female patients may be asked for a urine specimen on the morning of the surgery.  Please check with your nurse before using the restroom.    Contact lenses and removable denture work may not be worn during your procedure.    You may wear deodorant only. If you are having breast surgery, do not wear deodorant on the operative side.    Do not wear powder, body lotion, perfume/cologne or make-up.    Do not wear any jewelry or have any metal on your body.    You will be asked to remove any dentures or partials for the procedure.    If you are going home on the same day of surgery, you must arrange for a family member or a friend to drive you home.  Public transportation is prohibited.  You will not be able to drive home if you were given anesthesia or sedation.    Patients who want to have their Post-op prescriptions filled from our in-house [facility], bring a Credit/Debit Card or cash with you. A co-pay may be required.  The pharmacy closes at 5:30 pm.    Wear loose fitting clothes allowing for bandages.    Please leave money and valuables home.      You may bring your cell phone.    Call the doctor if fever or illness should occur before your surgery.    Call [phone] to contact us here if needed.                            CLOTHES ON DAY OF SURGERY    SHOULDER surgery:  you must have a very oversized shirt.  Very, Very large.  You will probably have a large sling on with your arm strapped to your chest.  You will not be able to put the arm of the operated shoulder into a sleeve.  You can put the arm of the un-operated shoulder into the sleeve, but the shirt will need to be draped over the operated shoulder.       ARM or HAND surgery:  make sure that your sleeves are large and loose enough to pass over large dressings or cast.      BREAST or UNDERARM surgery:  wear a loose, button  down shirt so that you can dress without raising your arms over your head.    ABDOMINAL surgery:  wear loose, comfortable clothing.  Nothing tight around the abdomen.  NO JEANS    PENIS or SCROTAL surgery:  loose comfortable clothing.  Large sweat pants, pajama pants or a robe.  ABSOLUTELY NO JEANS      LEG or FOOT surgery:  wear large loose pants that are able to pass over any large dressings or casts.  You could also wear loose shorts or a skirt.

## 2025-02-24 NOTE — ANESTHESIA PREPROCEDURE EVALUATION
02/24/2025  Bambi Carrera is a 64 y.o., female scheduled for EXCISION, MASS, BACK (Left)  on 3/3/2025.        Past Medical History:   Diagnosis Date    Borderline diabetic     Depression     Eye injury     fb os     Hypertension     Menopause     Nuclear sclerosis of both eyes 7/1/2019           Past Surgical History:   Procedure Laterality Date    COLONOSCOPY N/A 7/15/2021    Procedure: COLONOSCOPY;  Surgeon: Geetha Walton MD;  Location: [facility] ENDO;  Service: Endoscopy;  Laterality: N/A;  Fully Vaccinated 3/25/21  see media tab 6/1/21, prep instr portal, and mailed -ml    INJECTION OF JOINT Right 7/17/2024    Procedure: Right Hip Steroid Injection;  Surgeon: Italo James Jr., MD;  Location: [facility] PAIN MANAGEMENT;  Service: Pain Management;  Laterality: Right;  @1100  No ATC or DM    LASIK Bilateral 07/15/2016    TRANSFORAMINAL EPIDURAL INJECTION OF STEROID Right 10/2/2024    Procedure: Right L4 Transforaminal Epidural Steroid Injection;  Surgeon: Italo James Jr., MD;  Location: [facility] PAIN MANAGEMENT;  Service: Pain Management;  Laterality: Right;  @1045  No ATC or DM  MD Sign.           Pre-op Assessment    I have reviewed the Patient Summary Reports.     I have reviewed the Nursing Notes. I have reviewed the NPO Status.   I have reviewed the Medications.     Review of Systems  Anesthesia Hx:  No problems with previous Anesthesia             Denies Family Hx of Anesthesia complications.    Denies Personal Hx of Anesthesia complications.                    Social:  Non-Smoker, No Alcohol Use       Hematology/Oncology:  Hematology Normal   Oncology Normal                                   EENT/Dental:  EENT/Dental Normal           Cardiovascular:  Exercise tolerance: good   Hypertension                  Functional Capacity good / => 4 METS                         Pulmonary:  Pulmonary  Normal                       Renal/:  Chronic Renal Disease                Hepatic/GI:     GERD    Taking GLP-1 Agonists Instructed to Hold for 7 Days No Reported GI Symptoms          Musculoskeletal:  Musculoskeletal Normal                Neurological:    Neuromuscular Disease,                                   Endocrine:        Morbid Obesity / BMI > 40  Dermatological:  Skin Normal        Physical Exam  General: Well nourished, Alert, Cooperative and Oriented    Airway:  Mallampati: II / II  Mouth Opening: Normal  TM Distance: Normal  Tongue: Normal  Neck ROM: Normal ROM    Chest/Lungs:  Clear to auscultation, Normal Respiratory Rate    Heart:  Rate: Normal  Rhythm: Regular Rhythm  Sounds: Normal        Anesthesia Plan  Type of Anesthesia, risks & benefits discussed:    Anesthesia Type: MAC, Gen Natural Airway  Intra-op Monitoring Plan: Standard ASA Monitors  Post Op Pain Control Plan: multimodal analgesia  Induction:  IV  Informed Consent: Informed consent signed with the Patient and all parties understand the risks and agree with anesthesia plan.  All questions answered.   ASA Score: 3  Day of Surgery Review of History & Physical: H&P Update referred to the surgeon/provider.    Ready For Surgery From Anesthesia Perspective.     .

## 2025-02-27 ENCOUNTER — OFFICE VISIT (OUTPATIENT)
Dept: URGENT CARE | Facility: CLINIC | Age: 65
End: 2025-02-27
Payer: COMMERCIAL

## 2025-02-27 VITALS
HEART RATE: 81 BPM | WEIGHT: 266 LBS | DIASTOLIC BLOOD PRESSURE: 81 MMHG | SYSTOLIC BLOOD PRESSURE: 129 MMHG | BODY MASS INDEX: 44.32 KG/M2 | RESPIRATION RATE: 18 BRPM | HEIGHT: 65 IN | TEMPERATURE: 98 F | OXYGEN SATURATION: 99 %

## 2025-02-27 DIAGNOSIS — J02.9 SORE THROAT: ICD-10-CM

## 2025-02-27 DIAGNOSIS — R09.82 POSTNASAL DRIP: ICD-10-CM

## 2025-02-27 DIAGNOSIS — J01.90 ACUTE VIRAL SINUSITIS: Primary | ICD-10-CM

## 2025-02-27 DIAGNOSIS — B97.89 ACUTE VIRAL SINUSITIS: Primary | ICD-10-CM

## 2025-02-27 LAB
CTP QC/QA: YES
MOLECULAR STREP A: NEGATIVE

## 2025-02-27 PROCEDURE — 87651 STREP A DNA AMP PROBE: CPT | Mod: QW,S$GLB,,

## 2025-02-27 PROCEDURE — 99212 OFFICE O/P EST SF 10 MIN: CPT | Mod: S$GLB,,,

## 2025-02-27 RX ORDER — CETIRIZINE HYDROCHLORIDE 10 MG/1
10 TABLET ORAL DAILY
Qty: 30 TABLET | Refills: 0 | Status: SHIPPED | OUTPATIENT
Start: 2025-02-27

## 2025-02-27 RX ORDER — FLUTICASONE PROPIONATE 50 MCG
1 SPRAY, SUSPENSION (ML) NASAL DAILY
Qty: 11.1 ML | Refills: 0 | Status: SHIPPED | OUTPATIENT
Start: 2025-02-27

## 2025-02-27 NOTE — PATIENT INSTRUCTIONS
Use Zyrtec and Flonase daily for your sinus, postnasal drip.  Magic mouthwash swish and spit as needed for sore throat.    When do I need to call the doctor?   You have an upset stomach and throwing up.  You have more pain in your face and head.  You are not getting better within 1 to 2 weeks.  You have new or worsening symptoms  - Rest.    - Drink plenty of fluids.    - Acetaminophen (tylenol) or Ibuprofen (advil,motrin) as directed as needed for fever/pain. Avoid tylenol if you have a history of liver disease. Do not take ibuprofen if you have a history of GI bleeding, kidney disease, or if you take blood thinners.     - Follow up with your PCP or specialty clinic as directed in the next 1-2 weeks if not improved or as needed.  You can call [phone] to schedule an appointment with the appropriate provider.    - Go to the ER or seek medical attention immediately if you develop new or worsening symptoms.     - You must understand that you have received an Urgent Care treatment only and that you may be released before all of your medical problems are known or treated.   - You, the patient, will arrange for follow up care as instructed.   - If your condition worsens or fails to improve we recommend that you receive another evaluation at the ER immediately or contact your PCP to discuss your concerns or return here.

## 2025-02-27 NOTE — PROGRESS NOTES
"Subjective:      Patient ID: Bambi Carrera is a 64 y.o. female.    Vitals:  height is 5' 5" (1.651 m) and weight is 120.7 kg (266 lb). Her oral temperature is 97.9 °F (36.6 °C). Her blood pressure is 129/81 and her pulse is 81. Her respiration is 18 and oxygen saturation is 99%.     Chief Complaint: Sore Throat    64-year-old female presents with a runny nose and a sore throat.  Patient states sore throat is worse in the morning.  She has not taken any medicine over-the-counter for her symptoms.  She denies fever, chest pain, shortness of breath, GI complaints or any other symptoms.    Sore Throat   This is a new problem. The current episode started in the past 7 days. The problem has been unchanged. There has been no fever. The pain is at a severity of 5/10. The pain is mild. Pertinent negatives include no abdominal pain, coughing, ear discharge, ear pain, neck pain or trouble swallowing. Treatments tried: OTC meds. The treatment provided no relief.       Constitution: Negative for activity change, appetite change, chills, sweating and fatigue.   HENT:  Positive for postnasal drip and sore throat. Negative for ear pain, ear discharge, foreign body in ear, tinnitus, hearing loss, nosebleeds, foreign body in nose, sinus pain, sinus pressure, trouble swallowing and voice change.    Neck: Negative for neck pain, neck stiffness and painful lymph nodes.   Cardiovascular:  Negative for chest trauma, chest pain and leg swelling.   Eyes:  Negative for eye trauma, foreign body in eye and eye discharge.   Respiratory:  Negative for sleep apnea, chest tightness, cough, sputum production and asthma.    Gastrointestinal:  Negative for abdominal trauma, abdominal pain, abdominal bloating, history of abdominal surgery and nausea.   Endocrine: hair loss, cold intolerance and heat intolerance.   Genitourinary:  Negative for dysuria, frequency, urgency and urine decreased.   Musculoskeletal:  Negative for pain, trauma, joint " pain, joint swelling and abnormal ROM of joint.   Skin:  Negative for color change, pale, rash and wound.   Allergic/Immunologic: Positive for seasonal allergies. Negative for environmental allergies, food allergies, eczema, asthma, immunocompromised state and recurrent sinus infections.   Neurological:  Negative for dizziness, history of vertigo, light-headedness, passing out, facial drooping, disorientation and altered mental status.   Hematologic/Lymphatic: Negative for swollen lymph nodes, easy bruising/bleeding, trouble clotting and history of blood clots. Does not bruise/bleed easily.   Psychiatric/Behavioral:  Negative for altered mental status, disorientation and confusion.       Objective:     Physical Exam   Constitutional: She is oriented to person, place, and time. She appears well-developed. She is cooperative.  Non-toxic appearance. She does not appear ill. No distress.   HENT:   Head: Normocephalic and atraumatic.   Ears:   Right Ear: Hearing, tympanic membrane, external ear and ear canal normal.   Left Ear: Hearing, tympanic membrane, external ear and ear canal normal.   Nose: Rhinorrhea present. No mucosal edema or nasal deformity. No epistaxis. Right sinus exhibits no maxillary sinus tenderness and no frontal sinus tenderness. Left sinus exhibits no maxillary sinus tenderness and no frontal sinus tenderness.   Mouth/Throat: Uvula is midline, oropharynx is clear and moist and mucous membranes are normal. Mucous membranes are moist. No trismus in the jaw. Normal dentition. No uvula swelling. No oropharyngeal exudate, posterior oropharyngeal edema or posterior oropharyngeal erythema. Oropharynx is clear.   Eyes: Conjunctivae and lids are normal. No scleral icterus.   Neck: Trachea normal and phonation normal. Neck supple. No edema present. No erythema present. No neck rigidity present.   Cardiovascular: Normal rate, regular rhythm, normal heart sounds and normal pulses.   Pulmonary/Chest: Effort  normal and breath sounds normal. No stridor. No respiratory distress. She has no decreased breath sounds. She has no wheezes. She has no rhonchi. She has no rales. She exhibits no tenderness.   Abdominal: Normal appearance and bowel sounds are normal. Soft.   Musculoskeletal: Normal range of motion.         General: No deformity. Normal range of motion.   Neurological: She is alert and oriented to person, place, and time. She exhibits normal muscle tone. Coordination normal.   Skin: Skin is warm, dry, intact, not diaphoretic and not pale.   Psychiatric: Her speech is normal and behavior is normal. Judgment and thought content normal.   Nursing note and vitals reviewed.    Results for orders placed or performed in visit on 02/27/25   POCT Strep A, Molecular    Collection Time: 02/27/25 10:19 AM   Result Value Ref Range    Molecular Strep A, POC Negative Negative     Acceptable Yes       Assessment:     1. Acute viral sinusitis    2. Sore throat    3. Postnasal drip        Plan:       Acute viral sinusitis  -     fluticasone propionate (FLONASE) 50 mcg/actuation nasal spray; 1 spray (50 mcg total) by Each Nostril route once daily.  Dispense: 11.1 mL; Refill: 0  -     cetirizine (ZYRTEC) 10 MG tablet; Take 1 tablet (10 mg total) by mouth once daily.  Dispense: 30 tablet; Refill: 0  -     diphenhydrAMINE-aluminum-magnesium hydroxide-simethicone-LIDOcaine viscous HCl 2%; Swish and spit 15 mLs every 4 (four) hours as needed (Sore throat). 220ml total  Dispense: 220 each; Refill: 0    Sore throat  -     POCT Strep A, Molecular    Postnasal drip

## 2025-02-28 ENCOUNTER — TELEPHONE (OUTPATIENT)
Dept: SURGERY | Facility: HOSPITAL | Age: 65
End: 2025-02-28
Payer: COMMERCIAL

## 2025-03-03 ENCOUNTER — ANESTHESIA (OUTPATIENT)
Dept: SURGERY | Facility: HOSPITAL | Age: 65
End: 2025-03-03
Payer: MEDICARE

## 2025-03-03 ENCOUNTER — HOSPITAL ENCOUNTER (OUTPATIENT)
Facility: HOSPITAL | Age: 65
Discharge: HOME | End: 2025-03-03
Attending: SURGERY | Admitting: SURGERY
Payer: MEDICARE

## 2025-03-03 VITALS
DIASTOLIC BLOOD PRESSURE: 58 MMHG | SYSTOLIC BLOOD PRESSURE: 118 MMHG | WEIGHT: 266.13 LBS | OXYGEN SATURATION: 97 % | BODY MASS INDEX: 44.28 KG/M2 | TEMPERATURE: 98 F | RESPIRATION RATE: 18 BRPM | HEART RATE: 66 BPM

## 2025-03-03 DIAGNOSIS — D17.9 LIPOMA, UNSPECIFIED SITE: Primary | ICD-10-CM

## 2025-03-03 PROCEDURE — 71000016 HC POSTOP RECOV ADDL HR: Performed by: SURGERY

## 2025-03-03 PROCEDURE — 63600175 PHARM REV CODE 636 W HCPCS: Performed by: SURGERY

## 2025-03-03 PROCEDURE — 88304 TISSUE EXAM BY PATHOLOGIST: CPT | Performed by: PATHOLOGY

## 2025-03-03 PROCEDURE — 88305 TISSUE EXAM BY PATHOLOGIST: CPT | Performed by: PATHOLOGY

## 2025-03-03 PROCEDURE — 25000003 PHARM REV CODE 250: Performed by: SURGERY

## 2025-03-03 PROCEDURE — 11406 EXC TR-EXT B9+MARG >4.0 CM: CPT | Mod: 51,,, | Performed by: SURGERY

## 2025-03-03 PROCEDURE — 63600175 PHARM REV CODE 636 W HCPCS

## 2025-03-03 PROCEDURE — 36000707: Performed by: SURGERY

## 2025-03-03 PROCEDURE — 37000008 HC ANESTHESIA 1ST 15 MINUTES: Performed by: SURGERY

## 2025-03-03 PROCEDURE — D9220A PRA ANESTHESIA: Mod: CRNA,,,

## 2025-03-03 PROCEDURE — 25000003 PHARM REV CODE 250

## 2025-03-03 PROCEDURE — 63600175 PHARM REV CODE 636 W HCPCS: Mod: JZ,TB | Performed by: ANESTHESIOLOGY

## 2025-03-03 PROCEDURE — D9220A PRA ANESTHESIA: Mod: ANES,,, | Performed by: ANESTHESIOLOGY

## 2025-03-03 PROCEDURE — 37000009 HC ANESTHESIA EA ADD 15 MINS: Performed by: SURGERY

## 2025-03-03 PROCEDURE — 71000033 HC RECOVERY, INTIAL HOUR: Performed by: SURGERY

## 2025-03-03 PROCEDURE — 21930 EXC BACK LES SC < 3 CM: CPT | Mod: ,,, | Performed by: SURGERY

## 2025-03-03 PROCEDURE — 71000015 HC POSTOP RECOV 1ST HR: Performed by: SURGERY

## 2025-03-03 PROCEDURE — 36000706: Performed by: SURGERY

## 2025-03-03 RX ORDER — BUPIVACAINE HYDROCHLORIDE 2.5 MG/ML
INJECTION, SOLUTION INFILTRATION; PERINEURAL
Status: DISCONTINUED | OUTPATIENT
Start: 2025-03-03 | End: 2025-03-03 | Stop reason: HOSPADM

## 2025-03-03 RX ORDER — HYDROMORPHONE HYDROCHLORIDE 2 MG/ML
0.2 INJECTION, SOLUTION INTRAMUSCULAR; INTRAVENOUS; SUBCUTANEOUS EVERY 5 MIN PRN
Status: DISCONTINUED | OUTPATIENT
Start: 2025-03-03 | End: 2025-03-03 | Stop reason: HOSPADM

## 2025-03-03 RX ORDER — HYDROCODONE BITARTRATE AND ACETAMINOPHEN 5; 325 MG/1; MG/1
1 TABLET ORAL EVERY 6 HOURS PRN
Qty: 15 TABLET | Refills: 0 | Status: SHIPPED | OUTPATIENT
Start: 2025-03-03

## 2025-03-03 RX ORDER — PHENYLEPHRINE HYDROCHLORIDE 10 MG/ML
INJECTION INTRAVENOUS
Status: DISCONTINUED | OUTPATIENT
Start: 2025-03-03 | End: 2025-03-03

## 2025-03-03 RX ORDER — SODIUM CHLORIDE 9 MG/ML
INJECTION, SOLUTION INTRAVENOUS CONTINUOUS
Status: DISCONTINUED | OUTPATIENT
Start: 2025-03-03 | End: 2025-03-03 | Stop reason: HOSPADM

## 2025-03-03 RX ORDER — PROPOFOL 10 MG/ML
VIAL (ML) INTRAVENOUS
Status: DISCONTINUED | OUTPATIENT
Start: 2025-03-03 | End: 2025-03-03

## 2025-03-03 RX ORDER — KETOROLAC TROMETHAMINE 10 MG/1
10 TABLET, FILM COATED ORAL EVERY 6 HOURS
Qty: 20 TABLET | Refills: 0 | Status: SHIPPED | OUTPATIENT
Start: 2025-03-03 | End: 2025-03-08

## 2025-03-03 RX ORDER — ROCURONIUM BROMIDE 10 MG/ML
INJECTION, SOLUTION INTRAVENOUS
Status: DISCONTINUED | OUTPATIENT
Start: 2025-03-03 | End: 2025-03-03

## 2025-03-03 RX ORDER — KETOROLAC TROMETHAMINE 30 MG/ML
INJECTION, SOLUTION INTRAMUSCULAR; INTRAVENOUS
Status: DISCONTINUED | OUTPATIENT
Start: 2025-03-03 | End: 2025-03-03

## 2025-03-03 RX ORDER — DEXAMETHASONE SODIUM PHOSPHATE 4 MG/ML
INJECTION, SOLUTION INTRA-ARTICULAR; INTRALESIONAL; INTRAMUSCULAR; INTRAVENOUS; SOFT TISSUE
Status: DISCONTINUED | OUTPATIENT
Start: 2025-03-03 | End: 2025-03-03

## 2025-03-03 RX ORDER — MIDAZOLAM HYDROCHLORIDE 1 MG/ML
INJECTION INTRAMUSCULAR; INTRAVENOUS
Status: DISCONTINUED | OUTPATIENT
Start: 2025-03-03 | End: 2025-03-03

## 2025-03-03 RX ORDER — MEPERIDINE HYDROCHLORIDE 25 MG/ML
12.5 INJECTION INTRAMUSCULAR; INTRAVENOUS; SUBCUTANEOUS EVERY 10 MIN PRN
Status: DISCONTINUED | OUTPATIENT
Start: 2025-03-03 | End: 2025-03-03 | Stop reason: HOSPADM

## 2025-03-03 RX ORDER — FENTANYL CITRATE 50 UG/ML
INJECTION, SOLUTION INTRAMUSCULAR; INTRAVENOUS
Status: DISCONTINUED | OUTPATIENT
Start: 2025-03-03 | End: 2025-03-03

## 2025-03-03 RX ORDER — DIPHENHYDRAMINE HYDROCHLORIDE 50 MG/ML
25 INJECTION, SOLUTION INTRAMUSCULAR; INTRAVENOUS EVERY 6 HOURS PRN
Status: DISCONTINUED | OUTPATIENT
Start: 2025-03-03 | End: 2025-03-03 | Stop reason: HOSPADM

## 2025-03-03 RX ORDER — ONDANSETRON HYDROCHLORIDE 2 MG/ML
INJECTION, SOLUTION INTRAVENOUS
Status: DISCONTINUED | OUTPATIENT
Start: 2025-03-03 | End: 2025-03-03

## 2025-03-03 RX ORDER — LIDOCAINE HYDROCHLORIDE 20 MG/ML
INJECTION INTRAVENOUS
Status: DISCONTINUED | OUTPATIENT
Start: 2025-03-03 | End: 2025-03-03

## 2025-03-03 RX ADMIN — LIDOCAINE HYDROCHLORIDE 60 MG: 20 INJECTION, SOLUTION INTRAVENOUS at 09:03

## 2025-03-03 RX ADMIN — PROPOFOL 30 MG: 10 INJECTION, EMULSION INTRAVENOUS at 10:03

## 2025-03-03 RX ADMIN — HYDROMORPHONE HYDROCHLORIDE 0.2 MG: 2 INJECTION, SOLUTION INTRAMUSCULAR; INTRAVENOUS; SUBCUTANEOUS at 11:03

## 2025-03-03 RX ADMIN — MIDAZOLAM HYDROCHLORIDE 2 MG: 1 INJECTION INTRAMUSCULAR; INTRAVENOUS at 09:03

## 2025-03-03 RX ADMIN — GLYCOPYRROLATE 0.1 MG: 0.2 INJECTION, SOLUTION INTRAMUSCULAR; INTRAVITREAL at 09:03

## 2025-03-03 RX ADMIN — FENTANYL CITRATE 100 MCG: 50 INJECTION, SOLUTION INTRAMUSCULAR; INTRAVENOUS at 09:03

## 2025-03-03 RX ADMIN — SUGAMMADEX 400 MG: 100 INJECTION, SOLUTION INTRAVENOUS at 10:03

## 2025-03-03 RX ADMIN — PROPOFOL 50 MG: 10 INJECTION, EMULSION INTRAVENOUS at 10:03

## 2025-03-03 RX ADMIN — SODIUM CHLORIDE, SODIUM LACTATE, POTASSIUM CHLORIDE, AND CALCIUM CHLORIDE: .6; .31; .03; .02 INJECTION, SOLUTION INTRAVENOUS at 09:03

## 2025-03-03 RX ADMIN — ROCURONIUM BROMIDE 50 MG: 10 INJECTION INTRAVENOUS at 09:03

## 2025-03-03 RX ADMIN — DEXAMETHASONE SODIUM PHOSPHATE 4 MG: 4 INJECTION, SOLUTION INTRAMUSCULAR; INTRAVENOUS at 09:03

## 2025-03-03 RX ADMIN — PHENYLEPHRINE HYDROCHLORIDE 100 MCG: 10 INJECTION INTRAVENOUS at 10:03

## 2025-03-03 RX ADMIN — KETOROLAC TROMETHAMINE 15 MG: 30 INJECTION, SOLUTION INTRAMUSCULAR at 10:03

## 2025-03-03 RX ADMIN — PROPOFOL 120 MG: 10 INJECTION, EMULSION INTRAVENOUS at 09:03

## 2025-03-03 RX ADMIN — DEXTROSE MONOHYDRATE 3 G: 5 INJECTION INTRAVENOUS at 09:03

## 2025-03-03 RX ADMIN — PHENYLEPHRINE HYDROCHLORIDE 200 MCG: 10 INJECTION INTRAVENOUS at 10:03

## 2025-03-03 RX ADMIN — ONDANSETRON 4 MG: 2 INJECTION, SOLUTION INTRAMUSCULAR; INTRAVENOUS at 09:03

## 2025-03-03 NOTE — ANESTHESIA PROCEDURE NOTES
Intubation    Date/Time: 3/3/2025 9:43 AM    Performed by: Tom Finn CRNA  Authorized by: Amarjit Bradford II, MD    Intubation:     Induction:  Intravenous    Intubated:  Postinduction    Mask Ventilation:  Easy with oral airway    Attempts:  1    Attempted By:  CRNA    Method of Intubation:  Video laryngoscopy    Blade:  Yun 3    Laryngeal View Grade: Grade I - full view of cords      Difficult Airway Encountered?: No      Complications:  None    Airway Device:  Oral endotracheal tube    Airway Device Size:  7.0    Style/Cuff Inflation:  Cuffed (inflated to minimal occlusive pressure)    Inflation Amount (mL):  7    Tube secured:  21    Secured at:  The lips    Placement Verified By:  Capnometry    Complicating Factors:  None    Findings Post-Intubation:  BS equal bilateral and atraumatic/condition of teeth unchanged

## 2025-03-03 NOTE — DISCHARGE SUMMARY
[facility] - Surgery  Discharge Note  Short Stay    Procedure(s) (LRB):  EXCISION, MASS, BACK (Left)    OUTCOME: Patient tolerated treatment/procedure well without complication and is now ready for discharge.    DISPOSITION: Home or Self Care    FINAL DIAGNOSIS: skin lesion of back, and mass of back.    FOLLOWUP: In clinic    DISCHARGE INSTRUCTIONS:    Discharge Procedure Orders   Diet Adult Regular     Notify your health care provider if you experience any of the following:  temperature >100.4     Notify your health care provider if you experience any of the following:  persistent nausea and vomiting or diarrhea     Notify your health care provider if you experience any of the following:  severe uncontrolled pain     Notify your health care provider if you experience any of the following:  redness, tenderness, or signs of infection (pain, swelling, redness, odor or green/yellow discharge around incision site)     Notify your health care provider if you experience any of the following:  difficulty breathing or increased cough     No dressing needed     Activity as tolerated      TIME SPENT ON DISCHARGE: 12 minutes

## 2025-03-03 NOTE — ANESTHESIA POSTPROCEDURE EVALUATION
Anesthesia Post Evaluation    Patient: Bambi Carrera    Procedure(s) Performed: Procedure(s) (LRB):  EXCISION, MASS, BACK (Left)    Final Anesthesia Type: general      Patient location during evaluation: PACU  Patient participation: Yes- Able to Participate  Level of consciousness: awake and alert  Post-procedure vital signs: reviewed and stable  Pain management: adequate  Airway patency: patent    PONV status at discharge: No PONV  Anesthetic complications: no      Respiratory status: spontaneous ventilation and room air  Hydration status: euvolemic  Follow-up not needed.              Vitals Value Taken Time   /58 03/03/25 12:52   Temp 36.7 °C (98 °F) 03/03/25 12:52   Pulse 66 03/03/25 12:52   Resp 18 03/03/25 12:52   SpO2 97 % 03/03/25 12:52         Event Time   Out of Recovery 11:54:00         Pain/Lynne Score: Pain Rating Prior to Med Admin: 8 (3/3/2025 11:34 AM)  Pain Rating Post Med Admin: 4 (3/3/2025 11:40 AM)  Lynne Score: 10 (3/3/2025 11:40 AM)

## 2025-03-03 NOTE — DISCHARGE INSTRUCTIONS
[facility]  Office # [phone]    Discharge Instructions for Same Day Surgery     Call the office for and appointment if one has not already been made.     Diet: Drink plenty of fluids the first 48 hours and you may resume your   usual diet.     Activity: No heavy lifting (over 10 pounds), pushing or pulling until your   post op visit. Your doctor's office may have told you to limit your lifting to less weight, or even no weight.  Be sure to follow those instructions.    Note: You may ride in a car and you may drive when comfortable.     Do not drive, drink alcohol, or sign legal documents for 24 hours, or if taking narcotic pain medication.    Dermabond / Prineotape    or a material like it was used on your incision.   It is like a liquid glue.   Do not peel or try to remove it it will start to fall off in 7-10 days on its' own.   It is OK to shower, pat dry, do not apply any creams or lotions.    No tub baths, swimming pools, hot tubs or submersion of the incision until your surgeon says it's ok.     Medical: Call the doctor for any of the following problems: fever above 101,   severe pain, bleeding, or abdominal distention (swelling).   If constipated you may take any stool softener you choose.     Occasionally small areas of skin numbness or an unpleasant skin sensation can result. Also, you may find that your incision is swollen and tender for a few days.  Some redness around sutures and staples is a normal reaction, but if the discomfort persists or worsens, call you doctor.       Fall Prevention  Millions of people fall every year and injure themselves. You may have had anesthesia or sedation which may increase your risk of falling. You may have health issues that put you at an increased risk of falling.     Here are ways to reduce your risk of falling.    Make your home safe by keeping walkways clear of objects you may trip over.  Use non-slip pads under rugs. Do not use area rugs or  small throw rugs.  Use non-slip mats in bathtubs and showers.  Install handrails and lights on staircases.  Do not walk in poorly lit areas.  Do not stand on chairs or wobbly ladders.  Use caution when reaching overhead or looking upward. This position can cause a loss of balance.  Be sure your shoes fit properly, have non-slip bottoms and are in good condition.   Wear shoes both inside and out. Avoid going barefoot or wearing slippers.  Be cautious when going up and down stairs, curbs, and when walking on uneven sidewalks.  If your balance is poor, consider using a cane or walker.  If your fall was related to alcohol use, stop or limit alcohol intake.   If your fall was related to use of sleeping medicines, talk to your doctor about this. You may need to reduce your dosage at bedtime if you awaken during the night to go to the bathroom.    To reduce the need for nighttime bathroom trips:  Avoid drinking fluids for several hours before going to bed  Empty your bladder before going to bed  Men can keep a urinal at the bedside  Stay as active as you can. Balance, flexibility, strength, and endurance all come from exercise. They all play a role in preventing falls. Ask your healthcare provider which types of activity are right for you.  Get your vision checked on a regular basis.  If you have pets, know where they are before you stand up or walk so you don't trip over them.  Use night lights.

## 2025-03-03 NOTE — TRANSFER OF CARE
Anesthesia Transfer of Care Note    Patient: Bambi Carrera    Procedure(s) Performed: Procedure(s) (LRB):  EXCISION, MASS, BACK (Left)    Patient location: PACU    Anesthesia Type: general    Transport from OR: Transported from OR on 6-10 L/min O2 by face mask with adequate spontaneous ventilation    Post pain: adequate analgesia    Post assessment: no apparent anesthetic complications and tolerated procedure well    Post vital signs: stable    Level of consciousness: awake and responds to stimulation    Nausea/Vomiting: no nausea/vomiting    Complications: none    Transfer of care protocol was followed      Last vitals: Visit Vitals  /69 (BP Location: Left arm, Patient Position: Lying)   Pulse 88   Temp 36.4 °C (97.6 °F) (Temporal)   Resp 14   Wt 120.7 kg (266 lb 1.6 oz)   SpO2 100%   Breastfeeding No   BMI 44.28 kg/m²

## 2025-03-03 NOTE — OP NOTE
[facility] - Surgery  Operative Note    SUMMARY     Surgery Date: 3/3/2025     Surgeons and Role:     * Joel Sanches MD - Primary    Assisting Surgeon: None    Pre-op Diagnosis:  Lipoma, unspecified site [D17.9]    Post-op Diagnosis:  Post-Op Diagnosis Codes:     * Lipoma, unspecified site [D17.9]  Skin lesion/cyst    Procedure(s) (LRB):  EXCISION, MASS, BACK (Left)  Excision of skin lesion, back    Anesthesia: Choice    Indication for procedure: Bambi Carrera is 64 y.o. female with skin lesion of left upper back and complaints of a growing painful mass to left mid back. Ultrasound obtained showing ill defined mass, questionable lipoma. After discussion of disease process, we discussed options and have elected for operation to perform excision of skin lesion and removal/exploration of back mass/lipoma.    Description of Procedure: After consent was obtained, patient was taken to the OR. The left upper back was prepped and draped in a standard sterile fashion after general anesthesia was started. Time out was performed. Antibiotics were started with ancef. We began the procedure by cutting out the left upper back skin lesion which was 6 mm x 6 mm and raised by 3 mm.  We cut out an ellipse around it gross negative margins in a transverse orientation we cut down through the deep subcutaneous fat and sent this as a specimen as left skin lesion of the back..  We closed with a dermal interrupted 4-0 Monocryl covered by Dermabond.    We then palpated the area of concern was ill-defined thickening in the area made a transverse incision numbed up the skin with Marcaine.  Made a transverse cut and used 15 blade and Bovie electrocautery to dissect our way down into the subcutaneous fat all the way down to the musculature of the back.  I did some finger sweeping in the area to explore and there was no obvious discrete mass.  All of the tissue appeared to be fat.  I did remove some of the fat in the central area and  remove it a specimen to ensure no malignancy this was marked as back mass.  However there is no discrete mass to measure and remove.  Did irrigate and ensured hemostasis and then closed in layers with 3-0 Vicryl 4-0 Monocryl covered by Dermabond  All counts were correct x2. Patient was awakened from anesthesia and taken to the recovery room in a stable condition having suffered no issues at this time.    Description of the findings of the procedure:   6 mm x 6 mm x 3 mm skin lesion/cyst. Excised.  Exploration for back mass/lipoma in area of concern: no discrete mass. Central area of fat excised and sent for pathology. Appeared grossly normal like fat. Excised tissue measured 2x2x2 cm    Estimated Blood Loss: * No values recorded between 3/3/2025  9:30 AM and 3/3/2025 10:37 AM *         Specimens:   Specimen (24h ago, onward)       Start     Ordered    03/03/25 1009  Specimen to Pathology, Surgery General Surgery  Once        Comments: Pre-op Diagnosis: Lipoma, unspecified site [D17.9]Procedure(s):EXCISION, MASS, BACK Number of specimens: 2Name of specimens:back mass, left upper back skin lesion     References:    Click here for ordering Quick Tip   Question Answer Comment   Procedure Type: General Surgery    Specimen Class: Routine/Screening    Release to patient Immediate        03/03/25 1028                  Drains/Implants: None

## 2025-03-10 ENCOUNTER — NURSE TRIAGE (OUTPATIENT)
Dept: ADMINISTRATIVE | Facility: CLINIC | Age: 65
End: 2025-03-10
Payer: COMMERCIAL

## 2025-03-10 NOTE — TELEPHONE ENCOUNTER
Pt reports she had surgery to remove a mass from her back on 3/3/25. Pt reports incision sites are itching and burning since surgery. Current pain of 7/10. Norco with no relief. Care advice to call PCP now. Attempt x2 to contact on call general surgery resident, Slime Palencia; unsuccessful. Second call general surgery, Ernesto Park, contacted regarding pt. MD verbalized that pt should take Motrin 800 mg every 8 hours for the next 24 hours and continue to take Norco as scheduled. MD verbalized if pt has no relief by mid morning tomorrow to contact office to schedule appointment. Pt made aware and verbalizes understanding.   Reason for Disposition   [1] Post-op pain AND [2] not controlled with pain medications    Additional Information   Negative: [1] Major abdominal surgical incision AND [2] wound gaping open AND [3] visible internal organs   Negative: Sounds like a life-threatening emergency to the triager   Negative: [1] Bleeding from incision AND [2] won't stop after 10 minutes of direct pressure   Negative: [1] Widespread rash AND [2] bright red, sunburn-like   Negative: Severe pain in the incision   Negative: [1] Suture came out early AND [2] wound gaping AND [3] < 48 hours since sutures placed   Negative: [1] Incision gaping open AND [2] length of opening > 2 inches (5 cm)   Negative: Patient sounds very sick or weak to the triager   Negative: Sounds like a serious complication to the triager   Negative: Fever > 100.5 F (38.1 C)   Negative: [1] Incision looks infected (spreading redness, pain) AND [2] fever > 99.5 F (37.5 C)   Negative: [1] Incision looks infected (spreading redness, pain) AND [2] large red area (> 2 in. or 5 cm)   Negative: [1] Incision looks infected (spreading redness, pain) AND [2] face wound   Negative: [1] Red streak runs from the incision AND [2] longer than 1 inch (2.5 cm)   Negative: [1] Pus or bad-smelling fluid draining from incision AND [2] no fever    Protocols used: Post-Op  Incision Symptoms-A-AH

## 2025-03-11 LAB
FINAL PATHOLOGIC DIAGNOSIS: NORMAL
GROSS: NORMAL
Lab: NORMAL

## 2025-03-19 ENCOUNTER — OFFICE VISIT (OUTPATIENT)
Dept: SURGERY | Facility: CLINIC | Age: 65
End: 2025-03-19
Payer: MEDICARE

## 2025-03-19 VITALS
SYSTOLIC BLOOD PRESSURE: 142 MMHG | DIASTOLIC BLOOD PRESSURE: 79 MMHG | HEART RATE: 86 BPM | WEIGHT: 266.13 LBS | BODY MASS INDEX: 44.34 KG/M2 | HEIGHT: 65 IN

## 2025-03-19 DIAGNOSIS — D17.9 LIPOMA, UNSPECIFIED SITE: Primary | ICD-10-CM

## 2025-03-19 PROCEDURE — 99024 POSTOP FOLLOW-UP VISIT: CPT | Mod: POP,,, | Performed by: SURGERY

## 2025-03-19 PROCEDURE — 99214 OFFICE O/P EST MOD 30 MIN: CPT | Mod: PBBFAC | Performed by: SURGERY

## 2025-03-19 PROCEDURE — 99999 PR PBB SHADOW E&M-EST. PATIENT-LVL IV: CPT | Mod: PBBFAC,,, | Performed by: SURGERY

## 2025-03-19 RX ORDER — HYDROCODONE BITARTRATE AND ACETAMINOPHEN 5; 325 MG/1; MG/1
1 TABLET ORAL EVERY 6 HOURS PRN
Qty: 10 TABLET | Refills: 0 | Status: SHIPPED | OUTPATIENT
Start: 2025-03-19

## 2025-03-19 NOTE — PROGRESS NOTES
Surgery Clinic Note    Bambi Carrera is a 64 y.o. year old female in clinic today for follow up of back skin lesion excision and exploration for lipoma/removal of some fat from the lateral upper back with negative biopsies. She is doing ok, has some pain with lifting the arm.  No f/c/n/v/sob/cp    ROS:  Negative except above    Pathology:  Final Pathologic Diagnosis 1. Soft tissue, back, mass, excision:  Lipoma    2. Skin, left upper back, lesion, excision:  Benign skin with no diagnostic histopathologic abnormalities     PE:  Vitals:    03/19/25 1253   BP: (!) 142/79   Pulse: 86       NAD  No belabored breathing  Incisions clean dry intact. No ecchymosis or drainage.    A/P:  Bambi Carrera is a 64 y.o. year old female s/p skin excision and lipoma exploration from the left back    -refill norco for pain per patient preference. Counseled on non narcotic options; ice pack  -rtc prn    Joel Sanches  General Surgery - [facility]  3/19/2025

## 2025-04-07 ENCOUNTER — TELEPHONE (OUTPATIENT)
Dept: FAMILY MEDICINE | Facility: CLINIC | Age: 65
End: 2025-04-07

## 2025-04-07 NOTE — TELEPHONE ENCOUNTER
Spoke with patient and she informed me that she will need a new script for the Zepbound. She is requesting to do the dose under the 10 mg due to that dose being cheaper.

## 2025-04-07 NOTE — TELEPHONE ENCOUNTER
----- Message from Kalpana sent at 4/4/2025 11:18 AM CDT -----  .Type: Patient Call BackWho called:Self What is the request in detail: Stated she needs a prescription for zepbound. Ask that the nurse give her a call Can the clinic reply by [facility]? No Would the patient rather a call back or a response via [facility]? Call Back Best call back number: .[phone] (home) Additional Information:

## 2025-04-08 ENCOUNTER — OFFICE VISIT (OUTPATIENT)
Dept: FAMILY MEDICINE | Facility: CLINIC | Age: 65
End: 2025-04-08
Payer: MEDICARE

## 2025-04-08 ENCOUNTER — LAB VISIT (OUTPATIENT)
Dept: LAB | Facility: HOSPITAL | Age: 65
End: 2025-04-08
Attending: FAMILY MEDICINE
Payer: MEDICARE

## 2025-04-08 VITALS
TEMPERATURE: 98 F | WEIGHT: 257.06 LBS | DIASTOLIC BLOOD PRESSURE: 78 MMHG | HEIGHT: 65 IN | HEART RATE: 75 BPM | SYSTOLIC BLOOD PRESSURE: 112 MMHG | OXYGEN SATURATION: 100 % | BODY MASS INDEX: 42.83 KG/M2

## 2025-04-08 DIAGNOSIS — I10 ESSENTIAL HYPERTENSION: Primary | ICD-10-CM

## 2025-04-08 DIAGNOSIS — Z12.31 ENCOUNTER FOR SCREENING MAMMOGRAM FOR BREAST CANCER: ICD-10-CM

## 2025-04-08 DIAGNOSIS — E66.01 CLASS 3 SEVERE OBESITY DUE TO EXCESS CALORIES WITH SERIOUS COMORBIDITY AND BODY MASS INDEX (BMI) OF 50.0 TO 59.9 IN ADULT: ICD-10-CM

## 2025-04-08 DIAGNOSIS — E66.01 MORBID OBESITY: Chronic | ICD-10-CM

## 2025-04-08 DIAGNOSIS — I10 ESSENTIAL HYPERTENSION: ICD-10-CM

## 2025-04-08 DIAGNOSIS — E66.813 CLASS 3 SEVERE OBESITY DUE TO EXCESS CALORIES WITH SERIOUS COMORBIDITY AND BODY MASS INDEX (BMI) OF 50.0 TO 59.9 IN ADULT: ICD-10-CM

## 2025-04-08 PROBLEM — N18.31 STAGE 3A CHRONIC KIDNEY DISEASE: Status: RESOLVED | Noted: 2023-09-11 | Resolved: 2025-04-08

## 2025-04-08 LAB
ALBUMIN SERPL BCP-MCNC: 3.5 G/DL (ref 3.5–5.2)
ALP SERPL-CCNC: 120 UNIT/L (ref 40–150)
ALT SERPL W/O P-5'-P-CCNC: 17 UNIT/L (ref 10–44)
ANION GAP (OHS): 8 MMOL/L (ref 8–16)
AST SERPL-CCNC: 15 UNIT/L (ref 11–45)
BILIRUB SERPL-MCNC: 0.3 MG/DL (ref 0.1–1)
BUN SERPL-MCNC: 18 MG/DL (ref 8–23)
CALCIUM SERPL-MCNC: 9.7 MG/DL (ref 8.7–10.5)
CHLORIDE SERPL-SCNC: 103 MMOL/L (ref 95–110)
CO2 SERPL-SCNC: 29 MMOL/L (ref 23–29)
CREAT SERPL-MCNC: 1 MG/DL (ref 0.5–1.4)
GFR SERPLBLD CREATININE-BSD FMLA CKD-EPI: >60 ML/MIN/1.73/M2
GLUCOSE SERPL-MCNC: 95 MG/DL (ref 70–110)
POTASSIUM SERPL-SCNC: 3.8 MMOL/L (ref 3.5–5.1)
PROT SERPL-MCNC: 8 GM/DL (ref 6–8.4)
SODIUM SERPL-SCNC: 140 MMOL/L (ref 136–145)

## 2025-04-08 PROCEDURE — G2211 COMPLEX E/M VISIT ADD ON: HCPCS | Mod: S$PBB,,, | Performed by: FAMILY MEDICINE

## 2025-04-08 PROCEDURE — 36415 COLL VENOUS BLD VENIPUNCTURE: CPT | Mod: PO

## 2025-04-08 PROCEDURE — 99214 OFFICE O/P EST MOD 30 MIN: CPT | Mod: S$PBB,,, | Performed by: FAMILY MEDICINE

## 2025-04-08 PROCEDURE — 99999 PR PBB SHADOW E&M-EST. PATIENT-LVL V: CPT | Mod: PBBFAC,,, | Performed by: FAMILY MEDICINE

## 2025-04-08 PROCEDURE — 80053 COMPREHEN METABOLIC PANEL: CPT

## 2025-04-08 PROCEDURE — 99215 OFFICE O/P EST HI 40 MIN: CPT | Mod: PBBFAC,PO | Performed by: FAMILY MEDICINE

## 2025-04-08 RX ORDER — TIRZEPATIDE 10 MG/.5ML
10 INJECTION, SOLUTION SUBCUTANEOUS
Qty: 4 PEN | Refills: 3 | Status: CANCELLED | OUTPATIENT
Start: 2025-04-08

## 2025-04-08 NOTE — PROGRESS NOTES
[facility]  Progress Note    SUBJECTIVE:     Chief Complaint   Patient presents with    Shoulder Pain     Left shoulder pain continuously, pain travels to the left ear       HPI   Bambi Carrera  is a 65 y.o. female here for c/o L shoulder pain. Rates pain as moderate. Takes naproxen which does help. No falls/trauma. Patient has no other new complaints/problems at this time.      Review of patient's allergies indicates:  No Known Allergies    Past Medical History:   Diagnosis Date    Anxiety disorder, unspecified     Borderline diabetic     Depression     Eye injury     fb os     Hypertension     Menopause     Nuclear sclerosis of both eyes 07/01/2019     Past Surgical History:   Procedure Laterality Date    COLONOSCOPY N/A 7/15/2021    Procedure: COLONOSCOPY;  Surgeon: Geetha Walton MD;  Location: [facility] ENDO;  Service: Endoscopy;  Laterality: N/A;  Fully Vaccinated 3/25/21  see media tab 6/1/21, prep instr portal, and mailed -ml    EXCISION OF MASS OF BACK Left 3/3/2025    Procedure: EXCISION, MASS, BACK;  Surgeon: Joel Sanches MD;  Location: [facility] OR;  Service: General;  Laterality: Left;  back mass and skin lesion to the left of it. lateral position  RN PREOP 2/24/2025----BMI--44.28    INJECTION OF JOINT Right 7/17/2024    Procedure: Right Hip Steroid Injection;  Surgeon: Italo James Jr., MD;  Location: [facility] PAIN MANAGEMENT;  Service: Pain Management;  Laterality: Right;  @1100  No ATC or DM    LASIK Bilateral 07/15/2016    TRANSFORAMINAL EPIDURAL INJECTION OF STEROID Right 10/2/2024    Procedure: Right L4 Transforaminal Epidural Steroid Injection;  Surgeon: Italo James Jr., MD;  Location: [facility] PAIN MANAGEMENT;  Service: Pain Management;  Laterality: Right;  @1045  No ATC or DM  MD Sign.     Family History   Problem Relation Name Age of Onset    Cancer Mother      No Known Problems Father      No Known Problems Sister      No Known Problems Brother      No Known Problems  Maternal Aunt      No Known Problems Maternal Uncle      No Known Problems Paternal Aunt      No Known Problems Paternal Uncle      No Known Problems Maternal Grandmother      No Known Problems Maternal Grandfather      No Known Problems Paternal Grandmother      No Known Problems Paternal Grandfather      Amblyopia Neg Hx      Blindness Neg Hx      Cataracts Neg Hx      Diabetes Neg Hx      Glaucoma Neg Hx      Hypertension Neg Hx      Macular degeneration Neg Hx      Retinal detachment Neg Hx      Strabismus Neg Hx      Stroke Neg Hx      Thyroid disease Neg Hx       Social History[1]     Review of Systems   Constitutional:  Negative for chills and fever.   HENT: Negative.     Respiratory: Negative.  Negative for shortness of breath.    Cardiovascular: Negative.  Negative for chest pain.   Gastrointestinal: Negative.  Negative for abdominal pain, nausea and vomiting.   Genitourinary: Negative.    Musculoskeletal:  Positive for joint pain (L shoulder). Negative for falls.   Neurological:  Negative for headaches.   All other systems reviewed and are negative.    OBJECTIVE:     Vitals:    04/08/25 0957   BP: 112/78   Pulse: 75   Temp: 97.8 °F (36.6 °C)     Body mass index is 42.78 kg/m².    Physical Exam  Constitutional:       General: She is not in acute distress.     Appearance: Normal appearance. She is not diaphoretic.   HENT:      Head: Normocephalic and atraumatic.   Eyes:      Conjunctiva/sclera: Conjunctivae normal.   Pulmonary:      Effort: Pulmonary effort is normal.   Musculoskeletal:         General: Tenderness (to L upper trapezius muscle) present.   Neurological:      Mental Status: She is alert and oriented to person, place, and time.         Old records were reviewed. Labs and/or images were independently reviewed.    ASSESSMENT     1. Essential hypertension    2. Class 3 severe obesity due to excess calories with serious comorbidity and body mass index (BMI) of 50.0 to 59.9 in adult    3. Morbid  obesity    4. Encounter for screening mammogram for breast cancer        PLAN:     Essential hypertension  -     Comprehensive Metabolic Panel; Future  -     Educated patient to take medications as prescribed, and to record bp logs daily to bring along to next visit. Instructed patient to decrease salt intake. Also told patient to call if any new signs or symptoms develop.    Class 3 severe obesity due to excess calories with serious comorbidity and body mass index (BMI) of 50.0 to 59.9 in adult/Morbid obesity   -     Counseled patient about healthy diet, exercise habits, and to increase physical activity.    Left shoulder pain   -     take naproxen as directed.    Encounter for screening mammogram for breast cancer  -     Mammo Digital Screening Bilat; Future; Expected date: 04/08/2025        [facility] DILMA Whitmore MD  04/08/2025 10:23 AM           [1]   Social History  Tobacco Use    Smoking status: Never    Smokeless tobacco: Never   Substance Use Topics    Alcohol use: No     Alcohol/week: 0.0 standard drinks of alcohol    Drug use: No

## 2025-04-10 ENCOUNTER — RESULTS FOLLOW-UP (OUTPATIENT)
Dept: FAMILY MEDICINE | Facility: CLINIC | Age: 65
End: 2025-04-10

## 2025-04-30 DIAGNOSIS — Z78.0 MENOPAUSE: ICD-10-CM

## 2025-05-05 ENCOUNTER — TELEPHONE (OUTPATIENT)
Dept: SURGERY | Facility: CLINIC | Age: 65
End: 2025-05-05
Payer: MEDICARE

## 2025-05-05 NOTE — TELEPHONE ENCOUNTER
Spoke with pt, advised her that she can apply heat and ice to her arm to help with pain and swelling. Pt verbalized understanding.       ----- Message from Nell sent at 5/5/2025  8:47 AM CDT -----  Regarding: self  Patient is requesting a sooner appointment.  Patient declined first available appointment listed as well as another facility and provider .  Patient will not accept being placed on the waitlist and is requesting a message be sent to doctor.Name of Caller: selfWhen is the first available appointment? Pt is scheduled 5/6Symptoms: arm pain after surg last monthWould the patient rather a call back or a response via [facility]?Best Call Back Number: [phone] Information:  pt is looking for an appt today if possible

## 2025-05-06 ENCOUNTER — OFFICE VISIT (OUTPATIENT)
Dept: SURGERY | Facility: CLINIC | Age: 65
End: 2025-05-06
Payer: MEDICARE

## 2025-05-06 VITALS
DIASTOLIC BLOOD PRESSURE: 80 MMHG | BODY MASS INDEX: 42.83 KG/M2 | HEIGHT: 65 IN | SYSTOLIC BLOOD PRESSURE: 135 MMHG | HEART RATE: 75 BPM | WEIGHT: 257.06 LBS

## 2025-05-06 DIAGNOSIS — M79.602 LEFT ARM PAIN: ICD-10-CM

## 2025-05-06 DIAGNOSIS — D17.9 LIPOMA, UNSPECIFIED SITE: Primary | ICD-10-CM

## 2025-05-06 PROCEDURE — 99999 PR PBB SHADOW E&M-EST. PATIENT-LVL V: CPT | Mod: PBBFAC,,, | Performed by: SURGERY

## 2025-05-06 PROCEDURE — 99215 OFFICE O/P EST HI 40 MIN: CPT | Mod: PBBFAC | Performed by: SURGERY

## 2025-05-06 PROCEDURE — 99024 POSTOP FOLLOW-UP VISIT: CPT | Mod: POP,,, | Performed by: SURGERY

## 2025-05-06 NOTE — PROGRESS NOTES
Surgery Clinic Note    Bambi Carrera is a 65 y.o. year old female in clinic today for follow up of left upper back lipoma excision and shoulder skin tag. She reports having left arm pain, throbbing, and constant/all day long. She denies any wound issues. She reports these pains started after surgery and have not gone away.  Unclear why back lipoma excision and shoulder skin tag excision can lead to arm pain 2 months later, possibly surgical positioning.  Will order ultrasound to eval for fluid collections  No f/c/n/v/sob/cp    ROS:  Negative except above      PE:  Vitals:    05/06/25 1034   BP: 135/80   Pulse: 75       NAD  No belabored breathing  Abd soft nt nd  Incisions c/d/I  Left arm full ROM    A/P:  Bambi Carrera is a 65 y.o. year old female w arm pain following back lipoma and shoulder skin tag excisions    -ultrasound to eval for fluid collections  -refer to ortho  -rtc 2 wks to eval imaging    Joel Sanches  General Surgery - [facility]  5/6/2025

## 2025-05-08 ENCOUNTER — HOSPITAL ENCOUNTER (OUTPATIENT)
Dept: RADIOLOGY | Facility: HOSPITAL | Age: 65
Discharge: HOME | End: 2025-05-08
Attending: FAMILY MEDICINE
Payer: MEDICARE

## 2025-05-08 DIAGNOSIS — Z12.31 ENCOUNTER FOR SCREENING MAMMOGRAM FOR BREAST CANCER: ICD-10-CM

## 2025-05-08 PROCEDURE — 77063 BREAST TOMOSYNTHESIS BI: CPT | Mod: TC,PO

## 2025-05-10 ENCOUNTER — HOSPITAL ENCOUNTER (OUTPATIENT)
Dept: RADIOLOGY | Facility: OTHER | Age: 65
Discharge: HOME | End: 2025-05-10
Attending: SURGERY
Payer: MEDICARE

## 2025-05-10 DIAGNOSIS — D17.9 LIPOMA, UNSPECIFIED SITE: ICD-10-CM

## 2025-05-10 PROCEDURE — 76604 US EXAM CHEST: CPT | Mod: 26,,, | Performed by: RADIOLOGY

## 2025-05-10 PROCEDURE — 76604 US EXAM CHEST: CPT | Mod: TC

## 2025-05-19 ENCOUNTER — OFFICE VISIT (OUTPATIENT)
Dept: SURGERY | Facility: CLINIC | Age: 65
End: 2025-05-19
Payer: MEDICARE

## 2025-05-19 VITALS
WEIGHT: 254.75 LBS | DIASTOLIC BLOOD PRESSURE: 87 MMHG | HEART RATE: 80 BPM | SYSTOLIC BLOOD PRESSURE: 122 MMHG | BODY MASS INDEX: 42.44 KG/M2 | HEIGHT: 65 IN

## 2025-05-19 DIAGNOSIS — M79.602 LEFT ARM PAIN: Primary | ICD-10-CM

## 2025-05-19 PROCEDURE — 99024 POSTOP FOLLOW-UP VISIT: CPT | Mod: POP,,, | Performed by: SURGERY

## 2025-05-19 PROCEDURE — 99999 PR PBB SHADOW E&M-EST. PATIENT-LVL III: CPT | Mod: PBBFAC,,, | Performed by: SURGERY

## 2025-05-19 PROCEDURE — 99213 OFFICE O/P EST LOW 20 MIN: CPT | Mod: PBBFAC | Performed by: SURGERY

## 2025-05-19 NOTE — PROGRESS NOTES
Surgery Clinic Note    Bambi Carrera is a 65 y.o. year old female in clinic today for follow up of left shoulder pain. Ultrasound with no fluid collection and no other apparent cause of pain. I offered an xray and ortho eval (referral already placed).  She reports the left shoulder continues to have pain and arm pain/weakness.    On chart review it appears she has seen multiple providers and had L shoulder/arm imaging in 2020, thus It appears to be a longstanding prior issue.    No f/c/n/v/sob/cp    ROS:  Negative except above    PE:  Vitals:    05/19/25 1029   BP: 122/87   Pulse: 80       NAD  No belabored breathing  Abd soft nt nd  Left shoulder and back incisions: well healed incisions. No edema.  LUE limited range of motion.    A/P:  Bambi Carrera is a 65 y.o. year old female w shoulder pain, left    -ortho referral  -rtc prn    Joel Sanches  General Surgery - [facility]  5/19/2025

## 2025-05-27 DIAGNOSIS — E55.9 VITAMIN D DEFICIENCY: ICD-10-CM

## 2025-05-27 NOTE — TELEPHONE ENCOUNTER
Care Due:                  Date            Visit Type   Department     Provider  --------------------------------------------------------------------------------                                [facility]                              PRIMARY      / INTERNAL MED  Last Visit: 04-      CARE ([facility])   / CAROL Whitmore  Next Visit: None Scheduled  None         None Found                                                            Last  Test          Frequency    Reason                     Performed    Due Date  --------------------------------------------------------------------------------    HBA1C.......  6 months...  tirzepatide,.............  06- 12-    Vitamin D...  12 months..  ergocalciferol...........  Not Found    Overdue    [employer] Embedded Care Due Messages. Reference number: [id number].   5/27/2025 5:17:29 PM CDT

## 2025-05-28 RX ORDER — ERGOCALCIFEROL 1.25 MG/1
50000 CAPSULE ORAL
Qty: 12 CAPSULE | Refills: 1 | Status: SHIPPED | OUTPATIENT
Start: 2025-05-28

## 2025-06-06 ENCOUNTER — OFFICE VISIT (OUTPATIENT)
Dept: URGENT CARE | Facility: CLINIC | Age: 65
End: 2025-06-06
Payer: MEDICARE

## 2025-06-06 VITALS
DIASTOLIC BLOOD PRESSURE: 78 MMHG | SYSTOLIC BLOOD PRESSURE: 125 MMHG | TEMPERATURE: 98 F | RESPIRATION RATE: 20 BRPM | HEIGHT: 65 IN | WEIGHT: 256.63 LBS | HEART RATE: 83 BPM | BODY MASS INDEX: 42.76 KG/M2 | OXYGEN SATURATION: 96 %

## 2025-06-06 DIAGNOSIS — M54.12: Primary | ICD-10-CM

## 2025-06-06 DIAGNOSIS — M53.86 SCIATICA OF LEFT SIDE ASSOCIATED WITH DISORDER OF LUMBAR SPINE: ICD-10-CM

## 2025-06-06 RX ORDER — KETOROLAC TROMETHAMINE 30 MG/ML
30 INJECTION, SOLUTION INTRAMUSCULAR; INTRAVENOUS
Status: COMPLETED | OUTPATIENT
Start: 2025-06-06 | End: 2025-06-06

## 2025-06-06 RX ORDER — DEXAMETHASONE SODIUM PHOSPHATE 10 MG/ML
10 INJECTION INTRAMUSCULAR; INTRAVENOUS
Status: COMPLETED | OUTPATIENT
Start: 2025-06-06 | End: 2025-06-06

## 2025-06-06 RX ORDER — PREDNISONE 20 MG/1
20 TABLET ORAL DAILY
Qty: 4 TABLET | Refills: 0 | Status: SHIPPED | OUTPATIENT
Start: 2025-06-06 | End: 2025-06-10

## 2025-06-06 RX ADMIN — DEXAMETHASONE SODIUM PHOSPHATE 10 MG: 10 INJECTION INTRAMUSCULAR; INTRAVENOUS at 11:06

## 2025-06-06 RX ADMIN — KETOROLAC TROMETHAMINE 30 MG: 30 INJECTION, SOLUTION INTRAMUSCULAR; INTRAVENOUS at 11:06

## 2025-06-09 ENCOUNTER — OFFICE VISIT (OUTPATIENT)
Dept: FAMILY MEDICINE | Facility: CLINIC | Age: 65
End: 2025-06-09
Payer: MEDICARE

## 2025-06-09 ENCOUNTER — TELEPHONE (OUTPATIENT)
Dept: FAMILY MEDICINE | Facility: CLINIC | Age: 65
End: 2025-06-09
Payer: MEDICARE

## 2025-06-09 VITALS
DIASTOLIC BLOOD PRESSURE: 82 MMHG | SYSTOLIC BLOOD PRESSURE: 130 MMHG | BODY MASS INDEX: 42.44 KG/M2 | TEMPERATURE: 98 F | WEIGHT: 254.69 LBS | HEART RATE: 77 BPM | OXYGEN SATURATION: 98 % | HEIGHT: 65 IN

## 2025-06-09 DIAGNOSIS — G89.29 CHRONIC LEFT SHOULDER PAIN: ICD-10-CM

## 2025-06-09 DIAGNOSIS — G89.29 CHRONIC RIGHT-SIDED LOW BACK PAIN WITH RIGHT-SIDED SCIATICA: Primary | ICD-10-CM

## 2025-06-09 DIAGNOSIS — F51.04 PSYCHOPHYSIOLOGICAL INSOMNIA: ICD-10-CM

## 2025-06-09 DIAGNOSIS — M54.41 CHRONIC RIGHT-SIDED LOW BACK PAIN WITH RIGHT-SIDED SCIATICA: Primary | ICD-10-CM

## 2025-06-09 DIAGNOSIS — M25.512 CHRONIC LEFT SHOULDER PAIN: ICD-10-CM

## 2025-06-09 PROCEDURE — 99215 OFFICE O/P EST HI 40 MIN: CPT | Mod: PBBFAC,PO

## 2025-06-09 PROCEDURE — 99999 PR PBB SHADOW E&M-EST. PATIENT-LVL V: CPT | Mod: PBBFAC,,,

## 2025-06-09 RX ORDER — HYDROXYZINE PAMOATE 25 MG/1
25 CAPSULE ORAL NIGHTLY
Qty: 90 CAPSULE | Refills: 3 | Status: SHIPPED | OUTPATIENT
Start: 2025-06-09

## 2025-06-09 RX ORDER — ACETAMINOPHEN 500 MG
1000 TABLET ORAL EVERY 8 HOURS PRN
Qty: 180 TABLET | Refills: 3 | Status: SHIPPED | OUTPATIENT
Start: 2025-06-09

## 2025-06-09 RX ORDER — METHOCARBAMOL 500 MG/1
1000 TABLET, FILM COATED ORAL NIGHTLY PRN
Qty: 120 TABLET | Refills: 2 | Status: SHIPPED | OUTPATIENT
Start: 2025-06-09

## 2025-06-09 RX ORDER — HYDROCODONE BITARTRATE AND ACETAMINOPHEN 5; 325 MG/1; MG/1
1 TABLET ORAL EVERY 12 HOURS PRN
Qty: 14 TABLET | Refills: 0 | Status: SHIPPED | OUTPATIENT
Start: 2025-06-09

## 2025-06-09 RX ORDER — DICLOFENAC SODIUM 75 MG/1
75 TABLET, DELAYED RELEASE ORAL 2 TIMES DAILY
Qty: 180 TABLET | Refills: 1 | Status: SHIPPED | OUTPATIENT
Start: 2025-06-09

## 2025-06-09 NOTE — TELEPHONE ENCOUNTER
Spoke with patient and scheduled appointment

## 2025-06-09 NOTE — Clinical Note
States that 50mg Tramadol was not helpful, requesting higher dose.   I directed her towards NSGY given MRI which shows multilevel foraminal narrowing.  Thanks for the assist Jonh Dhillon

## 2025-06-09 NOTE — PROGRESS NOTES
Assessment & Plan     Chronic right-sided low back pain with right-sided sciatica  -- This is a chronic problem per chart review, which shows that she has seen both a general surgeon and urgent care in the last month for these issues.  Chart review also demonstrates that these issues have been present for at least 1 year, and that the patient had weakness of the right lower extremity and associated low back pain in 05/2024  -- patient was seen for chronic bilateral low back pain multiple times in the last few years, and as far back as 2017 with similar symptoms  -- Most recently, received steroid injection and Toradol shot from urgent care 3 days ago - states this was briefly very helpful  -- minimal relief from pain management injections, high dose gabapentin, 50 mg tramadol  -- denies B/B incontinence  EXAM  -- presents with a cane, marked antalgic gait on exam  PLAN  -- Analgesia:  multiple analgesic medications prescribed as below  -- Neurosurgery:  we reviewed her MRI and discussed the etiology of her radiculopathy.  I discussed that a conversation with Neurosurgery was likely a good idea at this time, given the fact that she now requires a cane for ambulation due to severe pain.  She agrees and will schedule an appointment to discuss her symptoms and further management   -- Weight:  she has lost approximately 50 lb in the last year and a half, commended and encouraged  Orders:  -     methocarbamoL (ROBAXIN) 500 MG Tab; Take 2 tablets (1,000 mg total) by mouth nightly as needed (pain). Take at bedtime only. No driving, heavy machinery or strenuous activity while taking  -     diclofenac (VOLTAREN) 75 MG EC tablet; Take 1 tablet (75 mg total) by mouth 2 (two) times daily.  -     Ambulatory referral/consult to Neurosurgery; Future  -     acetaminophen (TYLENOL) 500 MG tablet; Take 2 tablets (1,000 mg total) by mouth every 8 (eight) hours as needed for Pain.    Chronic left shoulder pain  Chronic, less severe and  less debilitating than sciatica described above.  Has referral to Orthopedics - asked her to please call the referral desk to schedule this appointment.       Insomnia   Falling asleep at about 3:00 a.m. daily, falls asleep watching Law and order.  Jokes that she could tell you what happens and every single episode of the show.   -- discussed sleep hygiene at length  -- given chronic radiculopathy as above, also recommended Mg supplementation in the evenings  -- prescribed Vistaril for her to take nightly PRN - will monitor for efficacy         HPI   Bambi Carrera is a 65 y.o. female with multiple medical diagnoses as listed in the medical history and problem list who presents for various chronic conditions as detailed above.    ROS:  As above.    Follow Up with Orthopedics as discussed      Health Maintenance         Date Due Completion Date    DEXA Scan Never done ---    RSV Vaccine (Age 60+ and Pregnant patients) (1 - Risk 60-74 years 1-dose series) Never done ---    COVID-19 Vaccine (4 - 2024-25 season) 09/01/2024 12/23/2021    Hemoglobin A1c (Prediabetes) 06/14/2025 6/14/2024    HIV Screening 07/15/2026 (Originally 4/1/1975) ---    Mammogram 05/08/2026 5/8/2025    Override on 6/10/2015: Done (normal)    Override on 6/10/2014: Done (normal)    TETANUS VACCINE 06/08/2026 6/8/2016    Override on 4/1/2005: Done    Colorectal Cancer Screening 07/15/2028 7/15/2021    Lipid Panel 06/14/2029 6/14/2024               Physical Exam     Vital signs reviewed  There is no height or weight on file to calculate BMI.  General:  Well-developed, well-nourished. NAD  Skin:  Warm, dry  Head:  NC/AT   Eyes:  Conjunctivae w/o exudates or hemorrhage.  Non-icteric sclerae.  Neck:  Trachea is midline.    Lungs:  CTAB without rales, rhonchi or wheezing.   Breathing comfortably on RA.    Heart:  Normal S1 & S2.  No extra heart sounds.   No pulsus alternans.  Abdomen:  Symmetric, non-distended, non-tender  Neuro:  A&O4.  No obvious  focal deficits  Extremities:  Radial pulses 2+ and symmetric  Psychiatric:  Appropriate affect    Additional pertinent exam findings included above in A/P       History     Past Medical History:  Past Medical History:   Diagnosis Date    Anxiety disorder, unspecified     Borderline diabetic     Depression     Eye injury     fb os     Hypertension     Menopause     Nuclear sclerosis of both eyes 07/01/2019       Past Surgical History:  Past Surgical History:   Procedure Laterality Date    COLONOSCOPY N/A 7/15/2021    Procedure: COLONOSCOPY;  Surgeon: Geetha Walton MD;  Location: [facility] ENDO;  Service: Endoscopy;  Laterality: N/A;  Fully Vaccinated 3/25/21  see media tab 6/1/21, prep instr portal, and mailed -ml    EXCISION OF MASS OF BACK Left 3/3/2025    Procedure: EXCISION, MASS, BACK;  Surgeon: Joel Sanches MD;  Location: [facility] OR;  Service: General;  Laterality: Left;  back mass and skin lesion to the left of it. lateral position  RN PREOP 2/24/2025----BMI--44.28    INJECTION OF JOINT Right 7/17/2024    Procedure: Right Hip Steroid Injection;  Surgeon: Italo James Jr., MD;  Location: [facility] PAIN MANAGEMENT;  Service: Pain Management;  Laterality: Right;  @1100  No ATC or DM    LASIK Bilateral 07/15/2016    TRANSFORAMINAL EPIDURAL INJECTION OF STEROID Right 10/2/2024    Procedure: Right L4 Transforaminal Epidural Steroid Injection;  Surgeon: Italo James Jr., MD;  Location: [facility] PAIN MANAGEMENT;  Service: Pain Management;  Laterality: Right;  @1045  No ATC or DM  MD Sign.       Social History:  Social History[1]    Family History:  Family History   Problem Relation Name Age of Onset    Cancer Mother      No Known Problems Father      No Known Problems Sister      No Known Problems Brother      No Known Problems Maternal Aunt      No Known Problems Maternal Uncle      No Known Problems Paternal Aunt      No Known Problems Paternal Uncle      No Known Problems Maternal Grandmother      No Known  Problems Maternal Grandfather      No Known Problems Paternal Grandmother      No Known Problems Paternal Grandfather      Amblyopia Neg Hx      Blindness Neg Hx      Cataracts Neg Hx      Diabetes Neg Hx      Glaucoma Neg Hx      Hypertension Neg Hx      Macular degeneration Neg Hx      Retinal detachment Neg Hx      Strabismus Neg Hx      Stroke Neg Hx      Thyroid disease Neg Hx         Allergies and Medications: (updated and reviewed)  Review of patient's allergies indicates:  No Known Allergies  Current Medications[2]    Patient Care Team:  Wong Whitmore MD as PCP - General (Family Medicine)        Jonh Kirk PA-C  Family Medicine  [facility]         - The patient is given an After Visit Summary that lists all medications with directions, allergies, education, orders placed during this encounter and follow-up instructions.      - I have reviewed the patient's medical information including past medical, family, and social history sections including the medications and allergies.      - We discussed the patient's current medications.     This note was created by combination of typed  and MModal dictation.  Transcription errors may be present.  If there are any questions, please contact me.                     [1]   Social History  Socioeconomic History    Marital status: Single   Occupational History     Employer: [employer]   Tobacco Use    Smoking status: Never    Smokeless tobacco: Never   Substance and Sexual Activity    Alcohol use: No     Alcohol/week: 0.0 standard drinks of alcohol    Drug use: No    Sexual activity: Not Currently   [2]   Current Outpatient Medications   Medication Sig Dispense Refill    albuterol (VENTOLIN HFA) 90 mcg/actuation inhaler Inhale 2 puffs into the lungs every 6 (six) hours as needed for Wheezing. Rescue 18 g 0    amLODIPine (NORVASC) 5 MG tablet Take 1 tablet (5 mg total) by mouth once daily. 90 tablet 3    azelastine (ASTELIN) 137  mcg (0.1 %) nasal spray 1 spray (137 mcg total) by Nasal route 2 (two) times daily. 30 mL 3    cetirizine (ZYRTEC) 10 MG tablet Take 1 tablet (10 mg total) by mouth once daily. 30 tablet 0    diclofenac sodium (VOLTAREN) 1 % Gel Apply 2 g topically once daily. 100 g 3    diphenhydrAMINE-aluminum-magnesium hydroxide-simethicone-LIDOcaine viscous HCl 2% Swish and spit 15 mLs every 4 (four) hours as needed (Sore throat). 220ml total 220 each 0    ergocalciferol (ERGOCALCIFEROL) 50,000 unit Cap Take 1 capsule by mouth once a week 12 capsule 1    famotidine (PEPCID) 40 MG tablet Take 1 tablet (40 mg total) by mouth every evening. 30 tablet 11    fluticasone propionate (FLONASE) 50 mcg/actuation nasal spray 1 spray (50 mcg total) by Each Nostril route once daily. 11.1 mL 0    hydroCHLOROthiazide (HYDRODIURIL) 25 MG tablet Take 1 tablet (25 mg total) by mouth once daily. 90 tablet 3    HYDROcodone-acetaminophen (NORCO) 5-325 mg per tablet Take 1 tablet by mouth every 6 (six) hours as needed for Pain. 10 tablet 0    levocetirizine (XYZAL) 5 MG tablet TAKE 1 TABLET(5 MG) BY MOUTH EVERY EVENING 90 tablet 0    LIDOcaine (LIDODERM) 5 % Place 1 patch onto the skin once daily. Remove & Discard patch within 12 hours or as directed by MD 14 patch 0    loratadine (CLARITIN) 10 mg tablet Take 1 tablet by mouth once daily 90 tablet 3    meclizine (ANTIVERT) 25 mg tablet Take 1 tablet (25 mg total) by mouth 3 (three) times daily as needed for Dizziness (vertigo). 15 tablet 0    methocarbamoL (ROBAXIN) 500 MG Tab Take 1 tablet (500 mg total) by mouth 2 (two) times daily as needed. 60 tablet 1    multivitamin (THERAGRAN) per tablet Take 1 tablet by mouth once daily.      naproxen (NAPROSYN) 500 MG tablet Take 1 tablet (500 mg total) by mouth 2 (two) times daily as needed. 60 tablet 1    pantoprazole (PROTONIX) 40 MG tablet Take 1 tablet by mouth once daily 90 tablet 3    predniSONE (DELTASONE) 20 MG tablet Take 1 tablet (20 mg total) by  mouth once daily. for 4 days 4 tablet 0    pregabalin (LYRICA) 50 MG capsule Take 1 capsule (50 mg total) by mouth 2 (two) times daily. 60 capsule 5    tirzepatide, weight loss, (ZEPBOUND) 10 mg/0.5 mL PnIj Inject 10 mg into the skin every 7 days. 4 Pen 3    traMADoL (ULTRAM) 50 mg tablet Take 1 tablet (50 mg total) by mouth every 12 (twelve) hours as needed for Pain. 60 tablet 2     No current facility-administered medications for this visit.

## 2025-06-09 NOTE — PATIENT INSTRUCTIONS
Thank you for seeing me today.    PAIN REGIMEN  Daily:  Lyrica, diclofenac (no other NSAIDs with this, including ibuprofen or naproxen), Tylenol (1000 mg 3x per day)  Nightly:  Robaxin    Please call the Referral Desk to schedule Neurosurgery, Orthopedics Appointment at your earliest convenience:  [phone]    Pt Education: Sleep Hygiene     1. Avoid watching TV, eating, and discussing emotional issues in bed. The bed should be used for sleep and sex only. If not, we can associate the bed with other activities and it often becomes difficult to fall asleep.  2. Minimize noise, light, and temperature extremes during sleep with ear plugs, window blinds, or an electric blanket or air conditioner. Even the slightest nighttime noises or luminescent lights can disrupt the quality of your sleep. Try to keep your bedroom at a comfortable temperature -- not too hot (above 75 degrees) or too cold (below 54 degrees).  3. Try not to drink fluids after 8 p.m. This may reduce awakenings due to urination.  4. Avoid naps, but if you do nap, make it no more than about 25 minutes about eight hours after you awake. But if you have problems falling asleep, then no naps for you.  5. Do not expose your self to bright light if you need to get up at night. Use a small night-light instead.  6. Nicotine is a stimulant and should be avoided particularly near bedtime and upon night awakenings. Having a smoke before bed, although it may feel relaxing, is actually putting a stimulant into your bloodstream.  7. Caffeine is also a stimulant and is present in coffee (100-200 mg), soda (50-75 mg), tea (50-75 mg), and various over-the-counter medications. Caffeine should be discontinued at least four to six hours before bedtime. If you consume large amounts of caffeine and you cut your self off too quickly, beware; you may get headaches that could keep you awake.  8. Although alcohol is a depressant and may help you fall asleep, the subsequent  metabolism that clears it from your body when you are sleeping causes a withdrawal syndrome. This withdrawal causes awakenings and is often associated with nightmares and sweats.  9. A light snack may be sleep-inducing, but a heavy meal too close to bedtime interferes with sleep.  Stay away from protein and stick to carbohydrates or dairy products.  Milk contains the amino acid L-tryptophan, which has been shown in research to help people go to sleep.  However, if you have GERD this pre-bed snack will surely make it worse, so exercise caution here.     Melatonin:  Taken 30-45 minutes before bed, supplemental melatonin can be sleep-inducing.  Start with 1 mg and slowly increase to find an effective dose.    Magnesium:  You might also try supplementing with magnesium glycinate (about 2-300 mg) or magnesium oxide (about 4-500 mg) about 1 hour before bed to promote relaxation and improve sleep quality    Vistaril:  Also prescribed Vistaril (hydroxyzine) today - please take about 1 hour before bed, and do not drive or perform any strenuous or dangerous activities while taking.        Please don't hesitate to seek emergency care if you develop any new or worsening symptoms.

## 2025-06-09 NOTE — TELEPHONE ENCOUNTER
Copied from CRM #[id number]. Topic: Appointments - Appointment Access  >> Jun 6, 2025 10:09 AM V[place]a wrote:  .Type:  Sooner Appointment Request    Patient is requesting a sooner appointment.  Patient declined first available appointment listed as well as another facility and provider .  Patient will not accept being placed on the waitlist and is requesting a message be sent to doctor.    Name of Caller: self     When is the first available appointment? Thursday 06/12/25    Symptoms: shoulder pain     Would the patient rather a call back or a response via [facility]? Call     Best Call Back Number: .[phone]      Additional Information:

## 2025-06-09 NOTE — ASSESSMENT & PLAN NOTE
-- This is a chronic problem per chart review, which shows that she has seen both a general surgeon and urgent care in the last month for these issues.  Chart review also demonstrates that these issues have been present for at least 1 year, and that the patient had weakness of the right lower extremity and associated low back pain in 05/2024  -- patient was seen for chronic bilateral low back pain multiple times in the last few years, and as far back as 2017 with similar symptoms  -- Most recently, received steroid injection and Toradol shot from urgent care 3 days ago - states this was briefly very helpful  -- minimal relief from pain management injections, high dose gabapentin, 50 mg tramadol  -- denies B/B incontinence  EXAM  -- presents with a cane, marked antalgic gait on exam  PLAN  -- Analgesia:  multiple analgesic medications prescribed as below  -- Neurosurgery:  we reviewed her MRI and discussed the etiology of her radiculopathy.  I discussed that a conversation with Neurosurgery was likely a good idea at this time, given the fact that she now requires a cane for ambulation due to severe pain.  She agrees and will schedule an appointment to discuss her symptoms and further management   -- Weight:  she has lost approximately 50 lb in the last year and a half, commended and encouraged

## 2025-06-10 ENCOUNTER — TELEPHONE (OUTPATIENT)
Dept: FAMILY MEDICINE | Facility: CLINIC | Age: 65
End: 2025-06-10
Payer: MEDICARE

## 2025-06-10 NOTE — TELEPHONE ENCOUNTER
----- Message from Wong Whitmore MD sent at 6/10/2025  9:14 AM CDT -----  Switched to norco as tramadol was ineffective  ----- Message -----  From: Yessy Sorto MA  Sent: 6/10/2025   8:56 AM CDT  To: Wong Whitmore MD    See message below  ----- Message -----  From: Carly Powers MA  Sent: 6/10/2025   8:51 AM CDT  To: Myranda Back Staff    Type:  Pharmacy Calling to Clarify an RXName of Caller:[facility] Name:Prescription Name:HYDROcodone-acetaminophen (NORCO) 5-325 mg per tablet and traMADoL (ULTRAM) 50 mg tabletWhat do they need to clarify?Pt had a 30 day supply of tramadol filled 6/2 , is she still taking it ?Can you be contacted via [facility]?Best Call Back Number:[phone] Information:

## 2025-06-25 DIAGNOSIS — N18.31 STAGE 3A CHRONIC KIDNEY DISEASE: ICD-10-CM

## 2025-06-25 DIAGNOSIS — R73.03 PREDIABETES: ICD-10-CM

## 2025-06-26 DIAGNOSIS — G89.29 CHRONIC RIGHT-SIDED LOW BACK PAIN WITH RIGHT-SIDED SCIATICA: ICD-10-CM

## 2025-06-26 DIAGNOSIS — M54.41 CHRONIC RIGHT-SIDED LOW BACK PAIN WITH RIGHT-SIDED SCIATICA: ICD-10-CM

## 2025-06-26 RX ORDER — HYDROCODONE BITARTRATE AND ACETAMINOPHEN 5; 325 MG/1; MG/1
1 TABLET ORAL EVERY 12 HOURS PRN
Qty: 14 TABLET | Refills: 0 | Status: SHIPPED | OUTPATIENT
Start: 2025-06-26

## 2025-06-27 ENCOUNTER — TELEPHONE (OUTPATIENT)
Dept: FAMILY MEDICINE | Facility: CLINIC | Age: 65
End: 2025-06-27
Payer: MEDICARE

## 2025-06-29 DIAGNOSIS — M54.16 LUMBAR RADICULOPATHY: ICD-10-CM

## 2025-06-30 ENCOUNTER — PATIENT MESSAGE (OUTPATIENT)
Dept: ADMINISTRATIVE | Facility: HOSPITAL | Age: 65
End: 2025-06-30
Payer: MEDICARE

## 2025-06-30 NOTE — TELEPHONE ENCOUNTER
No care due was identified.  [facility] Embedded Care Due Messages. Reference number: [id number].   6/29/2025 7:11:18 PM CDT

## 2025-07-01 ENCOUNTER — TELEPHONE (OUTPATIENT)
Dept: FAMILY MEDICINE | Facility: CLINIC | Age: 65
End: 2025-07-01
Payer: MEDICARE

## 2025-07-01 ENCOUNTER — OFFICE VISIT (OUTPATIENT)
Dept: URGENT CARE | Facility: CLINIC | Age: 65
End: 2025-07-01
Payer: MEDICARE

## 2025-07-01 VITALS
RESPIRATION RATE: 18 BRPM | HEART RATE: 89 BPM | BODY MASS INDEX: 42.24 KG/M2 | TEMPERATURE: 98 F | WEIGHT: 253.5 LBS | DIASTOLIC BLOOD PRESSURE: 82 MMHG | SYSTOLIC BLOOD PRESSURE: 107 MMHG | HEIGHT: 65 IN | OXYGEN SATURATION: 96 %

## 2025-07-01 DIAGNOSIS — H65.03 NON-RECURRENT ACUTE SEROUS OTITIS MEDIA OF BOTH EARS: Primary | ICD-10-CM

## 2025-07-01 PROCEDURE — 99214 OFFICE O/P EST MOD 30 MIN: CPT | Mod: S$GLB,,,

## 2025-07-01 RX ORDER — PREGABALIN 50 MG/1
50 CAPSULE ORAL 2 TIMES DAILY
Qty: 60 CAPSULE | Refills: 3 | Status: SHIPPED | OUTPATIENT
Start: 2025-07-01

## 2025-07-01 RX ORDER — PSEUDOEPHEDRINE HCL 30 MG
30 TABLET ORAL EVERY 6 HOURS PRN
Qty: 28 TABLET | Refills: 0 | Status: SHIPPED | OUTPATIENT
Start: 2025-07-01 | End: 2025-07-08

## 2025-07-01 RX ORDER — CETIRIZINE HYDROCHLORIDE 10 MG/1
10 TABLET ORAL DAILY
Qty: 30 TABLET | Refills: 0 | Status: SHIPPED | OUTPATIENT
Start: 2025-07-01

## 2025-07-01 NOTE — PROGRESS NOTES
"Subjective:      Patient ID: Bambi Carrera is a 65 y.o. female.    Vitals:  height is 5' 5" (1.651 m) and weight is 115 kg (253 lb 8.5 oz). Her oral temperature is 97.9 °F (36.6 °C). Her blood pressure is 107/82 and her pulse is 89. Her respiration is 18 and oxygen saturation is 96%.     Chief Complaint: Otalgia    Patient is a 65-year-old female with complaints of bilateral ear pain/pressure for 1 week.  Patient does admit that she has some sinus congestion as well, has not taken any Zyrtec or Flonase for her symptoms.  She denies any fever, cough, shortness for breath, chest pain, sore throat or other symptoms.  Patient does take pain medications for her sciatic pain, and she has tried some over-the-counter eardrops for her ear pain.    Otalgia   There is pain in both ears. This is a new problem. Episode onset: one week ago. The problem has been gradually worsening. There has been no fever. The pain is at a severity of 7/10. The pain is moderate. Associated symptoms include neck pain. Pertinent negatives include no abdominal pain, coughing, headaches or sore throat. She has tried ear drops for the symptoms. The treatment provided no relief. There is no history of a chronic ear infection, hearing loss or a tympanostomy tube.       Constitution: Negative for fever and generalized weakness.   HENT:  Positive for ear pain and congestion. Negative for sinus pain and sore throat.    Neck: Positive for neck pain.   Cardiovascular:  Negative for chest pain.   Respiratory:  Negative for cough and shortness of breath.    Gastrointestinal:  Negative for abdominal pain.   Neurological:  Negative for headaches.      Objective:     Physical Exam   Constitutional: She is oriented to person, place, and time. She appears well-developed. She is cooperative.  Non-toxic appearance. She does not appear ill. No distress.      Comments:Awake, alert, no acute distress.  Sitting comfortably on exam stretcher     HENT:   Head: " Normocephalic and atraumatic.   Ears:   Right Ear: Hearing, external ear and ear canal normal. Tympanic membrane is not erythematous and not bulging. A middle ear effusion (Serous) is present.   Left Ear: Hearing, external ear and ear canal normal. Tympanic membrane is not erythematous and not bulging. A middle ear effusion (Serous) is present.   Nose: Nose normal. No mucosal edema, rhinorrhea or nasal deformity. No epistaxis. Right sinus exhibits no maxillary sinus tenderness and no frontal sinus tenderness. Left sinus exhibits no maxillary sinus tenderness and no frontal sinus tenderness.   Mouth/Throat: Uvula is midline, oropharynx is clear and moist and mucous membranes are normal. No trismus in the jaw. Normal dentition. No uvula swelling. No oropharyngeal exudate, posterior oropharyngeal edema or posterior oropharyngeal erythema.   Eyes: Conjunctivae and lids are normal. No scleral icterus.   Neck: Trachea normal and phonation normal. Neck supple. No edema present. No erythema present. No neck rigidity present.   Cardiovascular: Normal rate, regular rhythm, normal heart sounds and normal pulses.   Pulmonary/Chest: Effort normal and breath sounds normal. No respiratory distress. She has no decreased breath sounds. She has no rhonchi.   Abdominal: Normal appearance.   Musculoskeletal: Normal range of motion.         General: No deformity. Normal range of motion.   Neurological: She is alert and oriented to person, place, and time. She exhibits normal muscle tone. Coordination normal.   Skin: Skin is warm, dry, intact, not diaphoretic and not pale.   Psychiatric: Her speech is normal and behavior is normal. Judgment and thought content normal.   Nursing note and vitals reviewed.      Assessment:     1. Non-recurrent acute serous otitis media of both ears        Plan:   Physical exam consistent with serous otitis media of both ears.  Discussed with patient to use decongestants such as a low-dose Sudafed,  antihistamine, nasal spray to help dry out her sinuses.  Cautioned patient on Sudafed use, as patient does have history of hypertension.  However patient states she is compliant with her prescribed antihypertensives, per chart review blood pressure has has been very well controlled over her recent visits.  She may also continue to take her prescribed pain medication for her chronic back pain with sciatica.  Discussed return precautions with patient, as well as anticipatory guidance of symptoms resolution.  She acknowledges understanding and we will follow up as discussed.    Non-recurrent acute serous otitis media of both ears  -     cetirizine (ZYRTEC) 10 MG tablet; Take 1 tablet (10 mg total) by mouth once daily.  Dispense: 30 tablet; Refill: 0  -     pseudoephedrine (SUDAFED) 30 MG tablet; Take 1 tablet (30 mg total) by mouth every 6 (six) hours as needed for Congestion.  Dispense: 28 tablet; Refill: 0

## 2025-07-01 NOTE — TELEPHONE ENCOUNTER
Copied from CRM #[id number]. Topic: General Inquiry - Patient Advice  >> Jul 1, 2025 11:12 AM Kalpana wrote:  Type: Patient Call Back    Who called: Alessia pierce/ [facility]    What is the request in detail: Calling to get last office visit for patient    Can the clinic reply by [facility]?No     Would the patient rather a call back or a response via [facility]?Call Back    Best call back number:[phone]    Additional Information:

## 2025-07-16 DIAGNOSIS — M54.41 CHRONIC RIGHT-SIDED LOW BACK PAIN WITH RIGHT-SIDED SCIATICA: ICD-10-CM

## 2025-07-16 DIAGNOSIS — G89.29 CHRONIC RIGHT-SIDED LOW BACK PAIN WITH RIGHT-SIDED SCIATICA: ICD-10-CM

## 2025-07-16 RX ORDER — HYDROCODONE BITARTRATE AND ACETAMINOPHEN 5; 325 MG/1; MG/1
1 TABLET ORAL EVERY 12 HOURS PRN
Qty: 14 TABLET | Refills: 0 | OUTPATIENT
Start: 2025-07-16

## 2025-07-16 NOTE — TELEPHONE ENCOUNTER
Copied from CRM #[id number]. Topic: Medications - Medication Refill  >> Jul 15, 2025  1:19 PM Man wrote:  .Type: RX Refill Request    Who Called: Self     Have you contacted your pharmacy: yes     Refill or New Rx: refill     RX Name and Strength:HYDROcodone-acetaminophen (NORCO) 5-325 mg per tablet ,   How is the patient currently taking it? (ex. 1XDay): per dr juares     Is this a 30 day or 90 day RX:  per dr juares     Preferred Pharmacy with phone number: .  [id number]  [place] - [address] [address] [facility]  [place] [address]  Phone: [phone] Fax: [phone]              Local or Mail Order: local     Ordering Provider: carmita irwin     Would the patient rather a call back or a response via [facility]?  Call back     Best Call Back Number: .[phone]      Additional Information:  pt got 14 pills last time

## 2025-07-16 NOTE — TELEPHONE ENCOUNTER
No care due was identified.  [facility] Embedded Care Due Messages. Reference number: [id number].   7/16/2025 9:46:47 AM CDT

## 2025-07-17 NOTE — TELEPHONE ENCOUNTER
Copied from CRM #[id number]. Topic: Medications - Medication Status Check   >> Jul 17, 2025  4:21 PM Jan wrote:  Type: Patient Call Back    Who called: self    What is the request in detail: Patient is asking the status of her HYDROcodone-acetaminophen (NORCO) 5-325 mg per tablet      Can the clinic reply by [facility]?no    Would the patient rather a call back or a response via [facility]? call    Best call back number: [phone]      Additional Information:

## 2025-07-23 ENCOUNTER — OFFICE VISIT (OUTPATIENT)
Dept: FAMILY MEDICINE | Facility: CLINIC | Age: 65
End: 2025-07-23
Payer: MEDICARE

## 2025-07-23 VITALS
OXYGEN SATURATION: 98 % | BODY MASS INDEX: 42.28 KG/M2 | HEART RATE: 73 BPM | SYSTOLIC BLOOD PRESSURE: 114 MMHG | WEIGHT: 253.75 LBS | DIASTOLIC BLOOD PRESSURE: 82 MMHG | HEIGHT: 65 IN | TEMPERATURE: 98 F

## 2025-07-23 DIAGNOSIS — Z78.0 POST-MENOPAUSAL: ICD-10-CM

## 2025-07-23 DIAGNOSIS — R79.9 ABNORMAL FINDING OF BLOOD CHEMISTRY, UNSPECIFIED: ICD-10-CM

## 2025-07-23 DIAGNOSIS — E66.813 CLASS 3 SEVERE OBESITY DUE TO EXCESS CALORIES WITH SERIOUS COMORBIDITY AND BODY MASS INDEX (BMI) OF 50.0 TO 59.9 IN ADULT: ICD-10-CM

## 2025-07-23 DIAGNOSIS — M54.41 CHRONIC RIGHT-SIDED LOW BACK PAIN WITH RIGHT-SIDED SCIATICA: ICD-10-CM

## 2025-07-23 DIAGNOSIS — G89.29 CHRONIC RIGHT-SIDED LOW BACK PAIN WITH RIGHT-SIDED SCIATICA: ICD-10-CM

## 2025-07-23 DIAGNOSIS — I10 ESSENTIAL HYPERTENSION: Primary | ICD-10-CM

## 2025-07-23 DIAGNOSIS — M25.512 LEFT SHOULDER PAIN, UNSPECIFIED CHRONICITY: ICD-10-CM

## 2025-07-23 DIAGNOSIS — M54.31 SCIATICA OF RIGHT SIDE: ICD-10-CM

## 2025-07-23 PROCEDURE — 99215 OFFICE O/P EST HI 40 MIN: CPT | Mod: PBBFAC,PO | Performed by: FAMILY MEDICINE

## 2025-07-23 PROCEDURE — 99999 PR PBB SHADOW E&M-EST. PATIENT-LVL V: CPT | Mod: PBBFAC,,, | Performed by: FAMILY MEDICINE

## 2025-07-23 RX ORDER — TIRZEPATIDE 10 MG/.5ML
10 INJECTION, SOLUTION SUBCUTANEOUS
Start: 2025-07-23

## 2025-07-23 RX ORDER — HYDROCODONE BITARTRATE AND ACETAMINOPHEN 5; 325 MG/1; MG/1
1 TABLET ORAL EVERY 12 HOURS PRN
Qty: 14 TABLET | Refills: 0 | Status: SHIPPED | OUTPATIENT
Start: 2025-08-23

## 2025-07-23 RX ORDER — HYDROCODONE BITARTRATE AND ACETAMINOPHEN 5; 325 MG/1; MG/1
1 TABLET ORAL EVERY 12 HOURS PRN
Qty: 14 TABLET | Refills: 0 | Status: SHIPPED | OUTPATIENT
Start: 2025-07-23

## 2025-07-23 NOTE — PROGRESS NOTES
[facility]  Progress Note    SUBJECTIVE:     Chief Complaint   Patient presents with    Medication Refill       HPI   Bambi Carrera  is a 65 y.o. female here for follow-up of her chronic conditions. C/o lower back pain and R sided sciatica which has been on/off. Recently got back from [place] for vacation. No falls/trauma.     Review of patient's allergies indicates:  No Known Allergies    Past Medical History:   Diagnosis Date    Anxiety disorder, unspecified     Borderline diabetic     Depression     Eye injury     fb os     Hypertension     Menopause     Nuclear sclerosis of both eyes 07/01/2019     Past Surgical History:   Procedure Laterality Date    COLONOSCOPY N/A 7/15/2021    Procedure: COLONOSCOPY;  Surgeon: Geetha Walton MD;  Location: [facility] ENDO;  Service: Endoscopy;  Laterality: N/A;  Fully Vaccinated 3/25/21  see media tab 6/1/21, prep instr portal, and mailed -ml    EXCISION OF MASS OF BACK Left 3/3/2025    Procedure: EXCISION, MASS, BACK;  Surgeon: Joel Sanches MD;  Location: [facility] OR;  Service: General;  Laterality: Left;  back mass and skin lesion to the left of it. lateral position  RN PREOP 2/24/2025----BMI--44.28    INJECTION OF JOINT Right 7/17/2024    Procedure: Right Hip Steroid Injection;  Surgeon: Italo James Jr., MD;  Location: [facility] PAIN MANAGEMENT;  Service: Pain Management;  Laterality: Right;  @1100  No ATC or DM    LASIK Bilateral 07/15/2016    TRANSFORAMINAL EPIDURAL INJECTION OF STEROID Right 10/2/2024    Procedure: Right L4 Transforaminal Epidural Steroid Injection;  Surgeon: Italo James Jr., MD;  Location: [facility] PAIN MANAGEMENT;  Service: Pain Management;  Laterality: Right;  @1045  No ATC or DM  MD Sign.     Family History   Problem Relation Name Age of Onset    Cancer Mother      No Known Problems Father      No Known Problems Sister      No Known Problems Brother      No Known Problems Maternal Aunt      No Known Problems Maternal Uncle       No Known Problems Paternal Aunt      No Known Problems Paternal Uncle      No Known Problems Maternal Grandmother      No Known Problems Maternal Grandfather      No Known Problems Paternal Grandmother      No Known Problems Paternal Grandfather      Amblyopia Neg Hx      Blindness Neg Hx      Cataracts Neg Hx      Diabetes Neg Hx      Glaucoma Neg Hx      Hypertension Neg Hx      Macular degeneration Neg Hx      Retinal detachment Neg Hx      Strabismus Neg Hx      Stroke Neg Hx      Thyroid disease Neg Hx       Social History[1]     Review of Systems   Constitutional:  Negative for chills and fever.   HENT: Negative.     Respiratory: Negative.  Negative for shortness of breath.    Cardiovascular: Negative.  Negative for chest pain.   Gastrointestinal: Negative.  Negative for abdominal pain, nausea and vomiting.   Genitourinary: Negative.    Musculoskeletal:  Positive for back pain and joint pain (R leg). Negative for falls.   Neurological:  Negative for headaches.   All other systems reviewed and are negative.    OBJECTIVE:     Vitals:    07/23/25 1335   BP: 114/82   Pulse: 73   Temp: 97.9 °F (36.6 °C)     Body mass index is 42.23 kg/m².    Physical Exam  Constitutional:       General: She is not in acute distress.     Appearance: She is not diaphoretic.   HENT:      Head: Normocephalic and atraumatic.      Nose: Nose normal.   Eyes:      Conjunctiva/sclera: Conjunctivae normal.   Cardiovascular:      Rate and Rhythm: Normal rate and regular rhythm.      Heart sounds: Normal heart sounds. No murmur heard.     No friction rub. No gallop.   Pulmonary:      Effort: Pulmonary effort is normal. No respiratory distress.      Breath sounds: Normal breath sounds. No wheezing or rales.   Abdominal:      Palpations: Abdomen is soft.   Skin:     General: Skin is warm.   Neurological:      Mental Status: She is alert and oriented to person, place, and time.         Old records were reviewed. Labs and/or images were  independently reviewed.    ASSESSMENT     1. Essential hypertension    2. Post-menopausal    3. Abnormal finding of blood chemistry, unspecified    4. Chronic right-sided low back pain with right-sided sciatica    5. Class 3 severe obesity due to excess calories with serious comorbidity and body mass index (BMI) of 50.0 to 59.9 in adult    6. Sciatica of right side    7. Left shoulder pain, unspecified chronicity        PLAN:     Essential hypertension  -     CBC Auto Differential; Future  -     Comprehensive Metabolic Panel; Future  -     Hemoglobin A1C; Future  -     Lipid Panel; Future  -     TSH; Future  -     T4, Free; Future  -     Stable. Continue current regimen.    Post-menopausal  -     DXA Bone Density Axial Skeleton 1 or more sites; Future; Expected date: 07/23/2025    Chronic right-sided low back pain with right-sided sciatica  -     HYDROcodone-acetaminophen (NORCO) 5-325 mg per tablet; Take 1 tablet by mouth every 12 (twelve) hours as needed for Pain.  Dispense: 14 tablet; Refill: 0  -     HYDROcodone-acetaminophen (NORCO) 5-325 mg per tablet; Take 1 tablet by mouth every 12 (twelve) hours as needed for Pain.  Dispense: 14 tablet; Refill: 0  -     Stable. Continue current regimen. Unable to wean at this time but am monitoring closely for any drug toxicity. Checked .    Class 3 severe obesity due to excess calories with serious comorbidity and body mass index (BMI) of 50.0 to 59.9 in adult  -     tirzepatide, weight loss, (ZEPBOUND) 10 mg/0.5 mL PnIj; Inject 10 mg into the skin every 7 days.  -     Counseled patient about healthy diet, exercise habits, and to increase physical activity.    Sciatica of right side  -     Ambulatory Referral/Consult to Physical Therapy; Future; Expected date: 07/30/2025  -     Patient counseled on good posture and stretching exercises. Continue to place ice packs on affected areas 3-4 times daily. Take medications as prescribed. Instructed patient to call MD if  symptoms persist or worsen.    Left shoulder pain, unspecified chronicity  -     Ambulatory Referral/Consult to Physical Therapy; Future; Expected date: 07/30/2025        RTC PRZAIN Whitmore MD  07/23/2025 1:56 PM           [1]   Social History  Tobacco Use    Smoking status: Never    Smokeless tobacco: Never   Substance Use Topics    Alcohol use: No     Alcohol/week: 0.0 standard drinks of alcohol    Drug use: No

## 2025-07-24 ENCOUNTER — PATIENT OUTREACH (OUTPATIENT)
Dept: ADMINISTRATIVE | Facility: HOSPITAL | Age: 65
End: 2025-07-24
Payer: MEDICARE

## 2025-07-24 DIAGNOSIS — N18.31 STAGE 3A CHRONIC KIDNEY DISEASE: Primary | ICD-10-CM

## 2025-07-25 ENCOUNTER — LAB VISIT (OUTPATIENT)
Dept: LAB | Facility: HOSPITAL | Age: 65
End: 2025-07-25
Attending: FAMILY MEDICINE
Payer: MEDICARE

## 2025-07-25 DIAGNOSIS — R79.9 ABNORMAL FINDING OF BLOOD CHEMISTRY, UNSPECIFIED: ICD-10-CM

## 2025-07-25 DIAGNOSIS — I10 ESSENTIAL HYPERTENSION: ICD-10-CM

## 2025-07-25 LAB
ABSOLUTE EOSINOPHIL (OHS): 0.15 K/UL
ABSOLUTE MONOCYTE (OHS): 0.7 K/UL (ref 0.3–1)
ABSOLUTE NEUTROPHIL COUNT (OHS): 2.75 K/UL (ref 1.8–7.7)
ALBUMIN SERPL BCP-MCNC: 3.8 G/DL (ref 3.5–5.2)
ALP SERPL-CCNC: 137 UNIT/L (ref 40–150)
ALT SERPL W/O P-5'-P-CCNC: 33 UNIT/L (ref 0–55)
ANION GAP (OHS): 9 MMOL/L (ref 8–16)
AST SERPL-CCNC: 23 UNIT/L (ref 0–50)
BASOPHILS # BLD AUTO: 0.06 K/UL
BASOPHILS NFR BLD AUTO: 0.8 %
BILIRUB SERPL-MCNC: 0.3 MG/DL (ref 0.1–1)
BUN SERPL-MCNC: 27 MG/DL (ref 8–23)
CALCIUM SERPL-MCNC: 9.2 MG/DL (ref 8.7–10.5)
CHLORIDE SERPL-SCNC: 106 MMOL/L (ref 95–110)
CHOLEST SERPL-MCNC: 214 MG/DL (ref 120–199)
CHOLEST/HDLC SERPL: 3.3 {RATIO} (ref 2–5)
CO2 SERPL-SCNC: 25 MMOL/L (ref 23–29)
CREAT SERPL-MCNC: 1 MG/DL (ref 0.5–1.4)
EAG (OHS): 105 MG/DL (ref 68–131)
ERYTHROCYTE [DISTWIDTH] IN BLOOD BY AUTOMATED COUNT: 15.1 % (ref 11.5–14.5)
GFR SERPLBLD CREATININE-BSD FMLA CKD-EPI: >60 ML/MIN/1.73/M2
GLUCOSE SERPL-MCNC: 96 MG/DL (ref 70–110)
HBA1C MFR BLD: 5.3 % (ref 4–5.6)
HCT VFR BLD AUTO: 38.7 % (ref 37–48.5)
HDLC SERPL-MCNC: 64 MG/DL (ref 40–75)
HDLC SERPL: 29.9 % (ref 20–50)
HGB BLD-MCNC: 12.3 GM/DL (ref 12–16)
IMM GRANULOCYTES # BLD AUTO: 0.01 K/UL (ref 0–0.04)
IMM GRANULOCYTES NFR BLD AUTO: 0.1 % (ref 0–0.5)
LDLC SERPL CALC-MCNC: 124 MG/DL (ref 63–159)
LYMPHOCYTES # BLD AUTO: 3.39 K/UL (ref 1–4.8)
MCH RBC QN AUTO: 28.8 PG (ref 27–31)
MCHC RBC AUTO-ENTMCNC: 31.8 G/DL (ref 32–36)
MCV RBC AUTO: 91 FL (ref 82–98)
NONHDLC SERPL-MCNC: 150 MG/DL
NUCLEATED RBC (/100WBC) (OHS): 0 /100 WBC
PLATELET # BLD AUTO: 249 K/UL (ref 150–450)
PMV BLD AUTO: 11 FL (ref 9.2–12.9)
POTASSIUM SERPL-SCNC: 4 MMOL/L (ref 3.5–5.1)
PROT SERPL-MCNC: 7.8 GM/DL (ref 6–8.4)
RBC # BLD AUTO: 4.27 M/UL (ref 4–5.4)
RELATIVE EOSINOPHIL (OHS): 2.1 %
RELATIVE LYMPHOCYTE (OHS): 48 % (ref 18–48)
RELATIVE MONOCYTE (OHS): 9.9 % (ref 4–15)
RELATIVE NEUTROPHIL (OHS): 39.1 % (ref 38–73)
SODIUM SERPL-SCNC: 140 MMOL/L (ref 136–145)
T4 FREE SERPL-MCNC: 1.11 NG/DL (ref 0.71–1.51)
TRIGL SERPL-MCNC: 130 MG/DL (ref 30–150)
TSH SERPL-ACNC: 1.53 UIU/ML (ref 0.4–4)
WBC # BLD AUTO: 7.06 K/UL (ref 3.9–12.7)

## 2025-07-25 PROCEDURE — 36415 COLL VENOUS BLD VENIPUNCTURE: CPT | Mod: PO

## 2025-07-25 PROCEDURE — 80061 LIPID PANEL: CPT

## 2025-07-25 PROCEDURE — 84439 ASSAY OF FREE THYROXINE: CPT

## 2025-07-25 PROCEDURE — 84443 ASSAY THYROID STIM HORMONE: CPT

## 2025-07-25 PROCEDURE — 83036 HEMOGLOBIN GLYCOSYLATED A1C: CPT

## 2025-07-25 PROCEDURE — 85025 COMPLETE CBC W/AUTO DIFF WBC: CPT

## 2025-07-25 PROCEDURE — 80053 COMPREHEN METABOLIC PANEL: CPT

## 2025-08-05 DIAGNOSIS — M54.16 LUMBAR RADICULOPATHY: ICD-10-CM

## 2025-08-05 NOTE — TELEPHONE ENCOUNTER
Care Due:                  Date            Visit Type   Department     Provider  --------------------------------------------------------------------------------                                [facility]                              PRIMARY      / INTERNAL MED  Last Visit: 07-      CARE ([facility])   / CAROL Whitmore  Next Visit: None Scheduled  None         None Found                                                            Last  Test          Frequency    Reason                     Performed    Due Date  --------------------------------------------------------------------------------    Vitamin D...  12 months..  ergocalciferol...........  Not Found    Overdue    [employer] Embedded Care Due Messages. Reference number: [id number].   8/05/2025 5:30:15 PM CDT

## 2025-08-06 RX ORDER — TRAMADOL HYDROCHLORIDE 50 MG/1
TABLET, FILM COATED ORAL
Qty: 60 TABLET | Refills: 0 | OUTPATIENT
Start: 2025-08-06

## 2025-08-06 NOTE — TELEPHONE ENCOUNTER
Copied from CRM #[id number]. Topic: General Inquiry - Return Call  >> Aug 6, 2025  2:29 PM Chanell wrote:  Type:  Patient Returning Call    Who Called: self    Who Left Message for Patient: Yessy    Does the patient know what this is regarding?:yes    Would the patient rather a call back or a response via [facility] call     Best Call Back Number:.[phone] (home)     Additional Information:

## 2025-08-20 ENCOUNTER — TELEPHONE (OUTPATIENT)
Dept: FAMILY MEDICINE | Facility: CLINIC | Age: 65
End: 2025-08-20
Payer: MEDICARE

## 2025-08-21 ENCOUNTER — HOSPITAL ENCOUNTER (OUTPATIENT)
Dept: RADIOLOGY | Facility: CLINIC | Age: 65
Discharge: HOME | End: 2025-08-21
Attending: FAMILY MEDICINE
Payer: MEDICARE

## 2025-08-21 DIAGNOSIS — Z78.0 POST-MENOPAUSAL: ICD-10-CM

## 2025-08-21 PROCEDURE — 77080 DXA BONE DENSITY AXIAL: CPT | Mod: TC,PO

## (undated) DEVICE — SYR ONLY LUER LOCK 20CC

## (undated) DEVICE — TRAP FLUID SMOKE EVACUATOR

## (undated) DEVICE — TOWEL OR DISP STRL BLUE 4/PK

## (undated) DEVICE — GLOVE SIGNATURE ESSNTL LTX 7

## (undated) DEVICE — Device

## (undated) DEVICE — COVER PROXIMA MAYO STAND

## (undated) DEVICE — SOL IRR SOD CHL .9% POUR

## (undated) DEVICE — BLANKET WARMING LOWER BODY

## (undated) DEVICE — SUPPORT ULNA NERVE PROTECTOR

## (undated) DEVICE — COVER OVERHEAD SURG LT BLUE

## (undated) DEVICE — GOWN NONREINF SET-IN SLV XL

## (undated) DEVICE — GLOVE SENSICARE PI GRN 7.5

## (undated) DEVICE — ELECTRODE REM PLYHSV RETURN 9

## (undated) DEVICE — PACK ENDOSCOPY GENERAL

## (undated) DEVICE — CONTAINER SPECIMEN OR STER 4OZ

## (undated) DEVICE — SYR 10CC LUER LOCK

## (undated) DEVICE — SUT MONOCRYL 4.0 PS2 CP496G

## (undated) DEVICE — APPLICATOR CHLORAPREP ORN 26ML